# Patient Record
Sex: MALE | Race: WHITE | Employment: FULL TIME | URBAN - METROPOLITAN AREA
[De-identification: names, ages, dates, MRNs, and addresses within clinical notes are randomized per-mention and may not be internally consistent; named-entity substitution may affect disease eponyms.]

---

## 2020-09-10 ENCOUNTER — APPOINTMENT (OUTPATIENT)
Dept: ULTRASOUND IMAGING | Age: 50
DRG: 291 | End: 2020-09-10
Payer: COMMERCIAL

## 2020-09-10 ENCOUNTER — HOSPITAL ENCOUNTER (INPATIENT)
Age: 50
LOS: 3 days | Discharge: HOME | DRG: 291 | End: 2020-09-13
Attending: EMERGENCY MEDICINE | Admitting: INTERNAL MEDICINE
Payer: COMMERCIAL

## 2020-09-10 PROBLEM — N17.9 AKI (ACUTE KIDNEY INJURY) (HCC): Status: ACTIVE | Noted: 2020-09-10

## 2020-09-10 LAB
ADENOVIRUS BY PCR: NOT DETECTED
ALBUMIN SERPL-MCNC: 3.5 G/DL (ref 3.5–5.2)
ALP BLD-CCNC: 48 U/L (ref 40–129)
ALT SERPL-CCNC: 7 U/L (ref 0–40)
AMPHETAMINE SCREEN, URINE: NOT DETECTED
ANION GAP SERPL CALCULATED.3IONS-SCNC: 9 MMOL/L (ref 7–16)
AST SERPL-CCNC: 9 U/L (ref 0–39)
BACTERIA: ABNORMAL /HPF
BARBITURATE SCREEN URINE: NOT DETECTED
BASOPHILS ABSOLUTE: 0.06 E9/L (ref 0–0.2)
BASOPHILS RELATIVE PERCENT: 0.8 % (ref 0–2)
BENZODIAZEPINE SCREEN, URINE: NOT DETECTED
BILIRUB SERPL-MCNC: <0.2 MG/DL (ref 0–1.2)
BILIRUBIN DIRECT: <0.2 MG/DL (ref 0–0.3)
BILIRUBIN URINE: NEGATIVE
BILIRUBIN, INDIRECT: NORMAL MG/DL (ref 0–1)
BLOOD, URINE: ABNORMAL
BORDETELLA PARAPERTUSSIS BY PCR: NOT DETECTED
BORDETELLA PERTUSSIS BY PCR: NOT DETECTED
BUN BLDV-MCNC: 37 MG/DL (ref 6–20)
CALCIUM SERPL-MCNC: 8.7 MG/DL (ref 8.6–10.2)
CANNABINOID SCREEN URINE: NOT DETECTED
CHLAMYDOPHILIA PNEUMONIAE BY PCR: NOT DETECTED
CHLORIDE BLD-SCNC: 111 MMOL/L (ref 98–107)
CHLORIDE URINE RANDOM: 64 MMOL/L
CLARITY: CLEAR
CO2: 18 MMOL/L (ref 22–29)
COCAINE METABOLITE SCREEN URINE: POSITIVE
COLOR: YELLOW
CORONAVIRUS 229E BY PCR: NOT DETECTED
CORONAVIRUS HKU1 BY PCR: NOT DETECTED
CORONAVIRUS NL63 BY PCR: NOT DETECTED
CORONAVIRUS OC43 BY PCR: NOT DETECTED
CREAT SERPL-MCNC: 5.9 MG/DL (ref 0.7–1.2)
EOSINOPHILS ABSOLUTE: 0.41 E9/L (ref 0.05–0.5)
EOSINOPHILS RELATIVE PERCENT: 5.8 % (ref 0–6)
FENTANYL SCREEN, URINE: NOT DETECTED
GFR AFRICAN AMERICAN: 12
GFR NON-AFRICAN AMERICAN: 10 ML/MIN/1.73
GLUCOSE BLD-MCNC: 100 MG/DL (ref 74–99)
GLUCOSE URINE: NEGATIVE MG/DL
HCT VFR BLD CALC: 32.1 % (ref 37–54)
HEMOGLOBIN: 10.3 G/DL (ref 12.5–16.5)
HUMAN METAPNEUMOVIRUS BY PCR: NOT DETECTED
HUMAN RHINOVIRUS/ENTEROVIRUS BY PCR: NOT DETECTED
IMMATURE GRANULOCYTES #: 0.02 E9/L
IMMATURE GRANULOCYTES %: 0.3 % (ref 0–5)
INFLUENZA A BY PCR: NOT DETECTED
INFLUENZA B BY PCR: NOT DETECTED
KETONES, URINE: NEGATIVE MG/DL
LEUKOCYTE ESTERASE, URINE: NEGATIVE
LYMPHOCYTES ABSOLUTE: 1.2 E9/L (ref 1.5–4)
LYMPHOCYTES RELATIVE PERCENT: 16.9 % (ref 20–42)
Lab: ABNORMAL
MCH RBC QN AUTO: 32.6 PG (ref 26–35)
MCHC RBC AUTO-ENTMCNC: 32.1 % (ref 32–34.5)
MCV RBC AUTO: 101.6 FL (ref 80–99.9)
METHADONE SCREEN, URINE: NOT DETECTED
MONOCYTES ABSOLUTE: 0.57 E9/L (ref 0.1–0.95)
MONOCYTES RELATIVE PERCENT: 8.1 % (ref 2–12)
MYCOPLASMA PNEUMONIAE BY PCR: NOT DETECTED
NEUTROPHILS ABSOLUTE: 4.82 E9/L (ref 1.8–7.3)
NEUTROPHILS RELATIVE PERCENT: 68.1 % (ref 43–80)
NITRITE, URINE: NEGATIVE
OPIATE SCREEN URINE: NOT DETECTED
OSMOLALITY URINE: 355 MOSM/KG (ref 300–900)
OSMOLALITY: 305 MOSM/KG (ref 285–310)
OXYCODONE URINE: NOT DETECTED
PARAINFLUENZA VIRUS 1 BY PCR: NOT DETECTED
PARAINFLUENZA VIRUS 2 BY PCR: NOT DETECTED
PARAINFLUENZA VIRUS 3 BY PCR: NOT DETECTED
PARAINFLUENZA VIRUS 4 BY PCR: NOT DETECTED
PDW BLD-RTO: 13.7 FL (ref 11.5–15)
PH UA: 6 (ref 5–9)
PHENCYCLIDINE SCREEN URINE: NOT DETECTED
PLATELET # BLD: 312 E9/L (ref 130–450)
PMV BLD AUTO: 10.3 FL (ref 7–12)
POTASSIUM SERPL-SCNC: 5.7 MMOL/L (ref 3.5–5)
POTASSIUM, UR: 42.8 MMOL/L
PROTEIN UA: >=300 MG/DL
RBC # BLD: 3.16 E12/L (ref 3.8–5.8)
RBC UA: ABNORMAL /HPF (ref 0–2)
RESPIRATORY SYNCYTIAL VIRUS BY PCR: NOT DETECTED
SODIUM BLD-SCNC: 138 MMOL/L (ref 132–146)
SODIUM URINE: 60 MMOL/L
SPECIFIC GRAVITY UA: 1.02 (ref 1–1.03)
TOTAL CK: 100 U/L (ref 20–200)
TOTAL PROTEIN: 6.6 G/DL (ref 6.4–8.3)
UROBILINOGEN, URINE: 0.2 E.U./DL
WBC # BLD: 7.1 E9/L (ref 4.5–11.5)
WBC UA: ABNORMAL /HPF (ref 0–5)

## 2020-09-10 PROCEDURE — 87088 URINE BACTERIA CULTURE: CPT

## 2020-09-10 PROCEDURE — 6360000002 HC RX W HCPCS: Performed by: EMERGENCY MEDICINE

## 2020-09-10 PROCEDURE — 0100U HC RESPIRPTHGN MULT REV TRANS & AMP PRB TECH 21 TRGT: CPT

## 2020-09-10 PROCEDURE — 84133 ASSAY OF URINE POTASSIUM: CPT

## 2020-09-10 PROCEDURE — 83935 ASSAY OF URINE OSMOLALITY: CPT

## 2020-09-10 PROCEDURE — 1200000000 HC SEMI PRIVATE

## 2020-09-10 PROCEDURE — 6370000000 HC RX 637 (ALT 250 FOR IP): Performed by: EMERGENCY MEDICINE

## 2020-09-10 PROCEDURE — 80307 DRUG TEST PRSMV CHEM ANLYZR: CPT

## 2020-09-10 PROCEDURE — 83930 ASSAY OF BLOOD OSMOLALITY: CPT

## 2020-09-10 PROCEDURE — 84300 ASSAY OF URINE SODIUM: CPT

## 2020-09-10 PROCEDURE — 82550 ASSAY OF CK (CPK): CPT

## 2020-09-10 PROCEDURE — 87040 BLOOD CULTURE FOR BACTERIA: CPT

## 2020-09-10 PROCEDURE — 83835 ASSAY OF METANEPHRINES: CPT

## 2020-09-10 PROCEDURE — 80048 BASIC METABOLIC PNL TOTAL CA: CPT

## 2020-09-10 PROCEDURE — 6360000002 HC RX W HCPCS: Performed by: INTERNAL MEDICINE

## 2020-09-10 PROCEDURE — 99284 EMERGENCY DEPT VISIT MOD MDM: CPT

## 2020-09-10 PROCEDURE — 6370000000 HC RX 637 (ALT 250 FOR IP): Performed by: INTERNAL MEDICINE

## 2020-09-10 PROCEDURE — 85025 COMPLETE CBC W/AUTO DIFF WBC: CPT

## 2020-09-10 PROCEDURE — 82436 ASSAY OF URINE CHLORIDE: CPT

## 2020-09-10 PROCEDURE — 76770 US EXAM ABDO BACK WALL COMP: CPT

## 2020-09-10 PROCEDURE — 88112 CYTOPATH CELL ENHANCE TECH: CPT

## 2020-09-10 PROCEDURE — 2580000003 HC RX 258: Performed by: EMERGENCY MEDICINE

## 2020-09-10 PROCEDURE — 93005 ELECTROCARDIOGRAM TRACING: CPT | Performed by: EMERGENCY MEDICINE

## 2020-09-10 PROCEDURE — 81001 URINALYSIS AUTO W/SCOPE: CPT

## 2020-09-10 PROCEDURE — 96365 THER/PROPH/DIAG IV INF INIT: CPT

## 2020-09-10 PROCEDURE — 80076 HEPATIC FUNCTION PANEL: CPT

## 2020-09-10 PROCEDURE — 2580000003 HC RX 258: Performed by: INTERNAL MEDICINE

## 2020-09-10 RX ORDER — SODIUM BICARBONATE 650 MG/1
1300 TABLET ORAL 2 TIMES DAILY
Status: DISCONTINUED | OUTPATIENT
Start: 2020-09-10 | End: 2020-09-13 | Stop reason: HOSPADM

## 2020-09-10 RX ORDER — HEPARIN SODIUM 10000 [USP'U]/ML
5000 INJECTION, SOLUTION INTRAVENOUS; SUBCUTANEOUS EVERY 8 HOURS
Status: DISCONTINUED | OUTPATIENT
Start: 2020-09-10 | End: 2020-09-12 | Stop reason: SDUPTHER

## 2020-09-10 RX ORDER — SODIUM CHLORIDE 0.9 % (FLUSH) 0.9 %
10 SYRINGE (ML) INJECTION PRN
Status: DISCONTINUED | OUTPATIENT
Start: 2020-09-10 | End: 2020-09-13 | Stop reason: HOSPADM

## 2020-09-10 RX ORDER — CARVEDILOL 6.25 MG/1
12.5 TABLET ORAL 2 TIMES DAILY WITH MEALS
Status: DISCONTINUED | OUTPATIENT
Start: 2020-09-10 | End: 2020-09-12

## 2020-09-10 RX ORDER — HYDRALAZINE HYDROCHLORIDE 25 MG/1
25 TABLET, FILM COATED ORAL EVERY 8 HOURS SCHEDULED
Status: DISCONTINUED | OUTPATIENT
Start: 2020-09-10 | End: 2020-09-12

## 2020-09-10 RX ORDER — HYDROCHLOROTHIAZIDE 12.5 MG/1
12.5 CAPSULE, GELATIN COATED ORAL DAILY
Status: ON HOLD | COMMUNITY
End: 2020-09-13 | Stop reason: HOSPADM

## 2020-09-10 RX ORDER — ACETAMINOPHEN 325 MG/1
650 TABLET ORAL EVERY 4 HOURS PRN
Status: DISCONTINUED | OUTPATIENT
Start: 2020-09-10 | End: 2020-09-13 | Stop reason: HOSPADM

## 2020-09-10 RX ORDER — POTASSIUM CHLORIDE 750 MG/1
10 TABLET, EXTENDED RELEASE ORAL 2 TIMES DAILY
Status: ON HOLD | COMMUNITY
End: 2020-09-13 | Stop reason: HOSPADM

## 2020-09-10 RX ORDER — SODIUM CHLORIDE 9 MG/ML
INJECTION, SOLUTION INTRAVENOUS CONTINUOUS
Status: DISCONTINUED | OUTPATIENT
Start: 2020-09-10 | End: 2020-09-11

## 2020-09-10 RX ORDER — CLONIDINE HYDROCHLORIDE 0.1 MG/1
0.1 TABLET ORAL ONCE
Status: COMPLETED | OUTPATIENT
Start: 2020-09-10 | End: 2020-09-10

## 2020-09-10 RX ORDER — LABETALOL HYDROCHLORIDE 5 MG/ML
10 INJECTION, SOLUTION INTRAVENOUS EVERY 6 HOURS PRN
Status: DISCONTINUED | OUTPATIENT
Start: 2020-09-10 | End: 2020-09-13 | Stop reason: HOSPADM

## 2020-09-10 RX ORDER — PANTOPRAZOLE SODIUM 40 MG/1
40 TABLET, DELAYED RELEASE ORAL
Status: DISCONTINUED | OUTPATIENT
Start: 2020-09-11 | End: 2020-09-13 | Stop reason: HOSPADM

## 2020-09-10 RX ADMIN — CALCIUM GLUCONATE 2 G: 98 INJECTION, SOLUTION INTRAVENOUS at 14:47

## 2020-09-10 RX ADMIN — HEPARIN SODIUM 5000 UNITS: 10000 INJECTION INTRAVENOUS; SUBCUTANEOUS at 17:34

## 2020-09-10 RX ADMIN — CARVEDILOL 12.5 MG: 6.25 TABLET, FILM COATED ORAL at 17:57

## 2020-09-10 RX ADMIN — SODIUM BICARBONATE 1300 MG: 650 TABLET ORAL at 21:10

## 2020-09-10 RX ADMIN — HYDRALAZINE HYDROCHLORIDE 25 MG: 25 TABLET, FILM COATED ORAL at 21:10

## 2020-09-10 RX ADMIN — CLONIDINE HYDROCHLORIDE 0.1 MG: 0.1 TABLET ORAL at 14:17

## 2020-09-10 RX ADMIN — Medication 10 ML: at 16:37

## 2020-09-10 RX ADMIN — SODIUM CHLORIDE: 9 INJECTION, SOLUTION INTRAVENOUS at 16:37

## 2020-09-10 ASSESSMENT — ENCOUNTER SYMPTOMS
SORE THROAT: 0
SINUS PRESSURE: 0
DIARRHEA: 0
BACK PAIN: 0
EYE PAIN: 0
WHEEZING: 0
VOMITING: 0
NAUSEA: 0
EYE REDNESS: 0
ABDOMINAL PAIN: 0
SHORTNESS OF BREATH: 0
COUGH: 0
EYE DISCHARGE: 0

## 2020-09-10 ASSESSMENT — PAIN SCALES - GENERAL: PAINLEVEL_OUTOF10: 0

## 2020-09-10 NOTE — PROGRESS NOTES
Called Lab to get 24 hour urine container they said we are out of them. I asked how we are to collect the 24 hour urine then and she said we wont be able to until we get more in stock. Brynn Willard

## 2020-09-10 NOTE — PROGRESS NOTES
Department of Internal Medicine  Nephrology Attending Progress Note        SUBJECTIVE:  Mr Kole Sneed is a 56-year-old gentleman who follows with Dr. Pura Morris who last year at his office visit was noted to have some elevation in blood pressure and attempted by going on a vegetarian diet and increasing his exercise regiment to improve his blood pressure management. He did not do any follow-up blood pressure readings as an outpatient and was seen yesterday by Dr. Pura Morris for his routine physical, and blood work revealed abnormal renal function. He was called by Dr. Pura Morris to come to the hospital for further evaluation. Patient does admit to frequency and nocturia for the last 4 months. Does complain of some occasional swelling in his lower extremities at the end of the day but he blames this on the tightness of his boots. He has been having some hand cramps. Which has been taking some as needed Flexeril Vicodin ibuprofen and Motrin which he gets from his friends. . He denies any history of blood in his urine there is no history of kidney stone disease. He does state he was told 5 years ago that he had some enlargement of his prostate but on  his rectal exam by Dr. Pura Morris he did not feel this had worsened. PMH  Elevated cholesterol  Elevated blood pressure  BPH    Review of systems  Last tattoo 1 year ago  Does admit to tobacco history now smoking only cigars usually 2-3 a month  Following a vegetarian diet unclear if is getting adequate protein intake  Denies any history of thyroid problems, there is no history of asthma chronic obstructive airway disease, denies any history of cardiac arrhythmias or coronary disease. He denies any history of peptic ulcer disease gastroesophageal reflux disease is been no history of hepatitis, there is no history of blood clots.         Physical Exam:    Vitals:    09/10/20 1615   BP: (!) 180/102   Pulse: 74   Resp: 18   Temp: 98.3 °F (36.8 °C)   SpO2: 98%       I/O last 24 hours:  Intake/Output just admitted    Weight: 205    General Appearance:  awake, alert, oriented, in no acute distress  Skin: Multiple tattoos no rashes  Neck:  neck- supple, no mass, non-tender and no bruits  Lungs:  Normal expansion. Clear to auscultation. No rales, rhonchi, or wheezing. Heart: Regular rhythm no murmur no rub     Abdominal: Abdomen soft, non-tender. BS normal. No masses,  No organomegaly  Extremities: Extremities warm to touch, pink, with no edema. Peripheral Pulses:  +2    DATA:    CBC with Differential:    Lab Results   Component Value Date    WBC 7.1 09/10/2020    RBC 3.16 09/10/2020    HGB 10.3 09/10/2020    HCT 32.1 09/10/2020     09/10/2020    .6 09/10/2020    MCH 32.6 09/10/2020    MCHC 32.1 09/10/2020    RDW 13.7 09/10/2020    LYMPHOPCT 16.9 09/10/2020    MONOPCT 8.1 09/10/2020    BASOPCT 0.8 09/10/2020    MONOSABS 0.57 09/10/2020    LYMPHSABS 1.20 09/10/2020    EOSABS 0.41 09/10/2020    BASOSABS 0.06 09/10/2020     CMP:    Lab Results   Component Value Date     09/10/2020    K 5.7 09/10/2020     09/10/2020    CO2 18 09/10/2020    BUN 37 09/10/2020    CREATININE 5.9 09/10/2020    GFRAA 12 09/10/2020    LABGLOM 10 09/10/2020    GLUCOSE 100 09/10/2020    PROT 6.6 09/10/2020    LABALBU 3.5 09/10/2020    CALCIUM 8.7 09/10/2020    BILITOT <0.2 09/10/2020    ALKPHOS 48 09/10/2020    AST 9 09/10/2020    ALT 7 09/10/2020     U/A:    Lab Results   Component Value Date    COLORU Yellow 09/10/2020    PROTEINU >=300 09/10/2020    PHUR 6.0 09/10/2020    WBCUA 1-3 09/10/2020    RBCUA 1-3 09/10/2020    BACTERIA FEW 09/10/2020    CLARITYU Clear 09/10/2020    SPECGRAV 1.025 09/10/2020    LEUKOCYTESUR Negative 09/10/2020    UROBILINOGEN 0.2 09/10/2020    BILIRUBINUR Negative 09/10/2020    BLOODU SMALL 09/10/2020    GLUCOSEU Negative 09/10/2020     Renal ultrasound 11.9 right kidney left kidney 9.3 cortical thinning noted.       heparin (porcine)  5,000 Units Subcutaneous Q8H  [START ON 9/11/2020] pantoprazole  40 mg Oral QAM AC      sodium chloride 100 mL/hr at 09/10/20 1637     sodium chloride flush, acetaminophen, labetalol    IMPRESSION/RECOMMENDATIONS:      CKD 5 UA suggesting proteinuria, patient denies any history compatible with primary nephrotic syndrome. This may be secondary to poorly controlled hypertension, suspicious for renal artery stenosis of left kidney will need duplex studies to screen for renal artery stenosis. Patient not uremic although some of his weight loss may be reflecting decreased appetite  Hypertension we will initiate medications in the form of hydralazine and  Hyperkalemia mild  Hypocalcemia suspect this is causing his cramping await PTH  Carvedilol  Anemia of chronic kidney disease?   Indices slightly macrocytic labs of already been ordered  Metabolic acidosis will initiate some bicarbonate therapy        Jami Crowe MD  9/10/2020 5:27 PM

## 2020-09-11 ENCOUNTER — APPOINTMENT (OUTPATIENT)
Dept: ULTRASOUND IMAGING | Age: 50
DRG: 291 | End: 2020-09-11
Payer: COMMERCIAL

## 2020-09-11 PROBLEM — I50.31 ACUTE HEART FAILURE WITH PRESERVED EJECTION FRACTION (HCC): Status: ACTIVE | Noted: 2020-09-11

## 2020-09-11 PROBLEM — R80.9 PROTEINURIA: Status: ACTIVE | Noted: 2020-09-11

## 2020-09-11 PROBLEM — F11.21 OPIOID DEPENDENCE IN REMISSION (HCC): Status: ACTIVE | Noted: 2020-09-11

## 2020-09-11 PROBLEM — N52.9 ERECTILE DYSFUNCTION: Status: ACTIVE | Noted: 2020-09-11

## 2020-09-11 PROBLEM — I71.20 THORACIC AORTIC ANEURYSM WITHOUT RUPTURE: Status: ACTIVE | Noted: 2020-09-11

## 2020-09-11 PROBLEM — N40.0 BPH (BENIGN PROSTATIC HYPERPLASIA): Status: ACTIVE | Noted: 2020-09-11

## 2020-09-11 PROBLEM — J30.2 SEASONAL ALLERGIC RHINITIS: Status: ACTIVE | Noted: 2020-09-11

## 2020-09-11 PROBLEM — M10.9 GOUT: Status: ACTIVE | Noted: 2020-09-11

## 2020-09-11 PROBLEM — E87.5 HYPERKALEMIA: Status: ACTIVE | Noted: 2020-09-11

## 2020-09-11 LAB
ALBUMIN SERPL-MCNC: 3.5 G/DL (ref 3.5–5.2)
ALP BLD-CCNC: 48 U/L (ref 40–129)
ALT SERPL-CCNC: 8 U/L (ref 0–40)
ANION GAP SERPL CALCULATED.3IONS-SCNC: 9 MMOL/L (ref 7–16)
AST SERPL-CCNC: 9 U/L (ref 0–39)
BASOPHILS ABSOLUTE: 0.09 E9/L (ref 0–0.2)
BASOPHILS RELATIVE PERCENT: 1.4 % (ref 0–2)
BILIRUB SERPL-MCNC: 0.3 MG/DL (ref 0–1.2)
BILIRUBIN DIRECT: <0.2 MG/DL (ref 0–0.3)
BILIRUBIN, INDIRECT: NORMAL MG/DL (ref 0–1)
BUN BLDV-MCNC: 34 MG/DL (ref 6–20)
C-REACTIVE PROTEIN: 0.1 MG/DL (ref 0–0.4)
CALCIUM IONIZED: 1.28 MMOL/L (ref 1.15–1.33)
CALCIUM SERPL-MCNC: 8.7 MG/DL (ref 8.6–10.2)
CHLORIDE BLD-SCNC: 111 MMOL/L (ref 98–107)
CHOLESTEROL, TOTAL: 198 MG/DL (ref 0–199)
CO2: 18 MMOL/L (ref 22–29)
CREAT SERPL-MCNC: 5.8 MG/DL (ref 0.7–1.2)
CREATININE URINE: 68 MG/DL (ref 40–278)
EKG ATRIAL RATE: 88 BPM
EKG P AXIS: 48 DEGREES
EKG P-R INTERVAL: 154 MS
EKG Q-T INTERVAL: 378 MS
EKG QRS DURATION: 86 MS
EKG QTC CALCULATION (BAZETT): 457 MS
EKG R AXIS: 11 DEGREES
EKG T AXIS: 34 DEGREES
EKG VENTRICULAR RATE: 88 BPM
EOSINOPHILS ABSOLUTE: 0.41 E9/L (ref 0.05–0.5)
EOSINOPHILS RELATIVE PERCENT: 6.2 % (ref 0–6)
FERRITIN: 167 NG/ML
FOLATE: 13.6 NG/ML (ref 4.8–24.2)
GFR AFRICAN AMERICAN: 13
GFR NON-AFRICAN AMERICAN: 10 ML/MIN/1.73
GLUCOSE BLD-MCNC: 104 MG/DL (ref 74–99)
HBA1C MFR BLD: 4.9 % (ref 4–5.6)
HCT VFR BLD CALC: 31.8 % (ref 37–54)
HDLC SERPL-MCNC: 46 MG/DL
HEMOGLOBIN: 10.4 G/DL (ref 12.5–16.5)
IMMATURE GRANULOCYTES #: 0.02 E9/L
IMMATURE GRANULOCYTES %: 0.3 % (ref 0–5)
IRON SATURATION: 40 % (ref 20–55)
IRON: 92 MCG/DL (ref 59–158)
LACTATE DEHYDROGENASE: 153 U/L (ref 135–225)
LACTIC ACID, SEPSIS: 0.6 MMOL/L (ref 0.5–1.9)
LDL CHOLESTEROL CALCULATED: 108 MG/DL (ref 0–99)
LV EF: 60 %
LVEF MODALITY: NORMAL
LYMPHOCYTES ABSOLUTE: 1.17 E9/L (ref 1.5–4)
LYMPHOCYTES RELATIVE PERCENT: 17.8 % (ref 20–42)
MAGNESIUM: 2.1 MG/DL (ref 1.6–2.6)
MCH RBC QN AUTO: 32.8 PG (ref 26–35)
MCHC RBC AUTO-ENTMCNC: 32.7 % (ref 32–34.5)
MCV RBC AUTO: 100.3 FL (ref 80–99.9)
MONOCYTES ABSOLUTE: 0.38 E9/L (ref 0.1–0.95)
MONOCYTES RELATIVE PERCENT: 5.8 % (ref 2–12)
NEUTROPHILS ABSOLUTE: 4.5 E9/L (ref 1.8–7.3)
NEUTROPHILS RELATIVE PERCENT: 68.5 % (ref 43–80)
OSMOLALITY: 302 MOSM/KG (ref 285–310)
PARATHYROID HORMONE INTACT: 167 PG/ML (ref 15–65)
PDW BLD-RTO: 13.6 FL (ref 11.5–15)
PHOSPHORUS: 4.8 MG/DL (ref 2.5–4.5)
PLATELET # BLD: 309 E9/L (ref 130–450)
PMV BLD AUTO: 10.5 FL (ref 7–12)
POTASSIUM SERPL-SCNC: 6 MMOL/L (ref 3.5–5)
PRO-BNP: 1683 PG/ML (ref 0–125)
PROCALCITONIN: 0.11 NG/ML (ref 0–0.08)
PROSTATE SPECIFIC ANTIGEN: 0.49 NG/ML (ref 0–4)
PROTEIN PROTEIN: 275 MG/DL (ref 0–12)
PROTEIN/CREAT RATIO: 4
PROTEIN/CREAT RATIO: 4 (ref 0–0.2)
RBC # BLD: 3.17 E12/L (ref 3.8–5.8)
RHEUMATOID FACTOR: <10 IU/ML (ref 0–13)
SEDIMENTATION RATE, ERYTHROCYTE: 26 MM/HR (ref 0–15)
SODIUM BLD-SCNC: 138 MMOL/L (ref 132–146)
T4 FREE: 1.01 NG/DL (ref 0.93–1.7)
TOTAL IRON BINDING CAPACITY: 231 MCG/DL (ref 250–450)
TOTAL PROTEIN: 6.6 G/DL (ref 6.4–8.3)
TRIGL SERPL-MCNC: 218 MG/DL (ref 0–149)
TSH SERPL DL<=0.05 MIU/L-ACNC: 2.4 UIU/ML (ref 0.27–4.2)
URIC ACID, SERUM: 7.1 MG/DL (ref 3.4–7)
VITAMIN B-12: 355 PG/ML (ref 211–946)
VITAMIN D 25-HYDROXY: 15 NG/ML (ref 30–100)
VLDLC SERPL CALC-MCNC: 44 MG/DL
WBC # BLD: 6.6 E9/L (ref 4.5–11.5)

## 2020-09-11 PROCEDURE — 83880 ASSAY OF NATRIURETIC PEPTIDE: CPT

## 2020-09-11 PROCEDURE — 82746 ASSAY OF FOLIC ACID SERUM: CPT

## 2020-09-11 PROCEDURE — 82306 VITAMIN D 25 HYDROXY: CPT

## 2020-09-11 PROCEDURE — 84443 ASSAY THYROID STIM HORMONE: CPT

## 2020-09-11 PROCEDURE — 83550 IRON BINDING TEST: CPT

## 2020-09-11 PROCEDURE — 85025 COMPLETE CBC W/AUTO DIFF WBC: CPT

## 2020-09-11 PROCEDURE — 83540 ASSAY OF IRON: CPT

## 2020-09-11 PROCEDURE — 82570 ASSAY OF URINE CREATININE: CPT

## 2020-09-11 PROCEDURE — 84550 ASSAY OF BLOOD/URIC ACID: CPT

## 2020-09-11 PROCEDURE — 6370000000 HC RX 637 (ALT 250 FOR IP): Performed by: INTERNAL MEDICINE

## 2020-09-11 PROCEDURE — 83036 HEMOGLOBIN GLYCOSYLATED A1C: CPT

## 2020-09-11 PROCEDURE — 84156 ASSAY OF PROTEIN URINE: CPT

## 2020-09-11 PROCEDURE — 82330 ASSAY OF CALCIUM: CPT

## 2020-09-11 PROCEDURE — 86200 CCP ANTIBODY: CPT

## 2020-09-11 PROCEDURE — 85651 RBC SED RATE NONAUTOMATED: CPT

## 2020-09-11 PROCEDURE — 80076 HEPATIC FUNCTION PANEL: CPT

## 2020-09-11 PROCEDURE — 82607 VITAMIN B-12: CPT

## 2020-09-11 PROCEDURE — 83615 LACTATE (LD) (LDH) ENZYME: CPT

## 2020-09-11 PROCEDURE — 83970 ASSAY OF PARATHORMONE: CPT

## 2020-09-11 PROCEDURE — 84153 ASSAY OF PSA TOTAL: CPT

## 2020-09-11 PROCEDURE — 2580000003 HC RX 258: Performed by: INTERNAL MEDICINE

## 2020-09-11 PROCEDURE — 93010 ELECTROCARDIOGRAM REPORT: CPT | Performed by: INTERNAL MEDICINE

## 2020-09-11 PROCEDURE — 83930 ASSAY OF BLOOD OSMOLALITY: CPT

## 2020-09-11 PROCEDURE — 84145 PROCALCITONIN (PCT): CPT

## 2020-09-11 PROCEDURE — 6360000002 HC RX W HCPCS: Performed by: INTERNAL MEDICINE

## 2020-09-11 PROCEDURE — 83735 ASSAY OF MAGNESIUM: CPT

## 2020-09-11 PROCEDURE — 82784 ASSAY IGA/IGD/IGG/IGM EACH: CPT

## 2020-09-11 PROCEDURE — 76770 US EXAM ABDO BACK WALL COMP: CPT

## 2020-09-11 PROCEDURE — 1200000000 HC SEMI PRIVATE

## 2020-09-11 PROCEDURE — 82085 ASSAY OF ALDOLASE: CPT

## 2020-09-11 PROCEDURE — 86140 C-REACTIVE PROTEIN: CPT

## 2020-09-11 PROCEDURE — 86038 ANTINUCLEAR ANTIBODIES: CPT

## 2020-09-11 PROCEDURE — 93306 TTE W/DOPPLER COMPLETE: CPT

## 2020-09-11 PROCEDURE — 83605 ASSAY OF LACTIC ACID: CPT

## 2020-09-11 PROCEDURE — 80048 BASIC METABOLIC PNL TOTAL CA: CPT

## 2020-09-11 PROCEDURE — 84100 ASSAY OF PHOSPHORUS: CPT

## 2020-09-11 PROCEDURE — 86431 RHEUMATOID FACTOR QUANT: CPT

## 2020-09-11 PROCEDURE — 86235 NUCLEAR ANTIGEN ANTIBODY: CPT

## 2020-09-11 PROCEDURE — 6370000000 HC RX 637 (ALT 250 FOR IP): Performed by: STUDENT IN AN ORGANIZED HEALTH CARE EDUCATION/TRAINING PROGRAM

## 2020-09-11 PROCEDURE — 80061 LIPID PANEL: CPT

## 2020-09-11 PROCEDURE — 84439 ASSAY OF FREE THYROXINE: CPT

## 2020-09-11 PROCEDURE — 82728 ASSAY OF FERRITIN: CPT

## 2020-09-11 PROCEDURE — 84165 PROTEIN E-PHORESIS SERUM: CPT

## 2020-09-11 PROCEDURE — 36415 COLL VENOUS BLD VENIPUNCTURE: CPT

## 2020-09-11 PROCEDURE — 80074 ACUTE HEPATITIS PANEL: CPT

## 2020-09-11 PROCEDURE — 86255 FLUORESCENT ANTIBODY SCREEN: CPT

## 2020-09-11 PROCEDURE — 51798 US URINE CAPACITY MEASURE: CPT

## 2020-09-11 RX ORDER — SODIUM POLYSTYRENE SULFONATE 15 G/60ML
15 SUSPENSION ORAL; RECTAL ONCE
Status: COMPLETED | OUTPATIENT
Start: 2020-09-11 | End: 2020-09-11

## 2020-09-11 RX ADMIN — SODIUM BICARBONATE 1300 MG: 650 TABLET ORAL at 09:49

## 2020-09-11 RX ADMIN — SODIUM POLYSTYRENE SULFONATE 15 G: 15 SUSPENSION ORAL; RECTAL at 15:15

## 2020-09-11 RX ADMIN — HEPARIN SODIUM 5000 UNITS: 10000 INJECTION INTRAVENOUS; SUBCUTANEOUS at 00:32

## 2020-09-11 RX ADMIN — HEPARIN SODIUM 5000 UNITS: 10000 INJECTION INTRAVENOUS; SUBCUTANEOUS at 16:49

## 2020-09-11 RX ADMIN — SODIUM BICARBONATE 1300 MG: 650 TABLET ORAL at 20:56

## 2020-09-11 RX ADMIN — HEPARIN SODIUM 5000 UNITS: 10000 INJECTION INTRAVENOUS; SUBCUTANEOUS at 09:49

## 2020-09-11 RX ADMIN — CARVEDILOL 12.5 MG: 6.25 TABLET, FILM COATED ORAL at 09:49

## 2020-09-11 RX ADMIN — CARVEDILOL 12.5 MG: 6.25 TABLET, FILM COATED ORAL at 16:49

## 2020-09-11 RX ADMIN — Medication 10 ML: at 06:06

## 2020-09-11 RX ADMIN — PANTOPRAZOLE SODIUM 40 MG: 40 TABLET, DELAYED RELEASE ORAL at 06:06

## 2020-09-11 RX ADMIN — HYDRALAZINE HYDROCHLORIDE 25 MG: 25 TABLET, FILM COATED ORAL at 06:05

## 2020-09-11 RX ADMIN — HYDRALAZINE HYDROCHLORIDE 25 MG: 25 TABLET, FILM COATED ORAL at 15:15

## 2020-09-11 RX ADMIN — HYDRALAZINE HYDROCHLORIDE 25 MG: 25 TABLET, FILM COATED ORAL at 21:00

## 2020-09-11 ASSESSMENT — PAIN SCALES - GENERAL
PAINLEVEL_OUTOF10: 0
PAINLEVEL_OUTOF10: 0

## 2020-09-11 NOTE — PROGRESS NOTES
Department of Internal Medicine  Nephrology Attending Progress Note        SUBJECTIVE:  Mr Deloras Dakins resting in bed today. Blood pressures have improved does admit to using some synthetic drugs at a recent party. Also admits to using marijuana products. He does give a history of using some creatinine back in 2013 but none recently no use of anabolic steroids.        PMH  Elevated cholesterol  Elevated blood pressure  BPH      Physical Exam:    Vitals:    09/11/20 0948   BP: (!) 152/92   Pulse: 68   Resp:    Temp:    SpO2:        I/O last 24 hours:  Intake/Output inaccurate    Weight: Not done    General Appearance:  awake, alert, oriented, in no acute distress  Skin: Multiple tattoos no rashes  Neck:  neck- supple, no mass, non-tender and no bruits  Lungs:  Normal expansion. Clear to auscultation. No rales, rhonchi, or wheezing. Heart: Regular rhythm no murmur no rub     Abdominal: Abdomen soft, non-tender. BS normal. No masses,  No organomegaly  Extremities: Extremities warm to touch, pink, with no edema.   Peripheral Pulses:  +2    DATA:    CBC with Differential:    Lab Results   Component Value Date    WBC 6.6 09/11/2020    RBC 3.17 09/11/2020    HGB 10.4 09/11/2020    HCT 31.8 09/11/2020     09/11/2020    .3 09/11/2020    MCH 32.8 09/11/2020    MCHC 32.7 09/11/2020    RDW 13.6 09/11/2020    LYMPHOPCT 17.8 09/11/2020    MONOPCT 5.8 09/11/2020    BASOPCT 1.4 09/11/2020    MONOSABS 0.38 09/11/2020    LYMPHSABS 1.17 09/11/2020    EOSABS 0.41 09/11/2020    BASOSABS 0.09 09/11/2020     BMP:    Lab Results   Component Value Date     09/11/2020    K 6.0 09/11/2020     09/11/2020    CO2 18 09/11/2020    BUN 34 09/11/2020    LABALBU 3.5 09/11/2020    CREATININE 5.8 09/11/2020    CALCIUM 8.7 09/11/2020    GFRAA 13 09/11/2020    LABGLOM 10 09/11/2020    GLUCOSE 104 09/11/2020     Magnesium:    Lab Results   Component Value Date    MG 2.1 09/11/2020     Phosphorus:    Lab Results   Component Value Date    PHOS 4.8 09/11/2020            heparin (porcine)  5,000 Units Subcutaneous Q8H    pantoprazole  40 mg Oral QAM AC    carvedilol  12.5 mg Oral BID WC    hydrALAZINE  25 mg Oral 3 times per day    sodium bicarbonate  1,300 mg Oral BID       sodium chloride flush, acetaminophen, labetalol    IMPRESSION/RECOMMENDATIONS:      CKD 5 not much improvement despite hydration , lab did not have 24 urine containers till just this afternoon . UA suggesting proteinuria,  patient had been instructed about 24-hour urine at [place] he will try to obtain copies   Patient not uremic although some of his weight loss may be reflecting decreased appetite. Discussed the need to view TOPS video, and discussed transplantation  Hypertension improving on carvedilol and hydralazine   Hyperkalemia worsening will give some Kayexalate  Hypocalcemia suspect this is causing his cramping await PTH  Carvedilol  Anemia of chronic kidney disease?   Indices slightly macrocytic labs of already been ordered  Metabolic acidosis will initiate some bicarbonate therapy      Paola Jain MD  9/11/2020 3:05 PM

## 2020-09-11 NOTE — CARE COORDINATION
Social Work / Discharge Planning : SW and PREETHI attempted to see patient. Patient off unit for testing. Patient admitted with abnormal labs. From PCP office. Patient PCP is DR Cheri Jimenez and patient has insurance. Patient independent from home. Plan to return. Patient positive drug screen . AWait Nephrology plan. For any potential needs. SW to follow. Electronically signed by RAE Viramontes on 9/11/20 at 10:09 AM EDT    Addendum : JACKY and PREETHI attempted to meet with patient. In patient room but patient snoring ,sleeping soundly and would not participate. Will re-attempt. Anticipating no needs. JACKY to follow. Electronically signed by RAE Viramontes on 9/11/20 at 12:59 PM EDT      Addendum : JACKY and PREETHI met with patient and explained role as discharge planner/ transition of care. Patient verified plan at discharge is HOME. Patient independent and employed full time and active. Patient PCP is DR Cheri Jimenez and he uses [facility] [address]. AWait Nephro. plan. Admitted with abnormal labs. BUN and Creatine is elevated. SW to follow.  Electronically signed by RAE Viramontes on 9/11/20 at 2:13 PM EDT

## 2020-09-11 NOTE — H&P
History and Physical      CHIEF COMPLAINT: Abnormal labs    History of Present Illness: Unfortunate 57-year-old male patient of Dr. Jermaine Veronica I am asked to admit and follow. Patient was seen for the first time by Dr. Arcelia Bobby of the last few days had laboratory tests performed. He was unsure what lab values were. In the ED sodium 138 potassium 5.7 CO2 18 BUN 37 creatinine 5.9 glucose 100 serum osmolality 305, urine osmolality 355. Hemoglobin 10.3 WBC 7.1. Urinalysis negative except for protein greater than 300. Today potassium 6.0 BUN 34 creatinine 5.8 uric acid 7.1. proBNP 1683  triglycerides 218. --He also complained of mild dysuria which is chronic but worse recently. He thinks he was told he had BPH in the past.  In 2015 he underwent a \"physical\" was advised that his prostate was a size of a grapefruit. Did not follow-up with urology. --History of opioid abuse during his younger years; has been off for at least last 10 years. --He has an ex-smoker  --Patient recalls in 2015 he had a \"corporate physical\" and was sent for a 24-hour urine but he is unsure for what  --Within the last year [facility] was advised his blood pressure was elevated. Rather than taking medications, he decided to become vegetarian. He is noticed that since his vegetarian diet his gout has improved significantly. --Patient has had ED off and on for last few years  --Equivocal hypertension in the 2015 physical  --Mother  age 55 acute MI; father still alive age 79 underwent CABG in his 62s. --History of allergic rhinitis; underwent immunotherapy in his younger years. --Past medical history negative rheumatic fever scarlet fever polio diphtheria cancer diabetes  --Denies any previous recurring pulmonary difficulties pneumonia asthma dyspnea  --Denies any chest pain palpitations; cannot recall any cardiac evaluation  --BPH symptoms as listed above; they seem to come and go.   He has had EXAM:    VS: BP (!) 160/94 Comment: manual  Pulse 65   Temp 98.4 °F (36.9 °C) (Oral)   Resp 18   Ht 5' 11\" (1.803 m)   Wt 205 lb (93 kg)   SpO2 94%   BMI 28.59 kg/m²     General appearance: Alert, Awake, Oriented times 3, no distress  Skin: Warm and dry ; no rashes  Head: Normocephalic. No masses, lesions or tenderness noted  Eyes: Conjunctivae pink, sclera white. PERRL,EOM-I  Ears: External ears normal  Nose/Sinuses: Nares normal. Septum midline. Mucosa normal. No drainage  Oropharynx: Oropharynx clear with no exudate seen  Neck: Supple. No jugular venous distension, lymphadenopathy or thyromegaly Trachea midline  Lungs: Clear to auscultation bilaterally. No rhonchi, crackles or wheezes  Heart: S1 S2  Regular rate and rhythm. No rub or gallop; grade 1/6 to 2/6 systolic murmur second right intercostal space  Abdomen: Soft, non-tender. BS normal. No masses, organomegaly; no rebound or guarding  Extremities: No edema, Peripheral pulses palpable; patient states he does get minimal occasional edema of his ankles in the last 1 to 2 years  Musculoskeletal: Muscular strength appears intact. Neuro:  No focal motor defects ; II-XII grossly intact .  SCOTT equally  Breast: deferred  Rectal: deferred  Genitalia:  deferred    LABS:  CBC:   Lab Results   Component Value Date    WBC 6.6 09/11/2020    RBC 3.17 09/11/2020    HGB 10.4 09/11/2020    HCT 31.8 09/11/2020    .3 09/11/2020    MCH 32.8 09/11/2020    MCHC 32.7 09/11/2020    RDW 13.6 09/11/2020     09/11/2020    MPV 10.5 09/11/2020     CBC with Differential:    Lab Results   Component Value Date    WBC 6.6 09/11/2020    RBC 3.17 09/11/2020    HGB 10.4 09/11/2020    HCT 31.8 09/11/2020     09/11/2020    .3 09/11/2020    MCH 32.8 09/11/2020    MCHC 32.7 09/11/2020    RDW 13.6 09/11/2020    LYMPHOPCT 17.8 09/11/2020    MONOPCT 5.8 09/11/2020    BASOPCT 1.4 09/11/2020    MONOSABS 0.38 09/11/2020    LYMPHSABS 1.17 09/11/2020    EOSABS 0.41 09/11/2020    BASOSABS 0.09 09/11/2020     Hemoglobin/Hematocrit:    Lab Results   Component Value Date    HGB 10.4 09/11/2020    HCT 31.8 09/11/2020     CMP:    Lab Results   Component Value Date     09/11/2020    K 6.0 09/11/2020     09/11/2020    CO2 18 09/11/2020    BUN 34 09/11/2020    CREATININE 5.8 09/11/2020    GFRAA 13 09/11/2020    LABGLOM 10 09/11/2020    GLUCOSE 104 09/11/2020    PROT 6.6 09/11/2020    LABALBU 3.5 09/11/2020    CALCIUM 8.7 09/11/2020    BILITOT 0.3 09/11/2020    ALKPHOS 48 09/11/2020    AST 9 09/11/2020    ALT 8 09/11/2020     BMP:    Lab Results   Component Value Date     09/11/2020    K 6.0 09/11/2020     09/11/2020    CO2 18 09/11/2020    BUN 34 09/11/2020    LABALBU 3.5 09/11/2020    CREATININE 5.8 09/11/2020    CALCIUM 8.7 09/11/2020    GFRAA 13 09/11/2020    LABGLOM 10 09/11/2020    GLUCOSE 104 09/11/2020     Hepatic Function Panel:    Lab Results   Component Value Date    ALKPHOS 48 09/11/2020    ALT 8 09/11/2020    AST 9 09/11/2020    PROT 6.6 09/11/2020    BILITOT 0.3 09/11/2020    BILIDIR <0.2 09/11/2020    IBILI see below 09/11/2020    LABALBU 3.5 09/11/2020     Ionized Calcium:  No results found for: IONCA  Magnesium:    Lab Results   Component Value Date    MG 2.1 09/11/2020     Phosphorus:    Lab Results   Component Value Date    PHOS 4.8 09/11/2020     Uric Acid:    Lab Results   Component Value Date    LABURIC 7.1 09/11/2020     PT/INR:  No results found for: PROTIME, INR  Warfarin PT/INR:  No components found for: PTPATWAR, PTINRWAR  U/A:    Lab Results   Component Value Date    COLORU Yellow 09/10/2020    PROTEINU >=300 09/10/2020    PHUR 6.0 09/10/2020    WBCUA 1-3 09/10/2020    RBCUA 1-3 09/10/2020    BACTERIA FEW 09/10/2020    CLARITYU Clear 09/10/2020    SPECGRAV 1.025 09/10/2020    LEUKOCYTESUR Negative 09/10/2020    UROBILINOGEN 0.2 09/10/2020    BILIRUBINUR Negative 09/10/2020    BLOODU SMALL 09/10/2020    GLUCOSEU Negative 09/10/2020 HgBA1c:    Lab Results   Component Value Date    LABA1C 4.9 09/11/2020     FLP:    Lab Results   Component Value Date    TRIG 218 09/11/2020    HDL 46 09/11/2020    LDLCALC 108 09/11/2020    LABVLDL 44 09/11/2020     TSH:    Lab Results   Component Value Date    TSH 2.400 09/11/2020     VITAMIN B12: No components found for: B12  FOLATE:  No results found for: FOLATE  PSA:   Lab Results   Component Value Date    PSA 0.49 09/11/2020       RADIOLOGY:  US RETROPERITONEAL COMPLETE   Final Result      Increased echogenicity of the renal cortices compatible with renal   medical disease.       US RETROPERITONEAL SONIDO    (Results Pending)       ASSESSMENT:      Active Hospital Problems    Diagnosis    Mild aortic sclerosis ([address] [I70.0]    Erectile dysfunction [N52.9]    Thoracic aortic aneurysm without rupture ([address] [I71.2]    Hyperkalemia [E87.5]    BPH (benign prostatic hyperplasia) [N40.0]    Gout [M10.9]    Opioid dependence in remission ([address] [F11.21]    Seasonal allergic rhinitis [J30.2]    Proteinuria [R80.9]    Acute heart failure with preserved ejection fraction (HCC) [I50.31]    Asymmetric septal hypertrophy (HCC) [I42.2]    Hyperlipidemia [E78.5]    Hypertension [I10]    CONY (acute kidney injury) ([address] [N17.9]       PLAN:  Medications discussed with patient  GI prophylaxis  DVT prophylaxis  Consultants notes reviewed  Nephrology consult appreciated; had a long discussion with nephrology regarding same  Renal ultrasound for renal artery stenosis  Acute hepatitis panel  JOSE M rheumatoid factor  Anti-scleroderma PSA  24-hour urine collection for protein  2D echo has been completed  Post void residual bladder scan  Sjogren's anti-Smith, Sjogren's antibodies  IgG IgA IgM  Carvedilol 12.5 mg twice daily  Hydralazine 25 mg 3 times a day  ANCA  Monitor labs  Labetalol 10 mg IV every 6 hours systolic greater than 450  Renal diet no added salt  Extensive discussion with patient regarding peritoneal dialysis hemodialysis further work-up etc.  Patient will try to obtain addresses of laboratories/facilities that he had blood test done and urine test done in the last few years.           Please note that over 50 minutes was spent in evaluating the patient, review of records and results, discussion with staff/family, etc.    [address]  3:35 PM  9/11/2020    Voice recognition software use for dictation

## 2020-09-11 NOTE — ED PROVIDER NOTES
57-year-old male presents the emergency department after his doctor called and told him to come in for evaluation for renal failure. Patient does not know what his lab values were but he states he has been having increased urinary frequency. He states no other complaints at this time. In nursing is reporting patient's blood pressure is elevated. The history is provided by the patient. Dysuria    This is a new problem. The problem occurs every urination. The problem has not changed since onset. The pain is at a severity of 0/10. The patient is experiencing no pain. There has been no fever. Associated symptoms include frequency. Pertinent negatives include no chills, no sweats, no nausea, no vomiting, no discharge, no hematuria, no hesitancy, no urgency and no flank pain. His past medical history does not include kidney stones. Review of Systems   Constitutional: Negative for chills and fever. HENT: Negative for ear pain, sinus pressure and sore throat. Eyes: Negative for pain, discharge and redness. Respiratory: Negative for cough, shortness of breath and wheezing. Cardiovascular: Negative for chest pain. Gastrointestinal: Negative for abdominal pain, diarrhea, nausea and vomiting. Genitourinary: Positive for dysuria and frequency. Negative for flank pain, hematuria, hesitancy and urgency. Musculoskeletal: Negative for arthralgias and back pain. Skin: Negative for rash and wound. Neurological: Negative for weakness and headaches. Hematological: Negative for adenopathy. All other systems reviewed and are negative. Physical Exam  Constitutional:       Appearance: Normal appearance. HENT:      Right Ear: Tympanic membrane normal.      Left Ear: Tympanic membrane normal.      Mouth/Throat:      Mouth: Mucous membranes are moist.   Eyes:      Extraocular Movements: Extraocular movements intact. Pupils: Pupils are equal, round, and reactive to light.    Cardiovascular: family history includes Heart Attack (age of onset: 61) in his father; Heart Disease in his mother; No Known Problems in his sister. The patients home medications have been reviewed.     Allergies: Anesthetics, amide    -------------------------------------------------- RESULTS -------------------------------------------------    LABS:  Results for orders placed or performed during the hospital encounter of 09/10/20   CBC Auto Differential   Result Value Ref Range    WBC 7.1 4.5 - 11.5 E9/L    RBC 3.16 (L) 3.80 - 5.80 E12/L    Hemoglobin 10.3 (L) 12.5 - 16.5 g/dL    Hematocrit 32.1 (L) 37.0 - 54.0 %    .6 (H) 80.0 - 99.9 fL    MCH 32.6 26.0 - 35.0 pg    MCHC 32.1 32.0 - 34.5 %    RDW 13.7 11.5 - 15.0 fL    Platelets 671 812 - 714 E9/L    MPV 10.3 7.0 - 12.0 fL    Neutrophils % 68.1 43.0 - 80.0 %    Immature Granulocytes % 0.3 0.0 - 5.0 %    Lymphocytes % 16.9 (L) 20.0 - 42.0 %    Monocytes % 8.1 2.0 - 12.0 %    Eosinophils % 5.8 0.0 - 6.0 %    Basophils % 0.8 0.0 - 2.0 %    Neutrophils Absolute 4.82 1.80 - 7.30 E9/L    Immature Granulocytes # 0.02 E9/L    Lymphocytes Absolute 1.20 (L) 1.50 - 4.00 E9/L    Monocytes Absolute 0.57 0.10 - 0.95 E9/L    Eosinophils Absolute 0.41 0.05 - 0.50 E9/L    Basophils Absolute 0.06 0.00 - 0.20 H9/G   Basic Metabolic Panel   Result Value Ref Range    Sodium 138 132 - 146 mmol/L    Potassium 5.7 (H) 3.5 - 5.0 mmol/L    Chloride 111 (H) 98 - 107 mmol/L    CO2 18 (L) 22 - 29 mmol/L    Anion Gap 9 7 - 16 mmol/L    Glucose 100 (H) 74 - 99 mg/dL    BUN 37 (H) 6 - 20 mg/dL    CREATININE 5.9 (H) 0.7 - 1.2 mg/dL    GFR Non-African American 10 >=60 mL/min/1.73    GFR African American 12     Calcium 8.7 8.6 - 10.2 mg/dL   Hepatic Function Panel   Result Value Ref Range    Total Protein 6.6 6.4 - 8.3 g/dL    Alb 3.5 3.5 - 5.2 g/dL    Alkaline Phosphatase 48 40 - 129 U/L    ALT 7 0 - 40 U/L    AST 9 0 - 39 U/L    Total Bilirubin <0.2 0.0 - 1.2 mg/dL    Bilirubin, Direct <0.2 0.0 - 0.3 mg/dL    Bilirubin, Indirect see below 0.0 - 1.0 mg/dL   Urinalysis   Result Value Ref Range    Color, UA Yellow Straw/Yellow    Clarity, UA Clear Clear    Glucose, Ur Negative Negative mg/dL    Bilirubin Urine Negative Negative    Ketones, Urine Negative Negative mg/dL    Specific Gravity, UA 1.025 1.005 - 1.030    Blood, Urine SMALL (A) Negative    pH, UA 6.0 5.0 - 9.0    Protein, UA >=300 (A) Negative mg/dL    Urobilinogen, Urine 0.2 <2.0 E.U./dL    Nitrite, Urine Negative Negative    Leukocyte Esterase, Urine Negative Negative   Osmolality, Serum   Result Value Ref Range    Osmolality 305 285 - 310 mOsm/Kg   URINE ELECTROLYTES   Result Value Ref Range    Sodium, Ur 60 Not Established mmol/L    Potassium, Ur 42.8 Not Established mmol/L    Chloride 64 Not Established mmol/L   CK   Result Value Ref Range    Total  20 - 200 U/L   Microscopic Urinalysis   Result Value Ref Range    WBC, UA 1-3 0 - 5 /HPF    RBC, UA 1-3 0 - 2 /HPF    Bacteria, UA FEW (A) None Seen /HPF   EKG 12 Lead   Result Value Ref Range    Ventricular Rate 88 BPM    Atrial Rate 88 BPM    P-R Interval 154 ms    QRS Duration 86 ms    Q-T Interval 378 ms    QTc Calculation (Bazett) 457 ms    P Axis 48 degrees    R Axis 11 degrees    T Axis 34 degrees       RADIOLOGY:  Us Retroperitoneal Complete    Result Date: 9/10/2020  Clinical indications: Renal failure. Real-time and Doppler sonography of the kidneys and urinary bladder. FINDINGS: Right kidney measures 11.9 x 5.8 x 6.1 cm. Left kidney measures 9.3 x 5.3 x 5.6 cm. Kidneys are negative for evidence of solid or cystic mass, hydronephrosis or calculus disease. There is mild diffuse increased echogenicity of the renal cortices. The urinary bladder is unremarkable. Increased echogenicity of the renal cortices compatible with renal medical disease. EKG: This EKG is signed and interpreted by me.     Rate: 88  Rhythm: Sinus  Interpretation: NSR  Comparison: stable as compared to patient's most recent EKG      ------------------------- NURSING NOTES AND VITALS REVIEWED ---------------------------  Date / Time Roomed:  9/10/2020 12:46 PM  ED Bed Assignment:  [id number]    The nursing notes within the ED encounter and vital signs as below have been reviewed. Patient Vitals for the past 24 hrs:   BP Temp Temp src Pulse Resp SpO2 Height Weight   09/10/20 2000 (!) 157/93 97.9 °F (36.6 °C) Oral 72 18 98 % -- --   09/10/20 1615 (!) 180/102 98.3 °F (36.8 °C) Oral 74 18 98 % -- --   09/10/20 1530 (!) 190/94 98.2 °F (36.8 °C) -- 65 20 98 % -- --   09/10/20 1500 -- -- -- 69 18 100 % -- --   09/10/20 1415 (!) 211/124 98 °F (36.7 °C) -- 78 18 100 % -- --   09/10/20 1330 (!) 211/124 98 °F (36.7 °C) -- 80 18 98 % -- --   09/10/20 1244 (!) 212/116 97.5 °F (36.4 °C) Oral 90 16 98 % 5' 11\" (1.803 m) 205 lb (93 kg)       Oxygen Saturation Interpretation: Normal    ------------------------------------------ PROGRESS NOTES ------------------------------------------  Counseling:  I have spoken with the patient and discussed todays results, in addition to providing specific details for the plan of care and counseling regarding the diagnosis and prognosis. Their questions are answered at this time and they are agreeable with the plan of admission.    --------------------------------- ADDITIONAL PROVIDER NOTES ---------------------------------  This patient's ED course included: a personal history and physicial examination, re-evaluation prior to disposition, multiple bedside re-evaluations, IV medications, cardiac monitoring and continuous pulse oximetry    This patient has remained hemodynamically stable during their ED course. Diagnosis:  1. Acute renal failure, unspecified acute renal failure type ([facility] [address]    2. Hypertensive emergency      Please note that the withdrawal or failure to initiate urgent interventions for this patient would likely result in a life threatening deterioration or permanent disability. Accordingly this patient received 30 minutes of critical care time, excluding separately billable procedures. Disposition:  Patient's disposition: Admit to telemetry  Patient's condition is fair.            Martinez Jaramillo DO  09/10/20 2018

## 2020-09-11 NOTE — CARE COORDINATION
CM/SW met with pt; states recently moved here from [address] [place] [address]; PCP is .discussed current medical RX plan. Awaiting outcome of workup, CONY is a new occurrence for him.plan is for pt to return home independently , no needs. Melvin Daniel.

## 2020-09-11 NOTE — PROGRESS NOTES
Patient Urinated 150ml out and bladder scanned for a PVR of 38ml. Urine sample taken and sent to lab, 24 hour urine initiated.  Patient educated to call staff after urinating in urinal

## 2020-09-12 LAB
24HR URINE VOLUME (ML): 2800 ML
ALBUMIN SERPL-MCNC: 3.5 G/DL (ref 3.5–5.2)
ALP BLD-CCNC: 47 U/L (ref 40–129)
ALT SERPL-CCNC: 7 U/L (ref 0–40)
ANION GAP SERPL CALCULATED.3IONS-SCNC: 11 MMOL/L (ref 7–16)
AST SERPL-CCNC: 11 U/L (ref 0–39)
BASOPHILS ABSOLUTE: 0.06 E9/L (ref 0–0.2)
BASOPHILS RELATIVE PERCENT: 0.9 % (ref 0–2)
BILIRUB SERPL-MCNC: <0.2 MG/DL (ref 0–1.2)
BILIRUBIN DIRECT: <0.2 MG/DL (ref 0–0.3)
BILIRUBIN, INDIRECT: ABNORMAL MG/DL (ref 0–1)
BUN BLDV-MCNC: 36 MG/DL (ref 6–20)
C-REACTIVE PROTEIN: 0.1 MG/DL (ref 0–0.4)
CALCIUM SERPL-MCNC: 8.5 MG/DL (ref 8.6–10.2)
CHLORIDE BLD-SCNC: 108 MMOL/L (ref 98–107)
CO2: 18 MMOL/L (ref 22–29)
CREAT SERPL-MCNC: 5.8 MG/DL (ref 0.7–1.2)
CREATININE 24 HOUR URINE: 1652 MG/24H (ref 980–2200)
EOSINOPHILS ABSOLUTE: 0.44 E9/L (ref 0.05–0.5)
EOSINOPHILS RELATIVE PERCENT: 6.5 % (ref 0–6)
GFR AFRICAN AMERICAN: 13
GFR NON-AFRICAN AMERICAN: 10 ML/MIN/1.73
GLUCOSE BLD-MCNC: 93 MG/DL (ref 74–99)
HCT VFR BLD CALC: 30.3 % (ref 37–54)
HEMOGLOBIN: 9.9 G/DL (ref 12.5–16.5)
IMMATURE GRANULOCYTES #: 0.02 E9/L
IMMATURE GRANULOCYTES %: 0.3 % (ref 0–5)
LYMPHOCYTES ABSOLUTE: 1.94 E9/L (ref 1.5–4)
LYMPHOCYTES RELATIVE PERCENT: 28.7 % (ref 20–42)
Lab: 24 HOURS
MAGNESIUM: 2 MG/DL (ref 1.6–2.6)
MCH RBC QN AUTO: 32.7 PG (ref 26–35)
MCHC RBC AUTO-ENTMCNC: 32.7 % (ref 32–34.5)
MCV RBC AUTO: 100 FL (ref 80–99.9)
MONOCYTES ABSOLUTE: 0.44 E9/L (ref 0.1–0.95)
MONOCYTES RELATIVE PERCENT: 6.5 % (ref 2–12)
NEUTROPHILS ABSOLUTE: 3.85 E9/L (ref 1.8–7.3)
NEUTROPHILS RELATIVE PERCENT: 57.1 % (ref 43–80)
PDW BLD-RTO: 13.7 FL (ref 11.5–15)
PHOSPHORUS: 5.6 MG/DL (ref 2.5–4.5)
PLATELET # BLD: 299 E9/L (ref 130–450)
PMV BLD AUTO: 10.2 FL (ref 7–12)
POTASSIUM SERPL-SCNC: 5.1 MMOL/L (ref 3.5–5)
PROTEIN 24 HOUR URINE: 7.36 G/24HR (ref 0–0.14)
RBC # BLD: 3.03 E12/L (ref 3.8–5.8)
SEDIMENTATION RATE, ERYTHROCYTE: 26 MM/HR (ref 0–15)
SODIUM BLD-SCNC: 137 MMOL/L (ref 132–146)
TOTAL PROTEIN: 6.2 G/DL (ref 6.4–8.3)
WBC # BLD: 6.8 E9/L (ref 4.5–11.5)

## 2020-09-12 PROCEDURE — 2580000003 HC RX 258: Performed by: INTERNAL MEDICINE

## 2020-09-12 PROCEDURE — 86140 C-REACTIVE PROTEIN: CPT

## 2020-09-12 PROCEDURE — 88112 CYTOPATH CELL ENHANCE TECH: CPT

## 2020-09-12 PROCEDURE — 85025 COMPLETE CBC W/AUTO DIFF WBC: CPT

## 2020-09-12 PROCEDURE — 84100 ASSAY OF PHOSPHORUS: CPT

## 2020-09-12 PROCEDURE — 36415 COLL VENOUS BLD VENIPUNCTURE: CPT

## 2020-09-12 PROCEDURE — 6370000000 HC RX 637 (ALT 250 FOR IP): Performed by: INTERNAL MEDICINE

## 2020-09-12 PROCEDURE — 6360000002 HC RX W HCPCS: Performed by: INTERNAL MEDICINE

## 2020-09-12 PROCEDURE — 1200000000 HC SEMI PRIVATE

## 2020-09-12 PROCEDURE — 85651 RBC SED RATE NONAUTOMATED: CPT

## 2020-09-12 PROCEDURE — 80048 BASIC METABOLIC PNL TOTAL CA: CPT

## 2020-09-12 PROCEDURE — 84156 ASSAY OF PROTEIN URINE: CPT

## 2020-09-12 PROCEDURE — 2500000003 HC RX 250 WO HCPCS: Performed by: INTERNAL MEDICINE

## 2020-09-12 PROCEDURE — 84166 PROTEIN E-PHORESIS/URINE/CSF: CPT

## 2020-09-12 PROCEDURE — 83735 ASSAY OF MAGNESIUM: CPT

## 2020-09-12 PROCEDURE — 80076 HEPATIC FUNCTION PANEL: CPT

## 2020-09-12 RX ORDER — ROSUVASTATIN CALCIUM 20 MG/1
20 TABLET, COATED ORAL NIGHTLY
Status: DISCONTINUED | OUTPATIENT
Start: 2020-09-12 | End: 2020-09-13

## 2020-09-12 RX ORDER — HEPARIN SODIUM 10000 [USP'U]/ML
5000 INJECTION, SOLUTION INTRAVENOUS; SUBCUTANEOUS EVERY 8 HOURS SCHEDULED
Status: DISCONTINUED | OUTPATIENT
Start: 2020-09-12 | End: 2020-09-13 | Stop reason: HOSPADM

## 2020-09-12 RX ORDER — HYDRALAZINE HYDROCHLORIDE 50 MG/1
50 TABLET, FILM COATED ORAL EVERY 8 HOURS SCHEDULED
Status: DISCONTINUED | OUTPATIENT
Start: 2020-09-12 | End: 2020-09-13

## 2020-09-12 RX ORDER — CARVEDILOL 25 MG/1
25 TABLET ORAL 2 TIMES DAILY WITH MEALS
Status: DISCONTINUED | OUTPATIENT
Start: 2020-09-12 | End: 2020-09-13

## 2020-09-12 RX ADMIN — HEPARIN SODIUM 5000 UNITS: 10000 INJECTION INTRAVENOUS; SUBCUTANEOUS at 01:38

## 2020-09-12 RX ADMIN — CARVEDILOL 25 MG: 25 TABLET, FILM COATED ORAL at 17:29

## 2020-09-12 RX ADMIN — SODIUM BICARBONATE 1300 MG: 650 TABLET ORAL at 08:04

## 2020-09-12 RX ADMIN — ROSUVASTATIN CALCIUM 20 MG: 20 TABLET, FILM COATED ORAL at 21:49

## 2020-09-12 RX ADMIN — Medication 10 ML: at 20:00

## 2020-09-12 RX ADMIN — HYDRALAZINE HYDROCHLORIDE 50 MG: 50 TABLET, FILM COATED ORAL at 21:50

## 2020-09-12 RX ADMIN — HYDRALAZINE HYDROCHLORIDE 25 MG: 25 TABLET, FILM COATED ORAL at 14:12

## 2020-09-12 RX ADMIN — CARVEDILOL 12.5 MG: 6.25 TABLET, FILM COATED ORAL at 08:04

## 2020-09-12 RX ADMIN — HEPARIN SODIUM 5000 UNITS: 10000 INJECTION INTRAVENOUS; SUBCUTANEOUS at 06:36

## 2020-09-12 RX ADMIN — HEPARIN SODIUM 5000 UNITS: 10000 INJECTION INTRAVENOUS; SUBCUTANEOUS at 14:10

## 2020-09-12 RX ADMIN — LABETALOL HYDROCHLORIDE 10 MG: 5 INJECTION INTRAVENOUS at 06:31

## 2020-09-12 RX ADMIN — LABETALOL HYDROCHLORIDE 10 MG: 5 INJECTION INTRAVENOUS at 14:17

## 2020-09-12 RX ADMIN — HYDRALAZINE HYDROCHLORIDE 25 MG: 25 TABLET, FILM COATED ORAL at 05:22

## 2020-09-12 RX ADMIN — HEPARIN SODIUM 5000 UNITS: 10000 INJECTION INTRAVENOUS; SUBCUTANEOUS at 21:50

## 2020-09-12 RX ADMIN — SODIUM BICARBONATE 1300 MG: 650 TABLET ORAL at 21:50

## 2020-09-12 RX ADMIN — PANTOPRAZOLE SODIUM 40 MG: 40 TABLET, DELAYED RELEASE ORAL at 05:22

## 2020-09-12 ASSESSMENT — PAIN SCALES - GENERAL
PAINLEVEL_OUTOF10: 0
PAINLEVEL_OUTOF10: 0

## 2020-09-12 NOTE — PROGRESS NOTES
Dr Cassandra Bhakta,  Please consider decreasing the Crestor to 10 mg po daily for this patient with a CrCl = 18 ml/min. Thank Falguni Watkins Pharm. D 9/12/2020 12:41 PM

## 2020-09-12 NOTE — PROGRESS NOTES
PROGRESS  NOTE --                                                          INTERNAL  MEDICINE                                                                              I  PERSONALLY SAW , EXAMINED, AND CARED [address]. TODAY, 9/12/2020     LABS, XRAY ,CHART, AND MEDICATIONS  REVIEWED BY ME       Chief complaint: Abnormal labs        SUBJECTIVE: Viann . is alert awake and cooperative; oriented ×3. Denies any chest pain dyspnea nausea emesis. Tolerating diet. No abdominal pain. Afebrile last 24 hours. Blood pressure 170/104.  90% saturation on room air. Intake and output -1743 cc. Sodium 137 potassium 5.1 chloride 108 CO2 18 BUN 36 creatinine 5.8 magnesium 2.0 calcium 8.5 phosphorus 5.6 protein 6.2 albumin 3.5 CRP 0.1. Procalcitonin 0.11.  proBNP 1683.  triglycerides 218; HDL 46. I discussed the above with cardiology, Dr. Shane Robles who suggested Crestor 20 mg daily not 10 mg daily. A1c 4.9 TSH 2.400. Hemoglobin 9.9 WBC 6.8. Vitamin D low at 15. Sed rate 26. Urinary protein to 75. Protein creatinine ratio 4.0. Rheumatoid factor negative PSA 0.49. Yesterday patient was able to urinate 150 cc; bladder was scanned with PVR of 38 cc. Cardiology consult from today appreciated; continue carvedilol, quit smoking, avoid nonsteroidal agents. Once stabilized nuclear stress test should be performed. He is just about to finish his 24-hour urine collection. He would like discharge ASAP.           Objective:     PHYSICAL EXAM:    VS: BP (!) 170/104   Pulse 74   Temp 97.8 °F (36.6 °C) (Oral)   Resp 18   Ht 5' 11\" (1.803 m)   Wt 205 lb (93 kg)   SpO2 98%   BMI 28.59 kg/m²     Labs:   CBC:   Lab Results   Component Value Date    WBC 6.8 09/12/2020    RBC 3.03 09/12/2020    HGB 9.9 09/12/2020    HCT 30.3 09/12/2020    .0 09/12/2020    MCH 32.7 09/12/2020    MCHC 32.7 09/12/2020    RDW 13.7 09/12/2020  09/12/2020    MPV 10.2 09/12/2020     CBC with Differential:    Lab Results   Component Value Date    WBC 6.8 09/12/2020    RBC 3.03 09/12/2020    HGB 9.9 09/12/2020    HCT 30.3 09/12/2020     09/12/2020    .0 09/12/2020    MCH 32.7 09/12/2020    MCHC 32.7 09/12/2020    RDW 13.7 09/12/2020    LYMPHOPCT 28.7 09/12/2020    MONOPCT 6.5 09/12/2020    BASOPCT 0.9 09/12/2020    MONOSABS 0.44 09/12/2020    LYMPHSABS 1.94 09/12/2020    EOSABS 0.44 09/12/2020    BASOSABS 0.06 09/12/2020     Hemoglobin/Hematocrit:    Lab Results   Component Value Date    HGB 9.9 09/12/2020    HCT 30.3 09/12/2020     CMP:    Lab Results   Component Value Date     09/12/2020    K 5.1 09/12/2020     09/12/2020    CO2 18 09/12/2020    BUN 36 09/12/2020    CREATININE 5.8 09/12/2020    GFRAA 13 09/12/2020    LABGLOM 10 09/12/2020    GLUCOSE 93 09/12/2020    PROT 6.2 09/12/2020    LABALBU 3.5 09/12/2020    CALCIUM 8.5 09/12/2020    BILITOT <0.2 09/12/2020    ALKPHOS 47 09/12/2020    AST 11 09/12/2020    ALT 7 09/12/2020     BMP:    Lab Results   Component Value Date     09/12/2020    K 5.1 09/12/2020     09/12/2020    CO2 18 09/12/2020    BUN 36 09/12/2020    LABALBU 3.5 09/12/2020    CREATININE 5.8 09/12/2020    CALCIUM 8.5 09/12/2020    GFRAA 13 09/12/2020    LABGLOM 10 09/12/2020    GLUCOSE 93 09/12/2020     Hepatic Function Panel:    Lab Results   Component Value Date    ALKPHOS 47 09/12/2020    ALT 7 09/12/2020    AST 11 09/12/2020    PROT 6.2 09/12/2020    BILITOT <0.2 09/12/2020    BILIDIR <0.2 09/12/2020    IBILI see below 09/12/2020    LABALBU 3.5 09/12/2020     Ionized Calcium:  No results found for: IONCA  Magnesium:    Lab Results   Component Value Date    MG 2.0 09/12/2020     Phosphorus:    Lab Results   Component Value Date    PHOS 5.6 09/12/2020     LDH:    Lab Results   Component Value Date     09/11/2020     Uric Acid:    Lab Results   Component Value Date    LABURIC 7.1 09/11/2020     PT/INR:  No results found for: PROTIME, INR  Warfarin PT/INR:  No components found for: PTPATWAR, PTINRWAR  PTT:  No results found for: APTT, PTT[APTT}  Troponin:  No results found for: TROPONINI  Last 3 Troponin:  No results found for: TROPONINI  U/A:    Lab Results   Component Value Date    COLORU Yellow 09/10/2020    PROTEINU >=300 09/10/2020    PHUR 6.0 09/10/2020    WBCUA 1-3 09/10/2020    RBCUA 1-3 09/10/2020    BACTERIA FEW 09/10/2020    CLARITYU Clear 09/10/2020    SPECGRAV 1.025 09/10/2020    LEUKOCYTESUR Negative 09/10/2020    UROBILINOGEN 0.2 09/10/2020    BILIRUBINUR Negative 09/10/2020    BLOODU SMALL 09/10/2020    GLUCOSEU Negative 09/10/2020     HgBA1c:    Lab Results   Component Value Date    LABA1C 4.9 09/11/2020     FLP:    Lab Results   Component Value Date    TRIG 218 09/11/2020    HDL 46 09/11/2020    LDLCALC 108 09/11/2020    LABVLDL 44 09/11/2020     TSH:    Lab Results   Component Value Date    TSH 2.400 09/11/2020     VITAMIN B12: No components found for: B12  FOLATE:    Lab Results   Component Value Date    FOLATE 13.6 09/11/2020     IRON:    Lab Results   Component Value Date    IRON 92 09/11/2020     Iron Saturation:  No components found for: PERCENTFE  TIBC:    Lab Results   Component Value Date    TIBC 231 09/11/2020     FERRITIN:    Lab Results   Component Value Date    FERRITIN 167 09/11/2020     RPR:  No results found for: RPR  HIV:  No results found for: HIV  JOSE M:  No results found for: ANATITER, JOSE M  RF:    Lab Results   Component Value Date    RF <10 09/11/2020     LIPASE:  No results found for: LIPASE  24 Hour Urine for Protein:  No components found for: RAWUPRO, UHRS3, TASQ25EK, UTV3  24 Hour Urine for Creatinine Clearance:  No components found for: CREAT4, UHRS10, UTV10  PSA:   Lab Results   Component Value Date    PSA 0.49 09/11/2020        General appearance: Alert, Awake, Oriented times 3, no distress  Skin: Warm and dry ; no rashes  Head: Normocephalic.  No masses, lesions or tenderness noted  Eyes: Conjunctivae pink, sclera white. PERRL,EOM-I  Ears: External ears normal  Nose/Sinuses: Nares normal. Septum midline. Mucosa normal. No drainage  Oropharynx: Oropharynx clear with no exudate seen  Neck: Supple. No jugular venous distension, lymphadenopathy or thyromegaly Trachea midline  Lungs: Clear to auscultation bilaterally. No rhonchi, crackles or wheezes  Heart: S1 S2  Regular rate and rhythm. No rub or gallop; grade 1/6 systolic murmur second right intercostal space  Abdomen: Soft, non-tender. BS normal. No masses, organomegaly; no rebound or guarding  Extremities: No edema, Peripheral pulses palpable  Musculoskeletal: Muscular strength appears intact. Neuro:  No focal motor defects ; II-XII grossly intact . SCOTT equally    TELEMETRY: REVIEWED--Telemetry: Sinus    ASSESSMENT:   Principal Problem:    CONY (acute kidney injury) ([address]  Active Problems:    Asymmetric septal hypertrophy (HCC)    Hyperlipidemia    Hypertension    Mild aortic sclerosis (HCC)    Erectile dysfunction    Thoracic aortic aneurysm without rupture (HCC)    Hyperkalemia    BPH (benign prostatic hyperplasia)    Gout    Opioid dependence in remission (HCC)    Seasonal allergic rhinitis    Proteinuria    Acute heart failure with preserved ejection fraction ([address]  Resolved Problems:    * No resolved hospital problems. *      PLAN:  SEE ORDERS      RE  CHANGES AND FINDINGS   Medications reviewed with patient  GI prophylaxis  DVT prophylaxis  Consultants notes reviewed  Carvedilol increased to 25 mg twice daily due to hypertension  Heparin 5000 units subcu every 8 hours  Hydralazine 25 mg 3 times daily by mouth  Monitor labs      Discussed with patient and domestic partner and nursing. Evonne Villagran DO     2:52 PM     9/12/2020    TIME > 35 MINUTES    >  50 %  OF  TIME  DISCUSSION               ------------  INFORMATION  -----------      DIET:DIET NO SALT ADDED (3-4 GM);         Allergies Allergen Reactions    Anesthetics, Amide Anaphylaxis         MEDICATION SIDE EFFECTS:none       SCHEDULED MEDS:                                 Scheduled Meds:   heparin (porcine)  5,000 Units Subcutaneous 3 times per day    carvedilol  25 mg Oral BID WC    rosuvastatin  20 mg Oral Nightly    pantoprazole  40 mg Oral QAM AC    hydrALAZINE  25 mg Oral 3 times per day    sodium bicarbonate  1,300 mg Oral BID       Continuous Infusions:      Data:       Intake/Output Summary (Last 24 hours) at 9/12/2020 1452  Last data filed at 9/12/2020 0807  Gross per 24 hour   Intake --   Output 1743 ml   Net -1743 ml       Wt Readings from Last 3 Encounters:   09/10/20 205 lb (93 kg)       Labs: Additional    GLUCOSE:No results for input(s): POCGLU in the last 72 hours. BNP:No results found for: BNP    CRP:   Recent Labs     09/11/20  1154 09/12/20  0158   CRP 0.1 0.1       ESR:  Recent Labs     09/11/20  1154 09/12/20  0158   SEDRATE 26* 26*       RADIOLOGY: REVIEWED AVAILABLE REPORT  US RETROPERITONEAL SONIDO   Final Result   1. No evidence for hemodynamically significant renal artery stenosis. 2.  The kidneys appear to be hyperechoic suspicious for medical renal   disease      US RETROPERITONEAL COMPLETE   Final Result      Increased echogenicity of the renal cortices compatible with renal   medical disease.                 [address]  DO   2:52 PM     9/12/2020      Voice recognition software used for dictation

## 2020-09-12 NOTE — PROGRESS NOTES
Progress Note  2020 6:27 PM  Subjective:   Admit Date: 9/10/2020  PCP: Yael Lainez MD  Interval History: Patient examined     Diet: DIET NO SALT ADDED (3-4 GM); Data:   Scheduled Meds:   heparin (porcine)  5,000 Units Subcutaneous 3 times per day    carvedilol  25 mg Oral BID WC    rosuvastatin  20 mg Oral Nightly    pantoprazole  40 mg Oral QAM AC    hydrALAZINE  25 mg Oral 3 times per day    sodium bicarbonate  1,300 mg Oral BID     Continuous Infusions:  PRN Meds:sodium chloride flush, acetaminophen, labetalol  I/O last 3 completed shifts:  In: -   Out: 1743 [PWOHL:1945]  I/O this shift:  In: -   Out: 90 [Urine:90]    Intake/Output Summary (Last 24 hours) at 2020 182  Last data filed at 2020 1540  Gross per 24 hour   Intake --   Output 1545 ml   Net -1545 ml     CBC:   Recent Labs     09/10/20  1331 20  11520  0158   WBC 7.1 6.6 6.8   HGB 10.3* 10.4* 9.9*    309 299     BMP:    Recent Labs     09/10/20  1331 20  1154 20  0158    138 137   K 5.7* 6.0* 5.1*   * 111* 108*   CO2 18* 18* 18*   BUN 37* 34* 36*   CREATININE 5.9* 5.8* 5.8*   GLUCOSE 100* 104* 93     Hepatic:   Recent Labs     09/10/20  1331 20  1154 20  0158   AST 9 9 11   ALT 7 8 7   BILITOT <0.2 0.3 <0.2   ALKPHOS 48 48 47     Troponin: No results for input(s): TROPONINI in the last 72 hours. BNP: No results for input(s): BNP in the last 72 hours. Lipids:   Recent Labs     20  1154   CHOL 198   HDL 46     ABGs: No results found for: PHART, PO2ART, [id number]  INR: No results for input(s): INR in the last 72 hours.     -----------------------------------------------------------------  RAD: Us Retroperitoneal Nathan    Result Date: 2020  Patient MRN:  [id number] : 1970 Age: 52 years Gender: Male Order Date:  2020 10:21 AM EXAM: US RETROPERITONEAL NATHAN NUMBER OF IMAGES:  48 INDICATION:  renal artery stenosis renal artery stenosis COMPARISON: 9/10/2020 The right kidney is 10.5 x 6.4 x 5.7 cm The left kidney  is 9.4 x 3.9 x 5 cm There is no mass identified. There is no hydronephrosis identified. There is no calculus identified. The bladder is unremarkable. Both the right and left kidney appear hyperechoic Velocity within the aorta measure 73 cm/sec. Velocities in the right renal artery measure 110 cm/sec,  renal aortic ratio is 1.5. Velocities within the left renal artery measure 104 cm/sec, renal aortic ratio on the left is 1.4.     1.  No evidence for hemodynamically significant renal artery stenosis. 2.  The kidneys appear to be hyperechoic suspicious for medical renal disease    Us Retroperitoneal Complete    Result Date: 9/10/2020  Clinical indications: Renal failure. Real-time and Doppler sonography of the kidneys and urinary bladder. FINDINGS: Right kidney measures 11.9 x 5.8 x 6.1 cm. Left kidney measures 9.3 x 5.3 x 5.6 cm. Kidneys are negative for evidence of solid or cystic mass, hydronephrosis or calculus disease. There is mild diffuse increased echogenicity of the renal cortices. The urinary bladder is unremarkable. Increased echogenicity of the renal cortices compatible with renal medical disease. Objective:   Vitals: BP (!) 168/96 Comment: manual  Pulse 68   Temp 97.8 °F (36.6 °C) (Oral)   Resp 18   Ht 5' 11\" (1.803 m)   Wt 205 lb (93 kg)   SpO2 98%   BMI 28.59 kg/m²   General appearance: appears stated age   Skin:  No rashes or lesions  HEENT: Head: Normocephalic, no lesions, without obvious abnormality.   Neck: no adenopathy, no carotid bruit, no JVD, supple, symmetrical, trachea midline and thyroid not enlarged, symmetric, no tenderness/mass/nodules  Lungs: clear to auscultation bilaterally  Heart: regular rate and rhythm, S1, S2 normal, no murmur, click, rub or gallop  Abdomen: soft, non-tender; bowel sounds normal; no masses,  no organomegaly  Extremities: extremities normal, atraumatic, no cyanosis or edema  Neurologic: Mental status: Alert, oriented, thought content appropriate    Assessment:   Patient Active Problem List:     CONY (acute kidney injury) ([facility] [address]     Asymmetric septal hypertrophy (HCC)     Hyperlipidemia     Hypertension     Mild aortic sclerosis (HCC)     Erectile dysfunction     Thoracic aortic aneurysm without rupture (HCC)     Hyperkalemia     BPH (benign prostatic hyperplasia)     Gout     Opioid dependence in remission (HCC)     Seasonal allergic rhinitis     Proteinuria     Acute heart failure with preserved ejection fraction ([address]    Plan:   1) CKD 5 not much improvement despite hydration . UA suggesting proteinuria,  patient had been instructed about 24-hour urine at [place] he will try to obtain copies   Patient not uremic although some of his weight loss may be reflecting decreased appetite.   Discussed the need to view TOPS video, and discussed transplantation    2) Hypertension improving on carvedilol and hydralazine     3) Hyperkalemia better     4) Hypocalcemia suspect this is causing his cramping await PTH      5) Anemia of chronic kidney disease?  Indices slightly macrocytic labs of already been ordered    6) Metabolic acidosis on  bicarbonate therapy    If BP better may be discharged tomorrow / will follow up as out pt   Dialysis education -- to be arranged at [facility] unit on discharge        Edwin Pearson

## 2020-09-12 NOTE — CONSULTS
CARDIOLOGY CONSULTATION    Patient Name:  Eleuterio Montemayor. :  1970    Reason for Consultation:   Ascending aortic aneurysm    History of Present Illness:   Eleuterio Montemayor. presents to [facility], following the sudden discovery of advanced renal failure. His history dates back approximately 5 years, when he injured his back he was found to have both hypertension and prediabetes. Instead of taking medications as recommended by his physician he elected to go on a vegetarian diet. He subsequently was moved back to the [facility] and followed up with his primary physician, Dr. Sherwin Reyes will obtain blood work and noted that he was in severe renal failure. He also has a history of chronic prostatism. From a cardiac standpoint he denies any exertional chest jaw or arm nor intrascapular discomfort. He was working out in [employer] on a regular basis. Following admission a two-dimensional echocardiogram was obtained which demonstrated is a sending aorta to be dilated at 4.3 cm. Importantly he also has a history of hyperuricemia and markedly elevated cholesterol. Most importantly is that he has a strong family history of cardiovascular disease with his mother  secondary to coronary disease and heart failure in her 45s and father  secondary to myocardial infarction in his 62s. To his knowledge there is no family history of aneurysm. Past Medical History:   has a past medical history of Acute heart failure with preserved ejection fraction ([address] CONY (acute kidney injury) ([address] Asymmetric septal hypertrophy ([address] BPH (benign prostatic hyperplasia), Erectile dysfunction, Gout, Hyperkalemia, Hyperlipidemia, Hypertension, Mild aortic sclerosis ([address] Opioid dependence in remission ([address] Proteinuria, Seasonal allergic rhinitis, and Thoracic aortic aneurysm without rupture ([address]     Surgical History:   has a past surgical history that includes Tonsillectomy and deformities, edema, or skin discoloration. No cyanosis or clubbing noted to the nails. Peripheral pulses present 2+ upper extremities and present 2+  lower extremities. Musculoskeletal: Spine ROM normal. Muscular strength intact. Neuro: Cranial nerves intact. Motor: Strength 5/5 in all extremities. Reflexes 2+ in all extremities. No focal weakness. Sensory: grossly normal to touch. Coordination intact. Pertinent Labs:  CBC:   Recent Labs     09/10/20  1331 09/11/20  1154 09/12/20  0158   WBC 7.1 6.6 6.8   HGB 10.3* 10.4* 9.9*    309 299     BMP:  Recent Labs     09/10/20  1331 09/11/20  1154 09/12/20  0158    138 137   K 5.7* 6.0* 5.1*   * 111* 108*   CO2 18* 18* 18*   BUN 37* 34* 36*   CREATININE 5.9* 5.8* 5.8*   GLUCOSE 100* 104* 93   LABGLOM 10 10 10     ABGs: No results found for: PH, PO2, PCO2  INR: No results for input(s): INR in the last 72 hours. PRO-BNP:   Lab Results   Component Value Date    PROBNP 1,683 (H) 09/11/2020      Cardiac Injury Profile:   Recent Labs     09/10/20  1331   CKTOTAL 100      Lipid Profile:   Lab Results   Component Value Date    TRIG 218 09/11/2020    HDL 46 09/11/2020    LDLCALC 108 09/11/2020    CHOL 198 09/11/2020      Thyroid:   Lab Results   Component Value Date    TSH 2.400 09/11/2020      Hemoglobin A1C: No components found for: HGBA1C   ECG:  See report  ECHO: 9/11/2020  Summary   Left ventricle grossly normal in size. Mild asymmetric left ventricular septal hypertrophy. Estimated left ventricular ejection fraction is 60±5%. Lateral wall hypokinesis, minimally borderline. There is doppler evidence of stage I diastolic dysfunction. The LAESV Index is >34 ml/m2. Normal right ventricular size and function. TAPSE is normal.   Dilated ascending aorta. 4.3cm. Mild mitral regurgitation is present. Trace-mild aortic regurgitation is noted. Physiologic and/or trace pulmonic regurgitation present.    Physiologic and/or trace tricuspid regurgitation. RVSP is 21 mmHg. No evidence to suggest pulmonary hypertension. Technically good quality study. Radiology:  Us Retroperitoneal Nathan    Result Date: 2020  Patient MRN:  [id number] : 1970 Age: 52 years Gender: Male Order Date:  2020 10:21 AM EXAM: US RETROPERITONEAL NATHAN NUMBER OF IMAGES:  48 INDICATION:  renal artery stenosis renal artery stenosis COMPARISON: 9/10/2020 The right kidney is 10.5 x 6.4 x 5.7 cm The left kidney  is 9.4 x 3.9 x 5 cm There is no mass identified. There is no hydronephrosis identified. There is no calculus identified. The bladder is unremarkable. Both the right and left kidney appear hyperechoic Velocity within the aorta measure 73 cm/sec. Velocities in the right renal artery measure 110 cm/sec,  renal aortic ratio is 1.5. Velocities within the left renal artery measure 104 cm/sec, renal aortic ratio on the left is 1.4.     1.  No evidence for hemodynamically significant renal artery stenosis. 2.  The kidneys appear to be hyperechoic suspicious for medical renal disease    Us Retroperitoneal Complete    Result Date: 9/10/2020  Clinical indications: Renal failure. Real-time and Doppler sonography of the kidneys and urinary bladder. FINDINGS: Right kidney measures 11.9 x 5.8 x 6.1 cm. Left kidney measures 9.3 x 5.3 x 5.6 cm. Kidneys are negative for evidence of solid or cystic mass, hydronephrosis or calculus disease. There is mild diffuse increased echogenicity of the renal cortices. The urinary bladder is unremarkable. Increased echogenicity of the renal cortices compatible with renal medical disease.     Assessment:    Principal Problem:    CONY (acute kidney injury) ([address]  Active Problems:    Asymmetric septal hypertrophy (HCC)    Hyperlipidemia    Hypertension    Mild aortic sclerosis (HCC)    Erectile dysfunction    Thoracic aortic aneurysm without rupture (HCC)    Hyperkalemia    BPH (benign prostatic hyperplasia)    Gout    Opioid dependence in remission (HCC)    Seasonal allergic rhinitis    Proteinuria    Acute heart failure with preserved ejection fraction ([address]  Resolved Problems:    * No resolved hospital problems. *      Plan:  Following a prolonged discussion with Mr. Bree Esteban he appears to be willing to take his medications as ordered and would thus recommend a beta-blocker secondary to the findings of ascending aortic aneurysm and the likelihood of underlying coronary artery disease is great considering all of his risk factors especially chromosomal ones. He was counseled on smoking cessation and as importantly to avoid nonsteroidal agents. Once stabilized would strongly recommend a nuclear stress test due to his multiple risk factors. I have spent more than 45 minutes face to face with Mary Haji Jr.,and reviewing notes and laboratory data with greater than 50% of this time instructing and counseling the patient regarding my findings and recommendations and I have answered all questions as posed to me by Mr. Bree Esteban. Thank you, Lupe Reynoso MD for allowing me to consult in the care of this patient. Sergey Diez DO, FACP, FACC, [facility]    NOTE:  This report was transcribed using voice recognition software. Every effort was made to ensure accuracy; however, inadvertent computerized transcription errors may be present.

## 2020-09-13 VITALS
HEART RATE: 76 BPM | BODY MASS INDEX: 28.7 KG/M2 | DIASTOLIC BLOOD PRESSURE: 102 MMHG | WEIGHT: 205 LBS | OXYGEN SATURATION: 98 % | TEMPERATURE: 97.8 F | SYSTOLIC BLOOD PRESSURE: 152 MMHG | RESPIRATION RATE: 18 BRPM | HEIGHT: 71 IN

## 2020-09-13 LAB
ALBUMIN SERPL-MCNC: 3.2 G/DL (ref 3.5–5.2)
ALP BLD-CCNC: 40 U/L (ref 40–129)
ALT SERPL-CCNC: 6 U/L (ref 0–40)
ANION GAP SERPL CALCULATED.3IONS-SCNC: 8 MMOL/L (ref 7–16)
AST SERPL-CCNC: 7 U/L (ref 0–39)
BASOPHILS ABSOLUTE: 0.08 E9/L (ref 0–0.2)
BASOPHILS RELATIVE PERCENT: 1.5 % (ref 0–2)
BILIRUB SERPL-MCNC: <0.2 MG/DL (ref 0–1.2)
BILIRUBIN DIRECT: <0.2 MG/DL (ref 0–0.3)
BILIRUBIN, INDIRECT: ABNORMAL MG/DL (ref 0–1)
BUN BLDV-MCNC: 34 MG/DL (ref 6–20)
C-REACTIVE PROTEIN: <0.1 MG/DL (ref 0–0.4)
CALCIUM SERPL-MCNC: 8.6 MG/DL (ref 8.6–10.2)
CHLORIDE BLD-SCNC: 110 MMOL/L (ref 98–107)
CO2: 20 MMOL/L (ref 22–29)
CREAT SERPL-MCNC: 6 MG/DL (ref 0.7–1.2)
EOSINOPHILS ABSOLUTE: 0.32 E9/L (ref 0.05–0.5)
EOSINOPHILS RELATIVE PERCENT: 5.8 % (ref 0–6)
GFR AFRICAN AMERICAN: 12
GFR NON-AFRICAN AMERICAN: 10 ML/MIN/1.73
GLUCOSE BLD-MCNC: 91 MG/DL (ref 74–99)
HCT VFR BLD CALC: 29.5 % (ref 37–54)
HEMOGLOBIN: 9.6 G/DL (ref 12.5–16.5)
IMMATURE GRANULOCYTES #: 0.01 E9/L
IMMATURE GRANULOCYTES %: 0.2 % (ref 0–5)
LYMPHOCYTES ABSOLUTE: 1.47 E9/L (ref 1.5–4)
LYMPHOCYTES RELATIVE PERCENT: 26.7 % (ref 20–42)
MAGNESIUM: 2.1 MG/DL (ref 1.6–2.6)
MCH RBC QN AUTO: 32.8 PG (ref 26–35)
MCHC RBC AUTO-ENTMCNC: 32.5 % (ref 32–34.5)
MCV RBC AUTO: 100.7 FL (ref 80–99.9)
MONOCYTES ABSOLUTE: 0.43 E9/L (ref 0.1–0.95)
MONOCYTES RELATIVE PERCENT: 7.8 % (ref 2–12)
NEUTROPHILS ABSOLUTE: 3.2 E9/L (ref 1.8–7.3)
NEUTROPHILS RELATIVE PERCENT: 58 % (ref 43–80)
PDW BLD-RTO: 13.6 FL (ref 11.5–15)
PHOSPHORUS: 6.2 MG/DL (ref 2.5–4.5)
PLATELET # BLD: 260 E9/L (ref 130–450)
PMV BLD AUTO: 10.4 FL (ref 7–12)
POTASSIUM SERPL-SCNC: 5 MMOL/L (ref 3.5–5)
RBC # BLD: 2.93 E12/L (ref 3.8–5.8)
SEDIMENTATION RATE, ERYTHROCYTE: 16 MM/HR (ref 0–15)
SODIUM BLD-SCNC: 138 MMOL/L (ref 132–146)
TOTAL PROTEIN: 6 G/DL (ref 6.4–8.3)
URINE CULTURE, ROUTINE: NORMAL
WBC # BLD: 5.5 E9/L (ref 4.5–11.5)

## 2020-09-13 PROCEDURE — 6370000000 HC RX 637 (ALT 250 FOR IP): Performed by: INTERNAL MEDICINE

## 2020-09-13 PROCEDURE — 6360000002 HC RX W HCPCS: Performed by: INTERNAL MEDICINE

## 2020-09-13 PROCEDURE — 84100 ASSAY OF PHOSPHORUS: CPT

## 2020-09-13 PROCEDURE — 86140 C-REACTIVE PROTEIN: CPT

## 2020-09-13 PROCEDURE — 83735 ASSAY OF MAGNESIUM: CPT

## 2020-09-13 PROCEDURE — 80076 HEPATIC FUNCTION PANEL: CPT

## 2020-09-13 PROCEDURE — 85651 RBC SED RATE NONAUTOMATED: CPT

## 2020-09-13 PROCEDURE — 2500000003 HC RX 250 WO HCPCS: Performed by: INTERNAL MEDICINE

## 2020-09-13 PROCEDURE — 36415 COLL VENOUS BLD VENIPUNCTURE: CPT

## 2020-09-13 PROCEDURE — 80048 BASIC METABOLIC PNL TOTAL CA: CPT

## 2020-09-13 PROCEDURE — 85025 COMPLETE CBC W/AUTO DIFF WBC: CPT

## 2020-09-13 RX ORDER — ROSUVASTATIN CALCIUM 10 MG/1
10 TABLET, COATED ORAL NIGHTLY
Status: DISCONTINUED | OUTPATIENT
Start: 2020-09-13 | End: 2020-09-13 | Stop reason: HOSPADM

## 2020-09-13 RX ORDER — CARVEDILOL 6.25 MG/1
12.5 TABLET ORAL 2 TIMES DAILY WITH MEALS
Status: DISCONTINUED | OUTPATIENT
Start: 2020-09-13 | End: 2020-09-13 | Stop reason: HOSPADM

## 2020-09-13 RX ORDER — HYDRALAZINE HYDROCHLORIDE 25 MG/1
25 TABLET, FILM COATED ORAL EVERY 8 HOURS SCHEDULED
Qty: 90 TABLET | Refills: 3 | Status: ON HOLD | OUTPATIENT
Start: 2020-09-13 | End: 2020-12-06 | Stop reason: HOSPADM

## 2020-09-13 RX ORDER — ROSUVASTATIN CALCIUM 10 MG/1
10 TABLET, COATED ORAL NIGHTLY
Qty: 30 TABLET | Refills: 3 | Status: SHIPPED | OUTPATIENT
Start: 2020-09-13

## 2020-09-13 RX ORDER — CARVEDILOL 12.5 MG/1
12.5 TABLET ORAL 2 TIMES DAILY WITH MEALS
Qty: 60 TABLET | Refills: 3 | Status: SHIPPED | OUTPATIENT
Start: 2020-09-13

## 2020-09-13 RX ORDER — SODIUM BICARBONATE 650 MG/1
1300 TABLET ORAL 2 TIMES DAILY
Qty: 120 TABLET | Refills: 1 | Status: SHIPPED | OUTPATIENT
Start: 2020-09-13

## 2020-09-13 RX ORDER — HYDRALAZINE HYDROCHLORIDE 25 MG/1
25 TABLET, FILM COATED ORAL EVERY 8 HOURS SCHEDULED
Status: DISCONTINUED | OUTPATIENT
Start: 2020-09-13 | End: 2020-09-13 | Stop reason: HOSPADM

## 2020-09-13 RX ADMIN — HEPARIN SODIUM 5000 UNITS: 10000 INJECTION INTRAVENOUS; SUBCUTANEOUS at 05:48

## 2020-09-13 RX ADMIN — SODIUM BICARBONATE 1300 MG: 650 TABLET ORAL at 08:51

## 2020-09-13 RX ADMIN — PANTOPRAZOLE SODIUM 40 MG: 40 TABLET, DELAYED RELEASE ORAL at 05:48

## 2020-09-13 RX ADMIN — HYDRALAZINE HYDROCHLORIDE 50 MG: 50 TABLET, FILM COATED ORAL at 05:48

## 2020-09-13 RX ADMIN — LABETALOL HYDROCHLORIDE 10 MG: 5 INJECTION INTRAVENOUS at 05:54

## 2020-09-13 RX ADMIN — CARVEDILOL 25 MG: 25 TABLET, FILM COATED ORAL at 08:51

## 2020-09-13 ASSESSMENT — PAIN SCALES - GENERAL: PAINLEVEL_OUTOF10: 0

## 2020-09-13 NOTE — PROGRESS NOTES
left for Dr. Tejada Senate via ans service to notify that pt is refusing all medication until seen by him. Will await response.

## 2020-09-13 NOTE — PROGRESS NOTES
Manual blood pressure 162/100 pulse 62 patient to the restroom stated \"I feel foggy\", no complaints of pain or distress noted. Medication increase within 12 hours patient stated in 2007 was prescribed blood pressure medication and felt the same \"foggy\" in the head and the patient then quit taking the medication and diet change.

## 2020-09-13 NOTE — PROGRESS NOTES
PROGRESS NOTE       PATIENT PROBLEM LIST:  Principal Problem:    CONY (acute kidney injury) ([address]  Active Problems:    Asymmetric septal hypertrophy (HCC)    Hyperlipidemia    Hypertension    Mild aortic sclerosis (HCC)    Erectile dysfunction    Thoracic aortic aneurysm without rupture (HCC)    Hyperkalemia    BPH (benign prostatic hyperplasia)    Gout    Opioid dependence in remission (HCC)    Seasonal allergic rhinitis    Proteinuria    Acute heart failure with preserved ejection fraction ([address]  Resolved Problems:    * No resolved hospital problems. *      SUBJECTIVE:  Stoney Blank. states he feels somewhat better yet somewhat weaker since admission. He denies any chest discomfort but does feel somewhat lightheaded. He notes that his blood pressure remains significantly elevated. REVIEW OF SYSTEMS:  General ROS: positive for - fatigue. Psychological ROS: positive for -reactive anxiety and depression  Ophthalmic ROS: negative for - decreased vision or visual distortion. ENT ROS: negative  Allergy and Immunology ROS: negative  Hematological and Lymphatic ROS: negative  Endocrine: no heat or cold intolerance and no polyphagia, polydipsia, or polyuria  Respiratory ROS: negative for - cough, pleuritic pain and wheezing  Cardiovascular ROS: positive for - murmur. Gastrointestinal ROS: no abdominal pain, change in bowel habits, or black or bloody stools  Genito-Urinary ROS: no nocturia, dysuria, trouble voiding, frequency or hematuria  Musculoskeletal ROS: negative for- myalgias, arthralgias, or claudication  Neurological ROS: no TIA or stroke symptoms otherwise no significant change in symptoms or problems since yesterday as documented in previous progress notes.     SCHEDULED MEDICATIONS:   carvedilol  12.5 mg Oral BID WC    hydrALAZINE  25 mg Oral 3 times per day    rosuvastatin  10 mg Oral Nightly    heparin (porcine)  5,000 Units Subcutaneous 3 times per day    pantoprazole  40 mg Oral QAM AC    sodium bicarbonate  1,300 mg Oral BID       VITAL SIGNS:                                                                                                                          BP (!) 152/102 Comment: manual  Pulse 76   Temp 97.8 °F (36.6 °C) (Oral)   Resp 18   Ht 5' 11\" (1.803 m)   Wt 205 lb (93 kg)   SpO2 98%   BMI 28.59 kg/m²   Patient Vitals for the past 96 hrs (Last 3 readings):   Weight   09/10/20 1244 205 lb (93 kg)     OBJECTIVE:    HEENT: PERRL, EOM  Intact; sclera non-icteric, conjunctiva pink. Carotids are brisk in upstroke with normal contour. No carotid bruits. Normal jugular venous pulsation at 45°. No palpable cervical nor supraclavicular nodes. Thyroid not palpable. Trachea midline. Chest: Even excursion  Lungs: CTA B, no expiratory wheezes or rhonchi, no decreased tactile fremitus without inspiratory rales. Heart: Regular  rhythm; S1 > S2, no gallop; grade 2/6 early systolic murmur second right intercostal space no clicks, rub, palpable thrills   or heaves. PMI nondisplaced, 5th intercostal space MCL. Abdomen: Soft, nontender, nondistended,  mildly protuberant, no masses or organomegaly. Bowel sounds active. Extremities: Without clubbing, cyanosis or edema. Pulses present 3+ upper extermities bilaterally; present 1+ DP and present 1+ PT bilaterally.      Data:   Scheduled Meds: Reviewed  Continuous Infusions:     Intake/Output Summary (Last 24 hours) at 9/13/2020 1529  Last data filed at 9/12/2020 1540  Gross per 24 hour   Intake --   Output 90 ml   Net -90 ml     CBC:   Recent Labs     09/11/20  1154 09/12/20  0158 09/13/20  0310   WBC 6.6 6.8 5.5   HGB 10.4* 9.9* 9.6*   HCT 31.8* 30.3* 29.5*    299 260     BMP:  Recent Labs     09/11/20  1154 09/12/20  0158 09/13/20  0310    137 138   K 6.0* 5.1* 5.0   * 108* 110*   CO2 18* 18* 20*   BUN 34* 36* 34*   CREATININE 5.8* 5.8* 6.0*   LABGLOM 10 10 10     ABGs: No results found for: PH, PO2, PCO2  INR: No results for input(s): INR in the last 72 hours. PRO-BNP:   Lab Results   Component Value Date    PROBNP 1,683 (H) 2020      TSH:   Lab Results   Component Value Date    TSH 2.400 2020      Cardiac Injury Profile: No results for input(s): CKTOTAL, CKMB, TROPONINI in the last 72 hours. Lipid Profile:   Lab Results   Component Value Date    TRIG 218 2020    HDL 46 2020    LDLCALC 108 2020    CHOL 198 2020      Hemoglobin A1C: No components found for: HGBA1C     RAD:   Us Retroperitoneal Nathan    Result Date: 2020  Patient MRN:  [id number] : 1970 Age: 52 years Gender: Male Order Date:  2020 10:21 AM EXAM: US RETROPERITONEAL NATHAN NUMBER OF IMAGES:  48 INDICATION:  renal artery stenosis renal artery stenosis COMPARISON: 9/10/2020 The right kidney is 10.5 x 6.4 x 5.7 cm The left kidney  is 9.4 x 3.9 x 5 cm There is no mass identified. There is no hydronephrosis identified. There is no calculus identified. The bladder is unremarkable. Both the right and left kidney appear hyperechoic Velocity within the aorta measure 73 cm/sec. Velocities in the right renal artery measure 110 cm/sec,  renal aortic ratio is 1.5. Velocities within the left renal artery measure 104 cm/sec, renal aortic ratio on the left is 1.4.     1.  No evidence for hemodynamically significant renal artery stenosis. 2.  The kidneys appear to be hyperechoic suspicious for medical renal disease    Us Retroperitoneal Complete    Result Date: 9/10/2020  Clinical indications: Renal failure. Real-time and Doppler sonography of the kidneys and urinary bladder. FINDINGS: Right kidney measures 11.9 x 5.8 x 6.1 cm. Left kidney measures 9.3 x 5.3 x 5.6 cm. Kidneys are negative for evidence of solid or cystic mass, hydronephrosis or calculus disease. There is mild diffuse increased echogenicity of the renal cortices. The urinary bladder is unremarkable.      Increased echogenicity of the renal cortices compatible with renal medical disease. EKG: See Report  Echo: See Report      IMPRESSIONS:  Principal Problem:    CONY (acute kidney injury) ([address]  Active Problems:    Asymmetric septal hypertrophy (HCC)    Hyperlipidemia    Hypertension    Mild aortic sclerosis (HCC)    Erectile dysfunction    Thoracic aortic aneurysm without rupture (HCC)    Hyperkalemia    BPH (benign prostatic hyperplasia)    Gout    Opioid dependence in remission (HCC)    Seasonal allergic rhinitis    Proteinuria    Acute heart failure with preserved ejection fraction ([address]  Resolved Problems:    * No resolved hospital problems. *      RECOMMENDATIONS:  At this time I would continue to titrate medications as tolerated without further affecting his renal function and in 7 days consider increasing his dose of carvedilol to 25 mg twice daily and if this is not sufficient would then consider labetalol in a titratable dose as well. He is seeking a second opinion at the [facility]. Likewise he will seek acupuncture for chronic pain in his lower back and not ingest any further nonsteroidals considering his present renal status. I have spent more than 26 minutes face to face with Catherine Suarez. and reviewing notes and laboratory data, with greater than 50% of this time instructing and counseling the patient face to face regarding my findings and recommendations and I have answered all questions as posed to me by Mr. Renetta Burrellluci. Nissa Basurto, DO FACP,FACC,FSCAI      NOTE:  This report was transcribed using voice recognition software.   Every effort was made to ensure accuracy; however, inadvertent computerized transcription errors may be present

## 2020-09-13 NOTE — PROGRESS NOTES
PROGRESS  NOTE --                                                          INTERNAL  MEDICINE                                                                              I  PERSONALLY SAW , EXAMINED, AND CARED [address]. TODAY, 9/13/2020     LABS, XRAY ,CHART, AND MEDICATIONS  REVIEWED BY ME       Chief complaint: Abnormal labs      9/12/2020-SUBJECTIVE: Александр Walters. is alert awake and cooperative; oriented ×3. Denies any chest pain dyspnea nausea emesis. Tolerating diet. No abdominal pain. Afebrile last 24 hours. Blood pressure 170/104.  90% saturation on room air. Intake and output -1743 cc. Sodium 137 potassium 5.1 chloride 108 CO2 18 BUN 36 creatinine 5.8 magnesium 2.0 calcium 8.5 phosphorus 5.6 protein 6.2 albumin 3.5 CRP 0.1. Procalcitonin 0.11.  proBNP 1683.  triglycerides 218; HDL 46. I discussed the above with cardiology, Dr. Sonja Tam who suggested Crestor 20 mg daily not 10 mg daily. A1c 4.9 TSH 2.400. Hemoglobin 9.9 WBC 6.8. Vitamin D low at 15. Sed rate 26. Urinary protein to 75. Protein creatinine ratio 4.0. Rheumatoid factor negative PSA 0.49. Yesterday patient was able to urinate 150 cc; bladder was scanned with PVR of 38 cc. Cardiology consult from today appreciated; continue carvedilol, quit smoking, avoid nonsteroidal agents. Once stabilized nuclear stress test should be performed. He is just about to finish his 24-hour urine collection. He would like discharge ASAP.      9/13/2020-patient feeling poorly this morning; he blames on his medications. Lightheaded dizzy felt off balance; felt as if he is having a panic attack. He also noticed he is having difficulty concentrating difficulty reading; reading the same paragraph over and over again. Afebrile last 24 hours. Blood pressure 152/102. Intake and output -1833 cc.   Potassium 5.0 chloride 110 CO2 20 BUN 34 creatinine 6.0 phosphorus 6.2 protein 6.2 albumin 3.2 liver functions normal.  Hemoglobin 9.6 WBC 5.5 platelets 259. 24-hour urine for protein, 7.36 g. Viral respiratory panel negative; blood cultures negative to date. Nephrology note from yesterday appreciated; not much improvement renal function despite hydration. They also rediscussed transplantation; await results of PTH.;  PTH now available 167 (15-65).       Objective:     PHYSICAL EXAM:    VS: BP (!) 152/102 Comment: manual  Pulse 76   Temp 97.8 °F (36.6 °C) (Oral)   Resp 18   Ht 5' 11\" (1.803 m)   Wt 205 lb (93 kg)   SpO2 98%   BMI 28.59 kg/m²     Labs:   CBC:   Lab Results   Component Value Date    WBC 5.5 09/13/2020    RBC 2.93 09/13/2020    HGB 9.6 09/13/2020    HCT 29.5 09/13/2020    .7 09/13/2020    MCH 32.8 09/13/2020    MCHC 32.5 09/13/2020    RDW 13.6 09/13/2020     09/13/2020    MPV 10.4 09/13/2020     CBC with Differential:    Lab Results   Component Value Date    WBC 5.5 09/13/2020    RBC 2.93 09/13/2020    HGB 9.6 09/13/2020    HCT 29.5 09/13/2020     09/13/2020    .7 09/13/2020    MCH 32.8 09/13/2020    MCHC 32.5 09/13/2020    RDW 13.6 09/13/2020    LYMPHOPCT 26.7 09/13/2020    MONOPCT 7.8 09/13/2020    BASOPCT 1.5 09/13/2020    MONOSABS 0.43 09/13/2020    LYMPHSABS 1.47 09/13/2020    EOSABS 0.32 09/13/2020    BASOSABS 0.08 09/13/2020     Hemoglobin/Hematocrit:    Lab Results   Component Value Date    HGB 9.6 09/13/2020    HCT 29.5 09/13/2020     CMP:    Lab Results   Component Value Date     09/13/2020    K 5.0 09/13/2020     09/13/2020    CO2 20 09/13/2020    BUN 34 09/13/2020    CREATININE 6.0 09/13/2020    GFRAA 12 09/13/2020    LABGLOM 10 09/13/2020    GLUCOSE 91 09/13/2020    PROT 6.0 09/13/2020    LABALBU 3.2 09/13/2020    CALCIUM 8.6 09/13/2020    BILITOT <0.2 09/13/2020    ALKPHOS 40 09/13/2020    AST 7 09/13/2020    ALT 6 09/13/2020     BMP:    Lab Results   Component Value Date     09/13/2020 K 5.0 09/13/2020     09/13/2020    CO2 20 09/13/2020    BUN 34 09/13/2020    LABALBU 3.2 09/13/2020    CREATININE 6.0 09/13/2020    CALCIUM 8.6 09/13/2020    GFRAA 12 09/13/2020    LABGLOM 10 09/13/2020    GLUCOSE 91 09/13/2020     Hepatic Function Panel:    Lab Results   Component Value Date    ALKPHOS 40 09/13/2020    ALT 6 09/13/2020    AST 7 09/13/2020    PROT 6.0 09/13/2020    BILITOT <0.2 09/13/2020    BILIDIR <0.2 09/13/2020    IBILI see below 09/13/2020    LABALBU 3.2 09/13/2020     Ionized Calcium:  No results found for: IONCA  Magnesium:    Lab Results   Component Value Date    MG 2.1 09/13/2020     Phosphorus:    Lab Results   Component Value Date    PHOS 6.2 09/13/2020     LDH:    Lab Results   Component Value Date     09/11/2020     Uric Acid:    Lab Results   Component Value Date    LABURIC 7.1 09/11/2020     PT/INR:  No results found for: PROTIME, INR  Warfarin PT/INR:  No components found for: PTPATWAR, PTINRWAR  PTT:  No results found for: APTT, PTT[APTT}  Troponin:  No results found for: TROPONINI  Last 3 Troponin:  No results found for: TROPONINI  U/A:    Lab Results   Component Value Date    COLORU Yellow 09/10/2020    PROTEINU >=300 09/10/2020    PHUR 6.0 09/10/2020    WBCUA 1-3 09/10/2020    RBCUA 1-3 09/10/2020    BACTERIA FEW 09/10/2020    CLARITYU Clear 09/10/2020    SPECGRAV 1.025 09/10/2020    LEUKOCYTESUR Negative 09/10/2020    UROBILINOGEN 0.2 09/10/2020    BILIRUBINUR Negative 09/10/2020    BLOODU SMALL 09/10/2020    GLUCOSEU Negative 09/10/2020     HgBA1c:    Lab Results   Component Value Date    LABA1C 4.9 09/11/2020     FLP:    Lab Results   Component Value Date    TRIG 218 09/11/2020    HDL 46 09/11/2020    LDLCALC 108 09/11/2020    LABVLDL 44 09/11/2020     TSH:    Lab Results   Component Value Date    TSH 2.400 09/11/2020     VITAMIN B12: No components found for: B12  FOLATE:    Lab Results   Component Value Date    FOLATE 13.6 09/11/2020     IRON:    Lab Results (benign prostatic hyperplasia)    Gout    Opioid dependence in remission (HCC)    Seasonal allergic rhinitis    Proteinuria    Acute heart failure with preserved ejection fraction (HCC)  Resolved Problems:    * No resolved hospital problems. *      PLAN:  SEE ORDERS      RE  CHANGES AND FINDINGS   Medications reviewed with patient  GI prophylaxis  DVT prophylaxis  Consultants notes reviewed  Carvedilol increased to 25 mg twice daily due to hypertension  Heparin 5000 units subcu every 8 hours  Hydralazine 25 mg 3 times daily by mouth  Monitor labs  Extended discussion with patient regarding medications, need for medications but perhaps slower institution of medications. Reduce carvedilol 12.5 mg twice daily until his body adjusts to medications  Reduce Crestor 10 mg daily rather than 20 mg  Reduce Apresoline 25 mg 3 times daily  Sodium bicarb 1300 mg twice daily  Med reconciliation completed  Discharge today  Prescriptions written  Follow-up Dr. Isaiah Moore 1 week  Follow-up nephrology 2 to 3 weeks  He will obtain blood pressure cuff for outpatient measurements  He will visit [facility] for transplantation consideration after he has been seen in nephrology office      Discussed with patient and domestic partner and nursing. Danilo Villagran DO     11:43 AM     9/13/2020    TIME > 35 MINUTES    >  50 %  OF  TIME  DISCUSSION               ------------  INFORMATION  -----------      DIET:DIET NO SALT ADDED (3-4 GM);         Allergies   Allergen Reactions    Anesthetics, Amide Anaphylaxis         MEDICATION SIDE EFFECTS:none       SCHEDULED MEDS:                                 Scheduled Meds:   heparin (porcine)  5,000 Units Subcutaneous 3 times per day    carvedilol  25 mg Oral BID WC    rosuvastatin  20 mg Oral Nightly    hydrALAZINE  50 mg Oral 3 times per day    pantoprazole  40 mg Oral QAM AC    sodium bicarbonate  1,300 mg Oral BID       Continuous Infusions:      Data:       Intake/Output Summary (Last 24 hours) at 9/13/2020 1143  Last data filed at 9/12/2020 1540  Gross per 24 hour   Intake --   Output 90 ml   Net -90 ml       Wt Readings from Last 3 Encounters:   09/10/20 205 lb (93 kg)       Labs: Additional    GLUCOSE:No results for input(s): POCGLU in the last 72 hours. BNP:No results found for: BNP    CRP:   Recent Labs     09/11/20  1154 09/12/20  0158 09/13/20  0310   CRP 0.1 0.1 <0.1       ESR:  Recent Labs     09/11/20  1154 09/12/20  0158 09/13/20  0310   SEDRATE 26* 26* 16*       RADIOLOGY: REVIEWED AVAILABLE REPORT  US RETROPERITONEAL SONIDO   Final Result   1. No evidence for hemodynamically significant renal artery stenosis. 2.  The kidneys appear to be hyperechoic suspicious for medical renal   disease      US RETROPERITONEAL COMPLETE   Final Result      Increased echogenicity of the renal cortices compatible with renal   medical disease.                 [address]   11:43 AM     9/13/2020      Voice recognition software used for dictation

## 2020-09-14 LAB
ADDENDUM ELECTROPHORESIS URINE RANDOM: NORMAL
ALBUMIN SERPL-MCNC: 2.8 G/DL (ref 3.5–4.7)
ALPHA-1-GLOBULIN: 0.2 G/DL (ref 0.2–0.4)
ALPHA-2-GLOBULIN: 0.9 G/FL (ref 0.5–1)
ANTI-NUCLEAR ANTIBODY (ANA): NEGATIVE
BETA GLOBULIN: 0.9 G/DL (ref 0.8–1.3)
CREATININE 24 HOUR URINE: NORMAL MG/D (ref 1000–2500)
CREATININE URINE: 66 MG/DL
ELECTROPHORESIS: ABNORMAL
ENA TO RNP ANTIBODY: NEGATIVE
ENA TO SMITH (SM) ANTIBODY: NEGATIVE
ENA TO SSA (RO) ANTIBODY: NEGATIVE
ENA TO SSB (LA) ANTIBODY: NEGATIVE
GAMMA GLOBULIN: 0.8 G/DL (ref 0.7–1.6)
HOURS COLLECTED: NORMAL
IGA: 178 MG/DL (ref 70–400)
IGG: 779 MG/DL (ref 700–1600)
IGM: 114 MG/DL (ref 40–230)
METANEPHRINE INTREP URINE: NORMAL
METANEPHRINE UG/G CRE: 118 UG/G CRT (ref 0–300)
METANEPHRINE, UR-PER VOL: 78 UG/L
METANEPHRINES URINE: NORMAL UG/D (ref 55–320)
NORMETANEPHRINE 24 HOUR URINE: NORMAL UG/D (ref 114–865)
NORMETANEPHRINE, (G/CRT): 326 UG/G CRT (ref 0–400)
NORMETANEPHRINES, NMOL/L: 215 UG/L
SCLERODERMA (SCL-70) AB: NEGATIVE
TOTAL PROTEIN: 5.7 G/DL (ref 6.4–8.3)
URINE TOTAL VOLUME: NORMAL

## 2020-09-14 NOTE — DISCHARGE SUMMARY
DISCHARGE SUMMARY  Patient ID:  Radha Brown.  [id number]  52 y.o.  1970    Admit date: 9/10/2020      Discharge date : 9/30/2020      Admission Diagnoses: CONY (acute kidney injury) ([facility] [N17.9]  CONY (acute kidney injury) ([facility] [N17.9]    Discharge Diagnosis  Principal Problem:    CONY (acute kidney injury) ([facility]  Active Problems:    Asymmetric septal hypertrophy (HCC)    Hyperlipidemia    Hypertension    Mild aortic sclerosis (HCC)    Erectile dysfunction    Thoracic aortic aneurysm without rupture (HCC)    Hyperkalemia    BPH (benign prostatic hyperplasia)    Gout    Opioid dependence in remission (HCC)    Seasonal allergic rhinitis    Proteinuria    Acute heart failure with preserved ejection fraction ([facility]  Resolved Problems:    * No resolved hospital problems. [facility] Course:CHIEF COMPLAINT: Abnormal labs     History of Present Illness: Unfortunate 61-year-old male patient of Dr. Russella Snellen I am asked to admit and follow. Patient was seen for the first time by Dr. Devon Ordaz of the last few days had laboratory tests performed. He was unsure what lab values were. In the ED sodium 138 potassium 5.7 CO2 18 BUN 37 creatinine 5.9 glucose 100 serum osmolality 305, urine osmolality 355. Hemoglobin 10.3 WBC 7.1. Urinalysis negative except for protein greater than 300. Today potassium 6.0 BUN 34 creatinine 5.8 uric acid 7.1. proBNP 1683  triglycerides 218. --He also complained of mild dysuria which is chronic but worse recently. He thinks he was told he had BPH in the past.  In 2015 he underwent a \"physical\" was advised that his prostate was a size of a grapefruit. Did not follow-up with urology. --History of opioid abuse during his younger years; has been off for at least last 10 years.   --He has an ex-smoker  --Patient recalls in 2015 he had a \"corporate physical\" and was sent for a 24-hour urine but he is unsure for what  --Within the last year he had a physical at the [facility] clinic was advised his blood pressure was elevated. Rather than taking medications, he decided to become vegetarian. He is noticed that since his vegetarian diet his gout has improved significantly. --Patient has had ED off and on for last few years  --Equivocal hypertension in the 2015 physical  --Mother  age 55 acute MI; father still alive age 79 underwent CABG in his 62s. --History of allergic rhinitis; underwent immunotherapy in his younger years. --Past medical history negative rheumatic fever scarlet fever polio diphtheria cancer diabetes  --Denies any previous recurring pulmonary difficulties pneumonia asthma dyspnea  --Denies any chest pain palpitations; cannot recall any cardiac evaluation  --BPH symptoms as listed above; they seem to come and go. He has had no kidney stones kidney infection he is aware of.  --Denies any previous recurring indigestion heartburn gas gallbladder difficulties blood in stool. --He previously worked in a [employer] office in [place]; he retired from that job and moved back to [facility] for the last year or 2; he still works but is much more physical       2020-SUBJECTIVE: Slim Rome. is alert awake and cooperative; oriented ×3. Denies any chest pain dyspnea nausea emesis. Tolerating diet. No abdominal pain. Afebrile last 24 hours. Blood pressure 170/104.  90% saturation on room air. Intake and output -1743 cc. Sodium 137 potassium 5.1 chloride 108 CO2 18 BUN 36 creatinine 5.8 magnesium 2.0 calcium 8.5 phosphorus 5.6 protein 6.2 albumin 3.5 CRP 0.1. Procalcitonin 0.11.  proBNP 1683.  triglycerides 218; HDL 46. I discussed the above with cardiology, Dr. Nakia Mcnair who suggested Crestor 20 mg daily not 10 mg daily. A1c 4.9 TSH 2.400. Hemoglobin 9.9 WBC 6.8. Vitamin D low at 15. Sed rate 26. Urinary protein to 75. Protein creatinine ratio 4.0. Rheumatoid factor negative PSA 0.49.   Yesterday patient was able to urinate 150 cc; bladder was scanned with PVR of 38 cc. Cardiology consult from today appreciated; continue carvedilol, quit smoking, avoid nonsteroidal agents. Once stabilized nuclear stress test should be performed. He is just about to finish his 24-hour urine collection. He would like discharge ASA.       9/13/2020-patient feeling poorly this morning; he blames on his medications. Lightheaded dizzy felt off balance; felt as if he is having a panic attack. He also noticed he is having difficulty concentrating difficulty reading; reading the same paragraph over and over again. Afebrile last 24 hours. Blood pressure 152/102. Intake and output -1833 cc. Potassium 5.0 chloride 110 CO2 20 BUN 34 creatinine 6.0 phosphorus 6.2 protein 6.2 albumin 3.2 liver functions normal.  Hemoglobin 9.6 WBC 5.5 platelets 717. 24-hour urine for protein, 7.36 g. Viral respiratory panel negative; blood cultures negative to date. Nephrology note from yesterday appreciated; not much improvement renal function despite hydration. They also rediscussed transplantation; await results of PTH.;  PTH now available 167 (15-65). Hospital orders:    RE  CHANGES AND FINDINGS   Medications reviewed with patient  GI prophylaxis  DVT prophylaxis  Consultants notes reviewed  Carvedilol increased to 25 mg twice daily due to hypertension  Heparin 5000 units subcu every 8 hours  Hydralazine 25 mg 3 times daily by mouth  Monitor labs    Instructions at discharge:  Extended discussion with patient regarding medications, need for medications but perhaps slower institution of medications.   Reduce carvedilol 12.5 mg twice daily until his body adjusts to medications  Reduce Crestor 10 mg daily rather than 20 mg  Reduce Apresoline 25 mg 3 times daily  Sodium bicarb 1300 mg twice daily  Med reconciliation completed  Discharge today  Prescriptions written  Follow-up Dr. Dany Webber 1 week  Follow-up nephrology 2 to 3 weeks  Follow-up cardiology per their instruction  He will obtain blood pressure cuff for outpatient measurements  He will visit [facility] for transplantation consideration after he has been seen in nephrology office      Condition at DISCHARGE : Zach Cain     Discharged to: Home    Discharge Instructions: Medications reviewed with patient    Consults:cardiology and nephrology     Past Medical Hx :   Past Medical History:   Diagnosis Date    Acute heart failure with preserved ejection fraction ([facility] 9/11/2020    CONY (acute kidney injury) ([facility] 9/10/2020    Asymmetric septal hypertrophy ([facility] 2012    BPH (benign prostatic hyperplasia) 9/11/2020    Erectile dysfunction 9/11/2020    Gout 9/11/2020    Hyperkalemia 9/11/2020    Hyperlipidemia     Hypertension     Mild aortic sclerosis ([facility] 9/11/2020    Opioid dependence in remission ([facility] 9/11/2020    Younger years    Proteinuria 9/11/2020    Seasonal allergic rhinitis 9/11/2020    Thoracic aortic aneurysm without rupture ([facility] 09/11/2020    Ascending; 4.3 cm       Past Surgical Hx :  Past Surgical History:   Procedure Laterality Date    TONSILLECTOMY      TYMPANOPLASTY      Ventilation tubes, child       Labs:   CBC:   Lab Results   Component Value Date    WBC 5.5 09/13/2020    RBC 2.93 09/13/2020    HGB 9.6 09/13/2020    HCT 29.5 09/13/2020    .7 09/13/2020    MCH 32.8 09/13/2020    MCHC 32.5 09/13/2020    RDW 13.6 09/13/2020     09/13/2020    MPV 10.4 09/13/2020     CBC with Differential:    Lab Results   Component Value Date    WBC 5.5 09/13/2020    RBC 2.93 09/13/2020    HGB 9.6 09/13/2020    HCT 29.5 09/13/2020     09/13/2020    .7 09/13/2020    MCH 32.8 09/13/2020    MCHC 32.5 09/13/2020    RDW 13.6 09/13/2020    LYMPHOPCT 26.7 09/13/2020    MONOPCT 7.8 09/13/2020    BASOPCT 1.5 09/13/2020    MONOSABS 0.43 09/13/2020    LYMPHSABS 1.47 09/13/2020    EOSABS 0.32 09/13/2020    BASOSABS 0.08 09/13/2020     Hemoglobin/Hematocrit:    Lab Results   Component Value Date HGB 9.6 09/13/2020    HCT 29.5 09/13/2020     CMP:    Lab Results   Component Value Date     09/13/2020    K 5.0 09/13/2020     09/13/2020    CO2 20 09/13/2020    BUN 34 09/13/2020    CREATININE 6.0 09/13/2020    GFRAA 12 09/13/2020    LABGLOM 10 09/13/2020    GLUCOSE 91 09/13/2020    PROT 6.0 09/13/2020    LABALBU 3.2 09/13/2020    CALCIUM 8.6 09/13/2020    BILITOT <0.2 09/13/2020    ALKPHOS 40 09/13/2020    AST 7 09/13/2020    ALT 6 09/13/2020     BMP:    Lab Results   Component Value Date     09/13/2020    K 5.0 09/13/2020     09/13/2020    CO2 20 09/13/2020    BUN 34 09/13/2020    LABALBU 3.2 09/13/2020    CREATININE 6.0 09/13/2020    CALCIUM 8.6 09/13/2020    GFRAA 12 09/13/2020    LABGLOM 10 09/13/2020    GLUCOSE 91 09/13/2020     Hepatic Function Panel:    Lab Results   Component Value Date    ALKPHOS 40 09/13/2020    ALT 6 09/13/2020    AST 7 09/13/2020    PROT 6.0 09/13/2020    BILITOT <0.2 09/13/2020    BILIDIR <0.2 09/13/2020    IBILI see below 09/13/2020    LABALBU 3.2 09/13/2020     Ionized Calcium:  No results found for: IONCA  Magnesium:    Lab Results   Component Value Date    MG 2.1 09/13/2020     Phosphorus:    Lab Results   Component Value Date    PHOS 6.2 09/13/2020     LDH:    Lab Results   Component Value Date     09/11/2020     Uric Acid:    Lab Results   Component Value Date    LABURIC 7.1 09/11/2020     PT/INR:  No results found for: PROTIME, INR  Warfarin PT/INR:  No components found for: PTPATWAR, PTINRWAR  PTT:  No results found for: APTT, PTT[APTT}  Troponin:  No results found for: TROPONINI  Last 3 Troponin:  No results found for: TROPONINI  U/A:    Lab Results   Component Value Date    COLORU Yellow 09/10/2020    PROTEINU >=300 09/10/2020    PHUR 6.0 09/10/2020    WBCUA 1-3 09/10/2020    RBCUA 1-3 09/10/2020    BACTERIA FEW 09/10/2020    CLARITYU Clear 09/10/2020    SPECGRAV 1.025 09/10/2020    LEUKOCYTESUR Negative 09/10/2020    UROBILINOGEN 0.2 09/10/2020    BILIRUBINUR Negative 09/10/2020    BLOODU SMALL 09/10/2020    GLUCOSEU Negative 09/10/2020     HgBA1c:    Lab Results   Component Value Date    LABA1C 4.9 09/11/2020     FLP:    Lab Results   Component Value Date    TRIG 218 09/11/2020    HDL 46 09/11/2020    LDLCALC 108 09/11/2020    LABVLDL 44 09/11/2020     TSH:    Lab Results   Component Value Date    TSH 2.400 09/11/2020     VITAMIN B12: No components found for: B12  FOLATE:    Lab Results   Component Value Date    FOLATE 13.6 09/11/2020     IRON:    Lab Results   Component Value Date    IRON 92 09/11/2020     Iron Saturation:  No components found for: PERCENTFE  TIBC:    Lab Results   Component Value Date    TIBC 231 09/11/2020     FERRITIN:    Lab Results   Component Value Date    FERRITIN 167 09/11/2020     LIPASE:  No results found for: LIPASE  24 Hour Urine for Protein:  No components found for: Pam Master, UTV3  PSA:   Lab Results   Component Value Date    PSA 0.49 09/11/2020          CBC:  Recent Labs     09/12/20 0158 09/13/20 0310   WBC 6.8 5.5   RBC 3.03* 2.93*   HGB 9.9* 9.6*   HCT 30.3* 29.5*    260   .0* 100.7*   MCH 32.7 32.8   MCHC 32.7 32.5   RDW 13.7 13.6   LYMPHOPCT 28.7 26.7   MONOPCT 6.5 7.8   BASOPCT 0.9 1.5   MONOSABS 0.44 0.43   LYMPHSABS 1.94 1.47*   EOSABS 0.44 0.32   BASOSABS 0.06 0.08          H & H :  Recent Labs     09/12/20  0158 09/13/20  0310   HGB 9.9* 9.6*       TSH:No results for input(s): TSH in the last 72 hours. GLUCOSE:No results for input(s): POCGLU in the last 72 hours.     CMP:  Recent Labs     09/11/20  1821 09/12/20  0158 09/13/20  0310   NA  --  137 138   K  --  5.1* 5.0   CL  --  108* 110*   CO2  --  18* 20*   BUN  --  36* 34*   CREATININE  --  5.8* 6.0*   GFRAA  --  13 12   LABGLOM  --  10 10   GLUCOSE  --  93 91   PROT 5.7* 6.2* 6.0*   LABALBU 2.8* 3.5 3.2*   CALCIUM  --  8.5* 8.6   BILITOT  --  <0.2 <0.2   ALKPHOS  --  47 40   AST  --  11 7   ALT  --  7 6 BNP:No results found for: BNP    PROTIME/INR:No results for input(s): PROTIME, INR in the last 72 hours. CRP:   Recent Labs     20  0158 20   CRP 0.1 <0.1       ESR:  Recent Labs     20  0158 20   SEDRATE 26* 16*       LIPASE , AMYLASE:  No results found for: LIPASE   No results found for: AMYLASE    ABGs: No results found for: PHART, PO2ART, TNH3SDS    CARDIAC: No results for input(s): CKTOTAL, CKMB, CKMBINDEX, TROPONINI in the last 72 hours. Lipid Profile:   Lab Results   Component Value Date    TRIG 218 2020    HDL 46 2020    LDLCALC 108 2020    CHOL 198 2020        Us Retroperitoneal Nathan    Result Date: 2020  Patient MRN:  [id number] : 1970 Age: 52 years Gender: Male Order Date:  2020 10:21 AM EXAM: US RETROPERITONEAL NATHAN NUMBER OF IMAGES:  48 INDICATION:  renal artery stenosis renal artery stenosis COMPARISON: 9/10/2020 The right kidney is 10.5 x 6.4 x 5.7 cm The left kidney  is 9.4 x 3.9 x 5 cm There is no mass identified. There is no hydronephrosis identified. There is no calculus identified. The bladder is unremarkable. Both the right and left kidney appear hyperechoic Velocity within the aorta measure 73 cm/sec. Velocities in the right renal artery measure 110 cm/sec,  renal aortic ratio is 1.5. Velocities within the left renal artery measure 104 cm/sec, renal aortic ratio on the left is 1.4.     1.  No evidence for hemodynamically significant renal artery stenosis. 2.  The kidneys appear to be hyperechoic suspicious for medical renal disease    Us Retroperitoneal Complete    Result Date: 9/10/2020  Clinical indications: Renal failure. Real-time and Doppler sonography of the kidneys and urinary bladder. FINDINGS: Right kidney measures 11.9 x 5.8 x 6.1 cm. Left kidney measures 9.3 x 5.3 x 5.6 cm. Kidneys are negative for evidence of solid or cystic mass, hydronephrosis or calculus disease.  There is mild diffuse increased echogenicity of the renal cortices. The urinary bladder is unremarkable. Increased echogenicity of the renal cortices compatible with renal medical disease. Discharge Exam:  See progress note from today    Discharge Medication List as of 9/13/2020  1:15 PM      START taking these medications    Details   carvedilol (COREG) 12.5 MG tablet Take 1 tablet by mouth 2 times daily (with meals), Disp-60 tablet,R-3Normal      hydrALAZINE (APRESOLINE) 25 MG tablet Take 1 tablet by mouth every 8 hours, Disp-90 tablet,R-3Normal      rosuvastatin (CRESTOR) 10 MG tablet Take 1 tablet by mouth nightly, Disp-30 tablet,R-3Normal      sodium bicarbonate 650 MG tablet Take 2 tablets by mouth 2 times daily, Disp-120 tablet,R-1Normal         STOP taking these medications       hydroCHLOROthiazide (MICROZIDE) 12.5 MG capsule Comments:   Reason for Stopping:         potassium chloride (KLOR-CON M) 10 MEQ extended release tablet Comments:   Reason for Stopping:               Time Spent on discharge is more than 45 minutes --      TIME  INCLUDES TIME THAT WAS  SPENT WITH DISCHARGE PAPERS, MEDICATION REVIEW, MEDICATION RECONCILIATION,   PRESCRIPTIONS, CHART REVIEW, PATIENT EXAM , FINAL PROGRESS NOTE, DISCUSSION OF FINDINGS WITH PATIENT AND AVAILABLE FAMILY , AND DICTATION  WHERE NEEDED ; Home Health [facility]) FORMS COMPLETED ; N-17  COMPLETION ;  H&P UPDATED ; DURABLE EQUIPMENT FORMS.      Active Hospital Problems    Diagnosis    [facility] [I70.0]    Erectile dysfunction [N52.9]    Thoracic aortic aneurysm without rupture ([facility] [I71.2]    Hyperkalemia [E87.5]    BPH (benign prostatic hyperplasia) [N40.0]    Gout [M10.9]    Opioid dependence in remission ([address] [F11.21]    Seasonal allergic rhinitis [J30.2]    Proteinuria [R80.9]    Acute heart failure with preserved ejection fraction (HCC) [I50.31]    Asymmetric septal hypertrophy (HCC) [I42.2]    Hyperlipidemia [E78.5]    Hypertension [I10]    CONY (acute kidney injury) [facility]) [N17.9]       Signed:    Hiro Villagran DO    9/14/2020    3:08 PM    Voice recognition software use for dictation

## 2020-09-15 LAB
ALDOLASE: 3.8 U/L (ref 1.5–8.1)
CCP IGG ANTIBODIES: 5 UNITS (ref 0–19)
HAV IGM SER IA-ACNC: NORMAL
HEPATITIS B CORE IGM ANTIBODY: NORMAL
HEPATITIS B SURFACE ANTIGEN INTERPRETATION: NORMAL
HEPATITIS C ANTIBODY INTERPRETATION: NORMAL

## 2020-09-16 LAB
ANCA IFA: NORMAL
BLOOD CULTURE, ROUTINE: NORMAL
CULTURE, BLOOD 2: NORMAL

## 2020-12-05 ENCOUNTER — HOSPITAL ENCOUNTER (INPATIENT)
Age: 50
LOS: 1 days | Discharge: HOME | DRG: 640 | End: 2020-12-06
Attending: EMERGENCY MEDICINE | Admitting: INTERNAL MEDICINE
Payer: COMMERCIAL

## 2020-12-05 DIAGNOSIS — D63.1 ANEMIA DUE TO STAGE 5 CHRONIC KIDNEY DISEASE, NOT ON CHRONIC DIALYSIS (HCC): ICD-10-CM

## 2020-12-05 DIAGNOSIS — E87.5 HYPERKALEMIA: Primary | ICD-10-CM

## 2020-12-05 DIAGNOSIS — N18.5 ANEMIA DUE TO STAGE 5 CHRONIC KIDNEY DISEASE, NOT ON CHRONIC DIALYSIS (HCC): ICD-10-CM

## 2020-12-05 PROBLEM — I35.8 MILD AORTIC VALVE SCLEROSIS: Status: ACTIVE | Noted: 2020-09-11

## 2020-12-05 LAB
ABO/RH: NORMAL
ALBUMIN SERPL-MCNC: 4.2 G/DL (ref 3.5–5.2)
ALP BLD-CCNC: 53 U/L (ref 40–129)
ALT SERPL-CCNC: 11 U/L (ref 0–40)
ANION GAP SERPL CALCULATED.3IONS-SCNC: 15 MMOL/L (ref 7–16)
ANTIBODY SCREEN: NORMAL
AST SERPL-CCNC: 7 U/L (ref 0–39)
BASOPHILS ABSOLUTE: 0.05 E9/L (ref 0–0.2)
BASOPHILS ABSOLUTE: 0.06 E9/L (ref 0–0.2)
BASOPHILS RELATIVE PERCENT: 0.7 % (ref 0–2)
BASOPHILS RELATIVE PERCENT: 0.9 % (ref 0–2)
BILIRUB SERPL-MCNC: <0.2 MG/DL (ref 0–1.2)
BLOOD BANK DISPENSE STATUS: NORMAL
BLOOD BANK PRODUCT CODE: NORMAL
BPU ID: NORMAL
BUN BLDV-MCNC: 89 MG/DL (ref 6–20)
CALCIUM SERPL-MCNC: 8.4 MG/DL (ref 8.6–10.2)
CHLORIDE BLD-SCNC: 105 MMOL/L (ref 98–107)
CHP ED QC CHECK: NORMAL
CO2: 19 MMOL/L (ref 22–29)
CREAT SERPL-MCNC: 8.2 MG/DL (ref 0.7–1.2)
DESCRIPTION BLOOD BANK: NORMAL
EOSINOPHILS ABSOLUTE: 0.21 E9/L (ref 0.05–0.5)
EOSINOPHILS ABSOLUTE: 0.47 E9/L (ref 0.05–0.5)
EOSINOPHILS RELATIVE PERCENT: 3 % (ref 0–6)
EOSINOPHILS RELATIVE PERCENT: 6.9 % (ref 0–6)
GFR AFRICAN AMERICAN: 8
GFR NON-AFRICAN AMERICAN: 7 ML/MIN/1.73
GLUCOSE BLD-MCNC: 107 MG/DL (ref 74–99)
GLUCOSE BLD-MCNC: 43 MG/DL
HCT VFR BLD CALC: 22.2 % (ref 37–54)
HCT VFR BLD CALC: 24.9 % (ref 37–54)
HCT VFR BLD CALC: 25.7 % (ref 37–54)
HEMOGLOBIN: 7.1 G/DL (ref 12.5–16.5)
HEMOGLOBIN: 7.9 G/DL (ref 12.5–16.5)
IMMATURE GRANULOCYTES #: 0.02 E9/L
IMMATURE GRANULOCYTES #: 0.02 E9/L
IMMATURE GRANULOCYTES %: 0.3 % (ref 0–5)
IMMATURE GRANULOCYTES %: 0.3 % (ref 0–5)
IMMATURE RETIC FRACT: 6.2 % (ref 2.3–13.4)
LYMPHOCYTES ABSOLUTE: 0.82 E9/L (ref 1.5–4)
LYMPHOCYTES ABSOLUTE: 0.97 E9/L (ref 1.5–4)
LYMPHOCYTES RELATIVE PERCENT: 11.8 % (ref 20–42)
LYMPHOCYTES RELATIVE PERCENT: 14.3 % (ref 20–42)
MAGNESIUM: 2.1 MG/DL (ref 1.6–2.6)
MCH RBC QN AUTO: 31.3 PG (ref 26–35)
MCH RBC QN AUTO: 32 PG (ref 26–35)
MCHC RBC AUTO-ENTMCNC: 31.7 % (ref 32–34.5)
MCHC RBC AUTO-ENTMCNC: 32 % (ref 32–34.5)
MCV RBC AUTO: 100 FL (ref 80–99.9)
MCV RBC AUTO: 98.8 FL (ref 80–99.9)
METER GLUCOSE: 112 MG/DL (ref 74–99)
METER GLUCOSE: 113 MG/DL (ref 74–99)
METER GLUCOSE: 43 MG/DL (ref 74–99)
MONOCYTES ABSOLUTE: 0.39 E9/L (ref 0.1–0.95)
MONOCYTES ABSOLUTE: 0.55 E9/L (ref 0.1–0.95)
MONOCYTES RELATIVE PERCENT: 5.6 % (ref 2–12)
MONOCYTES RELATIVE PERCENT: 8.1 % (ref 2–12)
NEUTROPHILS ABSOLUTE: 4.7 E9/L (ref 1.8–7.3)
NEUTROPHILS ABSOLUTE: 5.46 E9/L (ref 1.8–7.3)
NEUTROPHILS RELATIVE PERCENT: 69.5 % (ref 43–80)
NEUTROPHILS RELATIVE PERCENT: 78.6 % (ref 43–80)
PDW BLD-RTO: 11.9 FL (ref 11.5–15)
PDW BLD-RTO: 12.4 FL (ref 11.5–15)
PLATELET # BLD: 272 E9/L (ref 130–450)
PLATELET # BLD: 272 E9/L (ref 130–450)
PMV BLD AUTO: 10 FL (ref 7–12)
PMV BLD AUTO: 10 FL (ref 7–12)
POTASSIUM SERPL-SCNC: 5.7 MMOL/L (ref 3.5–5)
POTASSIUM SERPL-SCNC: 6.7 MMOL/L (ref 3.5–5)
RBC # BLD: 2.22 E12/L (ref 3.8–5.8)
RBC # BLD: 2.52 E12/L (ref 3.8–5.8)
RETIC HGB EQUIVALENT: 35.3 PG (ref 28.2–36.6)
RETICULOCYTE ABSOLUTE COUNT: 0.02 E12/L
RETICULOCYTE COUNT PCT: 0.8 % (ref 0.4–1.9)
SODIUM BLD-SCNC: 139 MMOL/L (ref 132–146)
TOTAL PROTEIN: 6.8 G/DL (ref 6.4–8.3)
TROPONIN: <0.01 NG/ML (ref 0–0.03)
WBC # BLD: 6.8 E9/L (ref 4.5–11.5)
WBC # BLD: 7 E9/L (ref 4.5–11.5)

## 2020-12-05 PROCEDURE — 85025 COMPLETE CBC W/AUTO DIFF WBC: CPT

## 2020-12-05 PROCEDURE — 84132 ASSAY OF SERUM POTASSIUM: CPT

## 2020-12-05 PROCEDURE — 2060000000 HC ICU INTERMEDIATE R&B

## 2020-12-05 PROCEDURE — 86901 BLOOD TYPING SEROLOGIC RH(D): CPT

## 2020-12-05 PROCEDURE — 86850 RBC ANTIBODY SCREEN: CPT

## 2020-12-05 PROCEDURE — 6360000002 HC RX W HCPCS: Performed by: EMERGENCY MEDICINE

## 2020-12-05 PROCEDURE — 2580000003 HC RX 258: Performed by: INTERNAL MEDICINE

## 2020-12-05 PROCEDURE — 0202U NFCT DS 22 TRGT SARS-COV-2: CPT

## 2020-12-05 PROCEDURE — 83540 ASSAY OF IRON: CPT

## 2020-12-05 PROCEDURE — 83735 ASSAY OF MAGNESIUM: CPT

## 2020-12-05 PROCEDURE — 85045 AUTOMATED RETICULOCYTE COUNT: CPT

## 2020-12-05 PROCEDURE — 6370000000 HC RX 637 (ALT 250 FOR IP): Performed by: EMERGENCY MEDICINE

## 2020-12-05 PROCEDURE — P9016 RBC LEUKOCYTES REDUCED: HCPCS

## 2020-12-05 PROCEDURE — G0378 HOSPITAL OBSERVATION PER HR: HCPCS

## 2020-12-05 PROCEDURE — 96374 THER/PROPH/DIAG INJ IV PUSH: CPT

## 2020-12-05 PROCEDURE — 6370000000 HC RX 637 (ALT 250 FOR IP): Performed by: INTERNAL MEDICINE

## 2020-12-05 PROCEDURE — 96361 HYDRATE IV INFUSION ADD-ON: CPT

## 2020-12-05 PROCEDURE — 99284 EMERGENCY DEPT VISIT MOD MDM: CPT

## 2020-12-05 PROCEDURE — 36430 TRANSFUSION BLD/BLD COMPNT: CPT

## 2020-12-05 PROCEDURE — 82962 GLUCOSE BLOOD TEST: CPT

## 2020-12-05 PROCEDURE — 94640 AIRWAY INHALATION TREATMENT: CPT

## 2020-12-05 PROCEDURE — 94664 DEMO&/EVAL PT USE INHALER: CPT

## 2020-12-05 PROCEDURE — 2500000003 HC RX 250 WO HCPCS: Performed by: EMERGENCY MEDICINE

## 2020-12-05 PROCEDURE — 93005 ELECTROCARDIOGRAM TRACING: CPT | Performed by: PHYSICIAN ASSISTANT

## 2020-12-05 PROCEDURE — 80053 COMPREHEN METABOLIC PANEL: CPT

## 2020-12-05 PROCEDURE — 96376 TX/PRO/DX INJ SAME DRUG ADON: CPT

## 2020-12-05 PROCEDURE — 83550 IRON BINDING TEST: CPT

## 2020-12-05 PROCEDURE — 36415 COLL VENOUS BLD VENIPUNCTURE: CPT

## 2020-12-05 PROCEDURE — 96372 THER/PROPH/DIAG INJ SC/IM: CPT

## 2020-12-05 PROCEDURE — 96375 TX/PRO/DX INJ NEW DRUG ADDON: CPT

## 2020-12-05 PROCEDURE — 82728 ASSAY OF FERRITIN: CPT

## 2020-12-05 PROCEDURE — 86923 COMPATIBILITY TEST ELECTRIC: CPT

## 2020-12-05 PROCEDURE — 82306 VITAMIN D 25 HYDROXY: CPT

## 2020-12-05 PROCEDURE — 2580000003 HC RX 258: Performed by: EMERGENCY MEDICINE

## 2020-12-05 PROCEDURE — 86900 BLOOD TYPING SEROLOGIC ABO: CPT

## 2020-12-05 PROCEDURE — 84484 ASSAY OF TROPONIN QUANT: CPT

## 2020-12-05 PROCEDURE — 6360000002 HC RX W HCPCS: Performed by: INTERNAL MEDICINE

## 2020-12-05 PROCEDURE — 96360 HYDRATION IV INFUSION INIT: CPT

## 2020-12-05 RX ORDER — HEPARIN SODIUM 10000 [USP'U]/ML
5000 INJECTION, SOLUTION INTRAVENOUS; SUBCUTANEOUS EVERY 8 HOURS
Status: DISCONTINUED | OUTPATIENT
Start: 2020-12-05 | End: 2020-12-06 | Stop reason: HOSPADM

## 2020-12-05 RX ORDER — CALCIUM GLUCONATE 94 MG/ML
1 INJECTION, SOLUTION INTRAVENOUS ONCE
Status: COMPLETED | OUTPATIENT
Start: 2020-12-05 | End: 2020-12-05

## 2020-12-05 RX ORDER — HYDRALAZINE HYDROCHLORIDE 25 MG/1
25 TABLET, FILM COATED ORAL EVERY 8 HOURS SCHEDULED
Status: DISCONTINUED | OUTPATIENT
Start: 2020-12-05 | End: 2020-12-05

## 2020-12-05 RX ORDER — 0.9 % SODIUM CHLORIDE 0.9 %
1000 INTRAVENOUS SOLUTION INTRAVENOUS ONCE
Status: COMPLETED | OUTPATIENT
Start: 2020-12-05 | End: 2020-12-05

## 2020-12-05 RX ORDER — SODIUM POLYSTYRENE SULFONATE 4.1 MEQ/G
30 POWDER, FOR SUSPENSION ORAL; RECTAL ONCE
Status: COMPLETED | OUTPATIENT
Start: 2020-12-05 | End: 2020-12-05

## 2020-12-05 RX ORDER — PANTOPRAZOLE SODIUM 40 MG/1
40 TABLET, DELAYED RELEASE ORAL
Status: DISCONTINUED | OUTPATIENT
Start: 2020-12-06 | End: 2020-12-06 | Stop reason: HOSPADM

## 2020-12-05 RX ORDER — ACETAMINOPHEN 325 MG/1
650 TABLET ORAL EVERY 4 HOURS PRN
Status: DISCONTINUED | OUTPATIENT
Start: 2020-12-05 | End: 2020-12-06 | Stop reason: HOSPADM

## 2020-12-05 RX ORDER — 0.9 % SODIUM CHLORIDE 0.9 %
250 INTRAVENOUS SOLUTION INTRAVENOUS ONCE
Status: COMPLETED | OUTPATIENT
Start: 2020-12-05 | End: 2020-12-06

## 2020-12-05 RX ORDER — FUROSEMIDE 10 MG/ML
40 INJECTION INTRAMUSCULAR; INTRAVENOUS ONCE
Status: COMPLETED | OUTPATIENT
Start: 2020-12-05 | End: 2020-12-05

## 2020-12-05 RX ORDER — CALCIUM GLUCONATE 94 MG/ML
1 INJECTION, SOLUTION INTRAVENOUS ONCE
Status: DISCONTINUED | OUTPATIENT
Start: 2020-12-05 | End: 2020-12-05 | Stop reason: SDUPTHER

## 2020-12-05 RX ORDER — CARVEDILOL 6.25 MG/1
12.5 TABLET ORAL 2 TIMES DAILY WITH MEALS
Status: DISCONTINUED | OUTPATIENT
Start: 2020-12-05 | End: 2020-12-06 | Stop reason: HOSPADM

## 2020-12-05 RX ORDER — SODIUM BICARBONATE 650 MG/1
1300 TABLET ORAL 2 TIMES DAILY
Status: DISCONTINUED | OUTPATIENT
Start: 2020-12-05 | End: 2020-12-06 | Stop reason: HOSPADM

## 2020-12-05 RX ORDER — HYDRALAZINE HYDROCHLORIDE 50 MG/1
50 TABLET, FILM COATED ORAL EVERY 8 HOURS SCHEDULED
Status: DISCONTINUED | OUTPATIENT
Start: 2020-12-06 | End: 2020-12-06 | Stop reason: HOSPADM

## 2020-12-05 RX ORDER — ROSUVASTATIN CALCIUM 10 MG/1
10 TABLET, COATED ORAL NIGHTLY
Status: DISCONTINUED | OUTPATIENT
Start: 2020-12-05 | End: 2020-12-06 | Stop reason: HOSPADM

## 2020-12-05 RX ORDER — DEXTROSE MONOHYDRATE 25 G/50ML
50 INJECTION, SOLUTION INTRAVENOUS PRN
Status: DISCONTINUED | OUTPATIENT
Start: 2020-12-05 | End: 2020-12-06 | Stop reason: HOSPADM

## 2020-12-05 RX ADMIN — DEXTROSE MONOHYDRATE 50 ML: 25 INJECTION, SOLUTION INTRAVENOUS at 14:48

## 2020-12-05 RX ADMIN — CARVEDILOL 12.5 MG: 6.25 TABLET, FILM COATED ORAL at 20:57

## 2020-12-05 RX ADMIN — ALBUTEROL SULFATE 2.5 MG: 2.5 SOLUTION RESPIRATORY (INHALATION) at 15:30

## 2020-12-05 RX ADMIN — SODIUM POLYSTYRENE SULFONATE 30 G: 1 POWDER ORAL; RECTAL at 20:57

## 2020-12-05 RX ADMIN — INSULIN HUMAN 10 UNITS: 100 INJECTION, SOLUTION PARENTERAL at 14:50

## 2020-12-05 RX ADMIN — HYDRALAZINE HYDROCHLORIDE 25 MG: 25 TABLET, FILM COATED ORAL at 20:58

## 2020-12-05 RX ADMIN — ALBUTEROL SULFATE 2.5 MG: 2.5 SOLUTION RESPIRATORY (INHALATION) at 15:31

## 2020-12-05 RX ADMIN — SODIUM CHLORIDE 250 ML: 9 INJECTION, SOLUTION INTRAVENOUS at 20:55

## 2020-12-05 RX ADMIN — SODIUM BICARBONATE 1300 MG: 650 TABLET ORAL at 20:58

## 2020-12-05 RX ADMIN — CALCIUM GLUCONATE 1 G: 98 INJECTION, SOLUTION INTRAVENOUS at 14:12

## 2020-12-05 RX ADMIN — SODIUM BICARBONATE 50 MEQ: 84 INJECTION, SOLUTION INTRAVENOUS at 14:46

## 2020-12-05 RX ADMIN — FUROSEMIDE 40 MG: 10 INJECTION, SOLUTION INTRAMUSCULAR; INTRAVENOUS at 14:46

## 2020-12-05 RX ADMIN — SODIUM CHLORIDE 1000 ML: 9 INJECTION, SOLUTION INTRAVENOUS at 14:15

## 2020-12-05 RX ADMIN — ALBUTEROL SULFATE 2.5 MG: 2.5 SOLUTION RESPIRATORY (INHALATION) at 15:32

## 2020-12-05 RX ADMIN — HEPARIN SODIUM 5000 UNITS: 10000 INJECTION INTRAVENOUS; SUBCUTANEOUS at 21:30

## 2020-12-05 RX ADMIN — DEXTROSE MONOHYDRATE 50 ML: 25 INJECTION, SOLUTION INTRAVENOUS at 15:59

## 2020-12-05 RX ADMIN — ROSUVASTATIN CALCIUM 10 MG: 10 TABLET, FILM COATED ORAL at 20:57

## 2020-12-05 NOTE — ED NOTES
Bed: 28  Expected date:   Expected time:   Means of arrival:   Comments:  Bassam in triage Lizzie 64.  GISELE Alanis  12/05/20 [id number]

## 2020-12-05 NOTE — ED PROVIDER NOTES
HPI:  12/5/20, Time: 3:40 PM SHELLEY Castillo. is a 52 y.o. male presenting to the ED for Weakness Nausea Reported Elevated K 7.5 mmol/L.  , beginning several hours ago. The complaint has been persistent, severe in severity, and worsened by nothing. Patient has history of chronic kidney disease stage V not on dialysis was sent in by his nephrologist because outpatient lab reported a potassium of 7.5 mmol/L. Patient has the vomiting or fevers. Eyes cough or abdominal pain. He does complain of dizziness when he stands. Also found to be anemic with hemoglobin of 7.1 mg/dl. Suspect this is from chronic kidney disease. Patient denies melena, hematochezia, diarrhea. He has no metaphysis hemoptysis. He denies shortness of breath or chest pain. No Covid exposures reported. ROS:   Pertinent positives and negatives are stated within HPI, all other systems reviewed and are negative.  --------------------------------------------- PAST HISTORY ---------------------------------------------  Past Medical History:  has a past medical history of Acute heart failure with preserved ejection fraction ([address] CONY (acute kidney injury) ([address] Anemia in stage 5 chronic kidney disease, not on chronic dialysis ([address] Asymmetric septal hypertrophy ([address] BPH (benign prostatic hyperplasia), CKD (chronic kidney disease) stage 5, GFR less than 15 ml/min ([address] Erectile dysfunction, Gout, Hyperkalemia, Hyperlipidemia, Hypertension, Mild aortic sclerosis, Opioid dependence in remission ([address] Proteinuria, Seasonal allergic rhinitis, and Thoracic aortic aneurysm without rupture ([address] Past Surgical History:  has a past surgical history that includes Tonsillectomy and Tympanoplasty. Social History:  reports that he quit smoking about 14 months ago. His smoking use included cigarettes. He started smoking about 33 years ago. He has a 31.00 pack-year smoking history.  He has never used smokeless tobacco. He reports current alcohol use. He reports current drug use. Drug: Marijuana. Family History: family history includes Heart Attack (age of onset: 61) in his father; Heart Disease in his mother; No Known Problems in his sister. The patients home medications have been reviewed. Allergies: Anesthetics, amide    ---------------------------------------------------PHYSICAL EXAM--------------------------------------    Constitutional/General: Alert and oriented x3, well appearing, non toxic in NAD  Head: Normocephalic and atraumatic  Eyes: PERRL, EOMI, Conjunctiva pale. Mouth: Oropharynx clear, handling secretions, no trismus  Neck: Supple, full ROM, non tender to palpation in the midline, no stridor, no crepitus, no meningeal signs  Pulmonary: Lungs clear to auscultation bilaterally, no wheezes, rales, or rhonchi. Not in respiratory distress  Cardiovascular:  Regular rate. Regular rhythm. No murmurs, gallops, or rubs. 2+ distal pulses  Chest: no chest wall tenderness  Abdomen: Soft. Non tender. Non distended. +BS. No rebound, guarding, or rigidity. No pulsatile masses appreciated. Musculoskeletal: Moves all extremities x 4. Warm and well perfused, no clubbing, cyanosis, or edema. Capillary refill <3 seconds  Skin: warm and moist. . No rashes. Neurologic: GCS 15, CN 2-12 grossly intact, no focal deficits, symmetric strength 5/5 in the upper and lower extremities bilaterally  Psych: Normal Affect    -------------------------------------------------- RESULTS -------------------------------------------------  I have personally reviewed all laboratory and imaging results for this patient. Results are listed below.      LABS:  Results for orders placed or performed during the hospital encounter of 12/05/20   CBC Auto Differential   Result Value Ref Range    WBC 6.8 4.5 - 11.5 E9/L    RBC 2.22 (L) 3.80 - 5.80 E12/L    Hemoglobin 7.1 (L) 12.5 - 16.5 g/dL    Hematocrit 22.2 (L) 37.0 - 54.0 %    .0 (H) 80.0 - 99.9 fL    MCH 32.0 26.0 - 35.0 pg    MCHC 32.0 32.0 - 34.5 %    RDW 11.9 11.5 - 15.0 fL    Platelets 396 237 - 368 E9/L    MPV 10.0 7.0 - 12.0 fL    Neutrophils % 69.5 43.0 - 80.0 %    Immature Granulocytes % 0.3 0.0 - 5.0 %    Lymphocytes % 14.3 (L) 20.0 - 42.0 %    Monocytes % 8.1 2.0 - 12.0 %    Eosinophils % 6.9 (H) 0.0 - 6.0 %    Basophils % 0.9 0.0 - 2.0 %    Neutrophils Absolute 4.70 1.80 - 7.30 E9/L    Immature Granulocytes # 0.02 E9/L    Lymphocytes Absolute 0.97 (L) 1.50 - 4.00 E9/L    Monocytes Absolute 0.55 0.10 - 0.95 E9/L    Eosinophils Absolute 0.47 0.05 - 0.50 E9/L    Basophils Absolute 0.06 0.00 - 0.20 E9/L   Comprehensive Metabolic Panel   Result Value Ref Range    Sodium 139 132 - 146 mmol/L    Potassium 6.7 (HH) 3.5 - 5.0 mmol/L    Chloride 105 98 - 107 mmol/L    CO2 19 (L) 22 - 29 mmol/L    Anion Gap 15 7 - 16 mmol/L    Glucose 107 (H) 74 - 99 mg/dL    BUN 89 (H) 6 - 20 mg/dL    CREATININE 8.2 (HH) 0.7 - 1.2 mg/dL    GFR Non-African American 7 >=60 mL/min/1.73    GFR African American 8     Calcium 8.4 (L) 8.6 - 10.2 mg/dL    Total Protein 6.8 6.4 - 8.3 g/dL    Alb 4.2 3.5 - 5.2 g/dL    Total Bilirubin <0.2 0.0 - 1.2 mg/dL    Alkaline Phosphatase 53 40 - 129 U/L    ALT 11 0 - 40 U/L    AST 7 0 - 39 U/L   Troponin   Result Value Ref Range    Troponin <0.01 0.00 - 0.03 ng/mL   Magnesium   Result Value Ref Range    Magnesium 2.1 1.6 - 2.6 mg/dL   Reticulocytes   Result Value Ref Range    Retic Ct Pct 0.8 0.4 - 1.9 %    Retic Ct Abs 0.019 E12/L    Immature Retic Fract 6.2 2.3 - 13.4 %    Hematocrit 25.7 (L) 37.0 - 54.0 %    Retic HGB Equivalent 35.3 28.2 - 36.6 pg   Comprehensive Metabolic Panel   Result Value Ref Range    Sodium 139 132 - 146 mmol/L    Potassium 5.4 (H) 3.5 - 5.0 mmol/L    Chloride 107 98 - 107 mmol/L    CO2 17 (L) 22 - 29 mmol/L    Anion Gap 15 7 - 16 mmol/L    Glucose 93 74 - 99 mg/dL    BUN 79 (H) 6 - 20 mg/dL    CREATININE 7.7 (H) 0.7 - 1.2 mg/dL    GFR Non-African American 8 >=60 mL/min/1.73 GFR African American 9     Calcium 8.4 (L) 8.6 - 10.2 mg/dL    Total Protein 6.5 6.4 - 8.3 g/dL    Alb 3.4 (L) 3.5 - 5.2 g/dL    Total Bilirubin 0.3 0.0 - 1.2 mg/dL    Alkaline Phosphatase 47 40 - 129 U/L    ALT 9 0 - 40 U/L    AST 7 0 - 39 U/L   Magnesium   Result Value Ref Range    Magnesium 2.0 1.6 - 2.6 mg/dL   CBC   Result Value Ref Range    WBC 6.7 4.5 - 11.5 E9/L    RBC 2.42 (L) 3.80 - 5.80 E12/L    Hemoglobin 7.6 (L) 12.5 - 16.5 g/dL    Hematocrit 23.6 (L) 37.0 - 54.0 %    MCV 97.5 80.0 - 99.9 fL    MCH 31.4 26.0 - 35.0 pg    MCHC 32.2 32.0 - 34.5 %    RDW 12.6 11.5 - 15.0 fL    Platelets 871 785 - 392 E9/L    MPV 10.1 7.0 - 12.0 fL   Phosphorus   Result Value Ref Range    Phosphorus 7.7 (H) 2.5 - 4.5 mg/dL   Calcium, Ionized   Result Value Ref Range    Calcium, Ion 1.17 1.15 - 1.33 mmol/L   Brain Natriuretic Peptide   Result Value Ref Range    Pro-BNP 1,086 (H) 0 - 125 pg/mL   CBC Auto Differential   Result Value Ref Range    WBC 7.0 4.5 - 11.5 E9/L    RBC 2.52 (L) 3.80 - 5.80 E12/L    Hemoglobin 7.9 (L) 12.5 - 16.5 g/dL    Hematocrit 24.9 (L) 37.0 - 54.0 %    MCV 98.8 80.0 - 99.9 fL    MCH 31.3 26.0 - 35.0 pg    MCHC 31.7 (L) 32.0 - 34.5 %    RDW 12.4 11.5 - 15.0 fL    Platelets 258 632 - 127 E9/L    MPV 10.0 7.0 - 12.0 fL    Neutrophils % 78.6 43.0 - 80.0 %    Immature Granulocytes % 0.3 0.0 - 5.0 %    Lymphocytes % 11.8 (L) 20.0 - 42.0 %    Monocytes % 5.6 2.0 - 12.0 %    Eosinophils % 3.0 0.0 - 6.0 %    Basophils % 0.7 0.0 - 2.0 %    Neutrophils Absolute 5.46 1.80 - 7.30 E9/L    Immature Granulocytes # 0.02 E9/L    Lymphocytes Absolute 0.82 (L) 1.50 - 4.00 E9/L    Monocytes Absolute 0.39 0.10 - 0.95 E9/L    Eosinophils Absolute 0.21 0.05 - 0.50 E9/L    Basophils Absolute 0.05 0.00 - 0.20 E9/L   Lipid Panel   Result Value Ref Range    Cholesterol, Total 115 0 - 199 mg/dL    Triglycerides 161 (H) 0 - 149 mg/dL    HDL 32 >40 mg/dL    LDL Calculated 51 0 - 99 mg/dL    VLDL Cholesterol Calculated 32 mg/dL Hepatic Function Panel   Result Value Ref Range    Total Protein 6.4 6.4 - 8.3 g/dL    Alb 3.4 (L) 3.5 - 5.2 g/dL    Alkaline Phosphatase 47 40 - 129 U/L    ALT 10 0 - 40 U/L    AST 7 0 - 39 U/L    Total Bilirubin 0.2 0.0 - 1.2 mg/dL    Bilirubin, Direct <0.2 0.0 - 0.3 mg/dL    Bilirubin, Indirect see below 0.0 - 1.0 mg/dL   Reticulocytes   Result Value Ref Range    Retic Ct Pct 0.8 0.4 - 1.9 %    Retic Ct Abs 0.020 E12/L    Immature Retic Fract 4.6 2.3 - 13.4 %    Hematocrit 23.5 (L) 37.0 - 54.0 %    Retic HGB Equivalent 35.6 28.2 - 36.6 pg   Uric Acid   Result Value Ref Range    Uric Acid, Serum 8.6 (H) 3.4 - 7.0 mg/dL   TSH without Reflex   Result Value Ref Range    TSH 1.470 0.270 - 4.200 uIU/mL   Potassium   Result Value Ref Range    Potassium 5.7 (H) 3.5 - 5.0 mmol/L   POCT Glucose   Result Value Ref Range    Meter Glucose 43 (L) 74 - 99 mg/dL   POCT glucose   Result Value Ref Range    Glucose 43 mg/dL    QC OK? y    POCT Glucose   Result Value Ref Range    Meter Glucose 113 (H) 74 - 99 mg/dL   POCT Glucose   Result Value Ref Range    Meter Glucose 112 (H) 74 - 99 mg/dL   EKG 12 Lead   Result Value Ref Range    Ventricular Rate 67 BPM    Atrial Rate 67 BPM    P-R Interval 160 ms    QRS Duration 96 ms    Q-T Interval 416 ms    QTc Calculation (Bazett) 439 ms    P Axis 59 degrees    R Axis 52 degrees    T Axis 54 degrees   TYPE AND SCREEN   Result Value Ref Range    ABO/Rh A POS     Antibody Screen NEG    PREPARE RBC (CROSSMATCH)   Result Value Ref Range    Product Code Blood Bank [id number]     Description Blood Bank Red Blood Cells, Leuko-reduced     Unit Number [id number]     Dispense Status Blood Bank transfused        RADIOLOGY:  Interpreted by Radiologist.  No orders to display         EKG Interpretation  Interpreted by emergency department physician    Rhythm: normal sinus   Rate: 67 BPM  Axis: normal  Conduction: normal  ST Segments: nonspecific changes  T Waves: hyperacute in  multiple    Clinical Impression: hyperkalemia  Comparison to prior EKG: None      ------------------------- NURSING NOTES AND VITALS REVIEWED ---------------------------   The nursing notes within the ED encounter and vital signs as below have been reviewed by myself. BP (!) 180/90   Pulse 72   Temp 97.4 °F (36.3 °C) (Oral)   Resp 18   Ht 6' (1.829 m)   Wt 205 lb (93 kg)   SpO2 98%   BMI 27.80 kg/m²   Oxygen Saturation Interpretation: Normal    The patients available past medical records and past encounters were reviewed.         ------------------------------ ED COURSE/MEDICAL DECISION MAKING----------------------  Medications   dextrose 50 % IV solution (50 mLs Intravenous Given 12/5/20 1559)   0.9 % sodium chloride bolus (250 mLs Intravenous New Bag 12/5/20 2055)   carvedilol (COREG) tablet 12.5 mg (12.5 mg Oral Given 12/5/20 2057)   rosuvastatin (CRESTOR) tablet 10 mg (10 mg Oral Given 12/5/20 2057)   sodium bicarbonate tablet 1,300 mg (1,300 mg Oral Given 12/5/20 2058)   heparin (porcine) injection 5,000 Units (5,000 Units Subcutaneous Given 12/5/20 2130)   pantoprazole (PROTONIX) tablet 40 mg (40 mg Oral Given 12/6/20 0525)   acetaminophen (TYLENOL) tablet 650 mg (has no administration in time range)   sodium zirconium cyclosilicate (LOKELMA) oral suspension 10 g (has no administration in time range)   hydrALAZINE (APRESOLINE) tablet 50 mg (50 mg Oral Given 12/6/20 0525)   0.9 % sodium chloride bolus (0 mLs Intravenous Stopped 12/5/20 1527)   sodium bicarbonate 8.4 % injection 50 mEq (50 mEq Intravenous Given 12/5/20 1446)   furosemide (LASIX) injection 40 mg (40 mg Intravenous Given 12/5/20 1446)   albuterol (PROVENTIL) nebulizer solution 2.5 mg (2.5 mg Nebulization Given 12/5/20 1532)   insulin regular (HUMULIN R;NOVOLIN R) injection 10 Units (10 Units Intravenous Given 12/5/20 1450)   calcium gluconate 10 % injection 1 g (1 g Intravenous Given 12/5/20 1412)   sodium polystyrene (KAYEXALATE) powder 30 g (30 g Oral Given 12/5/20 2057)             Medical Decision Making:    Patient's chronic kidney disease stage V not on hemodialysis sent in for reported elevated potassium. Repeat was concerning with potassium of 6.7. Patient is also found to be anemic 7.1 without any history of melena hematochezia. He was given 2 Amps of calcium gluconate. Followed by dextrose sodium bicarb Lasix he got Proventil and regular insulin 10 units IVP. Patient was typed and screened. Patient became diaphoretic. His repeat blood glucose was 43 mg/dL. He was given a second amp of D50 and given a frozen  Dinner. Recommend patient be admitted to the hospital for chronic kidney disease hyperkalemia and anemia. Re-Evaluations:             Re-evaluation. Patients symptoms are worsening      Consultations:             Dr. Erlinda Joe will admit. Consultation was made with Dr. Zohaib Juan from nephrology    Critical Care:   CRITICAL CARE:   [facility]. Please note that the withdrawal or failure to initiate urgent interventions for this patient would likely result in a life threatening deterioration or permanent disability. Accordingly this patient received the above mentioned time, excluding separately billable procedures. This patient's ED course included: a personal history and physicial examination, re-evaluation prior to disposition, multiple bedside re-evaluations, IV medications, cardiac monitoring, continuous pulse oximetry, complex medical decision making and emergency management and a personal history and physicial eaxmination    This patient has remained hemodynamically stable, been closely monitored and initially deteriorated, but then stabilized during their ED course. Counseling: The emergency provider has spoken with the patient and discussed todays results, in addition to providing specific details for the plan of care and counseling regarding the diagnosis and prognosis.   Questions are answered at this time and they are agreeable with the plan.       --------------------------------- IMPRESSION AND DISPOSITION ---------------------------------    IMPRESSION  1. Hyperkalemia    2. Anemia due to stage 5 chronic kidney disease, not on chronic dialysis ([address]        DISPOSITION  Disposition: Admit to telemetry  Patient condition is serious        NOTE: This report was transcribed using voice recognition software.  Every effort was made to ensure accuracy; however, inadvertent computerized transcription errors may be present        Martin Knox DO  12/06/20 0600

## 2020-12-05 NOTE — ED PROVIDER NOTES
FIRST PROVIDER CONTACT ASSESSMENT NOTE      Department of Emergency Medicine   ED  First Provider Note   12/5/20  1:00 PM EST    Chief Complaint: Other (potassium at 7.5. states he recently had covid test done that is pending, states stage 5 kidney failure)      History of Present Illness:    Noemy Carballo is a 52 y.o. male who presents to the ED by private car for elevated potassium. Stage V renal failure. States that he gets labs every 2 weeks. He was called this morning because his potassium was reported as 7.5 yesterday. He is not in dialysis. He has had recent upper respiratory symptoms but had a negative Covid 2 days ago. Yesterday he had another Covid swab but this was a send out. Focused Screening Exam:  Constitutional:  Alert, appears stated age and is in no distress.       *ALLERGIES*     Anesthetics, amide     ED Triage Vitals [12/05/20 1258]   BP Temp Temp Source Pulse Resp SpO2 Height Weight   -- -- Temporal -- -- -- -- --        Initial Plan of Care:  Initiate Treatment-Testing, Proceed toTreatment Area When Bed Available for ED Attending/MLP to Continue Care    -----------------END OF FIRST PROVIDER CONTACT ASSESSMENT NOTE--------------  Electronically signed by Jeanine Chew PA-C   DD: 12/5/20       Jeanine Chew PA-C  12/05/20 1300

## 2020-12-06 VITALS
DIASTOLIC BLOOD PRESSURE: 110 MMHG | OXYGEN SATURATION: 100 % | HEIGHT: 72 IN | TEMPERATURE: 98 F | RESPIRATION RATE: 18 BRPM | HEART RATE: 70 BPM | BODY MASS INDEX: 27.77 KG/M2 | SYSTOLIC BLOOD PRESSURE: 150 MMHG | WEIGHT: 205 LBS

## 2020-12-06 LAB
ADENOVIRUS BY PCR: NOT DETECTED
ALBUMIN SERPL-MCNC: 3.4 G/DL (ref 3.5–5.2)
ALBUMIN SERPL-MCNC: 3.4 G/DL (ref 3.5–5.2)
ALP BLD-CCNC: 47 U/L (ref 40–129)
ALP BLD-CCNC: 47 U/L (ref 40–129)
ALT SERPL-CCNC: 10 U/L (ref 0–40)
ALT SERPL-CCNC: 9 U/L (ref 0–40)
ANION GAP SERPL CALCULATED.3IONS-SCNC: 15 MMOL/L (ref 7–16)
AST SERPL-CCNC: 7 U/L (ref 0–39)
AST SERPL-CCNC: 7 U/L (ref 0–39)
BILIRUB SERPL-MCNC: 0.2 MG/DL (ref 0–1.2)
BILIRUB SERPL-MCNC: 0.3 MG/DL (ref 0–1.2)
BILIRUBIN DIRECT: <0.2 MG/DL (ref 0–0.3)
BILIRUBIN, INDIRECT: ABNORMAL MG/DL (ref 0–1)
BORDETELLA PARAPERTUSSIS BY PCR: NOT DETECTED
BORDETELLA PERTUSSIS BY PCR: NOT DETECTED
BUN BLDV-MCNC: 79 MG/DL (ref 6–20)
CALCIUM IONIZED: 1.17 MMOL/L (ref 1.15–1.33)
CALCIUM SERPL-MCNC: 8.4 MG/DL (ref 8.6–10.2)
CHLAMYDOPHILIA PNEUMONIAE BY PCR: NOT DETECTED
CHLORIDE BLD-SCNC: 107 MMOL/L (ref 98–107)
CHOLESTEROL, TOTAL: 115 MG/DL (ref 0–199)
CO2: 17 MMOL/L (ref 22–29)
CORONAVIRUS 229E BY PCR: NOT DETECTED
CORONAVIRUS HKU1 BY PCR: NOT DETECTED
CORONAVIRUS NL63 BY PCR: NOT DETECTED
CORONAVIRUS OC43 BY PCR: NOT DETECTED
CREAT SERPL-MCNC: 7.7 MG/DL (ref 0.7–1.2)
EKG ATRIAL RATE: 67 BPM
EKG P AXIS: 59 DEGREES
EKG P-R INTERVAL: 160 MS
EKG Q-T INTERVAL: 416 MS
EKG QRS DURATION: 96 MS
EKG QTC CALCULATION (BAZETT): 439 MS
EKG R AXIS: 52 DEGREES
EKG T AXIS: 54 DEGREES
EKG VENTRICULAR RATE: 67 BPM
GFR AFRICAN AMERICAN: 9
GFR NON-AFRICAN AMERICAN: 8 ML/MIN/1.73
GLUCOSE BLD-MCNC: 93 MG/DL (ref 74–99)
HBA1C MFR BLD: 5.6 % (ref 4–5.6)
HCT VFR BLD CALC: 23.5 % (ref 37–54)
HCT VFR BLD CALC: 23.6 % (ref 37–54)
HDLC SERPL-MCNC: 32 MG/DL
HEMOGLOBIN: 7.6 G/DL (ref 12.5–16.5)
HUMAN METAPNEUMOVIRUS BY PCR: NOT DETECTED
HUMAN RHINOVIRUS/ENTEROVIRUS BY PCR: NOT DETECTED
IMMATURE RETIC FRACT: 4.6 % (ref 2.3–13.4)
INFLUENZA A BY PCR: NOT DETECTED
INFLUENZA B BY PCR: NOT DETECTED
LDL CHOLESTEROL CALCULATED: 51 MG/DL (ref 0–99)
MAGNESIUM: 2 MG/DL (ref 1.6–2.6)
MCH RBC QN AUTO: 31.4 PG (ref 26–35)
MCHC RBC AUTO-ENTMCNC: 32.2 % (ref 32–34.5)
MCV RBC AUTO: 97.5 FL (ref 80–99.9)
MYCOPLASMA PNEUMONIAE BY PCR: NOT DETECTED
PARAINFLUENZA VIRUS 1 BY PCR: NOT DETECTED
PARAINFLUENZA VIRUS 2 BY PCR: NOT DETECTED
PARAINFLUENZA VIRUS 3 BY PCR: NOT DETECTED
PARAINFLUENZA VIRUS 4 BY PCR: NOT DETECTED
PARATHYROID HORMONE INTACT: 271 PG/ML (ref 15–65)
PDW BLD-RTO: 12.6 FL (ref 11.5–15)
PHOSPHORUS: 7.7 MG/DL (ref 2.5–4.5)
PLATELET # BLD: 286 E9/L (ref 130–450)
PMV BLD AUTO: 10.1 FL (ref 7–12)
POTASSIUM SERPL-SCNC: 5.4 MMOL/L (ref 3.5–5)
PREALBUMIN: 39 MG/DL (ref 20–40)
PRO-BNP: 1086 PG/ML (ref 0–125)
RBC # BLD: 2.42 E12/L (ref 3.8–5.8)
RESPIRATORY SYNCYTIAL VIRUS BY PCR: NOT DETECTED
RETIC HGB EQUIVALENT: 35.6 PG (ref 28.2–36.6)
RETICULOCYTE ABSOLUTE COUNT: 0.02 E12/L
RETICULOCYTE COUNT PCT: 0.8 % (ref 0.4–1.9)
SARS-COV-2, PCR: NOT DETECTED
SEDIMENTATION RATE, ERYTHROCYTE: 63 MM/HR (ref 0–15)
SODIUM BLD-SCNC: 139 MMOL/L (ref 132–146)
TOTAL PROTEIN: 6.4 G/DL (ref 6.4–8.3)
TOTAL PROTEIN: 6.5 G/DL (ref 6.4–8.3)
TRIGL SERPL-MCNC: 161 MG/DL (ref 0–149)
TSH SERPL DL<=0.05 MIU/L-ACNC: 1.47 UIU/ML (ref 0.27–4.2)
URIC ACID, SERUM: 8.6 MG/DL (ref 3.4–7)
VITAMIN D 25-HYDROXY: 15 NG/ML (ref 30–100)
VITAMIN D 25-HYDROXY: 17 NG/ML (ref 30–100)
VLDLC SERPL CALC-MCNC: 32 MG/DL
WBC # BLD: 6.7 E9/L (ref 4.5–11.5)

## 2020-12-06 PROCEDURE — 93308 TTE F-UP OR LMTD: CPT

## 2020-12-06 PROCEDURE — 83970 ASSAY OF PARATHORMONE: CPT

## 2020-12-06 PROCEDURE — 84100 ASSAY OF PHOSPHORUS: CPT

## 2020-12-06 PROCEDURE — 85027 COMPLETE CBC AUTOMATED: CPT

## 2020-12-06 PROCEDURE — 6360000002 HC RX W HCPCS: Performed by: INTERNAL MEDICINE

## 2020-12-06 PROCEDURE — 83550 IRON BINDING TEST: CPT

## 2020-12-06 PROCEDURE — 82746 ASSAY OF FOLIC ACID SERUM: CPT

## 2020-12-06 PROCEDURE — 83036 HEMOGLOBIN GLYCOSYLATED A1C: CPT

## 2020-12-06 PROCEDURE — 86334 IMMUNOFIX E-PHORESIS SERUM: CPT

## 2020-12-06 PROCEDURE — 84134 ASSAY OF PREALBUMIN: CPT

## 2020-12-06 PROCEDURE — 93010 ELECTROCARDIOGRAM REPORT: CPT | Performed by: INTERNAL MEDICINE

## 2020-12-06 PROCEDURE — 96361 HYDRATE IV INFUSION ADD-ON: CPT

## 2020-12-06 PROCEDURE — 6370000000 HC RX 637 (ALT 250 FOR IP): Performed by: INTERNAL MEDICINE

## 2020-12-06 PROCEDURE — 85651 RBC SED RATE NONAUTOMATED: CPT

## 2020-12-06 PROCEDURE — 36415 COLL VENOUS BLD VENIPUNCTURE: CPT

## 2020-12-06 PROCEDURE — 82607 VITAMIN B-12: CPT

## 2020-12-06 PROCEDURE — G0378 HOSPITAL OBSERVATION PER HR: HCPCS

## 2020-12-06 PROCEDURE — 83735 ASSAY OF MAGNESIUM: CPT

## 2020-12-06 PROCEDURE — 83880 ASSAY OF NATRIURETIC PEPTIDE: CPT

## 2020-12-06 PROCEDURE — 83540 ASSAY OF IRON: CPT

## 2020-12-06 PROCEDURE — 2500000003 HC RX 250 WO HCPCS: Performed by: NURSE PRACTITIONER

## 2020-12-06 PROCEDURE — 80076 HEPATIC FUNCTION PANEL: CPT

## 2020-12-06 PROCEDURE — 82306 VITAMIN D 25 HYDROXY: CPT

## 2020-12-06 PROCEDURE — 80061 LIPID PANEL: CPT

## 2020-12-06 PROCEDURE — 82330 ASSAY OF CALCIUM: CPT

## 2020-12-06 PROCEDURE — 84550 ASSAY OF BLOOD/URIC ACID: CPT

## 2020-12-06 PROCEDURE — 84166 PROTEIN E-PHORESIS/URINE/CSF: CPT

## 2020-12-06 PROCEDURE — 84165 PROTEIN E-PHORESIS SERUM: CPT

## 2020-12-06 PROCEDURE — 82728 ASSAY OF FERRITIN: CPT

## 2020-12-06 PROCEDURE — 6370000000 HC RX 637 (ALT 250 FOR IP): Performed by: NURSE PRACTITIONER

## 2020-12-06 PROCEDURE — 84443 ASSAY THYROID STIM HORMONE: CPT

## 2020-12-06 PROCEDURE — 85045 AUTOMATED RETICULOCYTE COUNT: CPT

## 2020-12-06 PROCEDURE — 80053 COMPREHEN METABOLIC PANEL: CPT

## 2020-12-06 RX ORDER — ERGOCALCIFEROL 1.25 MG/1
50000 CAPSULE ORAL WEEKLY
Status: DISCONTINUED | OUTPATIENT
Start: 2020-12-06 | End: 2020-12-06 | Stop reason: HOSPADM

## 2020-12-06 RX ORDER — ALLOPURINOL 100 MG/1
100 TABLET ORAL DAILY
Status: DISCONTINUED | OUTPATIENT
Start: 2020-12-06 | End: 2020-12-06 | Stop reason: HOSPADM

## 2020-12-06 RX ORDER — HYDRALAZINE HYDROCHLORIDE 50 MG/1
50 TABLET, FILM COATED ORAL EVERY 8 HOURS SCHEDULED
Qty: 90 TABLET | Refills: 3 | Status: SHIPPED | OUTPATIENT
Start: 2020-12-06

## 2020-12-06 RX ORDER — CALCIUM ACETATE 667 MG/1
667 CAPSULE ORAL
Qty: 180 CAPSULE | Refills: 1 | Status: SHIPPED | OUTPATIENT
Start: 2020-12-06

## 2020-12-06 RX ORDER — CALCIUM ACETATE 667 MG/1
667 CAPSULE ORAL
Status: DISCONTINUED | OUTPATIENT
Start: 2020-12-06 | End: 2020-12-06 | Stop reason: HOSPADM

## 2020-12-06 RX ORDER — ALLOPURINOL 100 MG/1
100 TABLET ORAL DAILY
Qty: 30 TABLET | Refills: 3 | Status: SHIPPED | OUTPATIENT
Start: 2020-12-07

## 2020-12-06 RX ADMIN — SODIUM ZIRCONIUM CYCLOSILICATE 10 G: 10 POWDER, FOR SUSPENSION ORAL at 11:44

## 2020-12-06 RX ADMIN — ALLOPURINOL 100 MG: 100 TABLET ORAL at 10:05

## 2020-12-06 RX ADMIN — CARVEDILOL 12.5 MG: 6.25 TABLET, FILM COATED ORAL at 10:06

## 2020-12-06 RX ADMIN — EPOETIN ALFA-EPBX 10000 UNITS: 10000 INJECTION, SOLUTION INTRAVENOUS; SUBCUTANEOUS at 16:04

## 2020-12-06 RX ADMIN — HYDRALAZINE HYDROCHLORIDE 50 MG: 50 TABLET, FILM COATED ORAL at 15:33

## 2020-12-06 RX ADMIN — HYDRALAZINE HYDROCHLORIDE 50 MG: 50 TABLET, FILM COATED ORAL at 05:25

## 2020-12-06 RX ADMIN — PANTOPRAZOLE SODIUM 40 MG: 40 TABLET, DELAYED RELEASE ORAL at 05:25

## 2020-12-06 RX ADMIN — CARVEDILOL 12.5 MG: 6.25 TABLET, FILM COATED ORAL at 16:04

## 2020-12-06 RX ADMIN — ERGOCALCIFEROL 50000 UNITS: 1.25 CAPSULE ORAL at 11:44

## 2020-12-06 RX ADMIN — CALCIUM ACETATE 667 MG: 667 CAPSULE ORAL at 16:04

## 2020-12-06 RX ADMIN — HEPARIN SODIUM 5000 UNITS: 10000 INJECTION INTRAVENOUS; SUBCUTANEOUS at 05:30

## 2020-12-06 RX ADMIN — CALCIUM ACETATE 667 MG: 667 CAPSULE ORAL at 11:44

## 2020-12-06 RX ADMIN — SODIUM BICARBONATE 1300 MG: 650 TABLET ORAL at 10:05

## 2020-12-06 ASSESSMENT — PAIN SCALES - GENERAL: PAINLEVEL_OUTOF10: 0

## 2020-12-06 NOTE — CONSULTS
Nephrology Consult Note  Patient's Name: Sarah Angelo.  8:41 PM  2020    Nephrologist: Ashely Art MD    Reason for Consult:  Hyperkalemia  Requesting Physician:  Tessie Concepcion MD    Chief Complaint:  Abnormal Labs    History Obtained From:  patient and past medical records    History of Present Ilness:    Sarah Sosa is a 52 y.o. male with prior history of CKD G5 with a creatinine 6.72mg/dl on 20 with an associated K+ 6.7. He was treated with Kayexalate. He had repeat lab work 20 and I was called re K+ 7.1. Pt sent to the ED. He states other than feeling fatigued he feels well. No N/V/D/C, appetite good, no pruritis, no insomnia. He has no urinary tract symptoms. Labs in the ED showed a K+=6.7, cr=8.2, and HgB 7.1.  Pt receiving 1 unit PRBC's    Past Medical History:   Diagnosis Date    Acute heart failure with preserved ejection fraction ([address] 2020    CONY (acute kidney injury) ([address] 9/10/2020    Anemia in stage 5 chronic kidney disease, not on chronic dialysis ([address] 2020    Asymmetric septal hypertrophy ([address]     BPH (benign prostatic hyperplasia) 2020    CKD (chronic kidney disease) stage 5, GFR less than 15 ml/min ([address] 2020    Erectile dysfunction 2020    Gout 2020    Hyperkalemia 2020    Hyperlipidemia     Hypertension     Mild aortic sclerosis 2020    Opioid dependence in remission ([address] 2020    Younger years    Proteinuria 2020    Seasonal allergic rhinitis 2020    Thoracic aortic aneurysm without rupture (HCC) 2020    Ascending; 4.3 cm       Past Surgical History:   Procedure Laterality Date    TONSILLECTOMY      TYMPANOPLASTY      Ventilation tubes, child       Family History   Problem Relation Age of Onset    Heart Disease Mother          age 55    Heart Attack Father 61        26, alive age 79; previous CABG    No Known Problems Sister         reports that he quit smoking about 14 months ago. His smoking use included cigarettes. He started smoking about 33 years ago. He has a 31.00 pack-year smoking history. He has never used smokeless tobacco. He reports current alcohol use. He reports current drug use. Drug: Marijuana. Allergies:  Anesthetics, amide    Current Medications:    dextrose 50 % IV solution, PRN  0.9 % sodium chloride bolus, Once  carvedilol (COREG) tablet 12.5 mg, BID WC  hydrALAZINE (APRESOLINE) tablet 25 mg, 3 times per day  rosuvastatin (CRESTOR) tablet 10 mg, Nightly  sodium bicarbonate tablet 1,300 mg, BID  heparin (porcine) injection 5,000 Units, Q8H  [START ON 12/6/2020] pantoprazole (PROTONIX) tablet 40 mg, QAM AC  acetaminophen (TYLENOL) tablet 650 mg, Q4H PRN  sodium polystyrene (KAYEXALATE) powder 30 g, Once  [START ON 12/6/2020] sodium zirconium cyclosilicate (LOKELMA) oral suspension 10 g, Daily        Review of Systems:   Pertinent items are noted in HPI. Remainder of a complete review of systems is (-) other than stated in the HPI    Physical exam:   Constitutional:  Middle aged male in NAD  Vitals:   VITALS:  BP (!) 220/114   Pulse 60   Temp 98.9 °F (37.2 °C) (Oral)   Resp 16   Ht 6' (1.829 m)   Wt 205 lb (93 kg)   SpO2 98%   BMI 27.80 kg/m²   24HR INTAKE/OUTPUT:    Intake/Output Summary (Last 24 hours) at 12/5/2020 2042  Last data filed at 12/5/2020 1747  Gross per 24 hour   Intake 350 ml   Output --   Net 350 ml     URINARY CATHETER OUTPUT (Louis):     DRAIN/TUBE OUTPUT:     VENT SETTINGS:  Vent Information  SpO2: 98 %  Additional Respiratory  Assessments  Pulse: 60  Resp: 16  SpO2: 98 %    Skin: no rash, turgor wnl  Heent:  eomi, mmm  Neck: no bruits or jvd noted  Cardiovascular: PMI not lat displaced  S1, S2 without an S3 no rub appreciated, 2/6 systolic murmur  Respiratory: CTA B without w/r/r  Abdomen:  +bs, soft, nt, nd, (-) HSM  Ext: (-) Bilat  lower extremity edema  Psychiatric: mood and affect appropriate, no asterixsis, cr nr 2-12 grossly intact    Data:   Labs:  CBC:   Lab Results   Component Value Date    WBC 6.8 12/05/2020    RBC 2.22 12/05/2020    HGB 7.1 12/05/2020    HCT 22.2 12/05/2020    .0 12/05/2020    MCH 32.0 12/05/2020    MCHC 32.0 12/05/2020    RDW 11.9 12/05/2020     12/05/2020    MPV 10.0 12/05/2020     CBC with Differential:    Lab Results   Component Value Date    WBC 6.8 12/05/2020    RBC 2.22 12/05/2020    HGB 7.1 12/05/2020    HCT 22.2 12/05/2020     12/05/2020    .0 12/05/2020    MCH 32.0 12/05/2020    MCHC 32.0 12/05/2020    RDW 11.9 12/05/2020    LYMPHOPCT 14.3 12/05/2020    MONOPCT 8.1 12/05/2020    BASOPCT 0.9 12/05/2020    MONOSABS 0.55 12/05/2020    LYMPHSABS 0.97 12/05/2020    EOSABS 0.47 12/05/2020    BASOSABS 0.06 12/05/2020     Hemoglobin/Hematocrit:    Lab Results   Component Value Date    HGB 7.1 12/05/2020    HCT 22.2 12/05/2020     CMP:    Lab Results   Component Value Date     12/05/2020    K 6.7 12/05/2020     12/05/2020    CO2 19 12/05/2020    BUN 89 12/05/2020    CREATININE 8.2 12/05/2020    GFRAA 8 12/05/2020    LABGLOM 7 12/05/2020    GLUCOSE 43 12/05/2020    PROT 6.8 12/05/2020    LABALBU 4.2 12/05/2020    CALCIUM 8.4 12/05/2020    BILITOT <0.2 12/05/2020    ALKPHOS 53 12/05/2020    AST 7 12/05/2020    ALT 11 12/05/2020     BMP:    Lab Results   Component Value Date     12/05/2020    K 6.7 12/05/2020     12/05/2020    CO2 19 12/05/2020    BUN 89 12/05/2020    LABALBU 4.2 12/05/2020    CREATININE 8.2 12/05/2020    CALCIUM 8.4 12/05/2020    GFRAA 8 12/05/2020    LABGLOM 7 12/05/2020    GLUCOSE 43 12/05/2020     Potassium:    Lab Results   Component Value Date    K 6.7 12/05/2020     Hepatic Function Panel:    Lab Results   Component Value Date    ALKPHOS 53 12/05/2020    ALT 11 12/05/2020    AST 7 12/05/2020    PROT 6.8 12/05/2020    BILITOT <0.2 12/05/2020    BILIDIR <0.2 09/13/2020    IBILI see below 09/13/2020    LABALBU 4.2 12/05/2020     Albumin:    Lab Results   Component Value Date    LABALBU 4.2 12/05/2020     Calcium:    Lab Results   Component Value Date    CALCIUM 8.4 12/05/2020     Ionized Calcium:  No results found for: IONCA  Magnesium:    Lab Results   Component Value Date    MG 2.1 12/05/2020     Phosphorus:    Lab Results   Component Value Date    PHOS 6.2 09/13/2020     LDH:    Lab Results   Component Value Date     09/11/2020     Uric Acid:    Lab Results   Component Value Date    LABURIC 7.1 09/11/2020     PT/INR:  No results found for: PROTIME, INR  PTT:  No results found for: APTT, PTT[APTT}  Troponin:    Lab Results   Component Value Date    TROPONINI <0.01 12/05/2020     U/A:    Lab Results   Component Value Date    COLORU Yellow 09/10/2020    PROTEINU >=300 09/10/2020    PHUR 6.0 09/10/2020    WBCUA 1-3 09/10/2020    RBCUA 1-3 09/10/2020    BACTERIA FEW 09/10/2020    CLARITYU Clear 09/10/2020    SPECGRAV 1.025 09/10/2020    LEUKOCYTESUR Negative 09/10/2020    UROBILINOGEN 0.2 09/10/2020    BILIRUBINUR Negative 09/10/2020    BLOODU SMALL 09/10/2020    GLUCOSEU Negative 09/10/2020     ABG:  No results found for: PH, PCO2, PO2, HCO3, BE, THGB, TCO2, O2SAT  HgBA1c:    Lab Results   Component Value Date    LABA1C 4.9 09/11/2020     Microalbumen/Creatinine ratio:  No components found for: RUCREAT  FLP:    Lab Results   Component Value Date    TRIG 218 09/11/2020    HDL 46 09/11/2020    LDLCALC 108 09/11/2020    LABVLDL 44 09/11/2020     TSH:    Lab Results   Component Value Date    TSH 2.400 09/11/2020     VITAMIN B12: No components found for: B12  FOLATE:    Lab Results   Component Value Date    FOLATE 13.6 09/11/2020     Iron Saturation:  No components found for: PERCENTFE  FERRITIN:    Lab Results   Component Value Date    FERRITIN 167 09/11/2020     AMYLASE:  No results found for: AMYLASE  LIPASE:  No results found for: LIPASE  24 Hour Urine for Protein:  No components found for: Jonelle August, URHB48VN, UTV3  24 Hour Urine for Creatinine Clearance:  No components found for: CREAT4, Vanalfonsoe Roles, UTV10  PSA:   Lab Results   Component Value Date    PSA 0.49 2020        Imaging:  Patient MRN:  [id number]    : 1970    Age: 52 years    Gender: Male    Order Date:  2020 10:21 AM    EXAM: US RETROPERITONEAL SONIDO    NUMBER OF IMAGES:  53    INDICATION:  renal artery stenosis    renal artery stenosis    COMPARISON: 9/10/2020         The right kidney is 10.5 x 6.4 x 5.7 cm    The left kidney  is 9.4 x 3.9 x 5 cm    There is no mass identified.         There is no hydronephrosis identified.         There is no calculus identified.         The bladder is unremarkable. Both the right and left kidney appear hyperechoic              Velocity within the aorta measure 73 cm/sec. Velocities in the right renal artery measure 110 cm/sec,  renal aortic    ratio is 1.5. Velocities within the left renal artery measure 104 cm/sec, renal    aortic ratio on the left is 1.4.              Impression    1.  No evidence for hemodynamically significant renal artery stenosis. 2.  The kidneys appear to be hyperechoic suspicious for medical renal    disease          Assessment  1-CKD G5 with a baseline serum cr 6.7-8.2mg/dl with an e-GFR=8-9ml/min-with minimal uremic symptoms  Pt wishes to set up PD catheter placement as an outpt  PLAN:1. Monitor Labs    2-Hyperkalemia sec to the advanced CKD  PLAN:1. Treat with Kayexalate this PM and then start on patiromer daily on   2. Follow K+    3-Non Anion gap Met Acidosis-HCO3 below goal of HCO3>/=22  PLAN:1. Start oral HCO3    4-Sec HPTH of Renal Origin  PLAN:1. Check PO4, PTH, Vit D    5- Anemia in CKD-HgB below goal 10  S/P 1 unit PRBC  Rule out GI loss, Paraproteinemia, B12, Folate  PLAN:1. Check Ferritin, Iron Sat if adequate start JUANITA  2. Check SPEP and UPEP  3. Check FOB    6- HTN with CKD 5/ESRD  PLAN:1. Titrate the hydralazine    Thank you  for allowing us to participate in care of Bell. Belkys Sanchez  8:41 PM  12/5/2020

## 2020-12-06 NOTE — CARE COORDINATION
COVID negative 12/5. Phone conversation w/ patient. Explained role of  and plan of care. Lives alone in a 2 story house. Independent PTA. Drives. PCP is Dr. Vigil Friend and pharmacy is [facility]. As per renal note, Pt wishes to set up PD catheter placement as an outpt. Per pt, plan is to return home on discharge and will continue to follow w/ renal pending their recommendation.  Will follow Andrey Phillips

## 2020-12-06 NOTE — PROGRESS NOTES
No Known Problems Sister         reports that he quit smoking about 14 months ago. His smoking use included cigarettes. He started smoking about 33 years ago. He has a 31.00 pack-year smoking history. He has never used smokeless tobacco. He reports current alcohol use. He reports current drug use. Drug: Marijuana. Allergies:  Anesthetics, amide    Current Medications:    dextrose 50 % IV solution, PRN  0.9 % sodium chloride bolus, Once  carvedilol (COREG) tablet 12.5 mg, BID WC  rosuvastatin (CRESTOR) tablet 10 mg, Nightly  sodium bicarbonate tablet 1,300 mg, BID  heparin (porcine) injection 5,000 Units, Q8H  pantoprazole (PROTONIX) tablet 40 mg, QAM AC  acetaminophen (TYLENOL) tablet 650 mg, Q4H PRN  sodium zirconium cyclosilicate (LOKELMA) oral suspension 10 g, Daily  hydrALAZINE (APRESOLINE) tablet 50 mg, 3 times per day        Review of Systems:   Pertinent items are noted in HPI. Remainder of a complete review of systems is (-) other than stated in the HPI    Physical exam:   Constitutional:  Middle aged male in NAD  Vitals:   VITALS:  BP (!) 160/100   Pulse 75   Temp 98.4 °F (36.9 °C) (Oral)   Resp 18   Ht 6' (1.829 m)   Wt 205 lb (93 kg)   SpO2 100%   BMI 27.80 kg/m²   24HR INTAKE/OUTPUT:      Intake/Output Summary (Last 24 hours) at 12/6/2020 0844  Last data filed at 12/6/2020 0057  Gross per 24 hour   Intake 670 ml   Output 400 ml   Net 270 ml     URINARY CATHETER OUTPUT (Louis):     DRAIN/TUBE OUTPUT:     VENT SETTINGS:  Vent Information  SpO2: 100 %  Additional Respiratory  Assessments  Pulse: 75  Resp: 18  SpO2: 100 %    Skin: no rash, turgor wnl  Heent:  eomi, mmm  Neck: no bruits or jvd noted  Cardiovascular: PMI not lat displaced  S1, S2 without an S3 no rub appreciated, 2/6 systolic murmur  Respiratory: Clear and equal bilaterally  Abdomen:  +bs, soft, nt, nd, (-) HSM  Ext: (-) Bilat  lower extremity edema  Psychiatric: mood and affect appropriate, no asterixsis, cr nr 2-12 grossly intact    Data:   Labs:  CBC:   Lab Results   Component Value Date    WBC 6.7 12/06/2020    RBC 2.42 12/06/2020    HGB 7.6 12/06/2020    HCT 23.6 12/06/2020    HCT 23.5 12/06/2020    MCV 97.5 12/06/2020    MCH 31.4 12/06/2020    MCHC 32.2 12/06/2020    RDW 12.6 12/06/2020     12/06/2020    MPV 10.1 12/06/2020     CBC with Differential:    Lab Results   Component Value Date    WBC 6.7 12/06/2020    RBC 2.42 12/06/2020    HGB 7.6 12/06/2020    HCT 23.6 12/06/2020    HCT 23.5 12/06/2020     12/06/2020    MCV 97.5 12/06/2020    MCH 31.4 12/06/2020    MCHC 32.2 12/06/2020    RDW 12.6 12/06/2020    LYMPHOPCT 11.8 12/05/2020    MONOPCT 5.6 12/05/2020    BASOPCT 0.7 12/05/2020    MONOSABS 0.39 12/05/2020    LYMPHSABS 0.82 12/05/2020    EOSABS 0.21 12/05/2020    BASOSABS 0.05 12/05/2020     Hemoglobin/Hematocrit:    Lab Results   Component Value Date    HGB 7.6 12/06/2020    HCT 23.6 12/06/2020    HCT 23.5 12/06/2020     CMP:    Lab Results   Component Value Date     12/06/2020    K 5.4 12/06/2020     12/06/2020    CO2 17 12/06/2020    BUN 79 12/06/2020    CREATININE 7.7 12/06/2020    GFRAA 9 12/06/2020    LABGLOM 8 12/06/2020    GLUCOSE 93 12/06/2020    PROT 6.5 12/06/2020    PROT 6.4 12/06/2020    LABALBU 3.4 12/06/2020    LABALBU 3.4 12/06/2020    CALCIUM 8.4 12/06/2020    BILITOT 0.3 12/06/2020    BILITOT 0.2 12/06/2020    ALKPHOS 47 12/06/2020    ALKPHOS 47 12/06/2020    AST 7 12/06/2020    AST 7 12/06/2020    ALT 9 12/06/2020    ALT 10 12/06/2020     BMP:    Lab Results   Component Value Date     12/06/2020    K 5.4 12/06/2020     12/06/2020    CO2 17 12/06/2020    BUN 79 12/06/2020    LABALBU 3.4 12/06/2020    LABALBU 3.4 12/06/2020    CREATININE 7.7 12/06/2020    CALCIUM 8.4 12/06/2020    GFRAA 9 12/06/2020    LABGLOM 8 12/06/2020    GLUCOSE 93 12/06/2020     Potassium:    Lab Results   Component Value Date    K 5.4 12/06/2020     Hepatic Function Panel:    Lab Results Component Value Date    ALKPHOS 47 12/06/2020    ALKPHOS 47 12/06/2020    ALT 9 12/06/2020    ALT 10 12/06/2020    AST 7 12/06/2020    AST 7 12/06/2020    PROT 6.5 12/06/2020    PROT 6.4 12/06/2020    BILITOT 0.3 12/06/2020    BILITOT 0.2 12/06/2020    BILIDIR <0.2 12/06/2020    IBILI see below 12/06/2020    LABALBU 3.4 12/06/2020    LABALBU 3.4 12/06/2020     Albumin:    Lab Results   Component Value Date    LABALBU 3.4 12/06/2020    LABALBU 3.4 12/06/2020     Calcium:    Lab Results   Component Value Date    CALCIUM 8.4 12/06/2020     Ionized Calcium:  No results found for: IONCA  Magnesium:    Lab Results   Component Value Date    MG 2.0 12/06/2020     Phosphorus:    Lab Results   Component Value Date    PHOS 7.7 12/06/2020     LDH:    Lab Results   Component Value Date     09/11/2020     Uric Acid:    Lab Results   Component Value Date    LABURIC 8.6 12/06/2020     PT/INR:  No results found for: PROTIME, INR  PTT:  No results found for: APTT, PTT[APTT}  Troponin:    Lab Results   Component Value Date    TROPONINI <0.01 12/05/2020     U/A:    Lab Results   Component Value Date    COLORU Yellow 09/10/2020    PROTEINU >=300 09/10/2020    PHUR 6.0 09/10/2020    WBCUA 1-3 09/10/2020    RBCUA 1-3 09/10/2020    BACTERIA FEW 09/10/2020    CLARITYU Clear 09/10/2020    SPECGRAV 1.025 09/10/2020    LEUKOCYTESUR Negative 09/10/2020    UROBILINOGEN 0.2 09/10/2020    BILIRUBINUR Negative 09/10/2020    BLOODU SMALL 09/10/2020    GLUCOSEU Negative 09/10/2020     ABG:  No results found for: PH, PCO2, PO2, HCO3, BE, THGB, TCO2, O2SAT  HgBA1c:    Lab Results   Component Value Date    LABA1C 4.9 09/11/2020     Microalbumen/Creatinine ratio:  No components found for: RUCREAT  FLP:    Lab Results   Component Value Date    TRIG 161 12/06/2020    HDL 32 12/06/2020    LDLCALC 51 12/06/2020    LABVLDL 32 12/06/2020     TSH:    Lab Results   Component Value Date    TSH 1.470 12/06/2020     VITAMIN B12: No components found for: B12  FOLATE:    Lab Results   Component Value Date    FOLATE 13.6 2020     Iron Saturation:  No components found for: PERCENTFE  FERRITIN:    Lab Results   Component Value Date    FERRITIN 167 2020     AMYLASE:  No results found for: AMYLASE  LIPASE:  No results found for: LIPASE  24 Hour Urine for Protein:  No components found for: Danay Prost, YOPX80TG, UTV3  24 Hour Urine for Creatinine Clearance:  No components found for: CREAT4, UHRS10, UTV10  PSA:   Lab Results   Component Value Date    PSA 0.49 2020        Imaging:  Patient MRN:  [id number]    : 1970    Age: 52 years    Gender: Male    Order Date:  2020 10:21 AM    EXAM: US RETROPERITONEAL SONIDO    NUMBER OF IMAGES:  53    INDICATION:  renal artery stenosis    renal artery stenosis    COMPARISON: 9/10/2020         The right kidney is 10.5 x 6.4 x 5.7 cm    The left kidney  is 9.4 x 3.9 x 5 cm    There is no mass identified.         There is no hydronephrosis identified.         There is no calculus identified.         The bladder is unremarkable. Both the right and left kidney appear hyperechoic              Velocity within the aorta measure 73 cm/sec. Velocities in the right renal artery measure 110 cm/sec,  renal aortic    ratio is 1.5. Velocities within the left renal artery measure 104 cm/sec, renal    aortic ratio on the left is 1.4.              Impression    1.  No evidence for hemodynamically significant renal artery stenosis. 2.  The kidneys appear to be hyperechoic suspicious for medical renal    disease          Assessment  1-CKD G5 with a baseline serum cr 6.7-8.2mg/dl with an e-GFR=8-9ml/min-with minimal uremic symptoms  Pt wishes to set up PD catheter placement as an outpt  PLAN:1. Monitor Labs    2-Hyperkalemia sec to the advanced CKD  PLAN:1. Treat with Kayexalate 12/5 pm- started on Lokelma 10 g daily on  K 6.7--5.7--5.4  2.  Follow K+    3-Non Anion gap Met Acidosis-HCO3 below goal of

## 2020-12-07 LAB
ADDENDUM ELECTROPHORESIS URINE RANDOM: NORMAL
IMMUNOFIXATION URINE: NORMAL

## 2020-12-07 NOTE — DISCHARGE SUMMARY
DISCHARGE SUMMARY  Patient ID:  Zaira Lange  [id number]  52 y.o.  1970    Admit date: 12/5/2020      Discharge date : 12/6/2020      Admission Diagnoses: Hyperkalemia [E87.5]  Hyperkalemia [E87.5]    Discharge Diagnosis  Principal Problem:    Hyperkalemia  Active Problems:    Asymmetric septal hypertrophy (HCC)    Hyperlipidemia    Hypertension    Mild aortic valve sclerosis    Thoracic aortic aneurysm without rupture (HCC)    Gout    Opioid dependence in remission (HCC)    Proteinuria    Acute heart failure with preserved ejection fraction (HCC)    CKD (chronic kidney disease) stage 5, GFR less than 15 ml/min (HCC)    Anemia in stage 5 chronic kidney disease, not on chronic dialysis ([facility] [address]  Resolved Problems:    * No resolved hospital problems. *      Hospital Course:  CHIEF COMPLAINT: Abnormal labs, weakness     History of Present Illness: 62-year-old male patient of Dr. Yudith Muhammad and nephrology group. He is known to have stage V chronic kidney disease not on dialysis. He has been having lab test done repeatedly as an outpatient. His most recent outpatient potassium reported 7.5. In addition hemoglobin was 7.1. He said no emesis diarrhea; he continues to work full-time. He did notice more fatigue and \"leg heaviness\" recently. He has not changed his diet. He sticks to a renal diet and limits carbohydrates and salt intake. He was supposed to have outpatient visit with surgery regarding insertion of peritoneal dialysis catheter. However, because of recent surgeon pandemic COVID-19 cases he was afraid to present to the hospital for peritoneal catheter insertion. He has had no blood loss nor chest pains. In the ED sodium 6.7 BUN 89 creatinine 8.2. Hemoglobin 7.1. He was transfused 1 unit of packed red cells, feeling much better today. He was seen yesterday by nephrology service given Kayexalate and today started on Veltassa. Today potassium 5.4 BUN 79 creatinine 7.7 calcium 8.8 phosphorus 7.7. proBNP 1086. SARS-CoV-2 nondetected. --Patient was admitted to this hospital 9/11/2020; he had just had labs drawn at Dr. Jeyson Reese office with a BUN of 37 creatinine to 5.9 potassium of 5.7. He was directed to the hospital for urgent admission. Extensive work-up done at that time.         Instructions at discharge:  Consultants notes reviewed  Patient to be scheduled for outpatient peritoneal dialysis catheter insertion  Possible discharge later today if okay with nephrology  Med reconciliation completed  Prescriptions written  Follow-up Dr. Jeyson Reese 1 week  Follow-up nephrology per their instructions  Veltassa or equivalent as per nephrology      Condition at DISCHARGE : STABLE AND IMPROVED     Discharged to: Home    Discharge Instructions: Medications reviewed with patient    Consults:nephrology     Past Medical Hx :   Past Medical History:   Diagnosis Date    Acute heart failure with preserved ejection fraction ([address] 9/11/2020    CONY (acute kidney injury) ([address] 9/10/2020    Anemia in stage 5 chronic kidney disease, not on chronic dialysis ([address] 12/5/2020    Asymmetric septal hypertrophy ([address] 2012    BPH (benign prostatic hyperplasia) 9/11/2020    CKD (chronic kidney disease) stage 5, GFR less than 15 ml/min ([address] 12/5/2020    Erectile dysfunction 9/11/2020    Gout 9/11/2020    Hyperkalemia 9/11/2020    Hyperlipidemia     Hypertension     Mild aortic sclerosis 9/11/2020    Opioid dependence in remission ([address] 9/11/2020    Younger years    Proteinuria 9/11/2020    Seasonal allergic rhinitis 9/11/2020    Thoracic aortic aneurysm without rupture ([address] 09/11/2020    Ascending; 4.3 cm       Past Surgical Hx :  Past Surgical History:   Procedure Laterality Date    TONSILLECTOMY      TYMPANOPLASTY      Ventilation tubes, child       Labs:   CBC:   Lab Results   Component Value Date    WBC 6.7 12/06/2020    RBC 2.42 12/06/2020    HGB 7.6 12/06/2020    HCT 23.6 12/06/2020    HCT 23.5 12/06/2020    MCV 97.5 12/06/2020    MCH 31.4 12/06/2020    MCHC 32.2 12/06/2020    RDW 12.6 12/06/2020     12/06/2020    MPV 10.1 12/06/2020     CBC with Differential:    Lab Results   Component Value Date    WBC 6.7 12/06/2020    RBC 2.42 12/06/2020    HGB 7.6 12/06/2020    HCT 23.6 12/06/2020    HCT 23.5 12/06/2020     12/06/2020    MCV 97.5 12/06/2020    MCH 31.4 12/06/2020    MCHC 32.2 12/06/2020    RDW 12.6 12/06/2020    LYMPHOPCT 11.8 12/05/2020    MONOPCT 5.6 12/05/2020    BASOPCT 0.7 12/05/2020    MONOSABS 0.39 12/05/2020    LYMPHSABS 0.82 12/05/2020    EOSABS 0.21 12/05/2020    BASOSABS 0.05 12/05/2020     Hemoglobin/Hematocrit:    Lab Results   Component Value Date    HGB 7.6 12/06/2020    HCT 23.6 12/06/2020    HCT 23.5 12/06/2020     CMP:    Lab Results   Component Value Date     12/06/2020    K 5.4 12/06/2020     12/06/2020    CO2 17 12/06/2020    BUN 79 12/06/2020    CREATININE 7.7 12/06/2020    GFRAA 9 12/06/2020    LABGLOM 8 12/06/2020    GLUCOSE 93 12/06/2020    PROT 6.5 12/06/2020    PROT 6.4 12/06/2020    LABALBU 3.4 12/06/2020    LABALBU 3.4 12/06/2020    CALCIUM 8.4 12/06/2020    BILITOT 0.3 12/06/2020    BILITOT 0.2 12/06/2020    ALKPHOS 47 12/06/2020    ALKPHOS 47 12/06/2020    AST 7 12/06/2020    AST 7 12/06/2020    ALT 9 12/06/2020    ALT 10 12/06/2020     BMP:    Lab Results   Component Value Date     12/06/2020    K 5.4 12/06/2020     12/06/2020    CO2 17 12/06/2020    BUN 79 12/06/2020    LABALBU 3.4 12/06/2020    LABALBU 3.4 12/06/2020    CREATININE 7.7 12/06/2020    CALCIUM 8.4 12/06/2020    GFRAA 9 12/06/2020    LABGLOM 8 12/06/2020    GLUCOSE 93 12/06/2020     Hepatic Function Panel:    Lab Results   Component Value Date    ALKPHOS 47 12/06/2020    ALKPHOS 47 12/06/2020    ALT 9 12/06/2020    ALT 10 12/06/2020    AST 7 12/06/2020    AST 7 12/06/2020    PROT 6.5 12/06/2020    PROT 6.4 12/06/2020    BILITOT 0.3 12/06/2020    BILITOT 0.2 12/06/2020    BILIDIR <0.2 medications which have NOT CHANGED    Details   carvedilol (COREG) 12.5 MG tablet Take 1 tablet by mouth 2 times daily (with meals), Disp-60 tablet,R-3Normal      rosuvastatin (CRESTOR) 10 MG tablet Take 1 tablet by mouth nightly, Disp-30 tablet,R-3Normal      sodium bicarbonate 650 MG tablet Take 2 tablets by mouth 2 times daily, Disp-120 tablet,R-1Normal             Time Spent on discharge is more than 30 minutes --      TIME  INCLUDES TIME THAT WAS  SPENT WITH DISCHARGE PAPERS, MEDICATION REVIEW, MEDICATION RECONCILIATION,   PRESCRIPTIONS, CHART REVIEW, PATIENT EXAM , FINAL PROGRESS NOTE, DISCUSSION OF FINDINGS WITH PATIENT AND AVAILABLE FAMILY , AND DICTATION  WHERE NEEDED ; Home Health Care [facility]) FORMS COMPLETED ; N-17  COMPLETION ;  H&P UPDATED ; DURABLE EQUIPMENT FORMS.      Active Hospital Problems    Diagnosis    CKD (chronic kidney disease) stage 5, GFR less than 15 ml/min (HCC) [N18.5]    Anemia in stage 5 chronic kidney disease, not on chronic dialysis ([address] [N18.5, D63.1]    Hyperkalemia [E87.5]    Thoracic aortic aneurysm without rupture ([address] [I71.2]    Opioid dependence in remission ([address] [F11.21]    Acute heart failure with preserved ejection fraction (HCC) [I50.31]    Proteinuria [R80.9]    Mild aortic valve sclerosis [I35.8]    Gout [M10.9]    Asymmetric septal hypertrophy ([address] [I42.2]    Hypertension [I10]    Hyperlipidemia [E78.5]       Signed:    Nolan Villagran DO    12/7/2020    11:54 AM    Voice recognition software use for dictation

## 2020-12-08 ENCOUNTER — OFFICE VISIT (OUTPATIENT)
Dept: SURGERY | Age: 50
End: 2020-12-08
Payer: COMMERCIAL

## 2020-12-08 VITALS
HEART RATE: 79 BPM | TEMPERATURE: 98.8 F | HEIGHT: 72 IN | OXYGEN SATURATION: 100 % | WEIGHT: 219.4 LBS | SYSTOLIC BLOOD PRESSURE: 162 MMHG | BODY MASS INDEX: 29.72 KG/M2 | RESPIRATION RATE: 16 BRPM | DIASTOLIC BLOOD PRESSURE: 91 MMHG

## 2020-12-08 LAB
FERRITIN: 253 NG/ML
IRON SATURATION: 31 % (ref 20–55)
IRON SATURATION: 36 % (ref 20–55)
IRON: 72 MCG/DL (ref 59–158)
IRON: 77 MCG/DL (ref 59–158)
TOTAL IRON BINDING CAPACITY: 215 MCG/DL (ref 250–450)
TOTAL IRON BINDING CAPACITY: 234 MCG/DL (ref 250–450)

## 2020-12-08 PROCEDURE — 99203 OFFICE O/P NEW LOW 30 MIN: CPT | Performed by: SURGERY

## 2020-12-08 NOTE — PROGRESS NOTES
Prior to Admission medications    Medication Sig Start Date End Date Taking?  Authorizing Provider   hydrALAZINE (APRESOLINE) 50 MG tablet Take 1 tablet by mouth every 8 hours 20  Yes Osiel Villagran, DO   Calcium Acetate, Phos Binder, 667 MG CAPS Take 1 capsule by mouth 3 times daily (with meals) 20  Yes Dale Villagran DO   allopurinol (ZYLOPRIM) 100 MG tablet Take 1 tablet by mouth daily 20  Yes Nelli Villagran DO   carvedilol (COREG) 12.5 MG tablet Take 1 tablet by mouth 2 times daily (with meals) 20  Yes Dale Villagran DO   rosuvastatin (CRESTOR) 10 MG tablet Take 1 tablet by mouth nightly 20  Yes Shalom Fletcher DO   sodium bicarbonate 650 MG tablet Take 2 tablets by mouth 2 times daily 20  Yes Shalom Fletcher DO   Cholecalciferol (VITAMIN D3) 1.25 MG (27934 UT) CAPS Take by mouth once a week    Historical Provider, MD       Allergies   Allergen Reactions    Anesthetics, Amide Anaphylaxis       Social History     Socioeconomic History    Marital status: Single     Spouse name: None    Number of children: None    Years of education: None    Highest education level: None   Occupational History    None   Social Needs    Financial resource strain: None    Food insecurity     Worry: None     Inability: None    Transportation needs     Medical: None     Non-medical: None   Tobacco Use    Smoking status: Former Smoker     Packs/day: 1.00     Years: 31.00     Pack years: 31.00     Types: Cigarettes     Start date: 9/10/1987     Quit date: 9/10/2019     Years since quittin.2    Smokeless tobacco: Never Used   Substance and Sexual Activity    Alcohol use: Not Currently    Drug use: Yes     Types: Marijuana     Comment: ROX 20    Sexual activity: None   Lifestyle    Physical activity     Days per week: None     Minutes per session: None    Stress: None   Relationships    Social connections     Talks on phone: None     Gets together: None Attends Taoist service: None     Active member of club or organization: None     Attends meetings of clubs or organizations: None     Relationship status: None    Intimate partner violence     Fear of current or ex partner: None     Emotionally abused: None     Physically abused: None     Forced sexual activity: None   Other Topics Concern    None   Social History Narrative    None       Family History   Problem Relation Age of Onset    Heart Disease Mother          age 55    Heart Attack Father 61        26, alive age 79; previous CABG    No Known Problems Sister        Review of Systems   All other systems reviewed and are negative. Objective:  Vitals:    20 1501   BP: (!) 162/91   Site: Left Upper Arm   Position: Sitting   Cuff Size: Large Adult   Pulse: 79   Resp: 16   Temp: 98.8 °F (37.1 °C)   TempSrc: Temporal   SpO2: 100%   Weight: 219 lb 6.4 oz (99.5 kg)   Height: 6' (1.829 m)          Physical Exam  Constitutional:       General: He is not in acute distress. Appearance: He is not diaphoretic. HENT:      Head: Normocephalic and atraumatic. Eyes:      General:         Right eye: No discharge. Left eye: No discharge. Neck:      Trachea: No tracheal deviation. Cardiovascular:      Rate and Rhythm: Normal rate. Pulmonary:      Effort: Pulmonary effort is normal. No respiratory distress. Abdominal:      General: There is no distension. Palpations: Abdomen is soft. Tenderness: There is no abdominal tenderness. There is no guarding or rebound. Skin:     General: Skin is warm and dry. Neurological:      Mental Status: He is alert and oriented to person, place, and time.                  Rolando Hall MD  2020 NOTE: This report, in part or full,may have been transcribed using voice recognition software. Every effort was made to ensure accuracy; however, inadvertent computerized transcription errors may be present. Please excuse any transcriptional grammatical or spelling errors that may have escaped my editorial review.     CC: Suzi Shahid MD , Dr. Rony Parks

## 2020-12-09 ENCOUNTER — TELEPHONE (OUTPATIENT)
Dept: SURGERY | Age: 50
End: 2020-12-09

## 2020-12-09 ENCOUNTER — HOSPITAL ENCOUNTER (OUTPATIENT)
Age: 50
Discharge: HOME | End: 2020-12-11
Payer: COMMERCIAL

## 2020-12-09 LAB
ALBUMIN SERPL-MCNC: 2.8 G/DL (ref 3.5–4.7)
ALPHA-1-GLOBULIN: 0.3 G/DL (ref 0.2–0.4)
ALPHA-2-GLOBULIN: 1 G/FL (ref 0.5–1)
BETA GLOBULIN: 0.9 G/DL (ref 0.8–1.3)
ELECTROPHORESIS: ABNORMAL
FERRITIN: 239 NG/ML
GAMMA GLOBULIN: 1 G/DL (ref 0.7–1.6)

## 2020-12-09 PROCEDURE — U0003 INFECTIOUS AGENT DETECTION BY NUCLEIC ACID (DNA OR RNA); SEVERE ACUTE RESPIRATORY SYNDROME CORONAVIRUS 2 (SARS-COV-2) (CORONAVIRUS DISEASE [COVID-19]), AMPLIFIED PROBE TECHNIQUE, MAKING USE OF HIGH THROUGHPUT TECHNOLOGIES AS DESCRIBED BY CMS-2020-01-R: HCPCS

## 2020-12-09 NOTE — TELEPHONE ENCOUNTER
Alexei Ochoa. is scheduled for laparoscopic peritoneal dialysis insertion with Dr Jami Patton on 12-14-20 at [facility] at 12:30 pm. Patient was told to arrive at 10:30 am. Patient needs to be NPO after midnight the night before procedure. All surgery instructions were explained to the patient and a surgery letter was also mailed out. MA informed patient that PAT will also be calling to review pre-op instructions and medications. Patient verbalized understanding. MA informed Lucila at [employer] [facility].   Electronically signed by Magali Maldonado MA on 12/9/2020 at 9:55 AM

## 2020-12-09 NOTE — TELEPHONE ENCOUNTER
Prior Authorization Form:      DEMOGRAPHICS:                     Patient Name:  Mercedes Boyd. Patient :  1970            Insurance:  Payor: [facility] / Plan: [facility] - [place] PPO / Product Type: *No Product type* /   Insurance ID Number:    Payor/Plan Subscr  Sex Relation Sub. Ins. ID Effective Group Num   1.  2422  Sw. 1970 Male  [id number]                                   [address]         DIAGNOSIS & PROCEDURE:                       Procedure/Operation: Laparoscopic peritoneal dialysis catheter insertion           CPT Code: 41936    Diagnosis:  Chronic Kidney Disease    ICD10 Code: N18.5    Location:  [facility]    Surgeon:  Dr Sarah Sy INFORMATION:                          Date: 20    Time: 12:30 pm              Anesthesia:  General                                                       Status:  Outpatient        Special Comments:         Electronically signed by Mindy Jang MA on 2020 at 10:01 AM

## 2020-12-10 LAB
SARS-COV-2: NOT DETECTED
SOURCE: NORMAL

## 2020-12-10 RX ORDER — CHOLECALCIFEROL (VITAMIN D3) 1250 MCG
CAPSULE ORAL WEEKLY
COMMUNITY

## 2020-12-10 NOTE — PROGRESS NOTES
Updated Dr. Montserrat Altamirano on history, recent hospitalization, and elevated potassium. Orders given to check the potassium level the day of surgery.

## 2020-12-12 LAB
FOLATE: 10 NG/ML (ref 4.8–24.2)
FOLATE: 10.2 NG/ML (ref 4.8–24.2)
VITAMIN B-12: 339 PG/ML (ref 211–946)
VITAMIN B-12: 359 PG/ML (ref 211–946)

## 2020-12-14 ENCOUNTER — ANESTHESIA EVENT (OUTPATIENT)
Dept: OPERATING ROOM | Age: 50
End: 2020-12-14
Payer: COMMERCIAL

## 2020-12-14 ENCOUNTER — HOSPITAL ENCOUNTER (OUTPATIENT)
Age: 50
Setting detail: OUTPATIENT SURGERY
Discharge: HOME | End: 2020-12-14
Attending: SURGERY | Admitting: SURGERY
Payer: COMMERCIAL

## 2020-12-14 ENCOUNTER — ANESTHESIA (OUTPATIENT)
Dept: OPERATING ROOM | Age: 50
End: 2020-12-14
Payer: COMMERCIAL

## 2020-12-14 VITALS
DIASTOLIC BLOOD PRESSURE: 51 MMHG | TEMPERATURE: 90.3 F | SYSTOLIC BLOOD PRESSURE: 108 MMHG | RESPIRATION RATE: 16 BRPM | OXYGEN SATURATION: 100 %

## 2020-12-14 VITALS
DIASTOLIC BLOOD PRESSURE: 78 MMHG | SYSTOLIC BLOOD PRESSURE: 138 MMHG | HEART RATE: 65 BPM | WEIGHT: 205 LBS | BODY MASS INDEX: 27.77 KG/M2 | RESPIRATION RATE: 16 BRPM | HEIGHT: 72 IN | OXYGEN SATURATION: 97 % | TEMPERATURE: 97 F

## 2020-12-14 LAB — POTASSIUM SERPL-SCNC: 5.7 MMOL/L (ref 3.5–5)

## 2020-12-14 PROCEDURE — 6360000002 HC RX W HCPCS: Performed by: SURGERY

## 2020-12-14 PROCEDURE — 7100000000 HC PACU RECOVERY - FIRST 15 MIN: Performed by: SURGERY

## 2020-12-14 PROCEDURE — 3700000001 HC ADD 15 MINUTES (ANESTHESIA): Performed by: SURGERY

## 2020-12-14 PROCEDURE — 2500000003 HC RX 250 WO HCPCS: Performed by: NURSE ANESTHETIST, CERTIFIED REGISTERED

## 2020-12-14 PROCEDURE — 49324 LAP INSERT TUNNEL IP CATH: CPT | Performed by: SURGERY

## 2020-12-14 PROCEDURE — 2580000003 HC RX 258: Performed by: SURGERY

## 2020-12-14 PROCEDURE — 7100000001 HC PACU RECOVERY - ADDTL 15 MIN: Performed by: SURGERY

## 2020-12-14 PROCEDURE — 3600000013 HC SURGERY LEVEL 3 ADDTL 15MIN: Performed by: SURGERY

## 2020-12-14 PROCEDURE — 36415 COLL VENOUS BLD VENIPUNCTURE: CPT

## 2020-12-14 PROCEDURE — 3700000000 HC ANESTHESIA ATTENDED CARE: Performed by: SURGERY

## 2020-12-14 PROCEDURE — 7100000011 HC PHASE II RECOVERY - ADDTL 15 MIN: Performed by: SURGERY

## 2020-12-14 PROCEDURE — 7100000010 HC PHASE II RECOVERY - FIRST 15 MIN: Performed by: SURGERY

## 2020-12-14 PROCEDURE — 2709999900 HC NON-CHARGEABLE SUPPLY: Performed by: SURGERY

## 2020-12-14 PROCEDURE — 84132 ASSAY OF SERUM POTASSIUM: CPT

## 2020-12-14 PROCEDURE — 2580000003 HC RX 258: Performed by: NURSE ANESTHETIST, CERTIFIED REGISTERED

## 2020-12-14 PROCEDURE — C1750 CATH, HEMODIALYSIS,LONG-TERM: HCPCS | Performed by: SURGERY

## 2020-12-14 PROCEDURE — 6360000002 HC RX W HCPCS: Performed by: NURSE ANESTHETIST, CERTIFIED REGISTERED

## 2020-12-14 PROCEDURE — 3600000003 HC SURGERY LEVEL 3 BASE: Performed by: SURGERY

## 2020-12-14 RX ORDER — ROCURONIUM BROMIDE 10 MG/ML
INJECTION, SOLUTION INTRAVENOUS PRN
Status: DISCONTINUED | OUTPATIENT
Start: 2020-12-14 | End: 2020-12-14 | Stop reason: SDUPTHER

## 2020-12-14 RX ORDER — PROMETHAZINE HYDROCHLORIDE 25 MG/ML
6.25 INJECTION, SOLUTION INTRAMUSCULAR; INTRAVENOUS
Status: DISCONTINUED | OUTPATIENT
Start: 2020-12-14 | End: 2020-12-14 | Stop reason: HOSPADM

## 2020-12-14 RX ORDER — EPHEDRINE SULFATE/0.9% NACL/PF 50 MG/5 ML
SYRINGE (ML) INTRAVENOUS PRN
Status: DISCONTINUED | OUTPATIENT
Start: 2020-12-14 | End: 2020-12-14 | Stop reason: SDUPTHER

## 2020-12-14 RX ORDER — FENTANYL CITRATE 50 UG/ML
INJECTION, SOLUTION INTRAMUSCULAR; INTRAVENOUS PRN
Status: DISCONTINUED | OUTPATIENT
Start: 2020-12-14 | End: 2020-12-14

## 2020-12-14 RX ORDER — MIDAZOLAM HYDROCHLORIDE 1 MG/ML
INJECTION INTRAMUSCULAR; INTRAVENOUS PRN
Status: DISCONTINUED | OUTPATIENT
Start: 2020-12-14 | End: 2020-12-14 | Stop reason: SDUPTHER

## 2020-12-14 RX ORDER — ROCURONIUM BROMIDE 10 MG/ML
INJECTION, SOLUTION INTRAVENOUS PRN
Status: DISCONTINUED | OUTPATIENT
Start: 2020-12-14 | End: 2020-12-14

## 2020-12-14 RX ORDER — SODIUM CHLORIDE 9 MG/ML
INJECTION, SOLUTION INTRAVENOUS CONTINUOUS PRN
Status: DISCONTINUED | OUTPATIENT
Start: 2020-12-14 | End: 2020-12-14 | Stop reason: SDUPTHER

## 2020-12-14 RX ORDER — PROPOFOL 10 MG/ML
INJECTION, EMULSION INTRAVENOUS PRN
Status: DISCONTINUED | OUTPATIENT
Start: 2020-12-14 | End: 2020-12-14 | Stop reason: SDUPTHER

## 2020-12-14 RX ORDER — BUPIVACAINE HYDROCHLORIDE AND EPINEPHRINE 5; 5 MG/ML; UG/ML
30 INJECTION, SOLUTION EPIDURAL; INTRACAUDAL; PERINEURAL ONCE
Status: CANCELLED | OUTPATIENT
Start: 2020-12-14 | End: 2020-12-14

## 2020-12-14 RX ORDER — LIDOCAINE HYDROCHLORIDE 10 MG/ML
INJECTION, SOLUTION EPIDURAL; INFILTRATION; INTRACAUDAL; PERINEURAL PRN
Status: DISCONTINUED | OUTPATIENT
Start: 2020-12-14 | End: 2020-12-14 | Stop reason: SDUPTHER

## 2020-12-14 RX ORDER — FENTANYL CITRATE 50 UG/ML
INJECTION, SOLUTION INTRAMUSCULAR; INTRAVENOUS PRN
Status: DISCONTINUED | OUTPATIENT
Start: 2020-12-14 | End: 2020-12-14 | Stop reason: SDUPTHER

## 2020-12-14 RX ORDER — ONDANSETRON 2 MG/ML
INJECTION INTRAMUSCULAR; INTRAVENOUS PRN
Status: DISCONTINUED | OUTPATIENT
Start: 2020-12-14 | End: 2020-12-14 | Stop reason: SDUPTHER

## 2020-12-14 RX ORDER — SODIUM CHLORIDE 0.9 % (FLUSH) 0.9 %
10 SYRINGE (ML) INJECTION PRN
Status: DISCONTINUED | OUTPATIENT
Start: 2020-12-14 | End: 2020-12-14 | Stop reason: HOSPADM

## 2020-12-14 RX ORDER — LIDOCAINE HYDROCHLORIDE 10 MG/ML
INJECTION, SOLUTION EPIDURAL; INFILTRATION; INTRACAUDAL; PERINEURAL PRN
Status: DISCONTINUED | OUTPATIENT
Start: 2020-12-14 | End: 2020-12-14

## 2020-12-14 RX ORDER — SODIUM CHLORIDE 0.9 % (FLUSH) 0.9 %
10 SYRINGE (ML) INJECTION EVERY 12 HOURS SCHEDULED
Status: DISCONTINUED | OUTPATIENT
Start: 2020-12-14 | End: 2020-12-14 | Stop reason: HOSPADM

## 2020-12-14 RX ORDER — OXYCODONE HYDROCHLORIDE AND ACETAMINOPHEN 5; 325 MG/1; MG/1
1 TABLET ORAL EVERY 6 HOURS PRN
Qty: 12 TABLET | Refills: 0 | Status: SHIPPED | OUTPATIENT
Start: 2020-12-14 | End: 2020-12-17

## 2020-12-14 RX ORDER — HEPARIN SODIUM (PORCINE) LOCK FLUSH IV SOLN 100 UNIT/ML 100 UNIT/ML
SOLUTION INTRAVENOUS PRN
Status: DISCONTINUED | OUTPATIENT
Start: 2020-12-14 | End: 2020-12-14 | Stop reason: ALTCHOICE

## 2020-12-14 RX ADMIN — Medication 2 G: at 13:37

## 2020-12-14 RX ADMIN — FENTANYL CITRATE 50 MCG: 50 INJECTION, SOLUTION INTRAMUSCULAR; INTRAVENOUS at 14:20

## 2020-12-14 RX ADMIN — ROCURONIUM BROMIDE 50 MG: 10 INJECTION, SOLUTION INTRAVENOUS at 13:46

## 2020-12-14 RX ADMIN — ONDANSETRON 4 MG: 2 INJECTION INTRAMUSCULAR; INTRAVENOUS at 14:27

## 2020-12-14 RX ADMIN — SODIUM CHLORIDE: 9 INJECTION, SOLUTION INTRAVENOUS at 13:37

## 2020-12-14 RX ADMIN — Medication 10 MG: at 14:35

## 2020-12-14 RX ADMIN — MIDAZOLAM 2 MG: 1 INJECTION INTRAMUSCULAR; INTRAVENOUS at 13:37

## 2020-12-14 RX ADMIN — LIDOCAINE HYDROCHLORIDE 4 ML: 10 INJECTION, SOLUTION EPIDURAL; INFILTRATION; INTRACAUDAL; PERINEURAL at 13:46

## 2020-12-14 RX ADMIN — FENTANYL CITRATE 100 MCG: 50 INJECTION, SOLUTION INTRAMUSCULAR; INTRAVENOUS at 13:46

## 2020-12-14 RX ADMIN — PROPOFOL 200 MG: 10 INJECTION, EMULSION INTRAVENOUS at 13:46

## 2020-12-14 ASSESSMENT — PULMONARY FUNCTION TESTS
PIF_VALUE: 22
PIF_VALUE: 27
PIF_VALUE: 28
PIF_VALUE: 23
PIF_VALUE: 26
PIF_VALUE: 19
PIF_VALUE: 29
PIF_VALUE: 20
PIF_VALUE: 55
PIF_VALUE: 21
PIF_VALUE: 28
PIF_VALUE: 3
PIF_VALUE: 26
PIF_VALUE: 4
PIF_VALUE: 0
PIF_VALUE: 20
PIF_VALUE: 22
PIF_VALUE: 19
PIF_VALUE: 0
PIF_VALUE: 20
PIF_VALUE: 19
PIF_VALUE: 23
PIF_VALUE: 29
PIF_VALUE: 19
PIF_VALUE: 19
PIF_VALUE: 0
PIF_VALUE: 4
PIF_VALUE: 23
PIF_VALUE: 22
PIF_VALUE: 1
PIF_VALUE: 1
PIF_VALUE: 26
PIF_VALUE: 30
PIF_VALUE: 19
PIF_VALUE: 0
PIF_VALUE: 19
PIF_VALUE: 22
PIF_VALUE: 22
PIF_VALUE: 23
PIF_VALUE: 25
PIF_VALUE: 1
PIF_VALUE: 1
PIF_VALUE: 26
PIF_VALUE: 26
PIF_VALUE: 21
PIF_VALUE: 22
PIF_VALUE: 23
PIF_VALUE: 11
PIF_VALUE: 29
PIF_VALUE: 0
PIF_VALUE: 28
PIF_VALUE: 0
PIF_VALUE: 30
PIF_VALUE: 24
PIF_VALUE: 0
PIF_VALUE: 29
PIF_VALUE: 23
PIF_VALUE: 22
PIF_VALUE: 17
PIF_VALUE: 22
PIF_VALUE: 0
PIF_VALUE: 27
PIF_VALUE: 27
PIF_VALUE: 19
PIF_VALUE: 0
PIF_VALUE: 19
PIF_VALUE: 1
PIF_VALUE: 26
PIF_VALUE: 21
PIF_VALUE: 22
PIF_VALUE: 17
PIF_VALUE: 18
PIF_VALUE: 29
PIF_VALUE: 22

## 2020-12-14 ASSESSMENT — PAIN DESCRIPTION - DESCRIPTORS
DESCRIPTORS: DISCOMFORT
DESCRIPTORS: DISCOMFORT

## 2020-12-14 ASSESSMENT — PAIN SCALES - GENERAL
PAINLEVEL_OUTOF10: 0
PAINLEVEL_OUTOF10: 0

## 2020-12-14 ASSESSMENT — PAIN DESCRIPTION - PAIN TYPE
TYPE: SURGICAL PAIN
TYPE: SURGICAL PAIN

## 2020-12-14 ASSESSMENT — PAIN DESCRIPTION - LOCATION
LOCATION: ABDOMEN
LOCATION: ABDOMEN

## 2020-12-14 ASSESSMENT — PAIN SCALES - WONG BAKER
WONGBAKER_NUMERICALRESPONSE: 4
WONGBAKER_NUMERICALRESPONSE: 2

## 2020-12-14 NOTE — H&P
 TONSILLECTOMY        TYMPANOPLASTY         Ventilation tubes, child            Home Medications           Prior to Admission medications    Medication Sig Start Date End Date Taking? Authorizing Provider   hydrALAZINE (APRESOLINE) 50 MG tablet Take 1 tablet by mouth every 8 hours 20   Yes Benedicto Villagran DO   Calcium Acetate, Phos Binder, 667 MG CAPS Take 1 capsule by mouth 3 times daily (with meals) 20   Yes Benedicto Villagran DO   allopurinol (ZYLOPRIM) 100 MG tablet Take 1 tablet by mouth daily 20   Yes Phan Villagran, DO   carvedilol (COREG) 12.5 MG tablet Take 1 tablet by mouth 2 times daily (with meals) 20   Yes Benedicto Villagran DO   rosuvastatin (CRESTOR) 10 MG tablet Take 1 tablet by mouth nightly 20   Yes Phan Villagran,    sodium bicarbonate 650 MG tablet Take 2 tablets by mouth 2 times daily 20   Yes Osiel Villagran DO   Cholecalciferol (VITAMIN D3) 1.25 MG (21101 UT) CAPS Take by mouth once a week       Historical Provider, MD                 Allergies   Allergen Reactions    Anesthetics, Amide Anaphylaxis         Social History   Social History            Socioeconomic History    Marital status: Single       Spouse name: None    Number of children: None    Years of education: None    Highest education level: None   Occupational History    None   Social Needs    Financial resource strain: None    Food insecurity       Worry: None       Inability: None    Transportation needs       Medical: None       Non-medical: None   Tobacco Use    Smoking status: Former Smoker       Packs/day: 1.00       Years: 31.00       Pack years: 31.00       Types: Cigarettes       Start date: 9/10/1987       Quit date: 9/10/2019       Years since quittin.2    Smokeless tobacco: Never Used   Substance and Sexual Activity    Alcohol use: Not Currently    Drug use:  Yes       Types: Marijuana       Comment: ROX 20    Sexual activity: None   Lifestyle    Physical activity       Days per week: None       Minutes per session: None    Stress: None   Relationships    Social connections       Talks on phone: None       Gets together: None       Attends Shinto service: None       Active member of club or organization: None       Attends meetings of clubs or organizations: None       Relationship status: None    Intimate partner violence       Fear of current or ex partner: None       Emotionally abused: None       Physically abused: None       Forced sexual activity: None   Other Topics Concern    None   Social History Narrative    None            Family History         Family History   Problem Relation Age of Onset    Heart Disease Mother            age 55    Heart Attack Father 61         26, alive age 79; previous CABG    No Known Problems Sister              Review of Systems   All other systems reviewed and are negative.                  Objective:  Vitals       Vitals:     20 1501   BP: (!) 162/91   Site: Left Upper Arm   Position: Sitting   Cuff Size: Large Adult   Pulse: 79   Resp: 16   Temp: 98.8 °F (37.1 °C)   TempSrc: Temporal   SpO2: 100%   Weight: 219 lb 6.4 oz (99.5 kg)   Height: 6' (1.829 m)            Physical Exam  Constitutional:       General: He is not in acute distress. Appearance: He is not diaphoretic. HENT:      Head: Normocephalic and atraumatic. Eyes:      General:         Right eye: No discharge. Left eye: No discharge. Neck:      Trachea: No tracheal deviation. Cardiovascular:      Rate and Rhythm: Normal rate. Pulmonary:      Effort: Pulmonary effort is normal. No respiratory distress. Abdominal:      General: There is no distension. Palpations: Abdomen is soft. Tenderness: There is no abdominal tenderness. There is no guarding or rebound. Skin:     General: Skin is warm and dry.    Neurological:      Mental Status: He is alert and oriented to person, place, and time.                      Juan Oropeza MD       NOTE: This report, in part or full,may have been transcribed using voice recognition software. Every effort was made to ensure accuracy; however, inadvertent computerized transcription errors may be present.  Please excuse any transcriptional grammatical or spelling errors that may have escaped my editorial review.     CC: Dara Mehta MD , Dr. Laurence Shore

## 2020-12-14 NOTE — ANESTHESIA PRE PROCEDURE
Department of Anesthesiology  Preprocedure Note       Name:  Jose M Shearer. Age:  52 y.o.  :  1970                                          MRN:  [id number]         Date:  2020      Surgeon: Lis Landon):  Merna Cornell MD    Procedure: Procedure(s):  LAPAROSCOPIC PERITONEAL DIALYSIS CATHETER INSERTION   ++DR. MURRELL NOTIFY REP++    Medications prior to admission:   Prior to Admission medications    Medication Sig Start Date End Date Taking? Authorizing Provider   Cholecalciferol (VITAMIN D3) 1.25 MG (39067 UT) CAPS Take by mouth once a week   Yes Historical Provider, MD   hydrALAZINE (APRESOLINE) 50 MG tablet Take 1 tablet by mouth every 8 hours 20  Yes Breanna Villagran DO   allopurinol (ZYLOPRIM) 100 MG tablet Take 1 tablet by mouth daily 20  Yes Osiel Villagran DO   carvedilol (COREG) 12.5 MG tablet Take 1 tablet by mouth 2 times daily (with meals) 20  Yes Breanna Villagran DO   rosuvastatin (CRESTOR) 10 MG tablet Take 1 tablet by mouth nightly 20  Yes Osiel Villagran DO   sodium bicarbonate 650 MG tablet Take 2 tablets by mouth 2 times daily 20  Yes Malathi Villagran DO   Calcium Acetate, Phos Binder, 667 MG CAPS Take 1 capsule by mouth 3 times daily (with meals) 20   Breanna Villagran DO       Current medications:    Current Facility-Administered Medications   Medication Dose Route Frequency Provider Last Rate Last Admin    sodium chloride flush 0.9 % injection 10 mL  10 mL Intravenous 2 times per day Merna Cornell MD        sodium chloride flush 0.9 % injection 10 mL  10 mL Intravenous PRN Merna Cornell MD        ceFAZolin (ANCEF) 2 g in sterile water 20 mL IV syringe  2 g Intravenous On Call to [address], MD           Allergies:     Allergies   Allergen Reactions    Anesthetics, Amide Anaphylaxis       Problem List:    Patient Active Problem List   Diagnosis Code    CONY (acute kidney injury) ([facility] [address] N17.9    Asymmetric septal hypertrophy (HCC) I42.2 Counseling given: Not Answered      Vital Signs (Current):   Vitals:    12/10/20 1223 12/14/20 1119   BP:  (!) 149/82   Pulse:  68   Resp:  14   Temp:  96.8 °F (36 °C)   TempSrc:  Temporal   SpO2:  100%   Weight: 205 lb (93 kg)    Height: 6' (1.829 m)                                               BP Readings from Last 3 Encounters:   12/14/20 (!) 149/82   12/08/20 (!) 162/91   12/06/20 (!) 150/110       NPO Status: Time of last liquid consumption: 2000                        Time of last solid consumption: 2000                        Date of last liquid consumption: 12/13/20                        Date of last solid food consumption: 12/13/20    BMI:   Wt Readings from Last 3 Encounters:   12/10/20 205 lb (93 kg)   12/08/20 219 lb 6.4 oz (99.5 kg)   12/05/20 205 lb (93 kg)     Body mass index is 27.8 kg/m². CBC:   Lab Results   Component Value Date    WBC 6.7 12/06/2020    RBC 2.42 12/06/2020    HGB 7.6 12/06/2020    HCT 23.6 12/06/2020    HCT 23.5 12/06/2020    MCV 97.5 12/06/2020    RDW 12.6 12/06/2020     12/06/2020       CMP:   Lab Results   Component Value Date     12/06/2020    K 5.7 12/14/2020     12/06/2020    CO2 17 12/06/2020    BUN 79 12/06/2020    CREATININE 7.7 12/06/2020    GFRAA 9 12/06/2020    LABGLOM 8 12/06/2020    GLUCOSE 93 12/06/2020    PROT 6.5 12/06/2020    PROT 6.4 12/06/2020    CALCIUM 8.4 12/06/2020    BILITOT 0.3 12/06/2020    BILITOT 0.2 12/06/2020    ALKPHOS 47 12/06/2020    ALKPHOS 47 12/06/2020    AST 7 12/06/2020    AST 7 12/06/2020    ALT 9 12/06/2020    ALT 10 12/06/2020       POC Tests: No results for input(s): POCGLU, POCNA, POCK, POCCL, POCBUN, POCHEMO, POCHCT in the last 72 hours.     Coags: No results found for: PROTIME, INR, APTT    HCG (If Applicable): No results found for: PREGTESTUR, PREGSERUM, HCG, HCGQUANT     ABGs: No results found for: PHART, PO2ART, VGA3GOB, TRJ7ECZ, BEART, B6LIMHJB     Type & Screen (If Applicable): No results found for: LABABO, [address]    Drug/Infectious Status (If Applicable):  No results found for: HIV, HEPCAB    COVID-19 Screening (If Applicable):   Lab Results   Component Value Date    COVID19 Not Detected 12/09/2020    COVID19 Not Detected 12/05/2020         Anesthesia Evaluation  Patient summary reviewed and Nursing notes reviewed no history of anesthetic complications:   Airway: Mallampati: IV  TM distance: >3 FB   Neck ROM: full  Mouth opening: > = 3 FB Dental: normal exam         Pulmonary:Negative Pulmonary ROS breath sounds clear to auscultation                             Cardiovascular:    (+) hypertension:,       ECG reviewed  Rhythm: regular  Rate: normal  Echocardiogram reviewed               ROS comment: Echo Dec 2020     Summary   No evidence of tricuspid regurgitation. Left ventricle grossly normal in size. Mild asymmetric left ventricular septal hypertrophy. Normal LV segmental wall motion. Estimated left ventricular ejection fraction is 58±5%. <50% criteria for diastolic dysfunction. Mildly dilated right ventricle. Right ventricular segmental wall motion is normal.   Trace-mild aortic regurgitation is noted. Physiologic and/or trace pulmonic regurgitation present. Technically good quality study. Compared to prior echo, no significant changes noted. Suggest clinical correlation. Neuro/Psych:   Negative Neuro/Psych ROS              GI/Hepatic/Renal:   (+) renal disease: CRI,           Endo/Other: Negative Endo/Other ROS                    Abdominal:           Vascular:           ROS comment: Thoracic aortic aneurysm without rupture (HCC) Ascending; 4.3 cm- Dr. Una Manzano 3/2020 . Anesthesia Plan      general     ASA 3       Induction: intravenous. MIPS: Postoperative opioids intended and Prophylactic antiemetics administered. Anesthetic plan and risks discussed with patient and father.       Plan discussed with CRNA and surgical team.

## 2020-12-14 NOTE — ANESTHESIA POSTPROCEDURE EVALUATION
Department of Anesthesiology  Postprocedure Note    Patient: Manjeet Combs MRN: [id number]  YOB: 1970  Date of evaluation: 12/14/2020  Time:  4:08 PM     Procedure Summary     Date: 12/14/20 Room / Location: [facility] 07 / [facility]    Anesthesia Start: 5552 Anesthesia Stop: 6477    Procedure: LAPAROSCOPIC PERITONEAL DIALYSIS CATHETER INSERTION   ++DR. MURRELL NOTIFY REP++ (N/A Abdomen) Diagnosis: (RENAL FAILURE)    Surgeons: Werner Oropeza MD Responsible Provider: Ggaan Barbosa MD    Anesthesia Type: general ASA Status: 3          Anesthesia Type: general    Savanna Phase I: Savanna Score: 10    Savanna Phase II: Savanna Score: 10    Last vitals: Reviewed and per EMR flowsheets.        Anesthesia Post Evaluation    Patient location during evaluation: PACU  Patient participation: complete - patient participated  Level of consciousness: awake and alert  Pain score: 3  Airway patency: patent  Nausea & Vomiting: no vomiting and no nausea  Complications: no  Cardiovascular status: hemodynamically stable  Respiratory status: spontaneous ventilation  Hydration status: stable

## 2020-12-16 NOTE — PROGRESS NOTES
Physician Progress Note      PATIENT:               Sabrina Hallman  CSN #:                  [id number]  :                       1970  ADMIT DATE:       2020 1:28 PM  [address] DATE:        2020 4:59 PM  RESPONDING  PROVIDER #:        Meriel Schirmer MIHOK DO          QUERY TEXT:    Dear Attending Provider,    Patient admitted with hyperkalemia. Noted documentation of Acute heart failure   with preserved EF in DCS on . If possible, please document in progress   notes and discharge summary:    The medical record reflects the following:  Risk Factors: history of CHF  Clinical Indicators: Per IM in DCS, \" Acute heart failure with preserved   ejection fraction. Amita Heman Amita Heman \", Pro-BNP 1086  Treatment: IV Lasix x's 1 dose in ED, labs    Nicole Krueger RN, CCDS, Cards Off  Clinical Documentation Improvement  [phone]  Options provided:  -- Acute diastolic heart failure present as evidenced by, Please document   evidence. -- Acute on chronic diastolic heart failure present as evidenced by, Please   document evidence.   -- Acute diastolic heart failure was ruled out  -- Other - I will add my own diagnosis  -- Disagree - Not applicable / Not valid  -- Disagree - Clinically unable to determine / Unknown  -- Refer to Clinical Documentation Reviewer    PROVIDER RESPONSE TEXT:    acute diastolic heart failure is present as evidenced by Ejection fraction   58%; elevated proBNP; intravenous furosemide given in the ED    Query created by: Chris Yoo on 12/15/2020 12:43 PM      Electronically signed by:  Kristian Cervantes DO 2020 2:04 PM

## 2020-12-18 ENCOUNTER — TELEPHONE (OUTPATIENT)
Dept: SURGERY | Age: 50
End: 2020-12-18

## 2020-12-18 NOTE — TELEPHONE ENCOUNTER
MA called the patient to make a follow up appointment for his PD cath. Patient stated he did not feel he needed to come in because the nurses are taking care of it and hes already seeing a doctor to follow up. He also stated he is starting the classes for the PD cath. MA told the patient he would let Dr. Echo Worley know he did not want to follow up in 2 weeks.     Electronically signed by Shaquille Cortez on 12/18/20 at 2:19 PM EST

## 2020-12-19 NOTE — OP NOTE
Operative Note    Petra Mancia. YOB: 1970  [id number]    Pre-operative Diagnosis: Chronic kidney disease stage V    Post-operative Diagnosis: Same    Procedure(s):  LAPAROSCOPIC PERITONEAL DIALYSIS CATHETER INSERTION    * No implants in log *      Surgeon:   Primary: Ghada Rodriguez MD    Staff:  Rachel Scrub: Rocío Higuera  Scrub Person First: Joel Singh    Anesthesia:   General    Estimated Blood Loss: less than 50     Complications: None    Specimens: * No specimens in log *    Findings: Adequate flow rate and fluid return      Indications: Patient is a 52 y.o. male who was diagnosed with CKD. Risks/Benefits/Alternatives were discussed with the patient, including bleeding, infection, catheter malfunction, injury to surrounding structures, need for further surgery. The patient agreed to undergo the procedure and informed consent was obtained. Procedure: After informed consent, the patient was brought to the operating room and placed supine. General anesthesia was then induced which the patient tolerated well. Time out was performed to identify the correct patient and procedure. They received appropriate perioperative antibiotics. The abdomen was prepped and draped in the usual sterile fashion. A small nick incision was made in the left upper quadrant. There is needle was inserted. Pneumoperitoneum was established which patient tolerated well. In the right upper abdomen a 5 mm incision was made and port placed with Optiview technique. There is no injury noted on entry. The abdomen was surveilled. There were no significant adhesions noted. We then identified symphysis pubis as our landmark to begin catheter measurement. Markings in the location of deep cuff noted. Superior to this transverse incision was made lateral to the midline. We dissected down to the rectus sheath and small nick incision was made in the sheath.   Port was then inserted down to the posterior sheath under direct visualization with the laparoscope and this was tunneled inferiorly to our intra-abdominal insertion site. In Seldinger technique the catheter was advanced with identification of the deep cuff just superficial to the peritoneum. Catheter was directed down towards the pelvis. Omentum was identified and noted to be small and omentopexy deemed unnecessary. Exit site was then identified and a small incision made here. Tunneler was then advanced from the deep site to the exit site and the catheter was tunneled. After the catheter was tunneled, 1 L of heparinized saline was instilled in the abdomen. Flow was noted to be adequate. Once fully instilled, gravity drainage was then allowed to drain the abdomen of all but approximately 200 to 300 L of saline. Flow again proceeded briskly. 3-0 Vicryl was used to close the subcutaneous tissue of the initial incision. 4-0 Monocryl and skin glue was then applied to the remainder of the incisions. The adapter was then attached and sterile cap was applied. Sterile dressing was then applied. Counts were correct at the end of the case. Tolerated procedure well    I was present and scrubbed for the procedure.     Rodrigo Bacon MD  12/19/20  10:43 AM

## 2020-12-29 ENCOUNTER — HOSPITAL ENCOUNTER (OUTPATIENT)
Dept: INFUSION THERAPY | Age: 50
Setting detail: INFUSION SERIES
Discharge: HOME | End: 2020-12-29
Payer: COMMERCIAL

## 2020-12-29 NOTE — PROGRESS NOTES
Patient called in said he came in for labs and he was not paying $300 so he left .  He said he spoke with the doctor and he is going to [facility] and getting the labs for $9 there tomorrow so he scheduled appointment for Monday 1/4/21 because he cannot make appointment here Thursday he works until 230pm

## 2021-01-04 ENCOUNTER — HOSPITAL ENCOUNTER (OUTPATIENT)
Dept: INFUSION THERAPY | Age: 51
Setting detail: INFUSION SERIES
Discharge: HOME | End: 2021-01-04
Payer: COMMERCIAL

## 2021-01-04 VITALS
HEART RATE: 71 BPM | HEIGHT: 72 IN | BODY MASS INDEX: 27.77 KG/M2 | RESPIRATION RATE: 16 BRPM | OXYGEN SATURATION: 98 % | SYSTOLIC BLOOD PRESSURE: 174 MMHG | DIASTOLIC BLOOD PRESSURE: 101 MMHG | WEIGHT: 205 LBS | TEMPERATURE: 98.2 F

## 2021-01-04 DIAGNOSIS — D63.1 ANEMIA IN STAGE 5 CHRONIC KIDNEY DISEASE, NOT ON CHRONIC DIALYSIS (HCC): Primary | ICD-10-CM

## 2021-01-04 DIAGNOSIS — N18.5 ANEMIA IN STAGE 5 CHRONIC KIDNEY DISEASE, NOT ON CHRONIC DIALYSIS (HCC): Primary | ICD-10-CM

## 2021-01-04 PROCEDURE — 6360000002 HC RX W HCPCS: Performed by: INTERNAL MEDICINE

## 2021-01-04 PROCEDURE — 96372 THER/PROPH/DIAG INJ SC/IM: CPT

## 2021-01-04 RX ADMIN — EPOETIN ALFA-EPBX 20000 UNITS: 10000 INJECTION, SOLUTION INTRAVENOUS; SUBCUTANEOUS at 13:35

## 2021-01-04 NOTE — PROGRESS NOTES
Called spoke with pharmacist Gerardo Klein to verify dose interchange insurance approval for retacrit interchange for aranesp and 60 units aranesp= retacrit 20,000 units

## 2021-01-04 NOTE — PROGRESS NOTES
Patient tolerated  RETACRIT injection well. Therapy plan reviewed with patient. Verbalizes understanding. Reviewed AVS with patient, reviewed medication information, verbalizes good knowledge of current plan, and has no signs or symptoms to report at this time. Declines copy of AVS.  Next appointment made and patient instructed on lab draw and procedure for next injection.

## 2021-01-11 ENCOUNTER — HOSPITAL ENCOUNTER (OUTPATIENT)
Dept: INFUSION THERAPY | Age: 51
Setting detail: INFUSION SERIES
Discharge: HOME | End: 2021-01-11
Payer: COMMERCIAL

## 2021-01-11 VITALS
WEIGHT: 205 LBS | TEMPERATURE: 97.7 F | BODY MASS INDEX: 27.8 KG/M2 | SYSTOLIC BLOOD PRESSURE: 151 MMHG | OXYGEN SATURATION: 99 % | RESPIRATION RATE: 16 BRPM | DIASTOLIC BLOOD PRESSURE: 94 MMHG | HEART RATE: 72 BPM

## 2021-01-11 DIAGNOSIS — D63.1 ANEMIA IN STAGE 5 CHRONIC KIDNEY DISEASE, NOT ON CHRONIC DIALYSIS (HCC): Primary | ICD-10-CM

## 2021-01-11 DIAGNOSIS — N18.5 ANEMIA IN STAGE 5 CHRONIC KIDNEY DISEASE, NOT ON CHRONIC DIALYSIS (HCC): Primary | ICD-10-CM

## 2021-01-11 PROCEDURE — 6360000002 HC RX W HCPCS: Performed by: INTERNAL MEDICINE

## 2021-01-11 PROCEDURE — 96372 THER/PROPH/DIAG INJ SC/IM: CPT

## 2021-01-11 RX ADMIN — EPOETIN ALFA-EPBX 20000 UNITS: 10000 INJECTION, SOLUTION INTRAVENOUS; SUBCUTANEOUS at 10:56

## 2021-01-11 ASSESSMENT — PAIN DESCRIPTION - LOCATION: LOCATION: HIP;SHOULDER;KNEE

## 2021-01-11 ASSESSMENT — PAIN DESCRIPTION - DESCRIPTORS: DESCRIPTORS: ACHING

## 2021-01-11 ASSESSMENT — PAIN SCALES - WONG BAKER: WONGBAKER_NUMERICALRESPONSE: 2

## 2021-01-11 ASSESSMENT — PAIN DESCRIPTION - PROGRESSION: CLINICAL_PROGRESSION: NOT CHANGED

## 2021-01-11 ASSESSMENT — PAIN DESCRIPTION - FREQUENCY: FREQUENCY: INTERMITTENT

## 2021-01-11 ASSESSMENT — PAIN SCALES - GENERAL: PAINLEVEL_OUTOF10: 4

## 2021-01-11 ASSESSMENT — PAIN DESCRIPTION - ORIENTATION: ORIENTATION: RIGHT;LEFT

## 2021-01-11 ASSESSMENT — PAIN DESCRIPTION - PAIN TYPE: TYPE: CHRONIC PAIN

## 2021-01-18 ENCOUNTER — HOSPITAL ENCOUNTER (OUTPATIENT)
Dept: INFUSION THERAPY | Age: 51
Setting detail: INFUSION SERIES
Discharge: HOME | End: 2021-01-18
Payer: COMMERCIAL

## 2021-01-19 ENCOUNTER — HOSPITAL ENCOUNTER (OUTPATIENT)
Dept: INFUSION THERAPY | Age: 51
Setting detail: INFUSION SERIES
Discharge: HOME | End: 2021-01-19
Payer: COMMERCIAL

## 2021-01-19 NOTE — PROGRESS NOTES
Patient called in said started on dialysis and was cancelling his aranesp injection, called and left message on clinical line at Dr. Rhoda Barrios office to see if ok to discharge

## 2022-01-10 ENCOUNTER — HOSPITAL ENCOUNTER (OUTPATIENT)
Dept: PULMONOLOGY | Age: 52
Discharge: HOME | End: 2022-01-10
Payer: MEDICARE

## 2022-01-10 DIAGNOSIS — Z01.818 PREOP EXAMINATION: ICD-10-CM

## 2022-01-10 DIAGNOSIS — Z01.810 PRE-OPERATIVE CARDIOVASCULAR EXAMINATION: ICD-10-CM

## 2022-01-10 DIAGNOSIS — N18.6 END STAGE RENAL DISEASE (HCC): ICD-10-CM

## 2022-01-10 PROCEDURE — 94729 DIFFUSING CAPACITY: CPT

## 2022-01-10 PROCEDURE — 94726 PLETHYSMOGRAPHY LUNG VOLUMES: CPT

## 2022-01-10 PROCEDURE — 94060 EVALUATION OF WHEEZING: CPT

## 2022-01-15 NOTE — PROCEDURES
[address] [address]                               PULMONARY FUNCTION    PATIENT NAME: Eve Mena                  :        1970  MED REC NO:   [id number]                            ROOM:  ACCOUNT NO:   [de-identified]                           ADMIT DATE: 01/10/2022  PROVIDER:     Sandra Ramirez MD    DATE OF PROCEDURE:  01/10/2022    ORDERING PROVIDER:  Zeny Urbina MD.    REASON FOR TESTING:  The patient with preop examination, end-stage renal  disease. FINDINGS:  As follows:  FVC was 4.35 liters, which was 81% of predicted. FEV1 was 3.64 liters, which was 86% of predicted. FEV1/FVC was 84. Minute volume ventilation was 162 liters per minute, which was 100% of  predicted. Total lung capacity was 7.45 liters, which was 101% of  predicted and diffusion capacity was 25.5 mL per minute per mmHg, which  was 72% of predicted. IMPRESSION:  1. No obstructive or restrictive lung disease. 2.  Normal minute volume ventilation. 3.  Normal total lung capacity. 4.  Normal diffusion capacity.         Murray Pinedo MD    D: 2022 14:19:43       T: 2022 14:22:02     [id number]  Job#: [id number]     Doc#: [id number]    CC:

## 2022-03-03 ENCOUNTER — HOSPITAL ENCOUNTER (OUTPATIENT)
Age: 52
Discharge: HOME | End: 2022-03-03
Payer: MEDICARE

## 2022-03-03 LAB
ALCOHOL URINE: NOT DETECTED MG/DL
AMPHETAMINE SCREEN, URINE: NOT DETECTED
BARBITURATE SCREEN URINE: NOT DETECTED
BENZODIAZEPINE SCREEN, URINE: NOT DETECTED
CANNABINOID SCREEN URINE: NOT DETECTED
COCAINE METABOLITE SCREEN URINE: NOT DETECTED
FENTANYL SCREEN, URINE: NOT DETECTED
Lab: NORMAL
METHADONE SCREEN, URINE: NOT DETECTED
OPIATE SCREEN URINE: NOT DETECTED
OXYCODONE URINE: NOT DETECTED
PHENCYCLIDINE SCREEN URINE: NOT DETECTED

## 2022-03-03 PROCEDURE — G0480 DRUG TEST DEF 1-7 CLASSES: HCPCS

## 2022-03-03 PROCEDURE — 80307 DRUG TEST PRSMV CHEM ANLYZR: CPT

## 2022-03-08 LAB
Lab: NORMAL
REPORT: NORMAL
THIS TEST SENT TO: NORMAL

## 2022-03-10 LAB
3-OH-COTININE: 7 NG/ML
COTININE: 6 NG/ML
NICOTINE: <2 NG/ML

## 2022-11-24 ENCOUNTER — HOSPITAL ENCOUNTER (INPATIENT)
Age: 52
LOS: 1 days | Discharge: HOME | DRG: 371 | End: 2022-11-24
Attending: EMERGENCY MEDICINE | Admitting: INTERNAL MEDICINE
Payer: COMMERCIAL

## 2022-11-24 VITALS
TEMPERATURE: 98.4 F | WEIGHT: 210 LBS | DIASTOLIC BLOOD PRESSURE: 74 MMHG | HEART RATE: 97 BPM | HEIGHT: 72 IN | RESPIRATION RATE: 18 BRPM | OXYGEN SATURATION: 97 % | SYSTOLIC BLOOD PRESSURE: 118 MMHG | BODY MASS INDEX: 28.44 KG/M2

## 2022-11-24 DIAGNOSIS — K65.2 SBP (SPONTANEOUS BACTERIAL PERITONITIS) (HCC): Primary | ICD-10-CM

## 2022-11-24 DIAGNOSIS — N18.5 STAGE 5 CHRONIC KIDNEY DISEASE NOT ON CHRONIC DIALYSIS (HCC): ICD-10-CM

## 2022-11-24 DIAGNOSIS — E87.5 HYPERKALEMIA: ICD-10-CM

## 2022-11-24 PROBLEM — Z99.2 ANEMIA IN CHRONIC KIDNEY DISEASE, ON CHRONIC DIALYSIS (HCC): Status: ACTIVE | Noted: 2020-12-05

## 2022-11-24 PROBLEM — N18.6 ANEMIA IN CHRONIC KIDNEY DISEASE, ON CHRONIC DIALYSIS (HCC): Status: ACTIVE | Noted: 2020-12-05

## 2022-11-24 PROBLEM — N18.6 CKD (CHRONIC KIDNEY DISEASE) STAGE V REQUIRING CHRONIC DIALYSIS (HCC): Status: ACTIVE | Noted: 2022-11-24

## 2022-11-24 PROBLEM — Z99.2 CKD (CHRONIC KIDNEY DISEASE) STAGE V REQUIRING CHRONIC DIALYSIS (HCC): Status: ACTIVE | Noted: 2022-11-24

## 2022-11-24 LAB
ANION GAP SERPL CALCULATED.3IONS-SCNC: 13 MMOL/L (ref 7–16)
APPEARANCE FLUID: NORMAL
BACTERIA: NORMAL /HPF
BASOPHILS ABSOLUTE: 0.04 E9/L (ref 0–0.2)
BASOPHILS RELATIVE PERCENT: 0.4 % (ref 0–2)
BILIRUBIN URINE: NEGATIVE
BLOOD, URINE: NEGATIVE
BUN BLDV-MCNC: 79 MG/DL (ref 6–20)
CALCIUM SERPL-MCNC: 9.3 MG/DL (ref 8.6–10.2)
CELL COUNT FLUID TYPE: NORMAL
CHLORIDE BLD-SCNC: 94 MMOL/L (ref 98–107)
CLARITY: CLEAR
CO2: 27 MMOL/L (ref 22–29)
COLOR FLUID: YELLOW
COLOR: YELLOW
CREAT SERPL-MCNC: 15.1 MG/DL (ref 0.7–1.2)
EKG ATRIAL RATE: 95 BPM
EKG P AXIS: 53 DEGREES
EKG P-R INTERVAL: 170 MS
EKG Q-T INTERVAL: 388 MS
EKG QRS DURATION: 90 MS
EKG QTC CALCULATION (BAZETT): 487 MS
EKG R AXIS: 38 DEGREES
EKG T AXIS: 51 DEGREES
EKG VENTRICULAR RATE: 95 BPM
EOSINOPHILS ABSOLUTE: 0.23 E9/L (ref 0.05–0.5)
EOSINOPHILS RELATIVE PERCENT: 2 % (ref 0–6)
GFR SERPL CREATININE-BSD FRML MDRD: 4 ML/MIN/1.73
GLUCOSE BLD-MCNC: 107 MG/DL (ref 74–99)
GLUCOSE URINE: 100 MG/DL
HCT VFR BLD CALC: 31.1 % (ref 37–54)
HEMOGLOBIN: 10.2 G/DL (ref 12.5–16.5)
IMMATURE GRANULOCYTES #: 0.06 E9/L
IMMATURE GRANULOCYTES %: 0.5 % (ref 0–5)
KETONES, URINE: NEGATIVE MG/DL
LEUKOCYTE ESTERASE, URINE: NEGATIVE
LYMPHOCYTES ABSOLUTE: 0.82 E9/L (ref 1.5–4)
LYMPHOCYTES RELATIVE PERCENT: 7.3 % (ref 20–42)
MCH RBC QN AUTO: 33.8 PG (ref 26–35)
MCHC RBC AUTO-ENTMCNC: 32.8 % (ref 32–34.5)
MCV RBC AUTO: 103 FL (ref 80–99.9)
MONOCYTE, FLUID: 24 %
MONOCYTES ABSOLUTE: 0.85 E9/L (ref 0.1–0.95)
MONOCYTES RELATIVE PERCENT: 7.5 % (ref 2–12)
NEUTROPHIL, FLUID: 76 %
NEUTROPHILS ABSOLUTE: 9.29 E9/L (ref 1.8–7.3)
NEUTROPHILS RELATIVE PERCENT: 82.3 % (ref 43–80)
NITRITE, URINE: NEGATIVE
NUCLEATED CELLS FLUID: 7763 /UL
PDW BLD-RTO: 13.4 FL (ref 11.5–15)
PH UA: 8 (ref 5–9)
PLATELET # BLD: 250 E9/L (ref 130–450)
PMV BLD AUTO: 10.4 FL (ref 7–12)
POTASSIUM SERPL-SCNC: 6 MMOL/L (ref 3.5–5)
PROTEIN UA: 100 MG/DL
RBC # BLD: 3.02 E12/L (ref 3.8–5.8)
RBC FLUID: <2000 /UL
RBC UA: NORMAL /HPF (ref 0–2)
SODIUM BLD-SCNC: 134 MMOL/L (ref 132–146)
SPECIFIC GRAVITY UA: 1.02 (ref 1–1.03)
UROBILINOGEN, URINE: 0.2 E.U./DL
WBC # BLD: 11.3 E9/L (ref 4.5–11.5)
WBC UA: NORMAL /HPF (ref 0–5)

## 2022-11-24 PROCEDURE — 99285 EMERGENCY DEPT VISIT HI MDM: CPT

## 2022-11-24 PROCEDURE — 87088 URINE BACTERIA CULTURE: CPT

## 2022-11-24 PROCEDURE — 87186 SC STD MICRODIL/AGAR DIL: CPT

## 2022-11-24 PROCEDURE — 6370000000 HC RX 637 (ALT 250 FOR IP): Performed by: INTERNAL MEDICINE

## 2022-11-24 PROCEDURE — 87070 CULTURE OTHR SPECIMN AEROBIC: CPT

## 2022-11-24 PROCEDURE — 1200000000 HC SEMI PRIVATE

## 2022-11-24 PROCEDURE — 85025 COMPLETE CBC W/AUTO DIFF WBC: CPT

## 2022-11-24 PROCEDURE — 6360000002 HC RX W HCPCS: Performed by: EMERGENCY MEDICINE

## 2022-11-24 PROCEDURE — 2580000003 HC RX 258: Performed by: EMERGENCY MEDICINE

## 2022-11-24 PROCEDURE — 80048 BASIC METABOLIC PNL TOTAL CA: CPT

## 2022-11-24 PROCEDURE — 87040 BLOOD CULTURE FOR BACTERIA: CPT

## 2022-11-24 PROCEDURE — 89051 BODY FLUID CELL COUNT: CPT

## 2022-11-24 PROCEDURE — 87077 CULTURE AEROBIC IDENTIFY: CPT

## 2022-11-24 PROCEDURE — 81001 URINALYSIS AUTO W/SCOPE: CPT

## 2022-11-24 PROCEDURE — 93005 ELECTROCARDIOGRAM TRACING: CPT | Performed by: EMERGENCY MEDICINE

## 2022-11-24 PROCEDURE — 94640 AIRWAY INHALATION TREATMENT: CPT

## 2022-11-24 PROCEDURE — 87205 SMEAR GRAM STAIN: CPT

## 2022-11-24 PROCEDURE — 93010 ELECTROCARDIOGRAM REPORT: CPT | Performed by: INTERNAL MEDICINE

## 2022-11-24 PROCEDURE — 2580000003 HC RX 258: Performed by: INTERNAL MEDICINE

## 2022-11-24 PROCEDURE — 6360000002 HC RX W HCPCS: Performed by: INTERNAL MEDICINE

## 2022-11-24 RX ORDER — CALCIUM ACETATE 667 MG/1
667 CAPSULE ORAL
Status: CANCELLED | OUTPATIENT
Start: 2022-11-25

## 2022-11-24 RX ORDER — SODIUM BICARBONATE 650 MG/1
1300 TABLET ORAL 3 TIMES DAILY
Status: CANCELLED | OUTPATIENT
Start: 2022-11-24

## 2022-11-24 RX ORDER — ACETAMINOPHEN 325 MG/1
650 TABLET ORAL EVERY 4 HOURS PRN
Status: CANCELLED | OUTPATIENT
Start: 2022-11-24

## 2022-11-24 RX ORDER — CARVEDILOL 25 MG/1
25 TABLET ORAL 2 TIMES DAILY WITH MEALS
Status: CANCELLED | OUTPATIENT
Start: 2022-11-24

## 2022-11-24 RX ORDER — ROSUVASTATIN CALCIUM 10 MG/1
10 TABLET, COATED ORAL NIGHTLY
Status: CANCELLED | OUTPATIENT
Start: 2022-11-24

## 2022-11-24 RX ORDER — HEPARIN SODIUM 10000 [USP'U]/ML
5000 INJECTION, SOLUTION INTRAVENOUS; SUBCUTANEOUS EVERY 8 HOURS
Status: CANCELLED | OUTPATIENT
Start: 2022-11-24

## 2022-11-24 RX ORDER — HYDRALAZINE HYDROCHLORIDE 50 MG/1
100 TABLET, FILM COATED ORAL EVERY 8 HOURS SCHEDULED
Status: CANCELLED | OUTPATIENT
Start: 2022-11-24

## 2022-11-24 RX ORDER — ALLOPURINOL 100 MG/1
100 TABLET ORAL DAILY
Status: CANCELLED | OUTPATIENT
Start: 2022-11-24

## 2022-11-24 RX ORDER — PANTOPRAZOLE SODIUM 40 MG/1
40 TABLET, DELAYED RELEASE ORAL
Status: CANCELLED | OUTPATIENT
Start: 2022-11-25

## 2022-11-24 RX ADMIN — VANCOMYCIN HYDROCHLORIDE: 1 INJECTION, POWDER, LYOPHILIZED, FOR SOLUTION INTRAVENOUS at 20:33

## 2022-11-24 RX ADMIN — ALBUTEROL SULFATE 2.5 MG: 2.5 SOLUTION RESPIRATORY (INHALATION) at 16:29

## 2022-11-24 RX ADMIN — CALCIUM GLUCONATE 1000 MG: 98 INJECTION, SOLUTION INTRAVENOUS at 15:59

## 2022-11-24 RX ADMIN — SODIUM ZIRCONIUM CYCLOSILICATE 10 G: 10 POWDER, FOR SUSPENSION ORAL at 14:53

## 2022-11-24 NOTE — ED PROVIDER NOTES
HPI:  11/24/22,   Time: 11:59 AM SHELLEY Shane is a 50 y.o. male presenting to the ED for Abdiminal cramping and cloudy dyastatele  no nausea. , beginning 24 hours ago. The complaint has been persistent, mild in severity, and worsened by nothing. Makes urine. No diarrhea no fevers or chills. No back pain cough or SOB. Review of Systems:   Pertinent positives and negatives are stated within HPI, all other systems reviewed and are negative.    --------------------------------------------- PAST HISTORY ---------------------------------------------  Past Medical History:  has a past medical history of Acute heart failure with preserved ejection fraction ([address] CONY (acute kidney injury) ([address] Anemia in stage 5 chronic kidney disease, not on chronic dialysis ([address] Asymmetric septal hypertrophy ([address] BPH (benign prostatic hyperplasia), CKD (chronic kidney disease) stage 5, GFR less than 15 ml/min ([address] Erectile dysfunction, Gout, Hyperkalemia, Hyperlipidemia, Hypertension, Mild aortic sclerosis, Opioid dependence in remission ([address] Proteinuria, Seasonal allergic rhinitis, and Thoracic aortic aneurysm without rupture ([address] Past Surgical History:  has a past surgical history that includes Tonsillectomy; Tympanoplasty; and Dialysis Catheter Insertion (N/A, 12/14/2020). Social History:  reports that he quit smoking about 3 years ago. His smoking use included cigarettes. He started smoking about 35 years ago. He has a 31.00 pack-year smoking history. He has never used smokeless tobacco. He reports that he does not currently use alcohol. He reports current drug use. Drug: Marijuana Kenard Snooks). Family History: family history includes Heart Attack (age of onset: 61) in his father; Heart Disease in his mother; No Known Problems in his sister. The patients home medications have been reviewed.     Allergies: Anesthetics, amide    ---------------------------------------------------PHYSICAL EXAM--------------------------------------    Constitutional/General: Alert and oriented x3, well appearing, non toxic in NAD  Head: Normocephalic and atraumatic  Eyes: PERRL, EOMI, conjunctive normal, sclera non icteric  Mouth: Oropharynx clear, handling secretions, no trismus, no asymmetry of the posterior oropharynx or uvular edema  Neck: Supple, full ROM, non tender to palpation in the midline, no stridor, no crepitus, no meningeal signs  Respiratory: Lungs clear to auscultation bilaterally, no wheezes, rales, or rhonchi. Not in respiratory distress  Cardiovascular:  Regular rate. Regular rhythm. No murmurs, gallops, or rubs. 2+ distal pulses  Chest: No chest wall tenderness  GI:  Abdomen Soft, Non tender, Non distended. +BS. No organomegaly, no palpable masses,  No rebound, guarding, or rigidity. Musculoskeletal: Moves all extremities x 4. Warm and well perfused, no clubbing, cyanosis, or edema. Capillary refill <3 seconds  Integument: skin warm and dry. No rashes. Lymphatic: no lymphadenopathy noted  Neurologic: GCS 15, no focal deficits, symmetric strength 5/5 in the upper and lower extremities bilaterally  Psychiatric: Normal Affect    -------------------------------------------------- RESULTS -------------------------------------------------  I have personally reviewed all laboratory and imaging results for this patient. Results are listed below.      LABS:  Results for orders placed or performed during the hospital encounter of 11/24/22   CBC with Auto Differential   Result Value Ref Range    WBC 11.3 4.5 - 11.5 E9/L    RBC 3.02 (L) 3.80 - 5.80 E12/L    Hemoglobin 10.2 (L) 12.5 - 16.5 g/dL    Hematocrit 31.1 (L) 37.0 - 54.0 %    .0 (H) 80.0 - 99.9 fL    MCH 33.8 26.0 - 35.0 pg    MCHC 32.8 32.0 - 34.5 %    RDW 13.4 11.5 - 15.0 fL    Platelets 946 872 - 413 E9/L    MPV 10.4 7.0 - 12.0 fL    Neutrophils % 82.3 (H) 43.0 - 80.0 %    Immature Granulocytes % 0.5 0.0 - 5.0 %    Lymphocytes % 7.3 (L) 20.0 - 42.0 %    Monocytes % 7.5 2.0 - 12.0 %    Eosinophils % 2.0 0.0 - 6.0 %    Basophils % 0.4 0.0 - 2.0 %    Neutrophils Absolute 9.29 (H) 1.80 - 7.30 E9/L    Immature Granulocytes # 0.06 E9/L    Lymphocytes Absolute 0.82 (L) 1.50 - 4.00 E9/L    Monocytes Absolute 0.85 0.10 - 0.95 E9/L    Eosinophils Absolute 0.23 0.05 - 0.50 E9/L    Basophils Absolute 0.04 0.00 - 0.20 R6/L   Basic Metabolic Panel   Result Value Ref Range    Sodium 134 132 - 146 mmol/L    Potassium 6.0 (H) 3.5 - 5.0 mmol/L    Chloride 94 (L) 98 - 107 mmol/L    CO2 27 22 - 29 mmol/L    Anion Gap 13 7 - 16 mmol/L    Glucose 107 (H) 74 - 99 mg/dL    BUN 79 (H) 6 - 20 mg/dL    Creatinine 15.1 (HH) 0.7 - 1.2 mg/dL    Est, Glom Filt Rate 4 >=60 mL/min/1.73    Calcium 9.3 8.6 - 10.2 mg/dL   Urinalysis   Result Value Ref Range    Color, UA Yellow Straw/Yellow    Clarity, UA Clear Clear    Glucose, Ur 100 (A) Negative mg/dL    Bilirubin Urine Negative Negative    Ketones, Urine Negative Negative mg/dL    Specific Gravity, UA 1.020 1.005 - 1.030    Blood, Urine Negative Negative    pH, UA 8.0 5.0 - 9.0    Protein,  (A) Negative mg/dL    Urobilinogen, Urine 0.2 <2.0 E.U./dL    Nitrite, Urine Negative Negative    Leukocyte Esterase, Urine Negative Negative   Microscopic Urinalysis   Result Value Ref Range    WBC, UA 1-3 0 - 5 /HPF    RBC, UA NONE 0 - 2 /HPF    Bacteria, UA NONE SEEN None Seen /HPF       RADIOLOGY:  Interpreted by Radiologist.  No orders to display       EKG: This EKG is signed and interpreted by the EP. Time: 1527  Rate: 95  Rhythm: Sinus  Interpretation: non-specific EKG, QTc: 487 ms  Comparison: stable as compared to patient's most recent EKG      ------------------------- NURSING NOTES AND VITALS REVIEWED ---------------------------   The nursing notes within the ED encounter and vital signs as below have been reviewed by myself.   /80   Pulse 92   Temp 98.1 °F (36.7 °C) (Oral)   Resp 17   Ht 6' (1.829 m)   Wt 210 lb (95.3 kg)   SpO2 97%   BMI 28.48 kg/m²   Oxygen Saturation Interpretation: Normal    The patients available past medical records and past encounters were reviewed. ------------------------------ ED COURSE/MEDICAL DECISION MAKING----------------------  Medications   sodium zirconium cyclosilicate (LOKELMA) oral suspension 10 g (10 g Oral Given 11/24/22 1453)   calcium gluconate 1,000 mg in dextrose 5 % 50 mL IVPB (has no administration in time range)   albuterol (PROVENTIL) nebulizer solution 2.5 mg (has no administration in time range)         ED COURSE:  ED Course as of 11/24/22 1535   Thu Nov 24, 2022   1229 I spoke with Dr. Lakisha Oquendo covering for Dr. Leslie Velasco. We will contact the dialysis nurse for culture extraction. I did speak with the dialysis nurse she is asking to be 5 to 6 hours before she can make it through the department to draw the cultures. Patient and family aware. [JN]   4938 Discussed admission with Dr. Gloris Kayser okay to admit to medical floor. [JN]      ED Course User Index  [JN] Inez Harper, DO       Medical Decision Making:    Patient presents with abdominal cramping nausea. Patient has a PD catheter. In place for 2 years. States last night his distillate was cloudy. He is having a lower abdominal cramping. No fevers. Positive chills. Concern for spontaneous bacterial peritonitis. Patient does. PD on a nightly basis. He has not missed any doses. Consultation was made with Dr. Lakisha Oquendo and we will consult the dialysis team to remove some distillate for cultures. Labs were sent and reviewed. Patient does have a BUN of 79 creatinine 15. Potassium is 6.0. Patient was given lokelma calcium gluconate and albuterol nebulizers. Hold antibiotics until cultures are obtained.     Vitals:    11/24/22 1450   BP: 126/80   Pulse:    Resp: 17   Temp:    SpO2: 97%     HEART RATE 90 BPM    Oxygen Saturation Interpretation: Normal    The cardiac monitor revealed SR with a heart rate in the 90s as interpreted by me. The cardiac monitor was ordered secondary to the patient's heart rate and to monitor the patient for dysrhythmia. CPT 58815    The patients available past medical records and past encounters were reviewed. This patient's ED course included: a personal history and physicial examination, re-evaluation prior to disposition, multiple bedside re-evaluations, IV medications, cardiac monitoring, continuous pulse oximetry, and complex medical decision making and emergency management    This patient has remained hemodynamically stable, remained unchanged, and been closely monitored during their ED course. Re-Evaluations:             Re-evaluation. Patients symptoms show no change    Re-examination  11/24/22   11:59 AM EST    Consultations:             Dr. Kilo Leal and Dr. Grecia Pizarro:   CRITICAL CARE:   27 MINUTES. Please note that the withdrawal or failure to initiate urgent interventions for this patient would likely result in a life threatening deterioration or permanent disability. Accordingly this patient received the above mentioned time, excluding separately billable procedures. Counseling: The emergency provider has spoken with the patient and discussed todays results, in addition to providing specific details for the plan of care and counseling regarding the diagnosis and prognosis. Questions are answered at this time and they are agreeable with the plan.       --------------------------------- IMPRESSION AND DISPOSITION ---------------------------------    IMPRESSION  1. SBP (spontaneous bacterial peritonitis) [facility]    2. Hyperkalemia    3. Stage 5 chronic kidney disease not on chronic dialysis ([facility]        DISPOSITION  Disposition: Admit to telemetry  Patient condition is serious    NOTE: This report was transcribed using voice recognition software.  Every effort was made to ensure accuracy; however, inadvertent computerized transcription errors may be present       Micaela Porras, DO  11/24/22 Benjamin Gaytan, DO  11/24/22 0134

## 2022-11-24 NOTE — ED NOTES
Called into the patients room, by the patient, and he said he was told by Dr. Aldean Skiff that there is a dialysis nurse coming in from [facility] to drain his PD. Pt will then be discharged after the ATB is instilled.       Minnie Bennett RN  11/24/22 9934

## 2022-11-24 NOTE — CONSULTS
Nephrology Consult  [facility]  Marlene Verduzco MD    CC:   cloudy pd fluid    HPI:   pt is a 51 yo male with pmh of htn, hyperlipidemia, bph, eserd on pd, taa, gout who came in for cloudy pd fluid and abd pain. Denies fever and vomiting, diarrhea, sob, cough, edema. Labs show na 134 k 6, co2 27, bun 79, cr 15, ca 9.3, wbc 11.3, hgb 10.2, plt 250. Vitals show bp 133/94, temp 98.1, rr 14 hr 92. He spoke to pd nurse who  told him to go to er. He has not had peritonitis before and has been on pd  for about 2 yrs. He has antiobiotic kit at home.      PMH:    Past Medical History:   Diagnosis Date    Acute heart failure with preserved ejection fraction ([address] 9/11/2020    CONY (acute kidney injury) ([address] 9/10/2020    Anemia in stage 5 chronic kidney disease, not on chronic dialysis ([address] 12/5/2020    Asymmetric septal hypertrophy ([address] 2012    BPH (benign prostatic hyperplasia) 9/11/2020    CKD (chronic kidney disease) stage 5, GFR less than 15 ml/min (HCC) 12/5/2020    Erectile dysfunction 9/11/2020    Gout 9/11/2020    Hyperkalemia 9/11/2020    Hyperlipidemia     Hypertension     Mild aortic sclerosis 9/11/2020    Opioid dependence in remission ([address] 9/11/2020    Younger years    Proteinuria 9/11/2020    Seasonal allergic rhinitis 9/11/2020    Thoracic aortic aneurysm without rupture ([address] 09/11/2020    Ascending; 4.3 cm- Dr. Rosina Garland 3/2020       Patient Active Problem List   Diagnosis    CONY (acute kidney injury) ([address]    Asymmetric septal hypertrophy (HCC)    Hyperlipidemia    Hypertension    Mild aortic valve sclerosis    Erectile dysfunction    Thoracic aortic aneurysm without rupture    Hyperkalemia    BPH (benign prostatic hyperplasia)    Gout    Opioid dependence in remission (HCC)    Seasonal allergic rhinitis    Proteinuria    Acute heart failure with preserved ejection fraction (HCC)    CKD (chronic kidney disease) stage 5, GFR less than 15 ml/min (HCC)    Anemia in stage 5 chronic kidney disease, not on chronic dialysis ([facility]       Meds:            Meds prn:         Meds prior to admission:     No current facility-administered medications on file prior to encounter. Current Outpatient Medications on File Prior to Encounter   Medication Sig Dispense Refill    sodium zirconium cyclosilicate (LOKELMA) 10 g PACK oral suspension Take 10 g by mouth daily      Cholecalciferol (VITAMIN D3) 1.25 MG (90417 UT) CAPS Take by mouth once a week      hydrALAZINE (APRESOLINE) 50 MG tablet Take 1 tablet by mouth every 8 hours (Patient taking differently: Take 100 mg by mouth every 8 hours ) 90 tablet 3    Calcium Acetate, Phos Binder, 667 MG CAPS Take 1 capsule by mouth 3 times daily (with meals) 180 capsule 1    allopurinol (ZYLOPRIM) 100 MG tablet Take 1 tablet by mouth daily 30 tablet 3    carvedilol (COREG) 12.5 MG tablet Take 1 tablet by mouth 2 times daily (with meals) (Patient taking differently: Take 25 mg by mouth 2 times daily (with meals) ) 60 tablet 3    rosuvastatin (CRESTOR) 10 MG tablet Take 1 tablet by mouth nightly 30 tablet 3    sodium bicarbonate 650 MG tablet Take 2 tablets by mouth 2 times daily (Patient taking differently: Take 1,300 mg by mouth 3 times daily ) 120 tablet 1       Allergies:    Anesthetics, amide    Social History:     reports that he quit smoking about 3 years ago. His smoking use included cigarettes. He started smoking about 35 years ago. He has a 31.00 pack-year smoking history. He has never used smokeless tobacco. He reports that he does not currently use alcohol. He reports current drug use. Drug: Marijuana [place]).     Family History:         Problem Relation Age of Onset    Heart Disease Mother          age 55    Heart Attack Father 61        26, alive age 79; previous CABG    No Known Problems Sister        ROS:     General: no fever, chills   Heent: no nasal congestion, sore throat   Resp: no cough, sob , hemoptysis  Cardiac: no cp , le edema, palpitations  Gi: abd pain and cloudy pd fluid  Gu: no hematuria, dysuria   Neruo: no numbness, weakness, headache, blurry vision   Endocrine:  no h/o dm  Derm: no rash , petechia  Heme: no epistaxis, bruising  All other sx negative     Physical Exam:      Patient Vitals for the past 24 hrs:   BP Temp Temp src Pulse Resp SpO2 Height Weight   11/24/22 1116 (!) 133/94 98.1 °F (36.7 °C) Oral 92 16 100 % 6' (1.829 m) 210 lb (95.3 kg)       No intake or output data in the 24 hours ending 11/24/22 1241    Constitutional: Patient in no acute distress   Head: normocephalic, atraumatic   Neck: supple, no jvd  Cardiovascular: regular rate and rhythm, no murmurs, gallops, or rubs   Respiratory: Clear, no rales, rhochi, or wheezes,   Gastrointestinal: soft, tender throughout  Ext: no edema  Neuro:aaox3  Skin: dry, no rash   Back: nontender    Data:    Recent Labs     11/24/22  1215   WBC 11.3   HGB 10.2*   HCT 31.1*   .0*          No results for input(s): NA, K, CL, CO2, GLU, CREATININE, BUN, LABGLOM, GLUCOSE, CALCIUM, PHOS, MG in the last 72 hours. Vit D, 25-Hydroxy   Date Value Ref Range Status   12/06/2020 15 (L) 30 - 100 ng/mL Final     Comment:     <20 ng/mL. ........... Lucyann Push Deficient  20-30 ng/mL. ......... Lucyann Push Insufficient   ng/mL. ........ Lucyann Push Sufficient  >100 ng/mL. .......... Lucyann Push Toxic         PTH   Date Value Ref Range Status   12/06/2020 271 (H) 15 - 65 pg/mL Final       No results for input(s): ALT, AST, ALKPHOS, BILITOT, BILIDIR in the last 72 hours. No results for input(s): LABALBU in the last 72 hours.     Ferritin   Date Value Ref Range Status   12/06/2020 239 ng/mL Final     Comment:     FERRITIN Reference Ranges:  Adult Males   20 - 60 years:    27 - 400 ng/mL  Adult females 16 - 61 years:    15 - 150 ng/mL  Adults greater than 60 years:   no established reference range  Pediatrics:                     no established reference range       Iron   Date Value Ref Range Status   12/06/2020 77 59 - 158 mcg/dL Final     TIBC   Date Value Ref Range Status   12/06/2020 215 (L) 250 - 450 mcg/dL Final       Vitamin B-12   Date Value Ref Range Status   12/06/2020 339 211 - 946 pg/mL Final   12/06/2020 359 211 - 946 pg/mL Final       Folate   Date Value Ref Range Status   12/06/2020 10.0 4.8 - 24.2 ng/mL Final   12/06/2020 10.2 4.8 - 24.2 ng/mL Final         Lab Results   Component Value Date/Time    COLORU Yellow 09/10/2020 01:31 PM    NITRU Negative 09/10/2020 01:31 PM    GLUCOSEU Negative 09/10/2020 01:31 PM    KETUA Negative 09/10/2020 01:31 PM    UROBILINOGEN 0.2 09/10/2020 01:31 PM    BILIRUBINUR Negative 09/10/2020 01:31 PM       Lab Results   Component Value Date/Time    OSMOU 355 09/10/2020 07:43 PM       No components found for: URIC    No results found for: LIPIDPAN      Assessment and Plan:    Esrd  Continue pd cycler per outpt orders  Check p in am    2. Abd pain/cloudy fluid  Send pd cx and cell count  Then start empiric abx for peritonitis with IP vanco 1 g/L x1  and IP cefepine 1g/L q 24 hours  and let dwell 6 hours then give q 24 hrs IP  Daily cell count  He has abx at home so could go home after cell ct and cx done and abx instilled, he can drain at home 6 hrs later  Can do vanco again on monday    3. Hyperkalemia  Will give lokelma now  To start pd tonight    4. Htn  Follow on outpt coreg and apresoline    5. Hyperlipidemia  On crestor    6. Anemia of esrd  Dose pablo if hgb <10    [facility].  Inga Wall MD

## 2022-11-25 NOTE — FLOWSHEET NOTE
CAPD drain 700cc cloudy yellow effluent, no fibrin seen. Instilled with 2L 1.5% dex/cefepime. vanc added. Tolerated well, walked sample to lab.

## 2022-11-25 NOTE — ED NOTES
This RN introduced herself to the patient and his girlfriend. Patient has no complaints and is resting quietly with TV on. Awaiting Dialysis nurse to perform the PD and collect the cell count of the PD fluid; followed by antibiotic instillation/dwell. Call light within reach.      Abdirashid Moreira RN  11/24/22 1919

## 2022-11-26 LAB — URINE CULTURE, ROUTINE: NORMAL

## 2022-11-28 LAB
BODY FLUID CULTURE, STERILE: ABNORMAL
GRAM STAIN RESULT: ABNORMAL
ORGANISM: ABNORMAL
VANCOMYCIN TROUGH: 7.3 MCG/ML (ref 5–16)

## 2022-11-29 LAB
BLOOD CULTURE, ROUTINE: NORMAL
CULTURE, BLOOD 2: NORMAL

## 2022-12-04 NOTE — PROGRESS NOTES
12/04/22                            Ricarda Burger  [address]  [place] [address]    To Whom It May Concern:    This is to certify Ricarda Burger was evaluated with Reshma Alaniz PA-C on 12/04/22 and is unable to return to physical education the week of Dec 5th-9th. If wrist pain continues, patient must be evaluated by pediatrician for release back to gym.                        Reshma Alaniz PA-C  [facility]  [address]  [place] [address]  Dept Phone: [phone]       [facility] PRE-ADMISSION TESTING INSTRUCTIONS    The Preadmission Testing patient is instructed accordingly using the following criteria (check applicable):    ARRIVAL INSTRUCTIONS:  [x] Parking the day of Surgery is located in the Main Entrance lot. Upon entering the door, make an immediate right to the surgery reception desk    [] Bring photo ID and insurance card    [] Bring in a copy of Living will or Durable Power of  papers. [x] Please be sure to arrange transportation to and from the hospital    [x] Please arrange for someone to be with you the remainder of the day due to having anesthesia      GENERAL INSTRUCTIONS:    [x] Nothing by mouth after midnight, including gum, candy, mints or water    [x] You may brush your teeth, but do not swallow any water    [x] Take medications as instructed with 1-2 oz of water    [] Stop herbal supplements and vitamins 5 days prior to procedure    [] Follow preop dosing of blood thinners per physician instructions    [] Do not take insulin or oral diabetic medications    [] If diabetic and have low blood sugar or feel symptomatic, take 1-2oz apple juice or glucose tablets    [] Bring inhalers day of surgery    [] Bring C-PAP/ Bi-Pap day of surgery    [] Bring urine specimen day of surgery    [x] Antibacterial Soap shower or bath AM of Surgery, no lotion, powders or creams to surgical site    [] Follow bowel prep as instructed per surgeon    [] No tobacco products within 24 hours of surgery     [] No alcohol or illegal drug use within 24 hours of surgery.     [x] Jewelry, body piercing's, eyeglasses, contact lenses and dentures are not permitted into surgery (bring cases)      [] Please do not wear any nail polish or make up on the day of surgery    [x] If not already done, you can expect a call from registration    [x] If surgeon requests a time change you will be notified the day prior to surgery    [] If you receive a survey after

## 2022-12-28 ENCOUNTER — HOSPITAL ENCOUNTER (INPATIENT)
Age: 52
LOS: 2 days | Discharge: HOME | DRG: 291 | End: 2022-12-30
Attending: STUDENT IN AN ORGANIZED HEALTH CARE EDUCATION/TRAINING PROGRAM | Admitting: INTERNAL MEDICINE
Payer: COMMERCIAL

## 2022-12-28 ENCOUNTER — APPOINTMENT (OUTPATIENT)
Dept: CT IMAGING | Age: 52
DRG: 291 | End: 2022-12-28
Payer: COMMERCIAL

## 2022-12-28 ENCOUNTER — APPOINTMENT (OUTPATIENT)
Dept: GENERAL RADIOLOGY | Age: 52
DRG: 291 | End: 2022-12-28
Payer: COMMERCIAL

## 2022-12-28 DIAGNOSIS — I21.4 NSTEMI (NON-ST ELEVATED MYOCARDIAL INFARCTION) (HCC): Primary | ICD-10-CM

## 2022-12-28 LAB
ABO/RH: NORMAL
ALBUMIN SERPL-MCNC: 3.7 G/DL (ref 3.5–5.2)
ALP BLD-CCNC: 58 U/L (ref 40–129)
ALT SERPL-CCNC: 32 U/L (ref 0–40)
ANION GAP SERPL CALCULATED.3IONS-SCNC: 16 MMOL/L (ref 7–16)
ANISOCYTOSIS: ABNORMAL
ANTIBODY SCREEN: NORMAL
APTT: 30.9 SEC (ref 24.5–35.1)
APTT: 32.3 SEC (ref 24.5–35.1)
AST SERPL-CCNC: 13 U/L (ref 0–39)
BACTERIA: ABNORMAL /HPF
BASOPHILS ABSOLUTE: 0.16 E9/L (ref 0–0.2)
BASOPHILS RELATIVE PERCENT: 0.9 % (ref 0–2)
BILIRUB SERPL-MCNC: 0.3 MG/DL (ref 0–1.2)
BILIRUBIN URINE: NEGATIVE
BLOOD, URINE: ABNORMAL
BUN BLDV-MCNC: 63 MG/DL (ref 6–20)
CALCIUM SERPL-MCNC: 10.6 MG/DL (ref 8.6–10.2)
CHLORIDE BLD-SCNC: 96 MMOL/L (ref 98–107)
CHP ED QC CHECK: NORMAL
CLARITY: CLEAR
CO2: 23 MMOL/L (ref 22–29)
COLOR: YELLOW
CREAT SERPL-MCNC: 14.4 MG/DL (ref 0.7–1.2)
EOSINOPHILS ABSOLUTE: 0 E9/L (ref 0.05–0.5)
EOSINOPHILS RELATIVE PERCENT: 0.3 % (ref 0–6)
EPITHELIAL CELLS, UA: ABNORMAL /HPF
GFR SERPL CREATININE-BSD FRML MDRD: 4 ML/MIN/1.73
GLUCOSE BLD-MCNC: 131 MG/DL
GLUCOSE BLD-MCNC: 138 MG/DL (ref 74–99)
GLUCOSE URINE: 100 MG/DL
HCT VFR BLD CALC: 30.4 % (ref 37–54)
HEMOGLOBIN: 10.1 G/DL (ref 12.5–16.5)
INR BLD: 1
KETONES, URINE: NEGATIVE MG/DL
LEUKOCYTE ESTERASE, URINE: NEGATIVE
LYMPHOCYTES ABSOLUTE: 0.9 E9/L (ref 1.5–4)
LYMPHOCYTES RELATIVE PERCENT: 5.2 % (ref 20–42)
MCH RBC QN AUTO: 34.1 PG (ref 26–35)
MCHC RBC AUTO-ENTMCNC: 33.2 % (ref 32–34.5)
MCV RBC AUTO: 102.7 FL (ref 80–99.9)
METAMYELOCYTES RELATIVE PERCENT: 0.9 % (ref 0–1)
METER GLUCOSE: 131 MG/DL (ref 74–99)
METER GLUCOSE: 76 MG/DL (ref 74–99)
MONOCYTES ABSOLUTE: 0.9 E9/L (ref 0.1–0.95)
MONOCYTES RELATIVE PERCENT: 5.2 % (ref 2–12)
MYELOCYTE PERCENT: 0.9 % (ref 0–0)
NEUTROPHILS ABSOLUTE: 15.93 E9/L (ref 1.8–7.3)
NEUTROPHILS RELATIVE PERCENT: 86.9 % (ref 43–80)
NITRITE, URINE: NEGATIVE
OVALOCYTES: ABNORMAL
PDW BLD-RTO: 13.6 FL (ref 11.5–15)
PH UA: 7.5 (ref 5–9)
PLATELET # BLD: 260 E9/L (ref 130–450)
PMV BLD AUTO: 9.4 FL (ref 7–12)
POIKILOCYTES: ABNORMAL
POTASSIUM REFLEX MAGNESIUM: 5.5 MMOL/L (ref 3.5–5)
POTASSIUM SERPL-SCNC: 5.7 MMOL/L (ref 3.5–5)
PRO-BNP: 749 PG/ML (ref 0–125)
PROTEIN UA: 100 MG/DL
PROTHROMBIN TIME: 11.4 SEC (ref 9.3–12.4)
RBC # BLD: 2.96 E12/L (ref 3.8–5.8)
RBC UA: ABNORMAL /HPF (ref 0–2)
SODIUM BLD-SCNC: 135 MMOL/L (ref 132–146)
SPECIFIC GRAVITY UA: 1.01 (ref 1–1.03)
STOMATOCYTES: ABNORMAL
TOTAL PROTEIN: 6.6 G/DL (ref 6.4–8.3)
TROPONIN, HIGH SENSITIVITY: 103 NG/L (ref 0–11)
TROPONIN, HIGH SENSITIVITY: 105 NG/L (ref 0–11)
TROPONIN, HIGH SENSITIVITY: 95 NG/L (ref 0–11)
UROBILINOGEN, URINE: 0.2 E.U./DL
WBC # BLD: 17.9 E9/L (ref 4.5–11.5)
WBC UA: ABNORMAL /HPF (ref 0–5)

## 2022-12-28 PROCEDURE — 85610 PROTHROMBIN TIME: CPT

## 2022-12-28 PROCEDURE — 84132 ASSAY OF SERUM POTASSIUM: CPT

## 2022-12-28 PROCEDURE — 86901 BLOOD TYPING SEROLOGIC RH(D): CPT

## 2022-12-28 PROCEDURE — 36415 COLL VENOUS BLD VENIPUNCTURE: CPT

## 2022-12-28 PROCEDURE — 2140000000 HC CCU INTERMEDIATE R&B

## 2022-12-28 PROCEDURE — 86850 RBC ANTIBODY SCREEN: CPT

## 2022-12-28 PROCEDURE — 93005 ELECTROCARDIOGRAM TRACING: CPT | Performed by: INTERNAL MEDICINE

## 2022-12-28 PROCEDURE — 6360000002 HC RX W HCPCS: Performed by: STUDENT IN AN ORGANIZED HEALTH CARE EDUCATION/TRAINING PROGRAM

## 2022-12-28 PROCEDURE — 96374 THER/PROPH/DIAG INJ IV PUSH: CPT

## 2022-12-28 PROCEDURE — 6370000000 HC RX 637 (ALT 250 FOR IP): Performed by: STUDENT IN AN ORGANIZED HEALTH CARE EDUCATION/TRAINING PROGRAM

## 2022-12-28 PROCEDURE — 81001 URINALYSIS AUTO W/SCOPE: CPT

## 2022-12-28 PROCEDURE — 85025 COMPLETE CBC W/AUTO DIFF WBC: CPT

## 2022-12-28 PROCEDURE — 6370000000 HC RX 637 (ALT 250 FOR IP): Performed by: NURSE PRACTITIONER

## 2022-12-28 PROCEDURE — 2580000003 HC RX 258: Performed by: STUDENT IN AN ORGANIZED HEALTH CARE EDUCATION/TRAINING PROGRAM

## 2022-12-28 PROCEDURE — 74174 CTA ABD&PLVS W/CONTRAST: CPT

## 2022-12-28 PROCEDURE — 83880 ASSAY OF NATRIURETIC PEPTIDE: CPT

## 2022-12-28 PROCEDURE — 96375 TX/PRO/DX INJ NEW DRUG ADDON: CPT

## 2022-12-28 PROCEDURE — 96365 THER/PROPH/DIAG IV INF INIT: CPT

## 2022-12-28 PROCEDURE — 85730 THROMBOPLASTIN TIME PARTIAL: CPT

## 2022-12-28 PROCEDURE — 80053 COMPREHEN METABOLIC PANEL: CPT

## 2022-12-28 PROCEDURE — 86900 BLOOD TYPING SEROLOGIC ABO: CPT

## 2022-12-28 PROCEDURE — 82962 GLUCOSE BLOOD TEST: CPT

## 2022-12-28 PROCEDURE — 84484 ASSAY OF TROPONIN QUANT: CPT

## 2022-12-28 PROCEDURE — 99285 EMERGENCY DEPT VISIT HI MDM: CPT

## 2022-12-28 PROCEDURE — 87040 BLOOD CULTURE FOR BACTERIA: CPT

## 2022-12-28 PROCEDURE — 6360000004 HC RX CONTRAST MEDICATION: Performed by: RADIOLOGY

## 2022-12-28 PROCEDURE — 71275 CT ANGIOGRAPHY CHEST: CPT

## 2022-12-28 PROCEDURE — 2500000003 HC RX 250 WO HCPCS: Performed by: STUDENT IN AN ORGANIZED HEALTH CARE EDUCATION/TRAINING PROGRAM

## 2022-12-28 RX ORDER — ALLOPURINOL 100 MG/1
100 TABLET ORAL DAILY
Status: DISCONTINUED | OUTPATIENT
Start: 2022-12-29 | End: 2022-12-30 | Stop reason: HOSPADM

## 2022-12-28 RX ORDER — HEPARIN SODIUM 1000 [USP'U]/ML
2000 INJECTION, SOLUTION INTRAVENOUS; SUBCUTANEOUS PRN
Status: DISCONTINUED | OUTPATIENT
Start: 2022-12-28 | End: 2022-12-30 | Stop reason: HOSPADM

## 2022-12-28 RX ORDER — CARVEDILOL 25 MG/1
25 TABLET ORAL 2 TIMES DAILY WITH MEALS
Status: DISCONTINUED | OUTPATIENT
Start: 2022-12-29 | End: 2022-12-30 | Stop reason: HOSPADM

## 2022-12-28 RX ORDER — HEPARIN SODIUM 1000 [USP'U]/ML
4000 INJECTION, SOLUTION INTRAVENOUS; SUBCUTANEOUS ONCE
Status: COMPLETED | OUTPATIENT
Start: 2022-12-28 | End: 2022-12-28

## 2022-12-28 RX ORDER — ROSUVASTATIN CALCIUM 10 MG/1
10 TABLET, COATED ORAL NIGHTLY
Status: DISCONTINUED | OUTPATIENT
Start: 2022-12-28 | End: 2022-12-30 | Stop reason: HOSPADM

## 2022-12-28 RX ORDER — ACETAMINOPHEN 325 MG/1
650 TABLET ORAL EVERY 6 HOURS PRN
Status: DISCONTINUED | OUTPATIENT
Start: 2022-12-28 | End: 2022-12-30 | Stop reason: HOSPADM

## 2022-12-28 RX ORDER — HEPARIN SODIUM 1000 [USP'U]/ML
4000 INJECTION, SOLUTION INTRAVENOUS; SUBCUTANEOUS PRN
Status: DISCONTINUED | OUTPATIENT
Start: 2022-12-28 | End: 2022-12-30 | Stop reason: HOSPADM

## 2022-12-28 RX ORDER — ASPIRIN 81 MG/1
324 TABLET, CHEWABLE ORAL ONCE
Status: COMPLETED | OUTPATIENT
Start: 2022-12-28 | End: 2022-12-28

## 2022-12-28 RX ORDER — HEPARIN SODIUM 10000 [USP'U]/100ML
5-30 INJECTION, SOLUTION INTRAVENOUS CONTINUOUS
Status: DISCONTINUED | OUTPATIENT
Start: 2022-12-28 | End: 2022-12-29

## 2022-12-28 RX ORDER — NITROGLYCERIN 0.4 MG/1
0.4 TABLET SUBLINGUAL ONCE
Status: COMPLETED | OUTPATIENT
Start: 2022-12-28 | End: 2022-12-28

## 2022-12-28 RX ORDER — SODIUM CHLORIDE 0.9 % (FLUSH) 0.9 %
5-40 SYRINGE (ML) INJECTION EVERY 12 HOURS SCHEDULED
Status: DISCONTINUED | OUTPATIENT
Start: 2022-12-28 | End: 2022-12-30 | Stop reason: HOSPADM

## 2022-12-28 RX ORDER — CALCIUM ACETATE 667 MG/1
667 CAPSULE ORAL
Status: DISCONTINUED | OUTPATIENT
Start: 2022-12-29 | End: 2022-12-30 | Stop reason: HOSPADM

## 2022-12-28 RX ORDER — SODIUM CHLORIDE 0.9 % (FLUSH) 0.9 %
5-40 SYRINGE (ML) INJECTION PRN
Status: DISCONTINUED | OUTPATIENT
Start: 2022-12-28 | End: 2022-12-30 | Stop reason: HOSPADM

## 2022-12-28 RX ORDER — ACETAMINOPHEN 650 MG/1
650 SUPPOSITORY RECTAL EVERY 6 HOURS PRN
Status: DISCONTINUED | OUTPATIENT
Start: 2022-12-28 | End: 2022-12-30 | Stop reason: HOSPADM

## 2022-12-28 RX ORDER — SODIUM CHLORIDE 9 MG/ML
INJECTION, SOLUTION INTRAVENOUS PRN
Status: DISCONTINUED | OUTPATIENT
Start: 2022-12-28 | End: 2022-12-30 | Stop reason: HOSPADM

## 2022-12-28 RX ORDER — SODIUM BICARBONATE 650 MG/1
1300 TABLET ORAL 3 TIMES DAILY
Status: DISCONTINUED | OUTPATIENT
Start: 2022-12-28 | End: 2022-12-30 | Stop reason: HOSPADM

## 2022-12-28 RX ORDER — HYDRALAZINE HYDROCHLORIDE 50 MG/1
100 TABLET, FILM COATED ORAL EVERY 8 HOURS SCHEDULED
Status: DISCONTINUED | OUTPATIENT
Start: 2022-12-28 | End: 2022-12-30 | Stop reason: HOSPADM

## 2022-12-28 RX ORDER — DEXTROSE MONOHYDRATE 100 MG/ML
INJECTION, SOLUTION INTRAVENOUS CONTINUOUS PRN
Status: DISCONTINUED | OUTPATIENT
Start: 2022-12-28 | End: 2022-12-30 | Stop reason: HOSPADM

## 2022-12-28 RX ADMIN — Medication 1000 MG: at 18:55

## 2022-12-28 RX ADMIN — SODIUM BICARBONATE 1300 MG: 650 TABLET ORAL at 21:44

## 2022-12-28 RX ADMIN — HEPARIN SODIUM 4000 UNITS: 1000 INJECTION INTRAVENOUS; SUBCUTANEOUS at 20:13

## 2022-12-28 RX ADMIN — Medication 5 UNITS: at 19:02

## 2022-12-28 RX ADMIN — DEXTROSE MONOHYDRATE 250 ML: 100 INJECTION, SOLUTION INTRAVENOUS at 19:06

## 2022-12-28 RX ADMIN — NITROGLYCERIN 0.5 INCH: 20 OINTMENT TOPICAL at 18:11

## 2022-12-28 RX ADMIN — NITROGLYCERIN 0.4 MG: 0.4 TABLET, ORALLY DISINTEGRATING SUBLINGUAL at 17:39

## 2022-12-28 RX ADMIN — IOPAMIDOL 90 ML: 755 INJECTION, SOLUTION INTRAVENOUS at 18:08

## 2022-12-28 RX ADMIN — HYDRALAZINE HYDROCHLORIDE 100 MG: 50 TABLET, FILM COATED ORAL at 21:44

## 2022-12-28 RX ADMIN — ASPIRIN 324 MG: 81 TABLET, CHEWABLE ORAL at 17:37

## 2022-12-28 RX ADMIN — ROSUVASTATIN 10 MG: 10 TABLET, FILM COATED ORAL at 21:44

## 2022-12-28 RX ADMIN — HEPARIN SODIUM 10 UNITS/KG/HR: 10000 INJECTION, SOLUTION INTRAVENOUS at 20:15

## 2022-12-28 ASSESSMENT — ENCOUNTER SYMPTOMS
BACK PAIN: 1
NAUSEA: 0
SINUS PRESSURE: 0
EYE DISCHARGE: 0
SINUS PAIN: 0
ABDOMINAL DISTENTION: 0
SHORTNESS OF BREATH: 0
ANAL BLEEDING: 0
ABDOMINAL PAIN: 0
DIARRHEA: 0
RHINORRHEA: 0
VOMITING: 0
COUGH: 0
CONSTIPATION: 0
CHEST TIGHTNESS: 0

## 2022-12-28 ASSESSMENT — PAIN SCALES - GENERAL
PAINLEVEL_OUTOF10: 0
PAINLEVEL_OUTOF10: 4

## 2022-12-28 ASSESSMENT — PAIN - FUNCTIONAL ASSESSMENT
PAIN_FUNCTIONAL_ASSESSMENT: 0-10
PAIN_FUNCTIONAL_ASSESSMENT: 0-10

## 2022-12-28 ASSESSMENT — PAIN DESCRIPTION - LOCATION
LOCATION: CHEST
LOCATION: CHEST

## 2022-12-28 ASSESSMENT — PAIN DESCRIPTION - ORIENTATION: ORIENTATION: RIGHT

## 2022-12-28 ASSESSMENT — PAIN DESCRIPTION - DESCRIPTORS: DESCRIPTORS: CRAMPING

## 2022-12-28 ASSESSMENT — PAIN DESCRIPTION - FREQUENCY: FREQUENCY: INTERMITTENT

## 2022-12-28 ASSESSMENT — PAIN DESCRIPTION - PAIN TYPE: TYPE: ACUTE PAIN

## 2022-12-28 NOTE — ED NOTES
Department of Emergency Medicine  FIRST PROVIDER TRIAGE NOTE             Independent MLP           12/28/22  5:13 PM EST    Date of Encounter: 12/28/22   MRN: [id number]      HPI: Matthew Raines. is a 50 y.o. male who presents to the ED for Chest Pain (Patient arrives to ED w/ CP and SOB starting this morning after peritoneal dialysis. -fever/chills )   History of peritonitis in November 2022. ROS: Negative for fever. PE: Gen Appearance/Constitutional: alert  HEENT: NC/NT. PERRLA,  Airway patent. Initial Plan of Care: All treatment areas with department are currently occupied. Plan to order/Initiate the following while awaiting opening in ED: labs, EKG, and imaging studies.   Initiate Treatment-Testing, Proceed toTreatment Area When Bed Available for ED Attending/MLP to Continue Care    Electronically signed by VIC Gibbons CNP   DD: 12/28/22       VIC Gibbons CNP  12/28/22 3729

## 2022-12-28 NOTE — ED PROVIDER NOTES
HPI     Patient is a 50 y.o. male presents with a chief complaint of chest pain  This has been occurring for a few hours. Patient states that it gets better with nothing. Patient states that it gets worse with nothing. Patient states that it is moderate in severity. Patient states it was acute in onset. Chest pain. Patient has a history of dialysis. Acute onset of midsternal pressure. Radiates to the back. Patient has never had a heart attack in the past.  Patient takes no blood thinners. Patient denies any fevers, chills, nausea, vomiting, abdominal pain, changes in urinary or bowel habits. Patient is peritoneal dialysis and has not missed any treatments. Review of Systems   Constitutional:  Negative for activity change, appetite change, fatigue and fever. HENT:  Negative for congestion, rhinorrhea, sinus pressure and sinus pain. Eyes:  Negative for discharge. Respiratory:  Negative for cough, chest tightness and shortness of breath. Cardiovascular:  Positive for chest pain. Negative for palpitations. Gastrointestinal:  Negative for abdominal distention, abdominal pain, anal bleeding, constipation, diarrhea, nausea and vomiting. Endocrine: Negative for polydipsia and polyuria. Genitourinary:  Negative for decreased urine volume, difficulty urinating, enuresis, flank pain, frequency and urgency. Musculoskeletal:  Positive for back pain. Negative for arthralgias and neck stiffness. Skin:  Negative for rash and wound. Neurological:  Negative for dizziness, weakness, light-headedness and headaches. Psychiatric/Behavioral:  Negative for agitation, behavioral problems and confusion. Physical Exam  Vitals and nursing note reviewed. Constitutional:       Appearance: He is well-developed. HENT:      Head: Normocephalic and atraumatic. Eyes:      Conjunctiva/sclera: Conjunctivae normal.   Cardiovascular:      Rate and Rhythm: Normal rate and regular rhythm.       Heart sounds: Normal heart sounds. No murmur heard. Pulmonary:      Effort: Pulmonary effort is normal. No respiratory distress. Breath sounds: Normal breath sounds. No wheezing or rales. Abdominal:      General: Bowel sounds are normal.      Palpations: Abdomen is soft. Tenderness: There is no abdominal tenderness. There is no guarding or rebound. Musculoskeletal:         General: No tenderness or deformity. Cervical back: Normal range of motion and neck supple. Skin:     General: Skin is warm and dry. Coloration: Skin is pale. Neurological:      Mental Status: He is alert and oriented to person, place, and time. Cranial Nerves: No cranial nerve deficit. Coordination: Coordination normal.        Procedures     MDM     Amount and/or Complexity of Data Reviewed  Decide to obtain previous medical records or to obtain history from someone other than the patient: yes         ED Course as of 12/28/22 2335   Wed Dec 28, 2022   1743 Consulted with Dr. Sergio Knowles, cardiology, who states the patient is not a canidate for the cath lab at this time. Will obtain ct prior to initiation of heparin. [KG]   9210 The read interpreted by me. Rate 107 bpm.  Normal axis. Normal UT interval.  ST elevations in lead I to aVL. T wave inversion in aVR. Significant changes from prior EKG [JM]   1756 Echo:      No evidence of tricuspid regurgitation. Left ventricle grossly normal in size. Mild asymmetric left ventricular septal hypertrophy. Normal LV segmental wall motion. Estimated left ventricular ejection fraction is 58±5%. <50% criteria for diastolic dysfunction. Mildly dilated right ventricle. Right ventricular segmental wall motion is normal.   Trace-mild aortic regurgitation is noted. Physiologic and/or trace pulmonic regurgitation present. Technically good quality study. Compared to prior echo, no significant changes noted. Suggest clinical correlation.  [KG]   1757 Discussed case with Dr. Abran Smart. Requested that patient have Nitropaste placed. [JM]   2013 Discussed case with internal medicine who agreed admit patient. [JM]      ED Course User Index  [JM] Donato Ronquillo MD  [KG] Marcel Stoll DO      Patient is a 50 y.o. male presenting with Chest pain. Patient. EKG changes. Interventional cardiology was immediately consulted. Concern for possible NSTEMI. Looked at patient's EKG. Differential includes but is not limited to STEMI, NSTEMI, aortic dissection. Patient was given aspirin and nitro. Significant improvement of symptoms. Patient had a CTA that showed no acute findings. Patient was started on heparin. Patient's cardiologist was also consulted. Patient will be admitted for an NSTEMI. Discussed this with patient and patient's family who are agreeable to this plan          Patient was seen and evaluated by myself and my attending Marcel Stoll DO. Assessment and Plan discussed with attending provider, please see attestation for final plan of care. This note was done using dictation software and there may be some grammatical errors associated with this. Donato Ronquillo MD    ED Course as of 12/28/22 9663   Wed Dec 28, 2022   8181 Consulted with Dr. Heber Tate, cardiology, who states the patient is not a canidate for the cath lab at this time. Will obtain ct prior to initiation of heparin. [KG]   8702 The read interpreted by me. Rate 107 bpm.  Normal axis. Normal MA interval.  ST elevations in lead I to aVL. T wave inversion in aVR. Significant changes from prior EKG [JM]   6266 Echo:      No evidence of tricuspid regurgitation. Left ventricle grossly normal in size. Mild asymmetric left ventricular septal hypertrophy. Normal LV segmental wall motion. Estimated left ventricular ejection fraction is 58±5%. <50% criteria for diastolic dysfunction. Mildly dilated right ventricle.    Right ventricular segmental wall motion is normal. Trace-mild aortic regurgitation is noted. Physiologic and/or trace pulmonic regurgitation present. Technically good quality study. Compared to prior echo, no significant changes noted. Suggest clinical correlation. [KG]   8642 Discussed case with Dr. Israel Gordon. Requested that patient have Nitropaste placed. [JM]   2013 Discussed case with internal medicine who agreed admit patient. [JM]      ED Course User Index  [JM] Coral Haynes MD  [KG] Carola Camarena, DO       --------------------------------------------- PAST HISTORY ---------------------------------------------  Past Medical History:  has a past medical history of Acute heart failure with preserved ejection fraction ([address] CONY (acute kidney injury) ([address] Anemia in chronic kidney disease, on chronic dialysis ([address] Anemia in stage 5 chronic kidney disease, not on chronic dialysis ([address] Asymmetric septal hypertrophy ([address] BPH (benign prostatic hyperplasia), CKD (chronic kidney disease) stage 5, GFR less than 15 ml/min ([address] CKD (chronic kidney disease) stage V requiring chronic dialysis ([address] Erectile dysfunction, Gout, Hyperkalemia, Hyperlipidemia, Hypertension, Mild aortic sclerosis, Mild aortic valve sclerosis, Opioid dependence in remission ([address] Proteinuria, SBP (spontaneous bacterial peritonitis) ([address] Seasonal allergic rhinitis, and Thoracic aortic aneurysm without rupture. Past Surgical History:  has a past surgical history that includes Tonsillectomy; Tympanoplasty; and Dialysis Catheter Insertion (N/A, 12/14/2020). Social History:  reports that he quit smoking about 3 years ago. His smoking use included cigarettes. He started smoking about 35 years ago. He has a 31.00 pack-year smoking history. He has never used smokeless tobacco. He reports that he does not currently use alcohol. He reports current drug use. Drug: Marijuana [place]).     Family History: family history includes Heart Attack (age of onset: 61) in his father; Heart Disease in his mother; No Known Problems in his sister. The patients home medications have been reviewed.     Allergies: Anesthetics, amide    -------------------------------------------------- RESULTS -------------------------------------------------    Lab  Results for orders placed or performed during the hospital encounter of 12/28/22   Troponin   Result Value Ref Range    Troponin, High Sensitivity 103 (H) 0 - 11 ng/L   CBC with Auto Differential   Result Value Ref Range    WBC 17.9 (H) 4.5 - 11.5 E9/L    RBC 2.96 (L) 3.80 - 5.80 E12/L    Hemoglobin 10.1 (L) 12.5 - 16.5 g/dL    Hematocrit 30.4 (L) 37.0 - 54.0 %    .7 (H) 80.0 - 99.9 fL    MCH 34.1 26.0 - 35.0 pg    MCHC 33.2 32.0 - 34.5 %    RDW 13.6 11.5 - 15.0 fL    Platelets 793 913 - 958 E9/L    MPV 9.4 7.0 - 12.0 fL    Neutrophils % 86.9 (H) 43.0 - 80.0 %    Lymphocytes % 5.2 (L) 20.0 - 42.0 %    Monocytes % 5.2 2.0 - 12.0 %    Eosinophils % 0.3 0.0 - 6.0 %    Basophils % 0.9 0.0 - 2.0 %    Neutrophils Absolute 15.93 (H) 1.80 - 7.30 E9/L    Lymphocytes Absolute 0.90 (L) 1.50 - 4.00 E9/L    Monocytes Absolute 0.90 0.10 - 0.95 E9/L    Eosinophils Absolute 0.00 (L) 0.05 - 0.50 E9/L    Basophils Absolute 0.16 0.00 - 0.20 E9/L    Metamyelocytes Relative 0.9 0.0 - 1.0 %    Myelocyte Percent 0.9 0 - 0 %    Anisocytosis 1+     Poikilocytes 2+     Ovalocytes 1+     Stomatocytes 1+    Comprehensive Metabolic Panel w/ Reflex to MG   Result Value Ref Range    Sodium 135 132 - 146 mmol/L    Potassium reflex Magnesium 5.5 (H) 3.5 - 5.0 mmol/L    Chloride 96 (L) 98 - 107 mmol/L    CO2 23 22 - 29 mmol/L    Anion Gap 16 7 - 16 mmol/L    Glucose 138 (H) 74 - 99 mg/dL    BUN 63 (H) 6 - 20 mg/dL    Creatinine 14.4 (HH) 0.7 - 1.2 mg/dL    Est, Glom Filt Rate 4 >=60 mL/min/1.73    Calcium 10.6 (H) 8.6 - 10.2 mg/dL    Total Protein 6.6 6.4 - 8.3 g/dL    Albumin 3.7 3.5 - 5.2 g/dL    Total Bilirubin 0.3 0.0 - 1.2 mg/dL    Alkaline Phosphatase 58 40 - 129 U/L ALT 32 0 - 40 U/L    AST 13 0 - 39 U/L   Brain Natriuretic Peptide   Result Value Ref Range    Pro- (H) 0 - 125 pg/mL   Protime-INR   Result Value Ref Range    Protime 11.4 9.3 - 12.4 sec    INR 1.0    APTT   Result Value Ref Range    aPTT 32.3 24.5 - 35.1 sec   Troponin   Result Value Ref Range    Troponin, High Sensitivity 95 (H) 0 - 11 ng/L   APTT   Result Value Ref Range    aPTT 30.9 24.5 - 35.1 sec   Potassium   Result Value Ref Range    Potassium 5.7 (H) 3.5 - 5.0 mmol/L   Troponin   Result Value Ref Range    Troponin, High Sensitivity 105 (H) 0 - 11 ng/L   Urinalysis with Microscopic   Result Value Ref Range    Color, UA Yellow Straw/Yellow    Clarity, UA Clear Clear    Glucose, Ur 100 (A) Negative mg/dL    Bilirubin Urine Negative Negative    Ketones, Urine Negative Negative mg/dL    Specific Gravity, UA 1.010 1.005 - 1.030    Blood, Urine TRACE-INTACT Negative    pH, UA 7.5 5.0 - 9.0    Protein,  (A) Negative mg/dL    Urobilinogen, Urine 0.2 <2.0 E.U./dL    Nitrite, Urine Negative Negative    Leukocyte Esterase, Urine Negative Negative    WBC, UA 0-1 0 - 5 /HPF    RBC, UA 2-5 0 - 2 /HPF    Epithelial Cells, UA RARE /HPF    Bacteria, UA RARE (A) None Seen /HPF   POCT Glucose   Result Value Ref Range    Glucose 131 mg/dL    QC OK? ok    POCT Glucose   Result Value Ref Range    Meter Glucose 131 (H) 74 - 99 mg/dL   POCT Glucose   Result Value Ref Range    Meter Glucose 76 74 - 99 mg/dL   TYPE AND SCREEN   Result Value Ref Range    ABO/Rh A POS     Antibody Screen NEG        Radiology  CTA CHEST W CONTRAST   Final Result   Mild dilatation of the ascending aorta measuring up to 4.3 cm. No evidence   for intramural hematoma or dissection involving the thoracic aorta. Cardiomegaly with small pericardial effusion. CTA ABDOMEN PELVIS W CONTRAST   Final Result   Normal caliber abdominal aorta and iliac arteries with no evidence for   intramural hematoma or dissection.       Moderate volume 4 quadrant ascites. Cholelithiasis in a mildly distended gallbladder with mild gallbladder wall   thickening. Correlate with right upper quadrant pain. Bilateral renal atrophy.                 ------------------------- NURSING NOTES AND VITALS REVIEWED ---------------------------  Date / Time Roomed:  12/28/2022  5:37 PM  ED Bed Assignment:  22/22    The nursing notes within the ED encounter and vital signs as below have been reviewed. Patient Vitals for the past 24 hrs:   BP Temp Temp src Pulse Resp SpO2 Weight   12/28/22 2130 134/89 -- -- (!) 110 10 96 % --   12/28/22 2030 125/84 -- -- (!) 112 25 97 % --   12/28/22 2000 (!) 140/94 -- -- (!) 116 19 96 % --   12/28/22 1930 (!) 143/87 -- -- (!) 112 22 96 % --   12/28/22 1900 (!) 162/108 -- -- (!) 102 21 96 % --   12/28/22 1830 (!) 147/92 -- -- 100 23 98 % --   12/28/22 1811 (!) 138/102 -- -- (!) 108 22 100 % --   12/28/22 1744 -- -- -- -- -- 98 % --   12/28/22 1739 (!) 157/105 -- -- (!) 114 -- -- --   12/28/22 1718 (!) 146/97 -- -- -- -- -- --   12/28/22 1715 -- -- -- -- 18 -- 215 lb (97.5 kg)   12/28/22 1700 -- 97.7 °F (36.5 °C) Temporal (!) 118 -- 98 % --       Oxygen Saturation Interpretation: Normal      ------------------------------------------ PROGRESS NOTES ------------------------------------------  Re-evaluation(s):   Patients symptoms show no change  Repeat physical examination is not changed        I have spoken with the patient and discussed todays results, in addition to providing specific details for the plan of care and counseling regarding the diagnosis and prognosis. Their questions are answered at this time and they are agreeable with the plan.      --------------------------------- ADDITIONAL PROVIDER NOTES ---------------------------------  Consultations:  Spoke with Dr. Jason Johnston,  They will admit this patient.     This patient's ED course included: a personal history and physicial examination, re-evaluation prior to disposition, multiple bedside re-evaluations, IV medications, cardiac monitoring, continuous pulse oximetry, and complex medical decision making and emergency management    This patient has remained hemodynamically stable during their ED course. Please note that the withdrawal or failure to initiate urgent interventions for this patient would likely result in a life threatening deterioration or permanent disability. Accordingly this patient received 30 minutes of critical care time, excluding separately billable procedures. Clinical Impression  1. NSTEMI (non-ST elevated myocardial infarction) [facility])          Disposition  Patient's disposition: Admit to telemetry  Patient's condition is serious.        Massiel Pace MD  Resident  12/28/22 7750

## 2022-12-28 NOTE — ED NOTES
Radiology Procedure Waiver   Name: Nasima Love. : 1970  MRN: [id number]    Date:  22    Time: 5:47 PM EST    Benefits of immediately proceeding with radiology exam(s) without pre-testing outweigh the risks or are not indicated as specified below and therefore the following is/are being waived:    [x] Benefits of immediate radiology exam(s) outweigh any risk. OR    Pre-exam testing is not indicated for the following reason(s):  [] Pregnancy test   [] Patients LMP on-time and regular.   [] Patient had Tubal Ligation or has other Contraception Device. [] Patient  is Menopausal or Premenarcheal.    [] Patient had Full or Partial Hysterectomy. [] Protocol for CT contrast allegry   [] Patient has tolerated well previously   [] Patient does not have a true allergy    [] MRI Questionnaire     [] BUN/Creatinine   [] Patient age w/no hx of renal dysfunction. [] Patient on Dialysis. [] Recent Normal Labs.   Electronically signed by Anthony Soares DO on 22 at 5:47 PM EST               Anthony Soares DO  22 [id number]

## 2022-12-29 LAB
ANION GAP SERPL CALCULATED.3IONS-SCNC: 16 MMOL/L (ref 7–16)
ANION GAP SERPL CALCULATED.3IONS-SCNC: 19 MMOL/L (ref 7–16)
APTT: 29.4 SEC (ref 24.5–35.1)
APTT: 35.5 SEC (ref 24.5–35.1)
APTT: 37.1 SEC (ref 24.5–35.1)
BASOPHILS ABSOLUTE: 0.08 E9/L (ref 0–0.2)
BASOPHILS RELATIVE PERCENT: 0.7 % (ref 0–2)
BLOOD CULTURE, ROUTINE: NORMAL
BUN BLDV-MCNC: 71 MG/DL (ref 6–20)
BUN BLDV-MCNC: 72 MG/DL (ref 6–20)
CALCIUM SERPL-MCNC: 9.8 MG/DL (ref 8.6–10.2)
CALCIUM SERPL-MCNC: 9.9 MG/DL (ref 8.6–10.2)
CHLORIDE BLD-SCNC: 93 MMOL/L (ref 98–107)
CHLORIDE BLD-SCNC: 94 MMOL/L (ref 98–107)
CHOLESTEROL, FASTING: 173 MG/DL (ref 0–199)
CO2: 24 MMOL/L (ref 22–29)
CO2: 25 MMOL/L (ref 22–29)
CREAT SERPL-MCNC: 14.6 MG/DL (ref 0.7–1.2)
CREAT SERPL-MCNC: 15.7 MG/DL (ref 0.7–1.2)
CULTURE, BLOOD 2: NORMAL
EOSINOPHILS ABSOLUTE: 0.13 E9/L (ref 0.05–0.5)
EOSINOPHILS RELATIVE PERCENT: 1.1 % (ref 0–6)
GFR SERPL CREATININE-BSD FRML MDRD: 3 ML/MIN/1.73
GFR SERPL CREATININE-BSD FRML MDRD: 4 ML/MIN/1.73
GLUCOSE BLD-MCNC: 108 MG/DL (ref 74–99)
GLUCOSE BLD-MCNC: 162 MG/DL (ref 74–99)
HBA1C MFR BLD: 5.3 % (ref 4–5.6)
HCT VFR BLD CALC: 28.1 % (ref 37–54)
HDLC SERPL-MCNC: 42 MG/DL
HEMOGLOBIN: 9.2 G/DL (ref 12.5–16.5)
IMMATURE GRANULOCYTES #: 0.08 E9/L
IMMATURE GRANULOCYTES %: 0.7 % (ref 0–5)
LDL CHOLESTEROL CALCULATED: 112 MG/DL (ref 0–99)
LYMPHOCYTES ABSOLUTE: 0.94 E9/L (ref 1.5–4)
LYMPHOCYTES RELATIVE PERCENT: 7.8 % (ref 20–42)
MCH RBC QN AUTO: 33.6 PG (ref 26–35)
MCHC RBC AUTO-ENTMCNC: 32.7 % (ref 32–34.5)
MCV RBC AUTO: 102.6 FL (ref 80–99.9)
MONOCYTES ABSOLUTE: 1.39 E9/L (ref 0.1–0.95)
MONOCYTES RELATIVE PERCENT: 11.5 % (ref 2–12)
NEUTROPHILS ABSOLUTE: 9.49 E9/L (ref 1.8–7.3)
NEUTROPHILS RELATIVE PERCENT: 78.2 % (ref 43–80)
PDW BLD-RTO: 13.7 FL (ref 11.5–15)
PLATELET # BLD: 231 E9/L (ref 130–450)
PMV BLD AUTO: 9.6 FL (ref 7–12)
POTASSIUM REFLEX MAGNESIUM: 5.8 MMOL/L (ref 3.5–5)
POTASSIUM SERPL-SCNC: 5.2 MMOL/L (ref 3.5–5)
POTASSIUM SERPL-SCNC: 5.4 MMOL/L (ref 3.5–5)
POTASSIUM SERPL-SCNC: 5.5 MMOL/L (ref 3.5–5)
POTASSIUM SERPL-SCNC: 6 MMOL/L (ref 3.5–5)
RBC # BLD: 2.74 E12/L (ref 3.8–5.8)
SODIUM BLD-SCNC: 135 MMOL/L (ref 132–146)
SODIUM BLD-SCNC: 136 MMOL/L (ref 132–146)
TRIGLYCERIDE, FASTING: 94 MG/DL (ref 0–149)
VLDLC SERPL CALC-MCNC: 19 MG/DL
WBC # BLD: 12.1 E9/L (ref 4.5–11.5)

## 2022-12-29 PROCEDURE — 6370000000 HC RX 637 (ALT 250 FOR IP): Performed by: INTERNAL MEDICINE

## 2022-12-29 PROCEDURE — 87088 URINE BACTERIA CULTURE: CPT

## 2022-12-29 PROCEDURE — 6370000000 HC RX 637 (ALT 250 FOR IP): Performed by: NURSE PRACTITIONER

## 2022-12-29 PROCEDURE — 2580000003 HC RX 258: Performed by: NURSE PRACTITIONER

## 2022-12-29 PROCEDURE — 6360000002 HC RX W HCPCS: Performed by: INTERNAL MEDICINE

## 2022-12-29 PROCEDURE — 85025 COMPLETE CBC W/AUTO DIFF WBC: CPT

## 2022-12-29 PROCEDURE — 80061 LIPID PANEL: CPT

## 2022-12-29 PROCEDURE — 6360000002 HC RX W HCPCS: Performed by: STUDENT IN AN ORGANIZED HEALTH CARE EDUCATION/TRAINING PROGRAM

## 2022-12-29 PROCEDURE — 2500000003 HC RX 250 WO HCPCS: Performed by: INTERNAL MEDICINE

## 2022-12-29 PROCEDURE — 85730 THROMBOPLASTIN TIME PARTIAL: CPT

## 2022-12-29 PROCEDURE — 2500000003 HC RX 250 WO HCPCS: Performed by: NURSE PRACTITIONER

## 2022-12-29 PROCEDURE — 90945 DIALYSIS ONE EVALUATION: CPT

## 2022-12-29 PROCEDURE — 2140000000 HC CCU INTERMEDIATE R&B

## 2022-12-29 PROCEDURE — 93005 ELECTROCARDIOGRAM TRACING: CPT | Performed by: INTERNAL MEDICINE

## 2022-12-29 PROCEDURE — 84132 ASSAY OF SERUM POTASSIUM: CPT

## 2022-12-29 PROCEDURE — 83036 HEMOGLOBIN GLYCOSYLATED A1C: CPT

## 2022-12-29 PROCEDURE — 36415 COLL VENOUS BLD VENIPUNCTURE: CPT

## 2022-12-29 PROCEDURE — 80048 BASIC METABOLIC PNL TOTAL CA: CPT

## 2022-12-29 PROCEDURE — 2580000003 HC RX 258: Performed by: INTERNAL MEDICINE

## 2022-12-29 PROCEDURE — 93005 ELECTROCARDIOGRAM TRACING: CPT

## 2022-12-29 PROCEDURE — 3E1M39Z IRRIGATION OF PERITONEAL CAVITY USING DIALYSATE, PERCUTANEOUS APPROACH: ICD-10-PCS | Performed by: INTERNAL MEDICINE

## 2022-12-29 RX ORDER — SODIUM POLYSTYRENE SULFONATE 15 G/60ML
15 SUSPENSION ORAL; RECTAL ONCE
Status: COMPLETED | OUTPATIENT
Start: 2022-12-29 | End: 2022-12-29

## 2022-12-29 RX ORDER — DEXTROSE MONOHYDRATE 25 G/50ML
25 INJECTION, SOLUTION INTRAVENOUS ONCE
Status: COMPLETED | OUTPATIENT
Start: 2022-12-29 | End: 2022-12-29

## 2022-12-29 RX ORDER — SEVELAMER CARBONATE 800 MG/1
1 TABLET, FILM COATED ORAL
COMMUNITY

## 2022-12-29 RX ADMIN — CALCIUM GLUCONATE 1000 MG: 98 INJECTION, SOLUTION INTRAVENOUS at 13:55

## 2022-12-29 RX ADMIN — ROSUVASTATIN 10 MG: 10 TABLET, FILM COATED ORAL at 23:15

## 2022-12-29 RX ADMIN — CARVEDILOL 25 MG: 25 TABLET, FILM COATED ORAL at 10:06

## 2022-12-29 RX ADMIN — Medication 10 ML: at 23:16

## 2022-12-29 RX ADMIN — HYDRALAZINE HYDROCHLORIDE 100 MG: 50 TABLET, FILM COATED ORAL at 23:16

## 2022-12-29 RX ADMIN — SODIUM BICARBONATE 50 MEQ: 84 INJECTION INTRAVENOUS at 14:08

## 2022-12-29 RX ADMIN — SODIUM POLYSTYRENE SULFONATE 15 G: 15 SUSPENSION ORAL; RECTAL at 01:19

## 2022-12-29 RX ADMIN — SODIUM BICARBONATE 1300 MG: 650 TABLET ORAL at 23:16

## 2022-12-29 RX ADMIN — CARVEDILOL 25 MG: 25 TABLET, FILM COATED ORAL at 16:12

## 2022-12-29 RX ADMIN — ALLOPURINOL 100 MG: 100 TABLET ORAL at 10:06

## 2022-12-29 RX ADMIN — SODIUM BICARBONATE 1300 MG: 650 TABLET ORAL at 15:45

## 2022-12-29 RX ADMIN — HEPARIN SODIUM 2000 UNITS: 1000 INJECTION INTRAVENOUS; SUBCUTANEOUS at 02:45

## 2022-12-29 RX ADMIN — DEXTROSE MONOHYDRATE 25 G: 25 INJECTION, SOLUTION INTRAVENOUS at 14:09

## 2022-12-29 RX ADMIN — Medication 10 ML: at 10:06

## 2022-12-29 RX ADMIN — Medication 5 ML: at 01:32

## 2022-12-29 RX ADMIN — CALCIUM ACETATE 667 MG: 667 CAPSULE ORAL at 10:06

## 2022-12-29 RX ADMIN — SODIUM ZIRCONIUM CYCLOSILICATE 10 G: 10 POWDER, FOR SUSPENSION ORAL at 13:56

## 2022-12-29 RX ADMIN — CALCIUM ACETATE 667 MG: 667 CAPSULE ORAL at 16:12

## 2022-12-29 RX ADMIN — HYDRALAZINE HYDROCHLORIDE 100 MG: 50 TABLET, FILM COATED ORAL at 06:24

## 2022-12-29 RX ADMIN — INSULIN HUMAN 5 UNITS: 100 INJECTION, SOLUTION PARENTERAL at 14:09

## 2022-12-29 RX ADMIN — HYDRALAZINE HYDROCHLORIDE 100 MG: 50 TABLET, FILM COATED ORAL at 15:46

## 2022-12-29 ASSESSMENT — PAIN SCALES - GENERAL
PAINLEVEL_OUTOF10: 0
PAINLEVEL_OUTOF10: 0

## 2022-12-29 ASSESSMENT — PAIN - FUNCTIONAL ASSESSMENT
PAIN_FUNCTIONAL_ASSESSMENT: NONE - DENIES PAIN
PAIN_FUNCTIONAL_ASSESSMENT: NONE - DENIES PAIN

## 2022-12-29 NOTE — FLOWSHEET NOTE
12/29/22 1622   Vitals   /82   Temp 97.8 °F (36.6 °C)   Temp Source Oral   Heart Rate 97   Resp 20   SpO2 95 %   Weight 214 lb 15.2 oz (97.5 kg)   Peritoneal Dialysis (CAPD manual)   Effluent Appearance Clear;Yellow   Cycler   Verification of Prescription CCPD   Informed Consent  Yes   Total Volume Programmed 88781 mL   Therapy Time (Hours:Minutes) 8   Cycler Type [facility]   Fill Volume 2500 mL   Number of Cycles 5   Bag Volume 5000 mL   Number of Bags Used 3   Dianeal Solution Other (Comment)  (3 bags of 2.5% solution in 5000ml bags)   PD initiated without difficulty using aseptic technique. PD effluent clear. Abdomen non-tender. No complications noted. Dsg changed. Exit site clean and dry.

## 2022-12-29 NOTE — ED NOTES
Dialysis nurse called and notified of patient. She states she will notify staff in the morning. Dr. J Carlos Myles called for consult. Says to wait to see what morning labs say and that dialysis will be in the hospital after 0600.       Ant Casey RN  12/29/22 1524

## 2022-12-29 NOTE — H&P
[facility]  History and Physical      CHIEF COMPLAINT:    Chief Complaint   Patient presents with    Chest Pain     Patient arrives to ED w/ CP and SOB starting this morning after peritoneal dialysis. -fever/chills         Patient of Dioni Sánchez MD presents with:  NSTEMI (non-ST elevated myocardial infarction) [facility])    History of Present Illness:   Patient is a 51-year-old male with a past medical history of CONY, anemia, BPH, CKD, erectile dysfunction, hyperkalemia, hyperlipidemia, hypertension, thoracic aortic aneurysm without rupture. Patient presented to the ED with complaints of chest pain that has been ongoing for a few hours prior to arrival.  Patient is on dialysis. Patient states chest pain radiates to his back. Patient does peritoneal dialysis has not missed any treatments. Patient is alert and oriented on examination. Patient admits that approximately 1 to 2 months ago he did have peritonitis as he was treated with antibiotics and has not had any further complications. ER work-up revealed elevated potassium 5.5, BUN/creatinine 63/14.4, troponin 103, WBC 17.9. Patient is admitted to telemetry unit for further testing and treatment. On evaluation, he is completely asymptomatic. He denies having had any chest heaviness pain or discomfort when he presented to the ER. He has been evaluated by cardiology last night. He is a peritoneal dialysis patient and wishes to be discharged so he can continue PD at home. REVIEW OF SYSTEMS:  Pertinent negatives are above in HPI. 10 point ROS otherwise negative.       Past Medical History:   Diagnosis Date    Acute heart failure with preserved ejection fraction ([address] 09/11/2020    CONY (acute kidney injury) ([address] 09/10/2020    Anemia in chronic kidney disease, on chronic dialysis ([address] 12/05/2020    Anemia in stage 5 chronic kidney disease, not on chronic dialysis ([address] 12/05/2020    Asymmetric septal hypertrophy ([address] 2012    BPH (benign prostatic hyperplasia) 09/11/2020    CKD (chronic kidney disease) stage 5, GFR less than 15 ml/min ([facility]) 12/05/2020    CKD (chronic kidney disease) stage V requiring chronic dialysis ([facility]     Erectile dysfunction 09/11/2020    Gout 09/11/2020    Hyperkalemia 09/11/2020    Hyperlipidemia     Hypertension     Mild aortic sclerosis 09/11/2020    Mild aortic valve sclerosis 09/11/2020    Opioid dependence in remission ([facility] 09/11/2020    Younger years    Proteinuria 09/11/2020    SBP (spontaneous bacterial peritonitis) ([facility] 11/24/2022    Seasonal allergic rhinitis 09/11/2020    Thoracic aortic aneurysm without rupture 09/11/2020    Ascending; 4.3 cm- Dr. Machelle Rojo 3/2020         Past Surgical History:   Procedure Laterality Date    DIALYSIS CATHETER INSERTION N/A 12/14/2020    LAPAROSCOPIC PERITONEAL DIALYSIS CATHETER INSERTION performed by Miguel Rubio MD at [address]      Ventilation tubes, child       Medications Prior to Admission:    Not in a hospital admission. Note that the patient's home medications were reviewed and the above list is accurate to the best of my knowledge at the time of the exam.    Allergies:    Anesthetics, amide    Social History:    reports that he quit smoking about 3 years ago. His smoking use included cigarettes. He started smoking about 35 years ago. He has a 31.00 pack-year smoking history. He has never used smokeless tobacco. He reports that he does not currently use alcohol. He reports current drug use. Drug: Marijuana Vanessa Lux). Family History:   family history includes Heart Attack (age of onset: 61) in his father; Heart Disease in his mother; No Known Problems in his sister.       PHYSICAL EXAM:    Vitals:  /74   Pulse 98   Temp 98.2 °F (36.8 °C) (Oral)   Resp 18   Wt 215 lb (97.5 kg)   SpO2 98%   BMI 29.16 kg/m²       General appearance: NAD, conversant, pleasant  Eyes: Sclerae anicteric, PERRLA  HEENT: AT/NC, MMM  Neck: FROM, supple, no thyromegaly  Lymph: No cervical / supraclavicular lymphadenopathy  Lungs: Clear to auscultation, WOB normal  CV: RRR, no MRGs, no lower extremity edema  Abdomen: Soft, non-tender; no masses or HSM, +BS, CAPD catheter  Extremities: FROM without synovitis. No clubbing or cyanosis of the hands. Skin: no rash, induration, lesions, or ulcers  Psych: Calm and cooperative. Normal judgement and insight. Normal mood and affect. Neuro: Alert and interactive, face symmetric, speech fluent. LABS:  All labs reviewed.   Of note:  CBC with Differential:    Lab Results   Component Value Date/Time    WBC 12.1 12/29/2022 05:08 AM    RBC 2.74 12/29/2022 05:08 AM    HGB 9.2 12/29/2022 05:08 AM    HCT 28.1 12/29/2022 05:08 AM     12/29/2022 05:08 AM    .6 12/29/2022 05:08 AM    MCH 33.6 12/29/2022 05:08 AM    MCHC 32.7 12/29/2022 05:08 AM    RDW 13.7 12/29/2022 05:08 AM    METASPCT 0.9 12/28/2022 05:23 PM    LYMPHOPCT 7.8 12/29/2022 05:08 AM    MONOPCT 11.5 12/29/2022 05:08 AM    MYELOPCT 0.9 12/28/2022 05:23 PM    BASOPCT 0.7 12/29/2022 05:08 AM    MONOSABS 1.39 12/29/2022 05:08 AM    LYMPHSABS 0.94 12/29/2022 05:08 AM    EOSABS 0.13 12/29/2022 05:08 AM    BASOSABS 0.08 12/29/2022 05:08 AM     CMP:    Lab Results   Component Value Date/Time     12/29/2022 05:08 AM    K 5.8 12/29/2022 05:08 AM    K 5.5 12/29/2022 03:16 AM    CL 94 12/29/2022 05:08 AM    CO2 25 12/29/2022 05:08 AM    BUN 71 12/29/2022 05:08 AM    CREATININE 14.6 12/29/2022 05:08 AM    GFRAA 9 12/06/2020 04:58 AM    LABGLOM 4 12/29/2022 05:08 AM    GLUCOSE 108 12/29/2022 05:08 AM    PROT 6.6 12/28/2022 05:23 PM    LABALBU 3.7 12/28/2022 05:23 PM    CALCIUM 9.8 12/29/2022 05:08 AM    BILITOT 0.3 12/28/2022 05:23 PM    ALKPHOS 58 12/28/2022 05:23 PM    AST 13 12/28/2022 05:23 PM    ALT 32 12/28/2022 05:23 PM       Imaging:  CTA chest: Mild dilation of the ascending aorta measures up to 4.3 cm no evidence of intramural hematoma or dissection involving the thoracic aorta. Cardiomegaly with small pericardial effusion. CTA abdomen pelvis: Normal caliber abdominal aorta and iliac arteries with no evidence for intramural hematoma or dissection. Moderate volume four-quadrant ascites. Cholelithiasis is mildly distended gallbladder and mild gallbladder wall thickening. Bilateral renal atrophy. EKG:  I've personally reviewed the patient's EKG:  NSR    Telemetry:  I've personally reviewed the patient's telemetry:      ASSESSMENT/PLAN:  Principal Problem:    NSTEMI (non-ST elevated myocardial infarction) ([address]  Resolved Problems:    * No resolved hospital problems. *    70-year-old male with a history of ESRD, hypertension presents to the ED with complaints of chest pain and is admitted to telemetry unit with    Elevated troponins are secondary to his renal disease, no evidence of NSTEMI  Pericardial effusion is of volume overload  Monitor labs-troponins-105  Continue heparin drip-low-dose nomogram  Cardiology following-no plans for any further evaluation or work-up  Nitropaste-monitor BP  Supplement O2 to keep saturation greater than 93%. Patient stable for discharge from medicine standpoint if repeat potassium at 5 PM returns within normal limits    ESRD/hyperkalemia  Continue CAPD  Consult nephrology   Status post 1 dose of Lokelma at 12 noon      Medication for other comorbidities continue as appropriate dose adjustment as necessary. DVT prophylaxis  PT OT  Discharge planning  Case discussed with attending and agreed upon plan of care. Code status: Full  Requires inpatient level of care  VIC العلي CNP    8:56 AM  12/29/2022     Above note edited to reflect my thoughts     I personally saw, examined and provided care for the patient. Radiographs, labs and medication list were reviewed by me independently. The case was discussed in detail and plans for care were established.  Review of Radha BATEMAN CNP, documentation was conducted and revisions were made as appropriate directly by me. I agree with the above documented exam, problem list, and plan of care.      Farzana Sparks MD  4:54 PM  12/29/2022

## 2022-12-29 NOTE — CARE COORDINATION
Social Work Discharge Planning:  SW met with patient at bedside. Patient lives alone in a 2 story home. Patient independent prior to admission with no hx with HHC. Patient does PD supplies provided by [facility]. PCP is Duong and pharmacy is [facility] on [address].  SW will continue to follow and assist.  Electronically signed by RAE Licea on 12/29/2022 at 11:41 AM

## 2022-12-29 NOTE — CONSULTS
CARDIOLOGY CONSULTATION    Patient Name:  Moises Kearney. :  1970    Reason for Consultation:   Chest pain    History of Present Illness:   Moises Kearney presents to [facility], following history of onset of this retrosternal chest discomfort markedly increased with lying supine and somewhat improved with sitting up also associated with respiratory inspiration. He has been on chronic peritoneal dialysis for the past few years and is being evaluated for renal transplantation. Apparently the etiology of his renal failure secondary to longstanding hypertension for which she was not treated by his lack of seeking medical advice by his own admission. He denies any hemoptysis and denies any exertional chest discomfort and in fact recently underwent a nuclear stress test at [facility] as part of his preoperative evaluation for renal transplantation and according to the patient was normal.  In the emergency room an EKG was obtained which suggested acute pericarditis with [place] segment changes in aVR and diffuse concordant concave ST elevation. Past Medical History:   has a past medical history of Acute heart failure with preserved ejection fraction ([address] CONY (acute kidney injury) ([address] Anemia in chronic kidney disease, on chronic dialysis ([address] Anemia in stage 5 chronic kidney disease, not on chronic dialysis ([address] Asymmetric septal hypertrophy ([address] BPH (benign prostatic hyperplasia), CKD (chronic kidney disease) stage 5, GFR less than 15 ml/min ([address] CKD (chronic kidney disease) stage V requiring chronic dialysis ([address] Erectile dysfunction, Gout, Hyperkalemia, Hyperlipidemia, Hypertension, Mild aortic sclerosis, Mild aortic valve sclerosis, Opioid dependence in remission ([address] Proteinuria, SBP (spontaneous bacterial peritonitis) ([address] Seasonal allergic rhinitis, and Thoracic aortic aneurysm without rupture.     Surgical History:   has a past surgical history that includes Tonsillectomy; Tympanoplasty; and Dialysis Catheter Insertion (N/A, 12/14/2020). Social History:   reports that he quit smoking about 3 years ago. His smoking use included cigarettes. He started smoking about 35 years ago. He has a 31.00 pack-year smoking history. He has never used smokeless tobacco. He reports that he does not currently use alcohol. He reports current drug use. Drug: Marijuana Valdene Vela). Family History:  family history includes Heart Attack (age of onset: 61) in his father; Heart Disease in his mother; No Known Problems in his sister. Medications:  Prior to Admission medications    Medication Sig Start Date End Date Taking?  Authorizing Provider   sevelamer (RENVELA) 800 MG tablet Take 1 tablet by mouth 3 times daily (with meals)   Yes Historical Provider, MD   sodium zirconium cyclosilicate (LOKELMA) 10 g PACK oral suspension Take 10 g by mouth daily    Historical Provider, MD   Cholecalciferol (VITAMIN D3) 1.25 MG (25718 UT) CAPS Take by mouth once a week    Historical Provider, MD   hydrALAZINE (APRESOLINE) 50 MG tablet Take 1 tablet by mouth every 8 hours  Patient taking differently: Take 100 mg by mouth every 8 hours  12/6/20   Celso Villagran DO   Calcium Acetate, Phos Binder, 667 MG CAPS Take 1 capsule by mouth 3 times daily (with meals)  Patient not taking: Reported on 12/29/2022 12/6/20   Celso Villagran DO   allopurinol (ZYLOPRIM) 100 MG tablet Take 1 tablet by mouth daily 12/7/20   Celso Villagran DO   carvedilol (COREG) 12.5 MG tablet Take 1 tablet by mouth 2 times daily (with meals)  Patient taking differently: Take 25 mg by mouth 2 times daily (with meals) 9/13/20   Celso Villagran DO   rosuvastatin (CRESTOR) 10 MG tablet Take 1 tablet by mouth nightly 9/13/20   Celso Villagran DO   sodium bicarbonate 650 MG tablet Take 2 tablets by mouth 2 times daily  Patient taking differently: Take 1,300 mg by mouth 3 times daily 9/13/20   Celso Villagran DO       Allergies:  Anesthetics, amide     Review of Systems:   Constitutional: there has been no unanticipated weight loss. There's been no significant change in energy or activity level, nor sleep pattern . No fever chills or rigors. Eyes: No visual changes or diplopia. No scleral icterus. ENT: No Headaches, hearing loss or vertigo. No mouth sores or sore throat. No change in taste or smell. Cardiovascular: As chest discomfort, dyspnea on exertion, denies palpitations, loss of consciousness, no phlebitis, no claudication. Respiratory: No cough or wheezing, no sputum production. No hemoptysis, pleuritic pain. Gastrointestinal: No abdominal pain, appetite loss, blood in stools. No change in bowel habits. No hematemesis  Genitourinary: No dysuria, trouble voiding or hematuria. No nocturia or increased frequency. Musculoskeletal:  No gait disturbance, weakness or joint complaints. Integumentary: No rash or pruritis. Neurological: No headache, diplopia, change in muscle strength, numbness or tingling. No change in gait, balance, coordination, mood, affect, memory, mentation, behavior. Psychiatric: No anxiety or depression. Endocrine: No temperature intolerance. No excessive thirst, fluid intake, or urination. No tremor. Hematologic/Lymphatic: No abnormal bruising or bleeding, blood clots or swollen lymph nodes. Allergic/Immunologic: No nasal congestion or hives. Physical Examination:    Vital Signs: BP (!) 148/96   Pulse (!) 110   Temp 97.5 °F (36.4 °C) (Axillary)   Resp 19   Wt 215 lb (97.5 kg)   SpO2 97%   BMI 29.16 kg/m²   General appearance: Well preserved, mesomorphic body habitus, alert, moderate distress. Skin: Skin color, texture, turgor normal. No rashes or lesions. No induration or tightening palpated. Head: Normocephalic. No masses, lesions, tenderness or abnormalities  Eyes: Conjunctivae/corneas clear. PERRL, EOMs intact. Sclera non icteric. Ears: External ears normal. Canals clear. TM's clear bilaterally.  Hearing normal to finger rub. Nose/Sinuses: Nares normal. Septum midline. Mucosa normal. No drainage or sinus tenderness. Oropharynx: Lips, mucosa, and tongue normal. Oropharynx clear with no exudate seen. Neck: Neck supple and symmetric. No adenopathy. Thyroid symmetric, normal size, without nodules. Trachea is midline. Carotids brisk in upstroke without bruits, no abnormal JVP noted at 45°. Chest: Even excursion  Lungs: Lungs clear to auscultation bilaterally. No retractions or use of accessory muscles. No tactile vocal fremitus. No rhonchi, crackles or rales. Heart:  S1 > S2. Regular rhythm. No gallop or murmur. No rub, palpable thrill or heave noted. PMI 5th intercostal space midclavicular line. Abdomen: Abdomen soft, moderately protuberant, non-tender. BS normal. No masses, organomegaly. No hernia noted. Extremities: Extremities normal. No deformities, edema, or skin discoloration. No cyanosis or clubbing noted to the nails. Peripheral pulses present 2+ upper extremities and present 1+  lower extremities. Musculoskeletal: Spine ROM normal. Muscular strength intact. Neuro: Cranial nerves intact. Motor: Strength 5/5 in all extremities. Reflexes 2+ in all extremities. No focal weakness. Sensory: grossly normal to touch. Coordination intact.     Pertinent Labs:  CBC:   Recent Labs     12/28/22  1723   WBC 17.9*   HGB 10.1*        BMP:  Recent Labs     12/28/22  1723 12/28/22  1902 12/28/22  2133 12/29/22  0508     --   --  135   K 5.5*  --  5.7* 5.8*   CL 96*  --   --  94*   CO2 23  --   --  25   BUN 63*  --   --  71*   CREATININE 14.4*  --   --  14.6*   GLUCOSE 138* 131  --  108*   LABGLOM 4  --   --  4     ABGs: No results found for: PH, PO2, PCO2  INR:   Recent Labs     12/28/22  1737   INR 1.0     PRO-BNP:   Lab Results   Component Value Date    PROBNP 749 (H) 12/28/2022    PROBNP 1,086 (H) 12/06/2020      Cardiac Injury Profile:   Recent Labs     12/28/22  1723 12/28/22  1858 12/28/22  2138 TROPHS 103* 95* 105*      Lipid Profile:   Lab Results   Component Value Date/Time    TRIG 161 12/06/2020 04:58 AM    HDL 42 12/29/2022 05:08 AM    LDLCALC 112 12/29/2022 05:08 AM    CHOL 115 12/06/2020 04:58 AM      Thyroid:   Lab Results   Component Value Date    TSH 1.470 12/06/2020      Hemoglobin A1C: No components found for: HGBA1C   ECG:  See report    Radiology:  CTA CHEST W CONTRAST    Result Date: 12/28/2022  Mild dilatation of the ascending aorta measuring up to 4.3 cm. No evidence for intramural hematoma or dissection involving the thoracic aorta. Cardiomegaly with small pericardial effusion. CTA ABDOMEN PELVIS W CONTRAST    Result Date: 12/28/2022  Normal caliber abdominal aorta and iliac arteries with no evidence for intramural hematoma or dissection. Moderate volume 4 quadrant ascites. Cholelithiasis in a mildly distended gallbladder with mild gallbladder wall thickening. Correlate with right upper quadrant pain. Bilateral renal atrophy.      Assessment:    Patient Active Problem List   Diagnosis Code    CONY (acute kidney injury) ([facility] N17.9    Asymmetric septal hypertrophy ([facility]) I42.2    Hyperlipidemia E78.5    Hypertension I10    Mild aortic valve sclerosis I35.8    Erectile dysfunction N52.9    Thoracic aortic aneurysm without rupture I71.20    Hyperkalemia E87.5    BPH (benign prostatic hyperplasia) N40.0    Gout M10.9    Opioid dependence in remission ([facility]) F11.21    Seasonal allergic rhinitis J30.2    Proteinuria R80.9    Acute heart failure with preserved ejection fraction ([facility]) I50.31    CKD (chronic kidney disease) stage 5, GFR less than 15 ml/min ([facility]) N18.5    Anemia in chronic kidney disease, on chronic dialysis ([facility]) N18.6, D63.1, Z99.2    SBP (spontaneous bacterial peritonitis) ([facility] K65.2    CKD (chronic kidney disease) stage V requiring chronic dialysis ([facility]) N18.6, Z99.2    NSTEMI (non-ST elevated myocardial infarction) ([facility] I21.4       Plan:  Would recommend increasing dialysis treatments with the hope that this will help somewhat. Utilizing colchicine is somewhat controversial considering his renal dysfunction and perhaps utilizing a nonsteroidal once again but not without potential secondary side effects. I have spent more than 55 minutes face to face with Linda Gold Jr.,and reviewing notes and laboratory data with greater than 50% of this time instructing and counseling the patient regarding my findings and recommendations and I have answered all questions as posed to me by Mr. Killianjoão Kamron. Thank you, Bre Alonso MD for allowing me to consult in the care of this patient. Estee Dougherty DO , FACP, [facility], [facility]    NOTE:  This report was transcribed using voice recognition software. Every effort was made to ensure accuracy; however, inadvertent computerized transcription errors may be present.

## 2022-12-29 NOTE — ED NOTES
Spoke with snow from dialysis and she stated pt may go to floor and dr juarez aware pt is here and they will perform peritoneal dialysis.       Mary Caicedo RN  12/29/22 [id number]

## 2022-12-29 NOTE — CONSULTS
[facility]  Nephrology Consult Note    Patient's Name: Sheba Perry. Reason for Consult: ESRD on PD    Chief Complaint: Chest pain  History Obtained From:  patient, past medical records, and EMR    History of Present Illness:    Sheba Peraza is a 50 y.o. male with a past medical history of anemia, hypertension, hyperlipidemia, and BPH. He presented to the ED on 12/28 with complaints of chest pain. Vital signs on 12/28 includes temperature 97.7, respirations 18, pulse 118, /97, he was 98% SPO2. Lab data on 12/28 includes potassium 5.7, BUN 63, creatinine 14.4, calcium 10.6, WBC 17.9, and hemoglobin 10.1. He had a CT chest on 12/28 which showed mild dilation of ascending aorta. We were consulted to see the patient for ESRD on PD. Patient is known to our service. At present, patient was seen and examined. He reports that he came in due to chest pain. He denies any current chest pain or shortness of breath. He denies any abdominal pain, nausea, vomiting, or diarrhea. He denies any fevers or chills. He denies any headaches.   He reports that his appetite is normal.    PMH:    Past Medical History:   Diagnosis Date    Acute heart failure with preserved ejection fraction ([address] 09/11/2020    CONY (acute kidney injury) ([address] 09/10/2020    Anemia in chronic kidney disease, on chronic dialysis ([address] 12/05/2020    Anemia in stage 5 chronic kidney disease, not on chronic dialysis ([address] 12/05/2020    Asymmetric septal hypertrophy ([address] 2012    BPH (benign prostatic hyperplasia) 09/11/2020    CKD (chronic kidney disease) stage 5, GFR less than 15 ml/min ([address] 12/05/2020    CKD (chronic kidney disease) stage V requiring chronic dialysis ([address]     Erectile dysfunction 09/11/2020    Gout 09/11/2020    Hyperkalemia 09/11/2020    Hyperlipidemia     Hypertension     Mild aortic sclerosis 09/11/2020    Mild aortic valve sclerosis 09/11/2020    Opioid dependence in remission ([address] 09/11/2020 Younger years    Proteinuria 09/11/2020    SBP (spontaneous bacterial peritonitis) ([facility]) 11/24/2022    Seasonal allergic rhinitis 09/11/2020    Thoracic aortic aneurysm without rupture 09/11/2020    Ascending; 4.3 cm- Dr. Armenta 3/2020       Patient Active Problem List   Diagnosis    CONY (acute kidney injury) ([facility])    Asymmetric septal hypertrophy ([facility])    Hyperlipidemia    Hypertension    Mild aortic valve sclerosis    Erectile dysfunction    Thoracic aortic aneurysm without rupture    Hyperkalemia    BPH (benign prostatic hyperplasia)    Gout    Opioid dependence in remission ([facility])    Seasonal allergic rhinitis    Proteinuria    Acute heart failure with preserved ejection fraction ([facility])    CKD (chronic kidney disease) stage 5, GFR less than 15 ml/min ([facility])    Anemia in chronic kidney disease, on chronic dialysis ([facility])    SBP (spontaneous bacterial peritonitis) ([facility])    CKD (chronic kidney disease) stage V requiring chronic dialysis ([facility])    NSTEMI (non-ST elevated myocardial infarction) ([facility])       Diet:    ADULT DIET; Regular; Low Fat/Low Chol/High Fiber/DAISY; Low Potassium (Less than 3000 mg/day)    Meds:     sodium zirconium cyclosilicate  10 g Oral Daily    sodium chloride flush  5-40 mL IntraVENous 2 times per day    allopurinol  100 mg Oral Daily    calcium acetate  667 mg Oral TID WC    carvedilol  25 mg Oral BID WC    hydrALAZINE  100 mg Oral 3 times per day    rosuvastatin  10 mg Oral Nightly    sodium bicarbonate  1,300 mg Oral TID        dextrose      sodium chloride         Meds prn:     perflutren lipid microspheres, glucose, dextrose bolus **OR** dextrose bolus, glucagon (rDNA), dextrose, heparin (porcine), heparin (porcine), sodium chloride flush, sodium chloride, acetaminophen **OR** acetaminophen, bisacodyl    Meds prior to admission:     No current facility-administered medications on file prior to encounter.     Current Outpatient Medications on File Prior to Encounter   Medication Sig Dispense  Refill    sevelamer (RENVELA) 800 MG tablet Take 1 tablet by mouth 3 times daily (with meals)      sodium zirconium cyclosilicate (LOKELMA) 10 g PACK oral suspension Take 10 g by mouth daily      Cholecalciferol (VITAMIN D3) 1.25 MG (07290 UT) CAPS Take by mouth once a week      hydrALAZINE (APRESOLINE) 50 MG tablet Take 1 tablet by mouth every 8 hours (Patient taking differently: Take 100 mg by mouth every 8 hours ) 90 tablet 3    Calcium Acetate, Phos Binder, 667 MG CAPS Take 1 capsule by mouth 3 times daily (with meals) (Patient not taking: Reported on 2022) 180 capsule 1    allopurinol (ZYLOPRIM) 100 MG tablet Take 1 tablet by mouth daily 30 tablet 3    carvedilol (COREG) 12.5 MG tablet Take 1 tablet by mouth 2 times daily (with meals) (Patient taking differently: Take 25 mg by mouth 2 times daily (with meals)) 60 tablet 3    rosuvastatin (CRESTOR) 10 MG tablet Take 1 tablet by mouth nightly 30 tablet 3    sodium bicarbonate 650 MG tablet Take 2 tablets by mouth 2 times daily (Patient taking differently: Take 1,300 mg by mouth 3 times daily) 120 tablet 1       Allergies:    Anesthetics, amide    Social History:     reports that he quit smoking about 3 years ago. His smoking use included cigarettes. He started smoking about 35 years ago. He has a 31.00 pack-year smoking history. He has never used smokeless tobacco. He reports that he does not currently use alcohol. He reports current drug use. Drug: Marijuana Russell Blow). Family History:         Problem Relation Age of Onset    Heart Disease Mother          age 55    Heart Attack Father 61        26, alive age 79; previous CABG    No Known Problems Sister        Review of Systems:   Pertinent items are noted in HPI.     Physical Exam:      Patient Vitals for the past 24 hrs:   BP Temp Temp src Pulse Resp SpO2 Weight   22 1440 125/80 97.8 °F (36.6 °C) Oral 97 20 95 % --   22 1044 (!) 132/99 96.9 °F (36.1 °C) Temporal (!) 103 18 98 % -- 12/29/22 0910 (!) 148/96 97.5 °F (36.4 °C) Axillary (!) 110 19 97 % --   12/29/22 0822 122/74 98.2 °F (36.8 °C) Oral 98 18 98 % --   12/29/22 0754 (!) 128/90 98.2 °F (36.8 °C) Oral 100 18 98 % --   12/29/22 0700 (!) 125/90 -- -- 100 16 -- --   12/29/22 0630 (!) 134/91 -- -- 97 15 -- --   12/29/22 0600 (!) 146/100 -- -- 97 20 98 % --   12/29/22 0530 (!) 131/96 -- -- (!) 101 22 -- --   12/29/22 0515 (!) 138/99 -- -- (!) 103 21 -- --   12/29/22 0430 (!) 140/91 -- -- (!) 108 17 -- --   12/29/22 0300 (!) 137/97 -- -- 94 23 99 % --   12/29/22 0100 (!) 128/90 -- -- 94 27 -- --   12/29/22 0030 124/79 -- -- 98 -- 98 % --   12/29/22 0000 113/82 -- -- -- -- -- --   12/28/22 2330 (!) 135/101 -- -- -- -- -- --   12/28/22 2300 113/84 -- -- -- -- -- --   12/28/22 2230 (!) 122/96 -- -- (!) 115 14 99 % --   12/28/22 2200 (!) 150/98 -- -- (!) 118 -- -- --   12/28/22 2130 134/89 -- -- (!) 110 10 96 % --   12/28/22 2030 125/84 -- -- (!) 112 25 97 % --   12/28/22 2000 (!) 140/94 -- -- (!) 116 19 96 % --   12/28/22 1930 (!) 143/87 -- -- (!) 112 22 96 % --   12/28/22 1900 (!) 162/108 -- -- (!) 102 21 96 % --   12/28/22 1830 (!) 147/92 -- -- 100 23 98 % --   12/28/22 1811 (!) 138/102 -- -- (!) 108 22 100 % --   12/28/22 1744 -- -- -- -- -- 98 % --   12/28/22 1739 (!) 157/105 -- -- (!) 114 -- -- --   12/28/22 1718 (!) 146/97 -- -- -- -- -- --   12/28/22 1715 -- -- -- -- 18 -- 215 lb (97.5 kg)   12/28/22 1700 -- 97.7 °F (36.5 °C) Temporal (!) 118 -- 98 % --         Intake/Output Summary (Last 24 hours) at 12/29/2022 1508  Last data filed at 12/29/2022 1326  Gross per 24 hour   Intake 170.96 ml   Output --   Net 170.96 ml     General: Awake, alert, no acute distress  Neck: No JVD noted  Lungs: Clear bilaterally upper, diminished to the bases bilaterally. Unlabored  CV: Regular rate and rhythm. No rub  Abd: Soft, nontender, nondistended. Active bowel sounds; PD cath  Skin: Warm and dry.   No rash on exposed extremities  Ext: No edema Neuro: Awake, answers questions appropriately    Data:    Recent Labs     12/28/22  1723 12/29/22  0508   WBC 17.9* 12.1*   HGB 10.1* 9.2*   HCT 30.4* 28.1*   .7* 102.6*    231       Recent Labs     12/28/22  1723 12/28/22  1902 12/28/22  2133 12/29/22  0316 12/29/22  0508 12/29/22  1143     --   --   --  135  --    K 5.5*  --    < > 5.5* 5.8* 6.0*   CL 96*  --   --   --  94*  --    CO2 23  --   --   --  25  --    CREATININE 14.4*  --   --   --  14.6*  --    BUN 63*  --   --   --  71*  --    LABGLOM 4  --   --   --  4  --    GLUCOSE 138* 131  --   --  108*  --    CALCIUM 10.6*  --   --   --  9.8  --     < > = values in this interval not displayed. Vit D, 25-Hydroxy   Date Value Ref Range Status   12/06/2020 15 (L) 30 - 100 ng/mL Final     Comment:     <20 ng/mL. ........... Art Gatito Deficient  20-30 ng/mL. ......... Art Gatito Insufficient   ng/mL. ........ Art Gatito Sufficient  >100 ng/mL. .......... Art Gatito Toxic         PTH   Date Value Ref Range Status   12/06/2020 271 (H) 15 - 65 pg/mL Final       Recent Labs     12/28/22  1723   ALT 32   AST 13   ALKPHOS 58   BILITOT 0.3       Recent Labs     12/28/22  1723   LABALBU 3.7       Ferritin   Date Value Ref Range Status   12/06/2020 239 ng/mL Final     Comment:     FERRITIN Reference Ranges:  Adult Males   20 - 60 years:    27 - 400 ng/mL  Adult females 16 - 61 years:    15 - 150 ng/mL  Adults greater than 60 years:   no established reference range  Pediatrics:                     no established reference range       Iron   Date Value Ref Range Status   12/06/2020 77 59 - 158 mcg/dL Final     TIBC   Date Value Ref Range Status   12/06/2020 215 (L) 250 - 450 mcg/dL Final       Vitamin B-12   Date Value Ref Range Status   12/06/2020 339 211 - 946 pg/mL Final   12/06/2020 359 211 - 946 pg/mL Final       Folate   Date Value Ref Range Status   12/06/2020 10.0 4.8 - 24.2 ng/mL Final   12/06/2020 10.2 4.8 - 24.2 ng/mL Final       Lab Results   Component Value Date/Time    COLORU Yellow 12/28/2022 09:33 PM    NITRU Negative 12/28/2022 09:33 PM    GLUCOSEU 100 12/28/2022 09:33 PM    KETUA Negative 12/28/2022 09:33 PM    UROBILINOGEN 0.2 12/28/2022 09:33 PM    BILIRUBINUR Negative 12/28/2022 09:33 PM       Lab Results   Component Value Date/Time    JOSE Hackett 09/10/2020 07:43 PM       No components found for: URIC    No results found for: LIPIDPAN    Assessment and Plans:    ESRD on PD  Follows outpatient with Dr. Krystina Shearer PD while inpatient  Strict I&O, daily weights  Monitor labs    2. Chest pain  Troponin 105 on 12/28  S/p heparin drip  For echo  Cardiology following    3. Hyperkalemia  in the setting of ESRD  K+ 6 today  S/p Lokelma  On Lokelma 10 g oral daily  Repeat BMP this evening  Low potassium diet  Monitor labs    4. Secondary hyperparathyroidism of renal origin   and phosphorus 6.2 on 12/16 in the outpatient setting  Check Phos in the a.m. On PhosLo  Monitor labs    5. Hypertension in CKD 5/ESRD  BP goal<140/90  BP at goal  Monitor on current regimen    6. Anemia of chronic kidney disease  Hemoglobin target 10-12  Hemoglobin 9.2 on 12/29-below target  Transfuse for hemoglobin<7  Monitor H&H    VIC Abrams - CNP    Patient seen and examined all key components of the physical performed independently , case discussed with NP, all pertinent labs and radiologic tests personally reviewed agree with above.       Tomas Camilo MD

## 2022-12-30 VITALS
SYSTOLIC BLOOD PRESSURE: 121 MMHG | HEART RATE: 89 BPM | RESPIRATION RATE: 16 BRPM | BODY MASS INDEX: 29.15 KG/M2 | OXYGEN SATURATION: 97 % | DIASTOLIC BLOOD PRESSURE: 85 MMHG | TEMPERATURE: 97.9 F | WEIGHT: 214.95 LBS

## 2022-12-30 LAB
ANION GAP SERPL CALCULATED.3IONS-SCNC: 18 MMOL/L (ref 7–16)
ANION GAP SERPL CALCULATED.3IONS-SCNC: 19 MMOL/L (ref 7–16)
BUN BLDV-MCNC: 73 MG/DL (ref 6–20)
BUN BLDV-MCNC: 74 MG/DL (ref 6–20)
CALCIUM SERPL-MCNC: 9.3 MG/DL (ref 8.6–10.2)
CALCIUM SERPL-MCNC: 9.5 MG/DL (ref 8.6–10.2)
CHLORIDE BLD-SCNC: 91 MMOL/L (ref 98–107)
CHLORIDE BLD-SCNC: 94 MMOL/L (ref 98–107)
CO2: 25 MMOL/L (ref 22–29)
CO2: 25 MMOL/L (ref 22–29)
CREAT SERPL-MCNC: 15.3 MG/DL (ref 0.7–1.2)
CREAT SERPL-MCNC: 16.8 MG/DL (ref 0.7–1.2)
GFR SERPL CREATININE-BSD FRML MDRD: 3 ML/MIN/1.73
GFR SERPL CREATININE-BSD FRML MDRD: 3 ML/MIN/1.73
GLUCOSE BLD-MCNC: 100 MG/DL (ref 74–99)
GLUCOSE BLD-MCNC: 99 MG/DL (ref 74–99)
HCT VFR BLD CALC: 27.3 % (ref 37–54)
HEMOGLOBIN: 9.2 G/DL (ref 12.5–16.5)
MAGNESIUM: 2.7 MG/DL (ref 1.6–2.6)
MCH RBC QN AUTO: 33.6 PG (ref 26–35)
MCHC RBC AUTO-ENTMCNC: 33.7 % (ref 32–34.5)
MCV RBC AUTO: 99.6 FL (ref 80–99.9)
PDW BLD-RTO: 13.5 FL (ref 11.5–15)
PHOSPHORUS: 8.9 MG/DL (ref 2.5–4.5)
PLATELET # BLD: 234 E9/L (ref 130–450)
PMV BLD AUTO: 9.7 FL (ref 7–12)
POTASSIUM SERPL-SCNC: 5.3 MMOL/L (ref 3.5–5)
POTASSIUM SERPL-SCNC: 5.6 MMOL/L (ref 3.5–5)
RBC # BLD: 2.74 E12/L (ref 3.8–5.8)
SODIUM BLD-SCNC: 135 MMOL/L (ref 132–146)
SODIUM BLD-SCNC: 137 MMOL/L (ref 132–146)
WBC # BLD: 10.1 E9/L (ref 4.5–11.5)

## 2022-12-30 PROCEDURE — 93306 TTE W/DOPPLER COMPLETE: CPT

## 2022-12-30 PROCEDURE — 2500000003 HC RX 250 WO HCPCS: Performed by: NURSE PRACTITIONER

## 2022-12-30 PROCEDURE — 2580000003 HC RX 258: Performed by: NURSE PRACTITIONER

## 2022-12-30 PROCEDURE — 6370000000 HC RX 637 (ALT 250 FOR IP): Performed by: INTERNAL MEDICINE

## 2022-12-30 PROCEDURE — 36415 COLL VENOUS BLD VENIPUNCTURE: CPT

## 2022-12-30 PROCEDURE — 6370000000 HC RX 637 (ALT 250 FOR IP): Performed by: NURSE PRACTITIONER

## 2022-12-30 PROCEDURE — 6370000000 HC RX 637 (ALT 250 FOR IP)

## 2022-12-30 PROCEDURE — 80048 BASIC METABOLIC PNL TOTAL CA: CPT

## 2022-12-30 PROCEDURE — 84100 ASSAY OF PHOSPHORUS: CPT

## 2022-12-30 PROCEDURE — 83735 ASSAY OF MAGNESIUM: CPT

## 2022-12-30 PROCEDURE — 85027 COMPLETE CBC AUTOMATED: CPT

## 2022-12-30 RX ORDER — SODIUM POLYSTYRENE SULFONATE 15 G/60ML
15 SUSPENSION ORAL; RECTAL ONCE
Status: COMPLETED | OUTPATIENT
Start: 2022-12-30 | End: 2022-12-30

## 2022-12-30 RX ORDER — CARVEDILOL 25 MG/1
25 TABLET ORAL 2 TIMES DAILY WITH MEALS
Qty: 60 TABLET | Refills: 3 | Status: SHIPPED | OUTPATIENT
Start: 2022-12-30

## 2022-12-30 RX ORDER — SODIUM BICARBONATE 650 MG/1
1300 TABLET ORAL 3 TIMES DAILY
Qty: 120 TABLET | Refills: 2 | Status: SHIPPED | OUTPATIENT
Start: 2022-12-30

## 2022-12-30 RX ORDER — HYDRALAZINE HYDROCHLORIDE 100 MG/1
100 TABLET, FILM COATED ORAL EVERY 8 HOURS SCHEDULED
Qty: 90 TABLET | Refills: 3 | Status: SHIPPED | OUTPATIENT
Start: 2022-12-30

## 2022-12-30 RX ADMIN — CALCIUM ACETATE 667 MG: 667 CAPSULE ORAL at 08:00

## 2022-12-30 RX ADMIN — ALLOPURINOL 100 MG: 100 TABLET ORAL at 10:21

## 2022-12-30 RX ADMIN — SODIUM BICARBONATE 1300 MG: 650 TABLET ORAL at 15:38

## 2022-12-30 RX ADMIN — SODIUM POLYSTYRENE SULFONATE 15 G: 15 SUSPENSION ORAL; RECTAL at 12:23

## 2022-12-30 RX ADMIN — CALCIUM ACETATE 667 MG: 667 CAPSULE ORAL at 17:01

## 2022-12-30 RX ADMIN — CARVEDILOL 25 MG: 25 TABLET, FILM COATED ORAL at 10:00

## 2022-12-30 RX ADMIN — Medication 10 ML: at 10:25

## 2022-12-30 RX ADMIN — SODIUM ZIRCONIUM CYCLOSILICATE 10 G: 10 POWDER, FOR SUSPENSION ORAL at 10:20

## 2022-12-30 RX ADMIN — HYDRALAZINE HYDROCHLORIDE 100 MG: 50 TABLET, FILM COATED ORAL at 08:01

## 2022-12-30 RX ADMIN — HYDRALAZINE HYDROCHLORIDE 100 MG: 50 TABLET, FILM COATED ORAL at 15:38

## 2022-12-30 RX ADMIN — CARVEDILOL 25 MG: 25 TABLET, FILM COATED ORAL at 17:18

## 2022-12-30 RX ADMIN — SODIUM BICARBONATE 1300 MG: 650 TABLET ORAL at 10:21

## 2022-12-30 NOTE — PROGRESS NOTES
[facility]  Nephrology Progress Note    Patient's Name: Nasima Love. History of Present Illness from 12/29 Consult Note:  Gianna Mena. is a 50 y.o. male with a past medical history of anemia, hypertension, hyperlipidemia, and BPH. He presented to the ED on 12/28 with complaints of chest pain. Vital signs on 12/28 includes temperature 97.7, respirations 18, pulse 118, /97, he was 98% SPO2. Lab data on 12/28 includes potassium 5.7, BUN 63, creatinine 14.4, calcium 10.6, WBC 17.9, and hemoglobin 10.1. He had a CT chest on 12/28 which showed mild dilation of ascending aorta. We were consulted to see the patient for ESRD on PD. Patient is known to our service. At present, patient was seen and examined. He reports that he came in due to chest pain. He denies any current chest pain or shortness of breath. He denies any abdominal pain, nausea, vomiting, or diarrhea. He denies any fevers or chills. He denies any headaches. He reports that his appetite is normal.\"    Subjective:    12/30: Patient was seen and examined. He denies any chest pain or shortness of breath. He denies any abdominal pain or nausea.     PMH:    Past Medical History:   Diagnosis Date    Acute heart failure with preserved ejection fraction ([address] 09/11/2020    CONY (acute kidney injury) ([address] 09/10/2020    Anemia in chronic kidney disease, on chronic dialysis ([address] 12/05/2020    Anemia in stage 5 chronic kidney disease, not on chronic dialysis ([address] 12/05/2020    Asymmetric septal hypertrophy ([address] 2012    BPH (benign prostatic hyperplasia) 09/11/2020    CKD (chronic kidney disease) stage 5, GFR less than 15 ml/min ([address] 12/05/2020    CKD (chronic kidney disease) stage V requiring chronic dialysis ([address]     Erectile dysfunction 09/11/2020    Gout 09/11/2020    Hyperkalemia 09/11/2020    Hyperlipidemia     Hypertension     Mild aortic sclerosis 09/11/2020    Mild aortic valve sclerosis 09/11/2020    Opioid dependence in remission ([facility] 09/11/2020    Younger years    Proteinuria 09/11/2020    SBP (spontaneous bacterial peritonitis) ([facility] 11/24/2022    Seasonal allergic rhinitis 09/11/2020    Thoracic aortic aneurysm without rupture 09/11/2020    Ascending; 4.3 cm- Dr. Livingstno Sessions 3/2020       Patient Active Problem List   Diagnosis    CONY (acute kidney injury) ([facility]    Asymmetric septal hypertrophy (H[facility])    Hyperlipidemia    Hypertension    Mild aortic valve sclerosis    Erectile dysfunction    Thoracic aortic aneurysm without rupture    Hyperkalemia    BPH (benign prostatic hyperplasia)    Gout    Opioid dependence in remission ([facility])    Seasonal allergic rhinitis    Proteinuria    Acute heart failure with preserved ejection fraction ([facility])    CKD (chronic kidney disease) stage 5, GFR less than 15 ml/min ([facility])    Anemia in chronic kidney disease, on chronic dialysis ([facility])    SBP (spontaneous bacterial peritonitis) ([facility]    CKD (chronic kidney disease) stage V requiring chronic dialysis ([facility]    NSTEMI (non-ST elevated myocardial infarction) ([facility]       Diet:    ADULT DIET; Regular; Low Fat/Low Chol/High Fiber/DAISY; Low Potassium (Less than 3000 mg/day)    Meds:     sodium zirconium cyclosilicate  10 g Oral Daily    sodium chloride flush  5-40 mL IntraVENous 2 times per day    allopurinol  100 mg Oral Daily    calcium acetate  667 mg Oral TID WC    carvedilol  25 mg Oral BID WC    hydrALAZINE  100 mg Oral 3 times per day    rosuvastatin  10 mg Oral Nightly    sodium bicarbonate  1,300 mg Oral TID        dextrose      sodium chloride         Meds prn:     perflutren lipid microspheres, glucose, dextrose bolus **OR** dextrose bolus, glucagon (rDNA), dextrose, heparin (porcine), heparin (porcine), sodium chloride flush, sodium chloride, acetaminophen **OR** acetaminophen, bisacodyl    Meds prior to admission:     No current facility-administered medications on file prior to encounter.      Current Outpatient Medications on File Prior to Encounter   Medication Sig Dispense Refill    sevelamer (RENVELA) 800 MG tablet Take 1 tablet by mouth 3 times daily (with meals)      sodium zirconium cyclosilicate (LOKELMA) 10 g PACK oral suspension Take 10 g by mouth daily      Cholecalciferol (VITAMIN D3) 1.25 MG (25283 UT) CAPS Take by mouth once a week      hydrALAZINE (APRESOLINE) 50 MG tablet Take 1 tablet by mouth every 8 hours (Patient taking differently: Take 100 mg by mouth every 8 hours ) 90 tablet 3    Calcium Acetate, Phos Binder, 667 MG CAPS Take 1 capsule by mouth 3 times daily (with meals) (Patient not taking: Reported on 2022) 180 capsule 1    allopurinol (ZYLOPRIM) 100 MG tablet Take 1 tablet by mouth daily 30 tablet 3    carvedilol (COREG) 12.5 MG tablet Take 1 tablet by mouth 2 times daily (with meals) (Patient taking differently: Take 25 mg by mouth 2 times daily (with meals)) 60 tablet 3    rosuvastatin (CRESTOR) 10 MG tablet Take 1 tablet by mouth nightly 30 tablet 3    sodium bicarbonate 650 MG tablet Take 2 tablets by mouth 2 times daily (Patient taking differently: Take 1,300 mg by mouth 3 times daily) 120 tablet 1       Allergies:    Anesthetics, amide    Social History:     reports that he quit smoking about 3 years ago. His smoking use included cigarettes. He started smoking about 35 years ago. He has a 31.00 pack-year smoking history. He has never used smokeless tobacco. He reports that he does not currently use alcohol. He reports current drug use. Drug: Marijuana Palma Hakeem).     Family History:         Problem Relation Age of Onset    Heart Disease Mother          age 55    Heart Attack Father 61        26, alive age 79; previous CABG    No Known Problems Sister        Physical Exam:      Patient Vitals for the past 24 hrs:   BP Temp Temp src Pulse Resp SpO2 Weight   22 0708 121/71 98.4 °F (36.9 °C) Temporal 99 18 95 % --   22 0507 -- -- -- -- -- -- 215 lb (97.5 kg)   22 0331 121/84 98.8 °F (37.1 °C) Temporal 95 18 97 % --   12/29/22 1949 110/74 99 °F (37.2 °C) Temporal 97 14 97 % --   12/29/22 1624 -- -- -- 96 -- -- --   12/29/22 1622 123/82 97.8 °F (36.6 °C) Oral 97 20 95 % 214 lb 15.2 oz (97.5 kg)   12/29/22 1440 125/80 97.8 °F (36.6 °C) Oral 97 20 95 % --   12/29/22 1044 (!) 132/99 96.9 °F (36.1 °C) Temporal (!) 103 18 98 % --   12/29/22 0910 (!) 148/96 97.5 °F (36.4 °C) Axillary (!) 110 19 97 % --           Intake/Output Summary (Last 24 hours) at 12/30/2022 0831  Last data filed at 12/30/2022 0700  Gross per 24 hour   Intake 300 ml   Output 1825 ml   Net -1525 ml       General: Awake, alert, no acute distress  Neck: No JVD noted  Lungs: Clear bilaterally upper, diminished to the bases bilaterally. Unlabored  CV: Regular rate and rhythm. No rub  Abd: Soft, nontender, nondistended. Active bowel sounds; PD cath  Skin: Warm and dry. No rash on exposed extremities  Ext: No edema   Neuro: Awake, answers questions appropriately    Data:    Recent Labs     12/28/22  1723 12/29/22  0508 12/30/22  0515   WBC 17.9* 12.1* 10.1   HGB 10.1* 9.2* 9.2*   HCT 30.4* 28.1* 27.3*   .7* 102.6* 99.6    231 234         Recent Labs     12/29/22  0508 12/29/22  1143 12/29/22  1507 12/29/22  1716 12/30/22  0515     --   --  136 137   K 5.8*   < > 5.4* 5.2* 5.6*   CL 94*  --   --  93* 94*   CO2 25  --   --  24 25   CREATININE 14.6*  --   --  15.7* 15.3*   BUN 71*  --   --  72* 74*   LABGLOM 4  --   --  3 3   GLUCOSE 108*  --   --  162* 99   CALCIUM 9.8  --   --  9.9 9.3   PHOS  --   --   --   --  8.9*   MG  --   --   --   --  2.7*    < > = values in this interval not displayed. Vit D, 25-Hydroxy   Date Value Ref Range Status   12/06/2020 15 (L) 30 - 100 ng/mL Final     Comment:     <20 ng/mL. ........... Rhenda Kriss Deficient  20-30 ng/mL. ......... Rhenda Kriss Insufficient   ng/mL. ........ Rhenda Kriss Sufficient  >100 ng/mL. .......... Rhenda Kriss Toxic         PTH   Date Value Ref Range Status   12/06/2020 271 (H) 15 - 65 pg/mL Final Recent Labs     12/28/22  1723   ALT 32   AST 13   ALKPHOS 58   BILITOT 0.3         Recent Labs     12/28/22  1723   LABALBU 3.7         Ferritin   Date Value Ref Range Status   12/06/2020 239 ng/mL Final     Comment:     FERRITIN Reference Ranges:  Adult Males   20 - 60 years:    27 - 400 ng/mL  Adult females 16 - 61 years:    15 - 150 ng/mL  Adults greater than 60 years:   no established reference range  Pediatrics:                     no established reference range       Iron   Date Value Ref Range Status   12/06/2020 77 59 - 158 mcg/dL Final     TIBC   Date Value Ref Range Status   12/06/2020 215 (L) 250 - 450 mcg/dL Final       Vitamin B-12   Date Value Ref Range Status   12/06/2020 339 211 - 946 pg/mL Final   12/06/2020 359 211 - 946 pg/mL Final       Folate   Date Value Ref Range Status   12/06/2020 10.0 4.8 - 24.2 ng/mL Final   12/06/2020 10.2 4.8 - 24.2 ng/mL Final       Lab Results   Component Value Date/Time    COLORU Yellow 12/28/2022 09:33 PM    NITRU Negative 12/28/2022 09:33 PM    GLUCOSEU 100 12/28/2022 09:33 PM    KETUA Negative 12/28/2022 09:33 PM    UROBILINOGEN 0.2 12/28/2022 09:33 PM    BILIRUBINUR Negative 12/28/2022 09:33 PM       Lab Results   Component Value Date/Time    OSMOU 355 09/10/2020 07:43 PM       No components found for: URIC    No results found for: LIPIDPAN    Assessment and Plans:    ESRD on PD  Follows outpatient with Dr. Rachel Connor PD while inpatient  Strict I&O, daily weights  Monitor labs    2. Chest pain  Troponin 105 on 12/28  S/p heparin drip  For echo  Cardiology following    3. Hyperkalemia  in the setting of ESRD  K+ 6 on 12/29--> 5.6 today  S/p Lokelma  On Lokelma 10 g oral daily  kayexalate once  Repeat BMP this evening  Low potassium diet  Monitor labs    4. Secondary hyperparathyroidism of renal origin  With hyperphosphatemia   and phosphorus 6.2 on 12/16 in the outpatient setting  Phosphorus 8.9 today  On PhosLo  Monitor labs    5. Hypertension in CKD 5/ESRD  BP goal<140/90  BP at goal  Monitor on current regimen    6. Anemia of chronic kidney disease  Hemoglobin target 10-12  Hemoglobin 9.2 on 12/30-below target  Transfuse for hemoglobin<7  Monitor H&H    VIC Pat - CNP    Patient seen and examined all key components of the physical performed independently , case discussed with NP, all pertinent labs and radiologic tests personally reviewed agree with above.     Chest pain, possible pericarditis  Dialysis prescription increased  Mild hyperkalemia; treated with binders  Dietary potassium restriction reinforced  Repeat level    OK to discharge    Justen Kam MD

## 2022-12-30 NOTE — DISCHARGE SUMMARY
[facility]   Discharge summary   Patient ID:  Lisbet Agarwal  [id number]  50 y.o.  1970    Admit date: 12/28/2022    Discharge date and time: 12/30/2022    Admission Diagnoses:   Patient Active Problem List   Diagnosis    CONY (acute kidney injury) ([facility]    Asymmetric septal hypertrophy (HCC)    Hyperlipidemia    Hypertension    Mild aortic valve sclerosis    Erectile dysfunction    Thoracic aortic aneurysm without rupture    Hyperkalemia    BPH (benign prostatic hyperplasia)    Gout    Opioid dependence in remission ([facility])    Seasonal allergic rhinitis    Proteinuria    Acute heart failure with preserved ejection fraction ([facility])    CKD (chronic kidney disease) stage 5, GFR less than 15 ml/min ([facility])    Anemia in chronic kidney disease, on chronic dialysis ([facility])    SBP (spontaneous bacterial peritonitis) ([facility])    CKD (chronic kidney disease) stage V requiring chronic dialysis ([facility]    NSTEMI (non-ST elevated myocardial infarction) [facility])       Discharge Diagnoses: Pericardial effusion    Consults: cardiology and nephrology    Procedures: NOne    Hospital Course: The patient is a 50 y.o. male of Cydney Conrad MD     53-year-old male with a history of ESRD, hypertension presents to the ED with complaints of chest pain and is admitted to telemetry unit with     Elevated troponins are secondary to his renal disease, no evidence of NSTEMI  Pericardial effusion is of volume overload  Monitor labs-troponins-105  Continue heparin drip-low-dose nomogram  Cardiology following-no plans for any further evaluation or work-up  Nitropaste-monitor BP  Supplement O2 to keep saturation greater than 93%. Patient stable for discharge from medicine standpoint if repeat potassium at 5 PM returns within normal limits     ESRD/hyperkalemia  Continue CAPD  Consult nephrology   Status post 1 dose of Lokelma at 12 noon     Patient needs  BMP on Monday.   Patient educated on importance of compliance of medications and dialysis. Patient to follow up with PCP and nephrology within 2 weeks of discharge. Recent Labs     12/28/22  1723 12/29/22  0508 12/30/22  0515   WBC 17.9* 12.1* 10.1   HGB 10.1* 9.2* 9.2*   HCT 30.4* 28.1* 27.3*    231 234       Recent Labs     12/29/22  1716 12/30/22  0515 12/30/22  1454    137 135   K 5.2* 5.6* 5.3*   CL 93* 94* 91*   CO2 24 25 25   BUN 72* 74* 73*   CREATININE 15.7* 15.3* 16.8*   CALCIUM 9.9 9.3 9.5       CTA CHEST W CONTRAST    Result Date: 12/28/2022  EXAMINATION: CTA OF THE CHEST 12/28/2022 6:09 pm TECHNIQUE: CTA of the chest was performed after the administration of intravenous contrast.  Multiplanar reformatted images are provided for review. MIP images are provided for review. Automated exposure control, iterative reconstruction, and/or weight based adjustment of the mA/kV was utilized to reduce the radiation dose to as low as reasonably achievable. COMPARISON: None. HISTORY: ORDERING SYSTEM PROVIDED HISTORY: r/o dissection TECHNOLOGIST PROVIDED HISTORY: Reason for exam:->r/o dissection Decision Support Exception - unselect if not a suspected or confirmed emergency medical condition->Emergency Medical Condition (MA) What reading provider will be dictating this exam?->CRC FINDINGS: Mediastinum: Cardiomegaly small pericardial effusion. Mild dilatation of the ascending aorta up to 4.3 cm with no evidence for intramural hematoma or dissection. No obvious pulmonary embolus. Soft Tissues/Bones: No acute bone or soft tissue abnormality. Mild dilatation of the ascending aorta measuring up to 4.3 cm. No evidence for intramural hematoma or dissection involving the thoracic aorta. Cardiomegaly with small pericardial effusion.      CTA ABDOMEN PELVIS W CONTRAST    Result Date: 12/28/2022  EXAMINATION: CTA OF THE ABDOMEN AND PELVIS WITH CONTRAST 12/28/2022 6:09 pm: TECHNIQUE: CTA of the abdomen and pelvis was performed with the administration of intravenous contrast. Multiplanar reformatted images are provided for review. MIP images are provided for review. Automated exposure control, iterative reconstruction, and/or weight based adjustment of the mA/kV was utilized to reduce the radiation dose to as low as reasonably achievable. COMPARISON: None. HISTORY: ORDERING SYSTEM PROVIDED HISTORY: r/o dissection TECHNOLOGIST PROVIDED HISTORY: Reason for exam:->r/o dissection Decision Support Exception - unselect if not a suspected or confirmed emergency medical condition->Emergency Medical Condition (MA) What reading provider will be dictating this exam?->CRC FINDINGS: CTA ABDOMEN: Normal caliber abdominal aorta no evidence for intramural hematoma or dissection. The liver, spleen, adrenal glands, pancreas are normal.  Cholelithiasis in a mildly distended gallbladder with mild gallbladder wall thickening. Free fluid noted in the upper abdomen. Bilateral renal atrophy. CTA PELVIS: Normal caliber iliac arteries with no evidence for intramural hematoma or dissection. Grossly normal large and small bowel. Moderate volume 4 quadrant ascites. Prostatomegaly with diffuse urinary bladder wall thickening. Normal caliber abdominal aorta and iliac arteries with no evidence for intramural hematoma or dissection. Moderate volume 4 quadrant ascites. Cholelithiasis in a mildly distended gallbladder with mild gallbladder wall thickening. Correlate with right upper quadrant pain. Bilateral renal atrophy. Discharge Exam:    HEENT: NCAT,  PERRLA, No JVD  Heart:  RRR, no murmurs, gallops, or rubs.   Lungs:  CTA bilaterally, no wheeze, rales or rhonchi  Abd: bowel sounds present, nontender, nondistended, no masses  Extrem:  No clubbing, cyanosis, or edema    Disposition: home     Patient Condition at Discharge: stable    Patient Instructions:      Medication List        CHANGE how you take these medications      carvedilol 25 MG tablet  Commonly known as: COREG  Take 1 tablet by mouth 2 times daily (with meals)  What changed:   medication strength  how much to take     hydrALAZINE 100 MG tablet  Commonly known as: APRESOLINE  Take 1 tablet by mouth every 8 hours  What changed:   medication strength  how much to take            CONTINUE taking these medications      allopurinol 100 MG tablet  Commonly known as: ZYLOPRIM  Take 1 tablet by mouth daily     rosuvastatin 10 MG tablet  Commonly known as: CRESTOR  Take 1 tablet by mouth nightly     sevelamer 800 MG tablet  Commonly known as: RENVELA     sodium bicarbonate 650 MG tablet  Take 2 tablets by mouth 3 times daily     sodium zirconium cyclosilicate 10 g Pack oral suspension  Commonly known as: LOKELMA     Vitamin D3 1.25 MG (51298 UT) Caps            STOP taking these medications      calcium acetate 667 MG Caps capsule  Commonly known as: PHOSLO               Where to Get Your Medications        These medications were sent to [address] [place] [address]      Phone: [phone]   carvedilol 25 MG tablet  hydrALAZINE 100 MG tablet  sodium bicarbonate 650 MG tablet       Activity: activity as tolerated  Diet: renal diet    Pt has been advised to: Follow-up with King Ashley MD in 1 week. Follow-up with consultants as recommended by them    Note that over 30 minutes was spent in preparing discharge papers, discussing discharge with patient, medication review, etc.    Signed:  VIC Masters CNP  12/30/2022  4:21 PM     Above note edited to reflect my thoughts     I personally saw, examined and provided care for the patient. Radiographs, labs and medication list were reviewed by me independently. The case was discussed in detail and plans for care were established. Review of Radha BATEMAN CNP, documentation was conducted and revisions were made as appropriate directly by me.  I agree with the above documented exam, problem list, and plan of care.      Sergei Smart MD  12/30/2022

## 2022-12-30 NOTE — CARE COORDINATION
Social Work Discharge Planning:   Nephrology consulted. Plan currently remains home at discharge. SW will continue to follow and assist with transition of care. Electronically signed by RAE Interiano on 12/30/2022 at 12:11 PM   Addendum:Patient did not have a stent yesterday.   Electronically signed by Tori Dye on 12/30/2022 at 12:20 PM

## 2022-12-30 NOTE — FLOWSHEET NOTE
12/30/22 0700   Vitals   /71   Temp 98.4 °F (36.9 °C)   Heart Rate 95   Resp 16   Weight 214 lb 15.2 oz (97.5 kg)   Post-Treatment (Cycler)   Effluent Appearance Clear;Yellow   PD Output (mL) 1822 mL   Accessed from [facility] using aseptic technique, 1822 clear yellow effluent removed, tolerated well, site CDI, stable at dc

## 2022-12-31 LAB
EKG ATRIAL RATE: 91 BPM
EKG ATRIAL RATE: 99 BPM
EKG P AXIS: 37 DEGREES
EKG P AXIS: 47 DEGREES
EKG P-R INTERVAL: 154 MS
EKG P-R INTERVAL: 164 MS
EKG Q-T INTERVAL: 352 MS
EKG Q-T INTERVAL: 360 MS
EKG QRS DURATION: 102 MS
EKG QRS DURATION: 92 MS
EKG QTC CALCULATION (BAZETT): 442 MS
EKG QTC CALCULATION (BAZETT): 451 MS
EKG R AXIS: 25 DEGREES
EKG R AXIS: 32 DEGREES
EKG T AXIS: 33 DEGREES
EKG T AXIS: 38 DEGREES
EKG VENTRICULAR RATE: 91 BPM
EKG VENTRICULAR RATE: 99 BPM
URINE CULTURE, ROUTINE: NORMAL

## 2023-01-02 LAB
BLOOD CULTURE, ROUTINE: NORMAL
CULTURE, BLOOD 2: NORMAL

## 2023-01-03 LAB
EKG ATRIAL RATE: 107 BPM
EKG P AXIS: 51 DEGREES
EKG P-R INTERVAL: 154 MS
EKG Q-T INTERVAL: 322 MS
EKG QRS DURATION: 94 MS
EKG QTC CALCULATION (BAZETT): 429 MS
EKG R AXIS: 31 DEGREES
EKG T AXIS: 32 DEGREES
EKG VENTRICULAR RATE: 107 BPM

## 2023-01-04 NOTE — PROGRESS NOTES
Physician Progress Note      PATIENT:               Elijah Lucas  CSN #:                  [id number]  :                       1970  ADMIT DATE:       2022 5:37 PM  Gera Watkins DATE:        2022 5:10 PM  RESPONDING  PROVIDER #:        Kaden Valera MD          QUERY TEXT:    Patient admitted with fluid overload. Noted documentation of acute heart   failure with preserved ejection fraction as an active hospital problem in H&P   through discharge summary. ? Please document in progress notes the clinical   indicators to support this diagnosis on current admission or document if this   diagnosis, acute heart failure with preserved ejection fraction, is PMH only   or has been ruled out after study. ? Please update active hospital problems   appropriately to reflect response. The medical record reflects the following:  Risk Factors: HTN, ESRD  Clinical Indicators: Per discharge summary \". Tony Power Tony Power Patient active problem   list.. Tony Power Acute heart failure with preserved ejection fraction. Tony Power Tony Power \", Pro-   on 22, Pro-BNP on 20 was 1683, Echo on 22 \". ..Mild-moderate   left ventricular concentric hypertrophy noted. Tony Power Tony Power Estimated left ventricular   ejection fraction is 65?5%. Tony Power Tony Power Does not meet 50% diagnostic criteria for   diastolic dysfunction. Tony Power Tony Power \", Echo on 20 \". .. Mild asymmetric left   ventricular septal hypertrophy. Estimated left ventricular ejection fraction   is 60?5%. Lateral wall hypokinesis, minimally borderline. There is  Treatment: Carvedilol, Hydralazine, Echo, Cardiology consult    Thank you,  Jenny Moses RN, BSN, CDIS  Clinical Documentation Improvement  [email or web address]  Options provided:  -- Acute heart failure with preserved ejection fraction is currently being   treated/evaluated as evidenced by, Please document clinical support.   -- Acute heart failure with preserved ejection fraction is PMH only  -- Acute heart failure with preserved ejection fraction has been ruled out   after study  -- Other - I will add my own diagnosis  -- Disagree - Not applicable / Not valid  -- Disagree - Clinically unable to determine / Unknown  -- Refer to Clinical Documentation Reviewer    PROVIDER RESPONSE TEXT:    Acute heart failure with preserved ejection fraction is currently being   treated/evaluated as evidenced by elevated bnp and sob    Query created by: Kiersten Liao on 1/3/2023 1:57 PM      Electronically signed by:  Jenny Wilks MD 1/4/2023 11:18 AM

## 2023-02-10 ENCOUNTER — HOSPITAL ENCOUNTER (OUTPATIENT)
Dept: CT IMAGING | Age: 53
End: 2023-02-10
Payer: MEDICARE

## 2023-02-10 DIAGNOSIS — C18.9 MALIGNANT NEOPLASM OF COLON, UNSPECIFIED PART OF COLON (HCC): ICD-10-CM

## 2023-02-10 PROCEDURE — 74177 CT ABD & PELVIS W/CONTRAST: CPT

## 2023-02-10 PROCEDURE — 6360000004 HC RX CONTRAST MEDICATION: Performed by: RADIOLOGY

## 2023-02-10 RX ADMIN — IOPAMIDOL 75 ML: 755 INJECTION, SOLUTION INTRAVENOUS at 18:26

## 2023-02-10 RX ADMIN — IOPAMIDOL 18 ML: 755 INJECTION, SOLUTION INTRAVENOUS at 18:26

## 2023-03-31 ENCOUNTER — HOSPITAL ENCOUNTER (OUTPATIENT)
Dept: PREADMISSION TESTING | Age: 53
Discharge: HOME | End: 2023-03-31

## 2023-03-31 VITALS — BODY MASS INDEX: 29.12 KG/M2 | HEIGHT: 72 IN | WEIGHT: 215 LBS

## 2023-03-31 RX ORDER — SODIUM CHLORIDE 0.9 % (FLUSH) 0.9 %
5-40 SYRINGE (ML) INJECTION 2 TIMES DAILY
Status: CANCELLED | OUTPATIENT
Start: 2023-03-31

## 2023-03-31 RX ORDER — DILTIAZEM HYDROCHLORIDE 240 MG/1
480 CAPSULE, EXTENDED RELEASE ORAL NIGHTLY
Status: ON HOLD | COMMUNITY

## 2023-03-31 NOTE — PRE-PROCEDURE INSTRUCTIONS
[facility]                                                                                                                    PRE OP INSTRUCTIONS FOR  Pranay Jacobs. Date: 3/31/2023    Date of surgery: 4/3/23   Arrival Time: 0700    Do not eat or drink anything after MIDNIGHT prior to surgery. This includes no water, chewing gum, mints or ice chips. Take the following medications with a small sip of water on the morning of Surgery:  CARDIZEM & HYDRALAZINE    Diabetics may take evening dose of insulin but none after midnight. If you feel symptomatic or low blood sugar morning of surgery drink 1-2 ounces of apple juice only. Aspirin, Ibuprofen, Advil, Naproxen, Vitamin E and other Anti-inflammatory products should be stopped  before surgery  as directed by your physician. Take Tylenol only unless instructed otherwise by your surgeon. Check with your Doctor regarding stopping Plavix, Coumadin, Lovenox, Eliquis, Effient, or other blood thinners. Do not smoke,use illicit drugs and do not drink any alcoholic beverages 24 hours prior to surgery. You may brush your teeth the morning of surgery. DO NOT SWALLOW WATER    You MUST make arrangements for a responsible adult to take you home after your surgery. You will not be allowed to leave alone or drive yourself home. It is strongly suggested someone stay with you the first 24 hrs. Your surgery will be cancelled if you do not have a ride home. PEDIATRIC PATIENTS ONLY:  A parent/legal guardian must accompany a child scheduled for surgery and plan to stay at the hospital until the child is discharged. Please do not bring other children with you. Please wear simple, loose fitting clothing to the hospital.  Benjamine Si not bring valuables (money, credit cards, checkbooks, etc.) Do not wear any makeup (including no eye makeup) or nail polish on your fingers or toes. DO NOT wear any jewelry or piercings on day of surgery.

## 2023-04-03 ENCOUNTER — PREP FOR PROCEDURE (OUTPATIENT)
Dept: SURGERY | Age: 53
End: 2023-04-03

## 2023-04-03 ENCOUNTER — HOSPITAL ENCOUNTER (OUTPATIENT)
Dept: INTERVENTIONAL RADIOLOGY/VASCULAR | Age: 53
Discharge: HOME | End: 2023-04-03
Payer: MEDICARE

## 2023-04-03 VITALS
WEIGHT: 220 LBS | TEMPERATURE: 97.5 F | HEIGHT: 72 IN | OXYGEN SATURATION: 100 % | SYSTOLIC BLOOD PRESSURE: 162 MMHG | HEART RATE: 80 BPM | DIASTOLIC BLOOD PRESSURE: 100 MMHG | RESPIRATION RATE: 16 BRPM | BODY MASS INDEX: 29.8 KG/M2

## 2023-04-03 DIAGNOSIS — N19 RENAL FAILURE, UNSPECIFIED CHRONICITY: ICD-10-CM

## 2023-04-03 DIAGNOSIS — E83.39 HYPOPHOSPHATURIA: ICD-10-CM

## 2023-04-03 DIAGNOSIS — N18.6 END STAGE RENAL DISEASE (HCC): ICD-10-CM

## 2023-04-03 LAB
ANION GAP SERPL CALCULATED.3IONS-SCNC: 15 MMOL/L (ref 7–16)
APTT BLD: 32.3 SEC (ref 24.5–35.1)
BUN SERPL-MCNC: 59 MG/DL (ref 6–20)
CALCIUM SERPL-MCNC: 9.8 MG/DL (ref 8.6–10.2)
CHLORIDE SERPL-SCNC: 95 MMOL/L (ref 98–107)
CO2 SERPL-SCNC: 25 MMOL/L (ref 22–29)
CREAT SERPL-MCNC: 13.1 MG/DL (ref 0.7–1.2)
ERYTHROCYTE [DISTWIDTH] IN BLOOD BY AUTOMATED COUNT: 13.9 FL (ref 11.5–15)
GLUCOSE SERPL-MCNC: 109 MG/DL (ref 74–99)
HCT VFR BLD AUTO: 29.9 % (ref 37–54)
HGB BLD-MCNC: 10 G/DL (ref 12.5–16.5)
INR BLD: 0.9
MCH RBC QN AUTO: 32.3 PG (ref 26–35)
MCHC RBC AUTO-ENTMCNC: 33.4 % (ref 32–34.5)
MCV RBC AUTO: 96.5 FL (ref 80–99.9)
PLATELET # BLD AUTO: 312 E9/L (ref 130–450)
PMV BLD AUTO: 9.3 FL (ref 7–12)
POTASSIUM SERPL-SCNC: 4.6 MMOL/L (ref 3.5–5)
PROTHROMBIN TIME: 10.6 SEC (ref 9.3–12.4)
RBC # BLD AUTO: 3.1 E12/L (ref 3.8–5.8)
SODIUM SERPL-SCNC: 135 MMOL/L (ref 132–146)
WBC # BLD: 10.7 E9/L (ref 4.5–11.5)

## 2023-04-03 PROCEDURE — 76937 US GUIDE VASCULAR ACCESS: CPT

## 2023-04-03 PROCEDURE — 36415 COLL VENOUS BLD VENIPUNCTURE: CPT

## 2023-04-03 PROCEDURE — 77001 FLUOROGUIDE FOR VEIN DEVICE: CPT

## 2023-04-03 PROCEDURE — 7100000010 HC PHASE II RECOVERY - FIRST 15 MIN

## 2023-04-03 PROCEDURE — 80048 BASIC METABOLIC PNL TOTAL CA: CPT

## 2023-04-03 PROCEDURE — 6360000002 HC RX W HCPCS: Performed by: RADIOLOGY

## 2023-04-03 PROCEDURE — 85027 COMPLETE CBC AUTOMATED: CPT

## 2023-04-03 PROCEDURE — 7100000011 HC PHASE II RECOVERY - ADDTL 15 MIN

## 2023-04-03 PROCEDURE — 85730 THROMBOPLASTIN TIME PARTIAL: CPT

## 2023-04-03 PROCEDURE — 2709999900 IR FLUORO GUIDED CVA DEVICE PLMT/REPLACE/REMOVAL

## 2023-04-03 PROCEDURE — 85610 PROTHROMBIN TIME: CPT

## 2023-04-03 PROCEDURE — 36558 INSERT TUNNELED CV CATH: CPT

## 2023-04-03 RX ORDER — FENTANYL CITRATE 0.05 MG/ML
INJECTION, SOLUTION INTRAMUSCULAR; INTRAVENOUS PRN
Status: COMPLETED | OUTPATIENT
Start: 2023-04-03 | End: 2023-04-03

## 2023-04-03 RX ORDER — SODIUM CHLORIDE, SODIUM LACTATE, POTASSIUM CHLORIDE, CALCIUM CHLORIDE 600; 310; 30; 20 MG/100ML; MG/100ML; MG/100ML; MG/100ML
INJECTION, SOLUTION INTRAVENOUS CONTINUOUS
Status: CANCELLED | OUTPATIENT
Start: 2023-04-03

## 2023-04-03 RX ORDER — SODIUM CHLORIDE 0.9 % (FLUSH) 0.9 %
5-40 SYRINGE (ML) INJECTION PRN
Status: CANCELLED | OUTPATIENT
Start: 2023-04-03

## 2023-04-03 RX ORDER — ENOXAPARIN SODIUM 100 MG/ML
30 INJECTION SUBCUTANEOUS
Status: CANCELLED | OUTPATIENT
Start: 2023-04-03 | End: 2023-04-03

## 2023-04-03 RX ORDER — MIDAZOLAM HYDROCHLORIDE 1 MG/ML
INJECTION INTRAMUSCULAR; INTRAVENOUS PRN
Status: COMPLETED | OUTPATIENT
Start: 2023-04-03 | End: 2023-04-03

## 2023-04-03 RX ORDER — SODIUM CHLORIDE 0.9 % (FLUSH) 0.9 %
5-40 SYRINGE (ML) INJECTION EVERY 12 HOURS SCHEDULED
Status: CANCELLED | OUTPATIENT
Start: 2023-04-03

## 2023-04-03 RX ADMIN — FENTANYL CITRATE 50 MCG: 50 INJECTION INTRAMUSCULAR; INTRAVENOUS at 08:59

## 2023-04-03 RX ADMIN — MIDAZOLAM 1 MG: 1 INJECTION INTRAMUSCULAR; INTRAVENOUS at 08:59

## 2023-04-03 ASSESSMENT — PAIN - FUNCTIONAL ASSESSMENT
PAIN_FUNCTIONAL_ASSESSMENT: NONE - DENIES PAIN
PAIN_FUNCTIONAL_ASSESSMENT: NONE - DENIES PAIN

## 2023-04-03 NOTE — DISCHARGE INSTRUCTIONS
[facility]  __________________________________________________    Discharge Instructions  [address]    Patient Name:  Bao Landon. Date: 4/3/2023  Time:  10:10 AM      __x_ Rest today; all patients that received a general anesthetic should not drive for 24 hours. __x_ Keep dressing clean and dry. Suture removal will be arranged by your physician.    ___ Drink extra fluids (non-alcoholic) for the next 24 hours. ___ Refer to your physician's special handout. __x_ *Make an appointment to see your physician.    __x_ Call office tomorrow to make follow-up arrangements. __x_ You need to have a responsible adult stay with you for the next 24 hours after surgery. ___ Medications as follows:***    ___ Refer to the medication handout.    ___ Artist Narciso / Shower Instructions    __x_ Special Instructions specific to your care as follows: DO NOT GET CATHETER/DRESSING WET. REPORT BLEEDING, FEVER, ANY PROBLEMS. COMMENTS: Your recovery is expected to be uneventful; however you may or may not experience mild to moderate discomfort after being discharged from the hospital. As instructed by your physician, if you begin to experience increasing pain, excessive swelling, bleeding from the area of you operation, saturation of the dressing, or any other unusual event as outlined by your physician, contact your doctor. If you are not able to reach your physician, the hospital Emergency Room is available 24 hours a day. I HAVE READ AND UNDERSTAND THE ABOVE INSTRUCTIONS. EMERGENCY DEPT.   SIGNATURE ___________________ RN/PHYSICIAN  [address] ___________________ PATIENT  [phone]    SIGNATURE ___________________ RESPONSIBLE ADULT

## 2023-04-03 NOTE — PROGRESS NOTES
Patient came down to special procedures for tunneled dialysis catheter placement. Procedure was explained, questions were answered. Patient was educated about the amount of radiation used with today's procedure. Patient prepped secured and draped. 0859 sedation medication given    0901 - Procedure start /107  84  20  100% on 2LPM by nasal cannula supine     0915 - Procedure end /102  82  20  100% on 2LPM by nasal cannula supine     Tunneled dialysis catheter to right IJ/chest.  Catheter sutured in place. one Suture placed right IJ. Folded 4 x 4 and tegaderm applied to both right IJ and right chest.  CDI    Patient tolerated procedure    0930 - 15 minutes post procedure /103  80  16  99% on room air  no complaints of pain, CDI. Total amount of sedation medication given during procedure: 1 mg of IV Versed and 50mcg of IV Fentanyl     nurse to nurse called, spoke with Kaylah Roy, nurse notified of above information. Patient transported back to [facility].

## 2023-04-04 ENCOUNTER — HOSPITAL ENCOUNTER (OUTPATIENT)
Dept: PREADMISSION TESTING | Age: 53
Discharge: HOME | End: 2023-04-04
Payer: COMMERCIAL

## 2023-04-04 VITALS
HEART RATE: 89 BPM | RESPIRATION RATE: 18 BRPM | HEIGHT: 72 IN | DIASTOLIC BLOOD PRESSURE: 82 MMHG | BODY MASS INDEX: 29.66 KG/M2 | WEIGHT: 219 LBS | SYSTOLIC BLOOD PRESSURE: 141 MMHG | OXYGEN SATURATION: 98 % | TEMPERATURE: 98.3 F

## 2023-04-04 LAB
ABO + RH BLD: NORMAL
BLD GP AB SCN SERPL QL: NORMAL
CEA SERPL-MCNC: 2.9 NG/ML (ref 0–5.2)

## 2023-04-04 PROCEDURE — 82378 CARCINOEMBRYONIC ANTIGEN: CPT

## 2023-04-04 PROCEDURE — 86901 BLOOD TYPING SEROLOGIC RH(D): CPT

## 2023-04-04 PROCEDURE — 86900 BLOOD TYPING SEROLOGIC ABO: CPT

## 2023-04-04 PROCEDURE — 36415 COLL VENOUS BLD VENIPUNCTURE: CPT

## 2023-04-04 PROCEDURE — 87081 CULTURE SCREEN ONLY: CPT

## 2023-04-04 PROCEDURE — 86850 RBC ANTIBODY SCREEN: CPT

## 2023-04-04 RX ORDER — CALCITRIOL 0.25 UG/1
0.25 CAPSULE, LIQUID FILLED ORAL DAILY
Status: ON HOLD | COMMUNITY

## 2023-04-04 RX ORDER — MULTIVIT-MIN/IRON/FOLIC ACID/K 18-600-40
CAPSULE ORAL
COMMUNITY
End: 2023-04-04

## 2023-04-04 RX ORDER — SODIUM BICARBONATE 325 MG/1
650 TABLET ORAL DAILY
Status: ON HOLD | COMMUNITY

## 2023-04-04 NOTE — PROGRESS NOTES
Labs faxed to Dr. Fela Bernal office.
Reviewed with  pt CTA chest results 12/2022 indicating a  Mild dilatation of the ascending aorta of 4.3cm. Celestia Mo and pt renal hx. No orders other than protocol BW at this time.
Transfusion bracelet applied with instructions    [x] Shower with soap, lather and rinse well, and use CHG wipes provided the evening before surgery as instructed    [x] Incentive spirometer with instructions

## 2023-04-05 LAB — MRSA SPEC QL CULT: NORMAL

## 2023-04-06 ENCOUNTER — ANESTHESIA EVENT (OUTPATIENT)
Dept: OPERATING ROOM | Age: 53
End: 2023-04-06
Payer: COMMERCIAL

## 2023-04-06 ASSESSMENT — LIFESTYLE VARIABLES: SMOKING_STATUS: 0

## 2023-04-06 NOTE — ANESTHESIA PRE PROCEDURE
- DVT and PE.       ROS comment: Thoracic AA (4.3cm ascending). Other Findings: Peritoneal dialysis catheter  Temporary R dialysis catheter          Anesthesia Plan      general     ASA 4     (Modified RSI with HOB at 30 degrees RT  Pre-oxygenation x 3 minutes  20mg Ketamine  PONV prophylaxis)  Induction: intravenous. MIPS: Postoperative opioids intended and Prophylactic antiemetics administered. Anesthetic plan and risks discussed with patient. Use of blood products discussed with patient whom consented to blood products. Plan discussed with CRNA. Tammy Cottrell DO   4/6/2023     Chart reviewed and patient assessed. Agreed with above note.  Cyndy HO CRNA

## 2023-04-07 ENCOUNTER — ANESTHESIA (OUTPATIENT)
Dept: OPERATING ROOM | Age: 53
End: 2023-04-07
Payer: COMMERCIAL

## 2023-04-07 ENCOUNTER — HOSPITAL ENCOUNTER (INPATIENT)
Age: 53
LOS: 6 days | Discharge: HOME | DRG: 329 | End: 2023-04-13
Attending: SURGERY | Admitting: SURGERY
Payer: MEDICARE

## 2023-04-07 DIAGNOSIS — C18.2 MALIGNANT NEOPLASM OF ASCENDING COLON (HCC): ICD-10-CM

## 2023-04-07 DIAGNOSIS — Z90.49 S/P RIGHT HEMICOLECTOMY: Primary | ICD-10-CM

## 2023-04-07 LAB — POTASSIUM SERPL-SCNC: 4.2 MMOL/L (ref 3.5–5)

## 2023-04-07 PROCEDURE — 2580000003 HC RX 258: Performed by: SURGERY

## 2023-04-07 PROCEDURE — 8E0W4CZ ROBOTIC ASSISTED PROCEDURE OF TRUNK REGION, PERCUTANEOUS ENDOSCOPIC APPROACH: ICD-10-PCS | Performed by: SURGERY

## 2023-04-07 PROCEDURE — 5A1D70Z PERFORMANCE OF URINARY FILTRATION, INTERMITTENT, LESS THAN 6 HOURS PER DAY: ICD-10-PCS | Performed by: INTERNAL MEDICINE

## 2023-04-07 PROCEDURE — 3700000001 HC ADD 15 MINUTES (ANESTHESIA): Performed by: SURGERY

## 2023-04-07 PROCEDURE — 3600000009 HC SURGERY ROBOT BASE: Performed by: SURGERY

## 2023-04-07 PROCEDURE — 3700000000 HC ANESTHESIA ATTENDED CARE: Performed by: SURGERY

## 2023-04-07 PROCEDURE — 6360000002 HC RX W HCPCS: Performed by: SURGERY

## 2023-04-07 PROCEDURE — 6360000002 HC RX W HCPCS

## 2023-04-07 PROCEDURE — 2580000003 HC RX 258

## 2023-04-07 PROCEDURE — 2580000003 HC RX 258: Performed by: ANESTHESIOLOGY

## 2023-04-07 PROCEDURE — 6370000000 HC RX 637 (ALT 250 FOR IP): Performed by: ANESTHESIOLOGY

## 2023-04-07 PROCEDURE — 1200000000 HC SEMI PRIVATE

## 2023-04-07 PROCEDURE — 7100000000 HC PACU RECOVERY - FIRST 15 MIN: Performed by: SURGERY

## 2023-04-07 PROCEDURE — 0DTF0ZZ RESECTION OF RIGHT LARGE INTESTINE, OPEN APPROACH: ICD-10-PCS | Performed by: SURGERY

## 2023-04-07 PROCEDURE — 80074 ACUTE HEPATITIS PANEL: CPT

## 2023-04-07 PROCEDURE — 88361 TUMOR IMMUNOHISTOCHEM/COMPUT: CPT

## 2023-04-07 PROCEDURE — S2900 ROBOTIC SURGICAL SYSTEM: HCPCS | Performed by: SURGERY

## 2023-04-07 PROCEDURE — 36415 COLL VENOUS BLD VENIPUNCTURE: CPT

## 2023-04-07 PROCEDURE — 2500000003 HC RX 250 WO HCPCS

## 2023-04-07 PROCEDURE — A4216 STERILE WATER/SALINE, 10 ML: HCPCS | Performed by: ANESTHESIOLOGY

## 2023-04-07 PROCEDURE — 86706 HEP B SURFACE ANTIBODY: CPT

## 2023-04-07 PROCEDURE — 88309 TISSUE EXAM BY PATHOLOGIST: CPT

## 2023-04-07 PROCEDURE — 6370000000 HC RX 637 (ALT 250 FOR IP)

## 2023-04-07 PROCEDURE — 3600000019 HC SURGERY ROBOT ADDTL 15MIN: Performed by: SURGERY

## 2023-04-07 PROCEDURE — 2709999900 HC NON-CHARGEABLE SUPPLY: Performed by: SURGERY

## 2023-04-07 PROCEDURE — 7100000001 HC PACU RECOVERY - ADDTL 15 MIN: Performed by: SURGERY

## 2023-04-07 PROCEDURE — 84132 ASSAY OF SERUM POTASSIUM: CPT

## 2023-04-07 PROCEDURE — 2500000003 HC RX 250 WO HCPCS: Performed by: ANESTHESIOLOGY

## 2023-04-07 PROCEDURE — 6360000002 HC RX W HCPCS: Performed by: ANESTHESIOLOGY

## 2023-04-07 PROCEDURE — 2720000010 HC SURG SUPPLY STERILE: Performed by: SURGERY

## 2023-04-07 RX ORDER — SODIUM CHLORIDE 0.9 % (FLUSH) 0.9 %
5-40 SYRINGE (ML) INJECTION EVERY 12 HOURS SCHEDULED
Status: DISCONTINUED | OUTPATIENT
Start: 2023-04-07 | End: 2023-04-07 | Stop reason: HOSPADM

## 2023-04-07 RX ORDER — HEPARIN SODIUM 10000 [USP'U]/ML
5000 INJECTION, SOLUTION INTRAVENOUS; SUBCUTANEOUS EVERY 8 HOURS
Status: DISCONTINUED | OUTPATIENT
Start: 2023-04-08 | End: 2023-04-13 | Stop reason: HOSPADM

## 2023-04-07 RX ORDER — PROCHLORPERAZINE EDISYLATE 5 MG/ML
5 INJECTION INTRAMUSCULAR; INTRAVENOUS
Status: DISCONTINUED | OUTPATIENT
Start: 2023-04-07 | End: 2023-04-07 | Stop reason: HOSPADM

## 2023-04-07 RX ORDER — DEXTROSE AND SODIUM CHLORIDE 5; .45 G/100ML; G/100ML
INJECTION, SOLUTION INTRAVENOUS CONTINUOUS
Status: DISCONTINUED | OUTPATIENT
Start: 2023-04-07 | End: 2023-04-08

## 2023-04-07 RX ORDER — MEPERIDINE HYDROCHLORIDE 25 MG/ML
12.5 INJECTION INTRAMUSCULAR; INTRAVENOUS; SUBCUTANEOUS ONCE
Status: DISCONTINUED | OUTPATIENT
Start: 2023-04-07 | End: 2023-04-07 | Stop reason: HOSPADM

## 2023-04-07 RX ORDER — SODIUM CHLORIDE 0.9 % (FLUSH) 0.9 %
10 SYRINGE (ML) INJECTION PRN
Status: DISCONTINUED | OUTPATIENT
Start: 2023-04-07 | End: 2023-04-13 | Stop reason: HOSPADM

## 2023-04-07 RX ORDER — SODIUM CHLORIDE, SODIUM LACTATE, POTASSIUM CHLORIDE, CALCIUM CHLORIDE 600; 310; 30; 20 MG/100ML; MG/100ML; MG/100ML; MG/100ML
INJECTION, SOLUTION INTRAVENOUS CONTINUOUS
Status: DISCONTINUED | OUTPATIENT
Start: 2023-04-07 | End: 2023-04-07

## 2023-04-07 RX ORDER — OXYCODONE HYDROCHLORIDE 5 MG/1
10 TABLET ORAL EVERY 4 HOURS PRN
Status: DISCONTINUED | OUTPATIENT
Start: 2023-04-07 | End: 2023-04-13 | Stop reason: HOSPADM

## 2023-04-07 RX ORDER — SODIUM CHLORIDE 0.9 % (FLUSH) 0.9 %
5-40 SYRINGE (ML) INJECTION PRN
Status: DISCONTINUED | OUTPATIENT
Start: 2023-04-07 | End: 2023-04-07 | Stop reason: HOSPADM

## 2023-04-07 RX ORDER — ONDANSETRON 4 MG/1
4 TABLET, ORALLY DISINTEGRATING ORAL EVERY 8 HOURS PRN
Status: DISCONTINUED | OUTPATIENT
Start: 2023-04-07 | End: 2023-04-13 | Stop reason: HOSPADM

## 2023-04-07 RX ORDER — ONDANSETRON 2 MG/ML
INJECTION INTRAMUSCULAR; INTRAVENOUS PRN
Status: DISCONTINUED | OUTPATIENT
Start: 2023-04-07 | End: 2023-04-07 | Stop reason: SDUPTHER

## 2023-04-07 RX ORDER — ACETAMINOPHEN 500 MG
1000 TABLET ORAL ONCE
Status: COMPLETED | OUTPATIENT
Start: 2023-04-07 | End: 2023-04-07

## 2023-04-07 RX ORDER — KETAMINE HYDROCHLORIDE 10 MG/ML
INJECTION INTRAMUSCULAR; INTRAVENOUS PRN
Status: DISCONTINUED | OUTPATIENT
Start: 2023-04-07 | End: 2023-04-07 | Stop reason: SDUPTHER

## 2023-04-07 RX ORDER — OXYCODONE HYDROCHLORIDE 5 MG/1
5 TABLET ORAL EVERY 4 HOURS PRN
Status: DISCONTINUED | OUTPATIENT
Start: 2023-04-07 | End: 2023-04-13 | Stop reason: HOSPADM

## 2023-04-07 RX ORDER — SODIUM CHLORIDE 9 MG/ML
INJECTION, SOLUTION INTRAVENOUS PRN
Status: DISCONTINUED | OUTPATIENT
Start: 2023-04-07 | End: 2023-04-13 | Stop reason: HOSPADM

## 2023-04-07 RX ORDER — ACETAMINOPHEN 325 MG/1
650 TABLET ORAL EVERY 4 HOURS PRN
Status: DISCONTINUED | OUTPATIENT
Start: 2023-04-07 | End: 2023-04-13 | Stop reason: HOSPADM

## 2023-04-07 RX ORDER — EPHEDRINE SULFATE/0.9% NACL/PF 50 MG/5 ML
SYRINGE (ML) INTRAVENOUS PRN
Status: DISCONTINUED | OUTPATIENT
Start: 2023-04-07 | End: 2023-04-07 | Stop reason: SDUPTHER

## 2023-04-07 RX ORDER — FENTANYL CITRATE 50 UG/ML
INJECTION, SOLUTION INTRAMUSCULAR; INTRAVENOUS PRN
Status: DISCONTINUED | OUTPATIENT
Start: 2023-04-07 | End: 2023-04-07 | Stop reason: SDUPTHER

## 2023-04-07 RX ORDER — FENTANYL CITRATE 50 UG/ML
25 INJECTION, SOLUTION INTRAMUSCULAR; INTRAVENOUS EVERY 5 MIN PRN
Status: DISCONTINUED | OUTPATIENT
Start: 2023-04-07 | End: 2023-04-07 | Stop reason: HOSPADM

## 2023-04-07 RX ORDER — SODIUM CHLORIDE 0.9 % (FLUSH) 0.9 %
10 SYRINGE (ML) INJECTION EVERY 12 HOURS SCHEDULED
Status: DISCONTINUED | OUTPATIENT
Start: 2023-04-07 | End: 2023-04-13 | Stop reason: HOSPADM

## 2023-04-07 RX ORDER — ENOXAPARIN SODIUM 100 MG/ML
40 INJECTION SUBCUTANEOUS DAILY
Status: DISCONTINUED | OUTPATIENT
Start: 2023-04-07 | End: 2023-04-07

## 2023-04-07 RX ORDER — ONDANSETRON 2 MG/ML
4 INJECTION INTRAMUSCULAR; INTRAVENOUS EVERY 6 HOURS PRN
Status: DISCONTINUED | OUTPATIENT
Start: 2023-04-07 | End: 2023-04-13 | Stop reason: HOSPADM

## 2023-04-07 RX ORDER — DEXAMETHASONE SODIUM PHOSPHATE 4 MG/ML
INJECTION, SOLUTION INTRA-ARTICULAR; INTRALESIONAL; INTRAMUSCULAR; INTRAVENOUS; SOFT TISSUE PRN
Status: DISCONTINUED | OUTPATIENT
Start: 2023-04-07 | End: 2023-04-07 | Stop reason: SDUPTHER

## 2023-04-07 RX ORDER — SODIUM CHLORIDE 9 MG/ML
INJECTION, SOLUTION INTRAVENOUS PRN
Status: DISCONTINUED | OUTPATIENT
Start: 2023-04-07 | End: 2023-04-07 | Stop reason: HOSPADM

## 2023-04-07 RX ORDER — DIPHENHYDRAMINE HYDROCHLORIDE 50 MG/ML
12.5 INJECTION INTRAMUSCULAR; INTRAVENOUS
Status: DISCONTINUED | OUTPATIENT
Start: 2023-04-07 | End: 2023-04-07 | Stop reason: HOSPADM

## 2023-04-07 RX ORDER — LIDOCAINE HYDROCHLORIDE 20 MG/ML
INJECTION, SOLUTION EPIDURAL; INFILTRATION; INTRACAUDAL; PERINEURAL PRN
Status: DISCONTINUED | OUTPATIENT
Start: 2023-04-07 | End: 2023-04-07 | Stop reason: SDUPTHER

## 2023-04-07 RX ORDER — ENOXAPARIN SODIUM 100 MG/ML
30 INJECTION SUBCUTANEOUS
Status: COMPLETED | OUTPATIENT
Start: 2023-04-07 | End: 2023-04-07

## 2023-04-07 RX ORDER — SODIUM CHLORIDE 9 MG/ML
INJECTION, SOLUTION INTRAVENOUS CONTINUOUS PRN
Status: DISCONTINUED | OUTPATIENT
Start: 2023-04-07 | End: 2023-04-07 | Stop reason: SDUPTHER

## 2023-04-07 RX ORDER — PROPOFOL 10 MG/ML
INJECTION, EMULSION INTRAVENOUS PRN
Status: DISCONTINUED | OUTPATIENT
Start: 2023-04-07 | End: 2023-04-07 | Stop reason: SDUPTHER

## 2023-04-07 RX ORDER — ROCURONIUM BROMIDE 10 MG/ML
INJECTION, SOLUTION INTRAVENOUS PRN
Status: DISCONTINUED | OUTPATIENT
Start: 2023-04-07 | End: 2023-04-07 | Stop reason: SDUPTHER

## 2023-04-07 RX ORDER — PHENYLEPHRINE HCL IN 0.9% NACL 1 MG/10 ML
SYRINGE (ML) INTRAVENOUS PRN
Status: DISCONTINUED | OUTPATIENT
Start: 2023-04-07 | End: 2023-04-07 | Stop reason: SDUPTHER

## 2023-04-07 RX ORDER — METOCLOPRAMIDE HYDROCHLORIDE 5 MG/ML
10 INJECTION INTRAMUSCULAR; INTRAVENOUS ONCE
Status: COMPLETED | OUTPATIENT
Start: 2023-04-07 | End: 2023-04-07

## 2023-04-07 RX ORDER — HYDRALAZINE HYDROCHLORIDE 20 MG/ML
5 INJECTION INTRAMUSCULAR; INTRAVENOUS
Status: DISCONTINUED | OUTPATIENT
Start: 2023-04-07 | End: 2023-04-07 | Stop reason: HOSPADM

## 2023-04-07 RX ORDER — LABETALOL HYDROCHLORIDE 5 MG/ML
5 INJECTION, SOLUTION INTRAVENOUS
Status: DISCONTINUED | OUTPATIENT
Start: 2023-04-07 | End: 2023-04-07 | Stop reason: HOSPADM

## 2023-04-07 RX ADMIN — ACETAMINOPHEN 1000 MG: 500 TABLET ORAL at 06:23

## 2023-04-07 RX ADMIN — PROPOFOL 140 MG: 10 INJECTION, EMULSION INTRAVENOUS at 07:07

## 2023-04-07 RX ADMIN — FENTANYL CITRATE 100 MCG: 50 INJECTION, SOLUTION INTRAMUSCULAR; INTRAVENOUS at 07:07

## 2023-04-07 RX ADMIN — Medication 100 MCG: at 07:35

## 2023-04-07 RX ADMIN — ROCURONIUM BROMIDE 20 MG: 10 INJECTION, SOLUTION INTRAVENOUS at 07:34

## 2023-04-07 RX ADMIN — Medication 10 MG: at 09:04

## 2023-04-07 RX ADMIN — Medication 10 MG: at 07:47

## 2023-04-07 RX ADMIN — ROCURONIUM BROMIDE 50 MG: 10 INJECTION, SOLUTION INTRAVENOUS at 07:07

## 2023-04-07 RX ADMIN — Medication 150 MCG: at 07:52

## 2023-04-07 RX ADMIN — FENTANYL CITRATE 50 MCG: 50 INJECTION, SOLUTION INTRAMUSCULAR; INTRAVENOUS at 09:39

## 2023-04-07 RX ADMIN — KETAMINE HYDROCHLORIDE 30 MG: 10 INJECTION INTRAMUSCULAR; INTRAVENOUS at 07:07

## 2023-04-07 RX ADMIN — ROCURONIUM BROMIDE 10 MG: 10 INJECTION, SOLUTION INTRAVENOUS at 08:40

## 2023-04-07 RX ADMIN — DEXTROSE AND SODIUM CHLORIDE: 5; 450 INJECTION, SOLUTION INTRAVENOUS at 11:25

## 2023-04-07 RX ADMIN — Medication 50 MCG: at 07:29

## 2023-04-07 RX ADMIN — SODIUM CHLORIDE: 9 INJECTION, SOLUTION INTRAVENOUS at 08:47

## 2023-04-07 RX ADMIN — METOCLOPRAMIDE 10 MG: 5 INJECTION, SOLUTION INTRAMUSCULAR; INTRAVENOUS at 06:23

## 2023-04-07 RX ADMIN — CEFOXITIN SODIUM 2000 MG: 2 POWDER, FOR SOLUTION INTRAVENOUS at 09:07

## 2023-04-07 RX ADMIN — OXYCODONE 10 MG: 5 TABLET ORAL at 15:51

## 2023-04-07 RX ADMIN — FENTANYL CITRATE 50 MCG: 50 INJECTION, SOLUTION INTRAMUSCULAR; INTRAVENOUS at 08:40

## 2023-04-07 RX ADMIN — SODIUM CHLORIDE, PRESERVATIVE FREE 10 ML: 5 INJECTION INTRAVENOUS at 20:01

## 2023-04-07 RX ADMIN — Medication 10 MG: at 07:57

## 2023-04-07 RX ADMIN — SODIUM CHLORIDE: 9 INJECTION, SOLUTION INTRAVENOUS at 06:57

## 2023-04-07 RX ADMIN — OXYCODONE 10 MG: 5 TABLET ORAL at 20:00

## 2023-04-07 RX ADMIN — Medication 10 MG: at 07:38

## 2023-04-07 RX ADMIN — FAMOTIDINE 20 MG: 10 INJECTION, SOLUTION INTRAVENOUS at 06:22

## 2023-04-07 RX ADMIN — SUGAMMADEX 200 MG: 100 INJECTION, SOLUTION INTRAVENOUS at 09:33

## 2023-04-07 RX ADMIN — ONDANSETRON HYDROCHLORIDE 4 MG: 2 SOLUTION INTRAMUSCULAR; INTRAVENOUS at 07:14

## 2023-04-07 RX ADMIN — ENOXAPARIN SODIUM 30 MG: 30 INJECTION SUBCUTANEOUS at 06:21

## 2023-04-07 RX ADMIN — CEFOXITIN SODIUM 2000 MG: 2 POWDER, FOR SOLUTION INTRAVENOUS at 07:12

## 2023-04-07 RX ADMIN — FENTANYL CITRATE 50 MCG: 50 INJECTION, SOLUTION INTRAMUSCULAR; INTRAVENOUS at 09:46

## 2023-04-07 RX ADMIN — Medication 10 MG: at 08:17

## 2023-04-07 RX ADMIN — LIDOCAINE HYDROCHLORIDE 100 MG: 20 INJECTION, SOLUTION EPIDURAL; INFILTRATION; INTRACAUDAL; PERINEURAL at 07:07

## 2023-04-07 RX ADMIN — SODIUM CHLORIDE, PRESERVATIVE FREE 10 ML: 5 INJECTION INTRAVENOUS at 06:26

## 2023-04-07 RX ADMIN — OXYCODONE 10 MG: 5 TABLET ORAL at 11:27

## 2023-04-07 RX ADMIN — DEXAMETHASONE SODIUM PHOSPHATE 10 MG: 4 INJECTION, SOLUTION INTRAMUSCULAR; INTRAVENOUS at 07:14

## 2023-04-07 ASSESSMENT — PAIN SCALES - GENERAL
PAINLEVEL_OUTOF10: 0
PAINLEVEL_OUTOF10: 7
PAINLEVEL_OUTOF10: 7
PAINLEVEL_OUTOF10: 3
PAINLEVEL_OUTOF10: 0
PAINLEVEL_OUTOF10: 6

## 2023-04-07 ASSESSMENT — PAIN DESCRIPTION - LOCATION
LOCATION: ABDOMEN

## 2023-04-07 ASSESSMENT — PAIN - FUNCTIONAL ASSESSMENT
PAIN_FUNCTIONAL_ASSESSMENT: ACTIVITIES ARE NOT PREVENTED
PAIN_FUNCTIONAL_ASSESSMENT: PREVENTS OR INTERFERES SOME ACTIVE ACTIVITIES AND ADLS
PAIN_FUNCTIONAL_ASSESSMENT: 0-10

## 2023-04-07 ASSESSMENT — PAIN DESCRIPTION - ORIENTATION
ORIENTATION: MID
ORIENTATION: RIGHT;MID
ORIENTATION: MID

## 2023-04-07 ASSESSMENT — PAIN DESCRIPTION - DESCRIPTORS
DESCRIPTORS: ACHING;DISCOMFORT;DULL
DESCRIPTORS: ACHING
DESCRIPTORS: CRAMPING;SHARP;ACHING

## 2023-04-07 ASSESSMENT — LIFESTYLE VARIABLES
HOW OFTEN DO YOU HAVE A DRINK CONTAINING ALCOHOL: NEVER
HOW MANY STANDARD DRINKS CONTAINING ALCOHOL DO YOU HAVE ON A TYPICAL DAY: PATIENT DOES NOT DRINK

## 2023-04-07 NOTE — ANESTHESIA POSTPROCEDURE EVALUATION
Department of Anesthesiology  Postprocedure Note    Patient: Ezequiel Fletcher MRN: [id number]  YOB: 1970  Date of evaluation: 4/7/2023      Procedure Summary     Date: 04/07/23 Room / Location: [facility] OR 10 / [facility]    Anesthesia Start: 8267 Anesthesia Stop: 4919    Procedure: LAPAROSCOPIC ROBOTIC XI ASSISTED RIGHT COLECTOMY (Right: Abdomen) Diagnosis:       Malignant neoplasm of ascending colon ([address]      (Malignant neoplasm of ascending colon ([address] [C18.2])    Surgeons: Lary Powell MD Responsible Provider: Celena Torres DO    Anesthesia Type: general ASA Status: 4          Anesthesia Type: No value filed. Savanna Phase I: Savanna Score: 10    Savanna Phase II:        Anesthesia Post Evaluation    Patient location during evaluation: PACU  Patient participation: complete - patient participated  Level of consciousness: awake  Pain score: 2  Airway patency: patent  Nausea & Vomiting: no nausea and no vomiting  Complications: no  Cardiovascular status: blood pressure returned to baseline and hemodynamically stable  Respiratory status: acceptable  Hydration status: euvolemic  Comments: Seen and examined. Progressing as expected. No questions at this time.   Multimodal analgesia pain management approach

## 2023-04-08 LAB
ANION GAP SERPL CALCULATED.3IONS-SCNC: 15 MMOL/L (ref 7–16)
BASOPHILS # BLD: 0 E9/L (ref 0–0.2)
BASOPHILS NFR BLD: 0 % (ref 0–2)
BUN SERPL-MCNC: 59 MG/DL (ref 6–20)
CALCIUM SERPL-MCNC: 9 MG/DL (ref 8.6–10.2)
CHLORIDE SERPL-SCNC: 97 MMOL/L (ref 98–107)
CO2 SERPL-SCNC: 20 MMOL/L (ref 22–29)
CREAT SERPL-MCNC: 14.1 MG/DL (ref 0.7–1.2)
EOSINOPHIL # BLD: 0 E9/L (ref 0.05–0.5)
EOSINOPHIL NFR BLD: 0 % (ref 0–6)
ERYTHROCYTE [DISTWIDTH] IN BLOOD BY AUTOMATED COUNT: 13.9 FL (ref 11.5–15)
GLUCOSE SERPL-MCNC: 125 MG/DL (ref 74–99)
HCT VFR BLD AUTO: 25.2 % (ref 37–54)
HGB BLD-MCNC: 7.9 G/DL (ref 12.5–16.5)
HYPOCHROMIA: ABNORMAL
LYMPHOCYTES # BLD: 0.77 E9/L (ref 1.5–4)
LYMPHOCYTES NFR BLD: 5.2 % (ref 20–42)
MCH RBC QN AUTO: 32 PG (ref 26–35)
MCHC RBC AUTO-ENTMCNC: 31.3 % (ref 32–34.5)
MCV RBC AUTO: 102 FL (ref 80–99.9)
MONOCYTES # BLD: 0.92 E9/L (ref 0.1–0.95)
MONOCYTES NFR BLD: 6.1 % (ref 2–12)
NEUTROPHILS # BLD: 13.62 E9/L (ref 1.8–7.3)
NEUTS SEG NFR BLD: 88.7 % (ref 43–80)
NRBC BLD-RTO: 0 /100 WBC
OVALOCYTES: ABNORMAL
PHOSPHATE SERPL-MCNC: 6.6 MG/DL (ref 2.5–4.5)
PLATELET # BLD AUTO: 255 E9/L (ref 130–450)
PMV BLD AUTO: 9.5 FL (ref 7–12)
POIKILOCYTES: ABNORMAL
POLYCHROMASIA: ABNORMAL
POTASSIUM SERPL-SCNC: 5.8 MMOL/L (ref 3.5–5)
RBC # BLD AUTO: 2.47 E12/L (ref 3.8–5.8)
SODIUM SERPL-SCNC: 132 MMOL/L (ref 132–146)
TEAR DROP CELLS: ABNORMAL
WBC # BLD: 15.3 E9/L (ref 4.5–11.5)

## 2023-04-08 PROCEDURE — 6360000002 HC RX W HCPCS

## 2023-04-08 PROCEDURE — 84100 ASSAY OF PHOSPHORUS: CPT

## 2023-04-08 PROCEDURE — 90935 HEMODIALYSIS ONE EVALUATION: CPT

## 2023-04-08 PROCEDURE — 36415 COLL VENOUS BLD VENIPUNCTURE: CPT

## 2023-04-08 PROCEDURE — 1200000000 HC SEMI PRIVATE

## 2023-04-08 PROCEDURE — 6370000000 HC RX 637 (ALT 250 FOR IP): Performed by: INTERNAL MEDICINE

## 2023-04-08 PROCEDURE — 85025 COMPLETE CBC W/AUTO DIFF WBC: CPT

## 2023-04-08 PROCEDURE — 80048 BASIC METABOLIC PNL TOTAL CA: CPT

## 2023-04-08 PROCEDURE — 2580000003 HC RX 258

## 2023-04-08 PROCEDURE — 6370000000 HC RX 637 (ALT 250 FOR IP)

## 2023-04-08 RX ORDER — SEVELAMER CARBONATE 800 MG/1
800 TABLET, FILM COATED ORAL
Status: DISCONTINUED | OUTPATIENT
Start: 2023-04-08 | End: 2023-04-13 | Stop reason: HOSPADM

## 2023-04-08 RX ADMIN — HEPARIN SODIUM 5000 UNITS: 10000 INJECTION INTRAVENOUS; SUBCUTANEOUS at 15:17

## 2023-04-08 RX ADMIN — OXYCODONE 10 MG: 5 TABLET ORAL at 00:26

## 2023-04-08 RX ADMIN — HYDROMORPHONE HYDROCHLORIDE 0.5 MG: 0.5 INJECTION, SOLUTION INTRAMUSCULAR; INTRAVENOUS; SUBCUTANEOUS at 07:31

## 2023-04-08 RX ADMIN — OXYCODONE 10 MG: 5 TABLET ORAL at 12:49

## 2023-04-08 RX ADMIN — HEPARIN SODIUM 5000 UNITS: 10000 INJECTION INTRAVENOUS; SUBCUTANEOUS at 05:38

## 2023-04-08 RX ADMIN — SODIUM CHLORIDE, PRESERVATIVE FREE 10 ML: 5 INJECTION INTRAVENOUS at 07:31

## 2023-04-08 RX ADMIN — OXYCODONE 10 MG: 5 TABLET ORAL at 19:43

## 2023-04-08 RX ADMIN — HYDROMORPHONE HYDROCHLORIDE 0.5 MG: 0.5 INJECTION, SOLUTION INTRAMUSCULAR; INTRAVENOUS; SUBCUTANEOUS at 15:42

## 2023-04-08 RX ADMIN — SEVELAMER CARBONATE 800 MG: 800 TABLET, FILM COATED ORAL at 19:46

## 2023-04-08 RX ADMIN — SODIUM CHLORIDE, PRESERVATIVE FREE 10 ML: 5 INJECTION INTRAVENOUS at 22:32

## 2023-04-08 RX ADMIN — HEPARIN SODIUM 5000 UNITS: 10000 INJECTION INTRAVENOUS; SUBCUTANEOUS at 22:32

## 2023-04-08 ASSESSMENT — PAIN DESCRIPTION - ONSET
ONSET: ON-GOING
ONSET: ON-GOING

## 2023-04-08 ASSESSMENT — PAIN SCALES - GENERAL
PAINLEVEL_OUTOF10: 4
PAINLEVEL_OUTOF10: 7
PAINLEVEL_OUTOF10: 4
PAINLEVEL_OUTOF10: 10
PAINLEVEL_OUTOF10: 9
PAINLEVEL_OUTOF10: 7
PAINLEVEL_OUTOF10: 5

## 2023-04-08 ASSESSMENT — PAIN - FUNCTIONAL ASSESSMENT
PAIN_FUNCTIONAL_ASSESSMENT: ACTIVITIES ARE NOT PREVENTED
PAIN_FUNCTIONAL_ASSESSMENT: PREVENTS OR INTERFERES WITH MANY ACTIVE NOT PASSIVE ACTIVITIES
PAIN_FUNCTIONAL_ASSESSMENT: PREVENTS OR INTERFERES SOME ACTIVE ACTIVITIES AND ADLS
PAIN_FUNCTIONAL_ASSESSMENT: PREVENTS OR INTERFERES WITH MANY ACTIVE NOT PASSIVE ACTIVITIES
PAIN_FUNCTIONAL_ASSESSMENT: PREVENTS OR INTERFERES WITH MANY ACTIVE NOT PASSIVE ACTIVITIES

## 2023-04-08 ASSESSMENT — PAIN DESCRIPTION - LOCATION
LOCATION: ABDOMEN
LOCATION: ABDOMEN;PENIS
LOCATION: ABDOMEN

## 2023-04-08 ASSESSMENT — PAIN DESCRIPTION - DESCRIPTORS
DESCRIPTORS: STABBING;SHARP;PENETRATING
DESCRIPTORS: ACHING;PRESSURE;SHARP
DESCRIPTORS: ACHING;DISCOMFORT;SORE

## 2023-04-08 ASSESSMENT — PAIN DESCRIPTION - PAIN TYPE
TYPE: SURGICAL PAIN
TYPE: ACUTE PAIN

## 2023-04-08 ASSESSMENT — PAIN DESCRIPTION - FREQUENCY
FREQUENCY: INTERMITTENT
FREQUENCY: INTERMITTENT

## 2023-04-08 ASSESSMENT — PAIN DESCRIPTION - ORIENTATION
ORIENTATION: LOWER
ORIENTATION: MID
ORIENTATION: MID;UPPER
ORIENTATION: UPPER;LOWER

## 2023-04-09 ENCOUNTER — APPOINTMENT (OUTPATIENT)
Dept: GENERAL RADIOLOGY | Age: 53
DRG: 329 | End: 2023-04-09
Attending: SURGERY
Payer: MEDICARE

## 2023-04-09 PROBLEM — N18.6 ESRD (END STAGE RENAL DISEASE) (HCC): Status: ACTIVE | Noted: 2023-04-09

## 2023-04-09 PROBLEM — R10.13 EPIGASTRIC PAIN: Status: ACTIVE | Noted: 2023-04-09

## 2023-04-09 PROBLEM — R00.0 SINUS TACHYCARDIA: Status: ACTIVE | Noted: 2023-04-09

## 2023-04-09 LAB
ANION GAP SERPL CALCULATED.3IONS-SCNC: 13 MMOL/L (ref 7–16)
ANION GAP SERPL CALCULATED.3IONS-SCNC: 13 MMOL/L (ref 7–16)
B.E.: 1.3 MMOL/L (ref -3–3)
BASOPHILS # BLD: 0.01 E9/L (ref 0–0.2)
BASOPHILS # BLD: 0.02 E9/L (ref 0–0.2)
BASOPHILS NFR BLD: 0.1 % (ref 0–2)
BASOPHILS NFR BLD: 0.2 % (ref 0–2)
BUN SERPL-MCNC: 31 MG/DL (ref 6–20)
BUN SERPL-MCNC: 37 MG/DL (ref 6–20)
CALCIUM SERPL-MCNC: 9 MG/DL (ref 8.6–10.2)
CALCIUM SERPL-MCNC: 9.1 MG/DL (ref 8.6–10.2)
CHLORIDE SERPL-SCNC: 93 MMOL/L (ref 98–107)
CHLORIDE SERPL-SCNC: 96 MMOL/L (ref 98–107)
CO2 SERPL-SCNC: 25 MMOL/L (ref 22–29)
CO2 SERPL-SCNC: 25 MMOL/L (ref 22–29)
COHB: 1.1 % (ref 0–1.5)
CREAT SERPL-MCNC: 10.3 MG/DL (ref 0.7–1.2)
CREAT SERPL-MCNC: 8.8 MG/DL (ref 0.7–1.2)
CRITICAL: ABNORMAL
DATE ANALYZED: ABNORMAL
DATE OF COLLECTION: ABNORMAL
EOSINOPHIL # BLD: 0.01 E9/L (ref 0.05–0.5)
EOSINOPHIL # BLD: 0.06 E9/L (ref 0.05–0.5)
EOSINOPHIL NFR BLD: 0.1 % (ref 0–6)
EOSINOPHIL NFR BLD: 0.7 % (ref 0–6)
ERYTHROCYTE [DISTWIDTH] IN BLOOD BY AUTOMATED COUNT: 13.8 FL (ref 11.5–15)
ERYTHROCYTE [DISTWIDTH] IN BLOOD BY AUTOMATED COUNT: 13.9 FL (ref 11.5–15)
GLUCOSE SERPL-MCNC: 101 MG/DL (ref 74–99)
GLUCOSE SERPL-MCNC: 115 MG/DL (ref 74–99)
HCO3: 24.5 MMOL/L (ref 22–26)
HCT VFR BLD AUTO: 26.1 % (ref 37–54)
HCT VFR BLD AUTO: 28.7 % (ref 37–54)
HGB BLD-MCNC: 8.3 G/DL (ref 12.5–16.5)
HGB BLD-MCNC: 9.4 G/DL (ref 12.5–16.5)
HHB: 2.9 % (ref 0–5)
IMM GRANULOCYTES # BLD: 0.04 E9/L
IMM GRANULOCYTES # BLD: 0.05 E9/L
IMM GRANULOCYTES NFR BLD: 0.3 % (ref 0–5)
IMM GRANULOCYTES NFR BLD: 0.5 % (ref 0–5)
LAB: ABNORMAL
LACTATE BLDV-SCNC: 1.2 MMOL/L (ref 0.5–2.2)
LYMPHOCYTES # BLD: 0.35 E9/L (ref 1.5–4)
LYMPHOCYTES # BLD: 0.69 E9/L (ref 1.5–4)
LYMPHOCYTES NFR BLD: 2.9 % (ref 20–42)
LYMPHOCYTES NFR BLD: 7.5 % (ref 20–42)
Lab: ABNORMAL
MAGNESIUM SERPL-MCNC: 2.1 MG/DL (ref 1.6–2.6)
MCH RBC QN AUTO: 32 PG (ref 26–35)
MCH RBC QN AUTO: 32.3 PG (ref 26–35)
MCHC RBC AUTO-ENTMCNC: 31.8 % (ref 32–34.5)
MCHC RBC AUTO-ENTMCNC: 32.8 % (ref 32–34.5)
MCV RBC AUTO: 100.8 FL (ref 80–99.9)
MCV RBC AUTO: 98.6 FL (ref 80–99.9)
METER GLUCOSE: 113 MG/DL (ref 74–99)
METHB: 0.3 % (ref 0–1.5)
MODE: ABNORMAL
MONOCYTES # BLD: 0.51 E9/L (ref 0.1–0.95)
MONOCYTES # BLD: 0.79 E9/L (ref 0.1–0.95)
MONOCYTES NFR BLD: 4.3 % (ref 2–12)
MONOCYTES NFR BLD: 8.6 % (ref 2–12)
NEUTROPHILS # BLD: 11.07 E9/L (ref 1.8–7.3)
NEUTROPHILS # BLD: 7.55 E9/L (ref 1.8–7.3)
NEUTS SEG NFR BLD: 82.5 % (ref 43–80)
NEUTS SEG NFR BLD: 92.3 % (ref 43–80)
O2 CONTENT: 13.9 ML/DL
O2 SATURATION: 97.1 % (ref 92–98.5)
O2HB: 95.7 % (ref 94–97)
OPERATOR ID: 316
OVALOCYTES: ABNORMAL
PATIENT TEMP: 37 C
PCO2: 33.5 MMHG (ref 35–45)
PH BLOOD GAS: 7.48 (ref 7.35–7.45)
PHOSPHATE SERPL-MCNC: 5.8 MG/DL (ref 2.5–4.5)
PLATELET # BLD AUTO: 243 E9/L (ref 130–450)
PLATELET # BLD AUTO: 293 E9/L (ref 130–450)
PMV BLD AUTO: 9.1 FL (ref 7–12)
PMV BLD AUTO: 9.3 FL (ref 7–12)
PO2: 91.9 MMHG (ref 75–100)
POIKILOCYTES: ABNORMAL
POTASSIUM SERPL-SCNC: 4.8 MMOL/L (ref 3.5–5)
POTASSIUM SERPL-SCNC: 4.8 MMOL/L (ref 3.5–5)
RBC # BLD AUTO: 2.59 E12/L (ref 3.8–5.8)
RBC # BLD AUTO: 2.91 E12/L (ref 3.8–5.8)
SODIUM SERPL-SCNC: 131 MMOL/L (ref 132–146)
SODIUM SERPL-SCNC: 134 MMOL/L (ref 132–146)
SOURCE, BLOOD GAS: ABNORMAL
THB: 10.2 G/DL (ref 11.5–16.5)
TIME ANALYZED: 1604
TROPONIN, HIGH SENSITIVITY: 121 NG/L (ref 0–11)
WBC # BLD: 12 E9/L (ref 4.5–11.5)
WBC # BLD: 9.2 E9/L (ref 4.5–11.5)

## 2023-04-09 PROCEDURE — 36600 WITHDRAWAL OF ARTERIAL BLOOD: CPT

## 2023-04-09 PROCEDURE — 6370000000 HC RX 637 (ALT 250 FOR IP)

## 2023-04-09 PROCEDURE — 36415 COLL VENOUS BLD VENIPUNCTURE: CPT

## 2023-04-09 PROCEDURE — 2580000003 HC RX 258

## 2023-04-09 PROCEDURE — 93005 ELECTROCARDIOGRAM TRACING: CPT

## 2023-04-09 PROCEDURE — 84484 ASSAY OF TROPONIN QUANT: CPT

## 2023-04-09 PROCEDURE — 6370000000 HC RX 637 (ALT 250 FOR IP): Performed by: INTERNAL MEDICINE

## 2023-04-09 PROCEDURE — 2060000000 HC ICU INTERMEDIATE R&B

## 2023-04-09 PROCEDURE — 74018 RADEX ABDOMEN 1 VIEW: CPT

## 2023-04-09 PROCEDURE — 83735 ASSAY OF MAGNESIUM: CPT

## 2023-04-09 PROCEDURE — 71045 X-RAY EXAM CHEST 1 VIEW: CPT

## 2023-04-09 PROCEDURE — 82805 BLOOD GASES W/O2 SATURATION: CPT

## 2023-04-09 PROCEDURE — 6360000002 HC RX W HCPCS

## 2023-04-09 PROCEDURE — 85025 COMPLETE CBC W/AUTO DIFF WBC: CPT

## 2023-04-09 PROCEDURE — 80048 BASIC METABOLIC PNL TOTAL CA: CPT

## 2023-04-09 PROCEDURE — 84100 ASSAY OF PHOSPHORUS: CPT

## 2023-04-09 PROCEDURE — 82962 GLUCOSE BLOOD TEST: CPT

## 2023-04-09 PROCEDURE — 83605 ASSAY OF LACTIC ACID: CPT

## 2023-04-09 PROCEDURE — 99291 CRITICAL CARE FIRST HOUR: CPT | Performed by: INTERNAL MEDICINE

## 2023-04-09 PROCEDURE — 2580000003 HC RX 258: Performed by: INTERNAL MEDICINE

## 2023-04-09 RX ORDER — 0.9 % SODIUM CHLORIDE 0.9 %
250 INTRAVENOUS SOLUTION INTRAVENOUS ONCE
Status: COMPLETED | OUTPATIENT
Start: 2023-04-09 | End: 2023-04-09

## 2023-04-09 RX ORDER — 0.9 % SODIUM CHLORIDE 0.9 %
500 INTRAVENOUS SOLUTION INTRAVENOUS ONCE
Status: DISCONTINUED | OUTPATIENT
Start: 2023-04-09 | End: 2023-04-09

## 2023-04-09 RX ORDER — DILTIAZEM HYDROCHLORIDE 240 MG/1
480 CAPSULE, COATED, EXTENDED RELEASE ORAL DAILY
Status: DISCONTINUED | OUTPATIENT
Start: 2023-04-09 | End: 2023-04-13 | Stop reason: HOSPADM

## 2023-04-09 RX ORDER — DILTIAZEM HYDROCHLORIDE 240 MG/1
480 CAPSULE, COATED, EXTENDED RELEASE ORAL NIGHTLY
Status: DISCONTINUED | OUTPATIENT
Start: 2023-04-09 | End: 2023-04-09

## 2023-04-09 RX ADMIN — SEVELAMER CARBONATE 800 MG: 800 TABLET, FILM COATED ORAL at 08:20

## 2023-04-09 RX ADMIN — SODIUM CHLORIDE, PRESERVATIVE FREE 10 ML: 5 INJECTION INTRAVENOUS at 08:17

## 2023-04-09 RX ADMIN — OXYCODONE 10 MG: 5 TABLET ORAL at 11:23

## 2023-04-09 RX ADMIN — HEPARIN SODIUM 5000 UNITS: 10000 INJECTION INTRAVENOUS; SUBCUTANEOUS at 05:33

## 2023-04-09 RX ADMIN — HYDROMORPHONE HYDROCHLORIDE 0.5 MG: 0.5 INJECTION, SOLUTION INTRAMUSCULAR; INTRAVENOUS; SUBCUTANEOUS at 20:05

## 2023-04-09 RX ADMIN — SEVELAMER CARBONATE 800 MG: 800 TABLET, FILM COATED ORAL at 11:23

## 2023-04-09 RX ADMIN — SEVELAMER CARBONATE 800 MG: 800 TABLET, FILM COATED ORAL at 16:48

## 2023-04-09 RX ADMIN — OXYCODONE 5 MG: 5 TABLET ORAL at 07:15

## 2023-04-09 RX ADMIN — HYDROMORPHONE HYDROCHLORIDE 0.5 MG: 0.5 INJECTION, SOLUTION INTRAMUSCULAR; INTRAVENOUS; SUBCUTANEOUS at 23:42

## 2023-04-09 RX ADMIN — SODIUM CHLORIDE 250 ML: 9 INJECTION, SOLUTION INTRAVENOUS at 16:47

## 2023-04-09 RX ADMIN — HEPARIN SODIUM 5000 UNITS: 10000 INJECTION INTRAVENOUS; SUBCUTANEOUS at 23:12

## 2023-04-09 RX ADMIN — DILTIAZEM HYDROCHLORIDE 480 MG: 240 CAPSULE, COATED, EXTENDED RELEASE ORAL at 16:48

## 2023-04-09 RX ADMIN — SODIUM CHLORIDE, PRESERVATIVE FREE 10 ML: 5 INJECTION INTRAVENOUS at 20:05

## 2023-04-09 RX ADMIN — HEPARIN SODIUM 5000 UNITS: 10000 INJECTION INTRAVENOUS; SUBCUTANEOUS at 15:22

## 2023-04-09 ASSESSMENT — PAIN DESCRIPTION - LOCATION
LOCATION: ABDOMEN

## 2023-04-09 ASSESSMENT — PAIN SCALES - GENERAL
PAINLEVEL_OUTOF10: 6
PAINLEVEL_OUTOF10: 7
PAINLEVEL_OUTOF10: 10

## 2023-04-09 ASSESSMENT — PAIN DESCRIPTION - DESCRIPTORS
DESCRIPTORS: ACHING
DESCRIPTORS: ACHING;DISCOMFORT;SORE

## 2023-04-09 ASSESSMENT — PAIN - FUNCTIONAL ASSESSMENT
PAIN_FUNCTIONAL_ASSESSMENT: PREVENTS OR INTERFERES SOME ACTIVE ACTIVITIES AND ADLS
PAIN_FUNCTIONAL_ASSESSMENT: PREVENTS OR INTERFERES SOME ACTIVE ACTIVITIES AND ADLS

## 2023-04-09 ASSESSMENT — PAIN DESCRIPTION - PAIN TYPE: TYPE: ACUTE PAIN

## 2023-04-09 ASSESSMENT — PAIN DESCRIPTION - FREQUENCY: FREQUENCY: INTERMITTENT

## 2023-04-09 ASSESSMENT — PAIN DESCRIPTION - ORIENTATION: ORIENTATION: RIGHT;UPPER

## 2023-04-09 ASSESSMENT — PAIN DESCRIPTION - ONSET: ONSET: GRADUAL

## 2023-04-10 LAB
ANION GAP SERPL CALCULATED.3IONS-SCNC: 13 MMOL/L (ref 7–16)
ANION GAP SERPL CALCULATED.3IONS-SCNC: 14 MMOL/L (ref 7–16)
BASOPHILS # BLD: 0.18 E9/L (ref 0–0.2)
BASOPHILS NFR BLD: 1.7 % (ref 0–2)
BUN SERPL-MCNC: 27 MG/DL (ref 6–20)
BUN SERPL-MCNC: 42 MG/DL (ref 6–20)
CALCIUM SERPL-MCNC: 8.9 MG/DL (ref 8.6–10.2)
CALCIUM SERPL-MCNC: 9.1 MG/DL (ref 8.6–10.2)
CHLORIDE SERPL-SCNC: 93 MMOL/L (ref 98–107)
CHLORIDE SERPL-SCNC: 96 MMOL/L (ref 98–107)
CO2 SERPL-SCNC: 26 MMOL/L (ref 22–29)
CO2 SERPL-SCNC: 26 MMOL/L (ref 22–29)
CREAT SERPL-MCNC: 11.8 MG/DL (ref 0.7–1.2)
CREAT SERPL-MCNC: 7.8 MG/DL (ref 0.7–1.2)
EOSINOPHIL # BLD: 0 E9/L (ref 0.05–0.5)
EOSINOPHIL NFR BLD: 0 % (ref 0–6)
ERYTHROCYTE [DISTWIDTH] IN BLOOD BY AUTOMATED COUNT: 14.1 FL (ref 11.5–15)
GLUCOSE SERPL-MCNC: 115 MG/DL (ref 74–99)
GLUCOSE SERPL-MCNC: 120 MG/DL (ref 74–99)
HAV IGM SERPL QL IA: NORMAL
HBV CORE IGM SERPL QL IA: NORMAL
HBV SURFACE AB SERPL IA-ACNC: NORMAL M[IU]/ML
HBV SURFACE AG SERPL QL IA: NORMAL
HCT VFR BLD AUTO: 26.6 % (ref 37–54)
HCV AB SERPL QL IA: NORMAL
HGB BLD-MCNC: 8.4 G/DL (ref 12.5–16.5)
LYMPHOCYTES # BLD: 0.41 E9/L (ref 1.5–4)
LYMPHOCYTES NFR BLD: 4.4 % (ref 20–42)
MCH RBC QN AUTO: 31.9 PG (ref 26–35)
MCHC RBC AUTO-ENTMCNC: 31.6 % (ref 32–34.5)
MCV RBC AUTO: 101.1 FL (ref 80–99.9)
MONOCYTES # BLD: 0.62 E9/L (ref 0.1–0.95)
MONOCYTES NFR BLD: 6.1 % (ref 2–12)
NEUTROPHILS # BLD: 9.06 E9/L (ref 1.8–7.3)
NEUTS SEG NFR BLD: 87.8 % (ref 43–80)
NRBC BLD-RTO: 0 /100 WBC
OVALOCYTES: ABNORMAL
PLATELET # BLD AUTO: 258 E9/L (ref 130–450)
PMV BLD AUTO: 9.7 FL (ref 7–12)
POIKILOCYTES: ABNORMAL
POLYCHROMASIA: ABNORMAL
POTASSIUM SERPL-SCNC: 4.5 MMOL/L (ref 3.5–5)
POTASSIUM SERPL-SCNC: 5.3 MMOL/L (ref 3.5–5)
RBC # BLD AUTO: 2.63 E12/L (ref 3.8–5.8)
SODIUM SERPL-SCNC: 132 MMOL/L (ref 132–146)
SODIUM SERPL-SCNC: 136 MMOL/L (ref 132–146)
STOMATOCYTES: ABNORMAL
WBC # BLD: 10.3 E9/L (ref 4.5–11.5)

## 2023-04-10 PROCEDURE — 85025 COMPLETE CBC W/AUTO DIFF WBC: CPT

## 2023-04-10 PROCEDURE — 80048 BASIC METABOLIC PNL TOTAL CA: CPT

## 2023-04-10 PROCEDURE — 36415 COLL VENOUS BLD VENIPUNCTURE: CPT

## 2023-04-10 PROCEDURE — 6370000000 HC RX 637 (ALT 250 FOR IP): Performed by: INTERNAL MEDICINE

## 2023-04-10 PROCEDURE — 6360000002 HC RX W HCPCS

## 2023-04-10 PROCEDURE — 90935 HEMODIALYSIS ONE EVALUATION: CPT

## 2023-04-10 PROCEDURE — 6360000002 HC RX W HCPCS: Performed by: INTERNAL MEDICINE

## 2023-04-10 PROCEDURE — 2580000003 HC RX 258

## 2023-04-10 PROCEDURE — 2060000000 HC ICU INTERMEDIATE R&B

## 2023-04-10 PROCEDURE — 6370000000 HC RX 637 (ALT 250 FOR IP)

## 2023-04-10 RX ORDER — CALCITRIOL 0.25 UG/1
0.25 CAPSULE, LIQUID FILLED ORAL
Status: DISCONTINUED | OUTPATIENT
Start: 2023-04-11 | End: 2023-04-13 | Stop reason: HOSPADM

## 2023-04-10 RX ADMIN — SODIUM ZIRCONIUM CYCLOSILICATE 10 G: 10 POWDER, FOR SUSPENSION ORAL at 10:29

## 2023-04-10 RX ADMIN — HYDROMORPHONE HYDROCHLORIDE 0.5 MG: 0.5 INJECTION, SOLUTION INTRAMUSCULAR; INTRAVENOUS; SUBCUTANEOUS at 16:35

## 2023-04-10 RX ADMIN — HYDROMORPHONE HYDROCHLORIDE 0.5 MG: 0.5 INJECTION, SOLUTION INTRAMUSCULAR; INTRAVENOUS; SUBCUTANEOUS at 11:45

## 2023-04-10 RX ADMIN — SODIUM CHLORIDE, PRESERVATIVE FREE 10 ML: 5 INJECTION INTRAVENOUS at 11:47

## 2023-04-10 RX ADMIN — SEVELAMER CARBONATE 800 MG: 800 TABLET, FILM COATED ORAL at 08:15

## 2023-04-10 RX ADMIN — HYDROMORPHONE HYDROCHLORIDE 0.5 MG: 0.5 INJECTION, SOLUTION INTRAMUSCULAR; INTRAVENOUS; SUBCUTANEOUS at 08:17

## 2023-04-10 RX ADMIN — HYDROMORPHONE HYDROCHLORIDE 0.5 MG: 0.5 INJECTION, SOLUTION INTRAMUSCULAR; INTRAVENOUS; SUBCUTANEOUS at 21:54

## 2023-04-10 RX ADMIN — HEPARIN SODIUM 5000 UNITS: 10000 INJECTION INTRAVENOUS; SUBCUTANEOUS at 21:57

## 2023-04-10 RX ADMIN — SEVELAMER CARBONATE 800 MG: 800 TABLET, FILM COATED ORAL at 11:43

## 2023-04-10 RX ADMIN — EPOETIN ALFA-EPBX 2940 UNITS: 3000 INJECTION, SOLUTION INTRAVENOUS; SUBCUTANEOUS at 08:15

## 2023-04-10 RX ADMIN — HYDROMORPHONE HYDROCHLORIDE 0.5 MG: 0.5 INJECTION, SOLUTION INTRAMUSCULAR; INTRAVENOUS; SUBCUTANEOUS at 05:39

## 2023-04-10 RX ADMIN — SODIUM CHLORIDE, PRESERVATIVE FREE 10 ML: 5 INJECTION INTRAVENOUS at 08:17

## 2023-04-10 RX ADMIN — HEPARIN SODIUM 5000 UNITS: 10000 INJECTION INTRAVENOUS; SUBCUTANEOUS at 04:25

## 2023-04-10 RX ADMIN — HYDROMORPHONE HYDROCHLORIDE 0.5 MG: 0.5 INJECTION, SOLUTION INTRAMUSCULAR; INTRAVENOUS; SUBCUTANEOUS at 02:42

## 2023-04-10 RX ADMIN — DILTIAZEM HYDROCHLORIDE 480 MG: 240 CAPSULE, COATED, EXTENDED RELEASE ORAL at 08:15

## 2023-04-10 RX ADMIN — SEVELAMER CARBONATE 800 MG: 800 TABLET, FILM COATED ORAL at 16:35

## 2023-04-10 RX ADMIN — SODIUM CHLORIDE, PRESERVATIVE FREE 10 ML: 5 INJECTION INTRAVENOUS at 21:55

## 2023-04-10 ASSESSMENT — PAIN SCALES - GENERAL
PAINLEVEL_OUTOF10: 7
PAINLEVEL_OUTOF10: 4
PAINLEVEL_OUTOF10: 7
PAINLEVEL_OUTOF10: 8
PAINLEVEL_OUTOF10: 7
PAINLEVEL_OUTOF10: 5
PAINLEVEL_OUTOF10: 7

## 2023-04-10 ASSESSMENT — PAIN DESCRIPTION - DESCRIPTORS
DESCRIPTORS: ACHING;DISCOMFORT
DESCRIPTORS: BURNING;ACHING
DESCRIPTORS: SHARP
DESCRIPTORS: SORE;STABBING
DESCRIPTORS: ACHING

## 2023-04-10 ASSESSMENT — PAIN DESCRIPTION - LOCATION
LOCATION: ABDOMEN

## 2023-04-10 ASSESSMENT — PAIN DESCRIPTION - ORIENTATION
ORIENTATION: MID
ORIENTATION: RIGHT
ORIENTATION: RIGHT;MID
ORIENTATION: MID
ORIENTATION: RIGHT;MID

## 2023-04-10 ASSESSMENT — PAIN DESCRIPTION - PAIN TYPE
TYPE: SURGICAL PAIN
TYPE: SURGICAL PAIN

## 2023-04-10 NOTE — PLAN OF CARE
Problem: Discharge Planning  Goal: Discharge to home or other facility with appropriate resources  4/9/2023 2145 by Lisa Murguia RN  Outcome: Progressing  4/9/2023 0918 by Blayne Ellsworth RN  Outcome: Progressing  Flowsheets (Taken 4/8/2023 2005 by Bren Morales RN)  Discharge to home or other facility with appropriate resources: Identify barriers to discharge with patient and caregiver     Problem: Pain  Goal: Verbalizes/displays adequate comfort level or baseline comfort level  4/9/2023 2145 by Lisa Murguia RN  Outcome: Progressing  4/9/2023 0918 by Blayne Ellsworth RN  Outcome: Progressing     Problem: Safety - Adult  Goal: Free from fall injury  4/9/2023 2145 by Lisa Murguia RN  Outcome: Progressing  4/9/2023 0918 by Blayne Ellsworth RN  Outcome: Progressing

## 2023-04-10 NOTE — CARE COORDINATION
Received return call from [facility] @ [facility]- referral made to [address] chair time( pt prefers TTS evening shift)- [facility] does not need any further info faxed to them per [facility]. POD # 3 colectomy. S/p tunneled cath 4/3. Plan remains to return home on discharge.  Will follow Maggy Mondragon RN case manager

## 2023-04-10 NOTE — FLOWSHEET NOTE
04/10/23 1600   Vital Signs   BP (!) 145/91   Temp (!) 96.2 °F (35.7 °C)   Heart Rate 99   Resp 18   Post-Hemodialysis Assessment   Post-Treatment Procedures Blood returned;Catheter capped, clamped with Citrate x 2 ports   Machine Disinfection Process Acid/Vinegar Clean;Heat Disinfect; Exterior Machine Disinfection   Rinseback Volume (ml) 300 ml   Dialyzer Clearance Clear   Duration of Treatment (minutes) 180 minutes   Heparin Amount Administered During Treatment (mL) 0 mL   Hemodialysis Intake (ml) 300 ml   Hemodialysis Output (ml) 1300 ml   NET Removed (ml) 1000   Tolerated Treatment Good   Patient Response to Treatment 1000ml removed   Time Off 1544   Patient Disposition Return to room

## 2023-04-10 NOTE — PLAN OF CARE
Patient's chart updated to reflect:      . - HF care plan, HF education points and HF discharge instructions.  -Orders:  daily weights, I/O.  -PCP and/or Cardiologist appointment to be scheduled within 7 days of hospital discharge.  -History of HF, not primary admission Dx. Patient admitted for treatment of malignant neoplasm of the ascending colon.      Kate Cline RN BSN  Heart Failure Navigator

## 2023-04-10 NOTE — DISCHARGE INSTRUCTIONS
HEART FAILURE  / CONGESTIVE HEART FAILURE  DISCHARGE INSTRUCTIONS:  GUIDELINES TO FOLLOW AT HOME    Self- Managed Care:     MEDICATIONS:  Take your medication as directed. If you are experiencing any side effects, inform your doctor, Do not stop taking any of your medications without letting your doctor know. Check with your doctor before taking any over-the-counter medications / herbal / or dietary supplements. They may interfere with your other medications. Do not take ibuprofen (Advil or Motrin) and naproxen (Aleve) without talking to your doctor first. They could make your heart failure worse. WEIGHT MONITORING:   Weigh yourself everyday (with the same scale) around the same time of the day and write it down. (you can chart them on a calendar or keep track of them on paper. Notify your doctor of a weight gain of 3 pounds or more in 1 day   OR a total of 5 pounds or more in 1 week    Take your weight record to your doctor visits  Also, the same goes if you loose more than 3# in one day, let your heart doctor know. DIET:   Cardiac heart healthy diet- Low saturated / low trans fat, no added salt, caffeine restricted, Low sodium diet-   No more than 2,000mg (2 grams) of salt / sodium per day (which equals to a little less than  a teaspoon of salt)  If your doctor wants you on a fluid restriction. ..it is usually recommended a fluid limit of 2,000cc -  Fluid restriction- 2,000 ml (milliliters) = 64 ounces = you can have 8 glasses of fluid per day (each glass 8 ounces)    Follow a low salt diet - avoid using salt at the table, avoid / limit use of canned soups, processed / packaged foods, salted snacks, olives and pickles. Do not use a salt substitute without checking with your doctor, they may contain a high amount of potassioum. (Mrs. Ernesto Latham is safe to use).     Limit the use of alcohol       CALL YOUR DOCTOR THE FIRST DAY YOU NOTICE ANY OF THESE   SYMPTOMS:  You have a

## 2023-04-11 ENCOUNTER — APPOINTMENT (OUTPATIENT)
Dept: GENERAL RADIOLOGY | Age: 53
DRG: 329 | End: 2023-04-11
Attending: SURGERY
Payer: MEDICARE

## 2023-04-11 LAB
ACANTHOCYTES: ABNORMAL
ANION GAP SERPL CALCULATED.3IONS-SCNC: 13 MMOL/L (ref 7–16)
BASOPHILIC STIPPLING: ABNORMAL
BASOPHILS # BLD: 0 E9/L (ref 0–0.2)
BASOPHILS NFR BLD: 0 % (ref 0–2)
BUN SERPL-MCNC: 36 MG/DL (ref 6–20)
CALCIUM SERPL-MCNC: 9.1 MG/DL (ref 8.6–10.2)
CHLORIDE SERPL-SCNC: 95 MMOL/L (ref 98–107)
CO2 SERPL-SCNC: 25 MMOL/L (ref 22–29)
CREAT SERPL-MCNC: 10.1 MG/DL (ref 0.7–1.2)
EOSINOPHIL # BLD: 0.31 E9/L (ref 0.05–0.5)
EOSINOPHIL NFR BLD: 3.5 % (ref 0–6)
ERYTHROCYTE [DISTWIDTH] IN BLOOD BY AUTOMATED COUNT: 14 FL (ref 11.5–15)
GLUCOSE SERPL-MCNC: 162 MG/DL (ref 74–99)
HCT VFR BLD AUTO: 24.3 % (ref 37–54)
HGB BLD-MCNC: 7.8 G/DL (ref 12.5–16.5)
LYMPHOCYTES # BLD: 0.27 E9/L (ref 1.5–4)
LYMPHOCYTES NFR BLD: 2.6 % (ref 20–42)
MCH RBC QN AUTO: 31.7 PG (ref 26–35)
MCHC RBC AUTO-ENTMCNC: 32.1 % (ref 32–34.5)
MCV RBC AUTO: 98.8 FL (ref 80–99.9)
MONOCYTES # BLD: 0.27 E9/L (ref 0.1–0.95)
MONOCYTES NFR BLD: 2.6 % (ref 2–12)
NEUTROPHILS # BLD: 8.1 E9/L (ref 1.8–7.3)
NEUTS SEG NFR BLD: 91.3 % (ref 43–80)
NRBC BLD-RTO: 0 /100 WBC
OVALOCYTES: ABNORMAL
PLATELET # BLD AUTO: 263 E9/L (ref 130–450)
PMV BLD AUTO: 9.4 FL (ref 7–12)
POLYCHROMASIA: ABNORMAL
POTASSIUM SERPL-SCNC: 4.8 MMOL/L (ref 3.5–5)
RBC # BLD AUTO: 2.46 E12/L (ref 3.8–5.8)
SODIUM SERPL-SCNC: 133 MMOL/L (ref 132–146)
WBC # BLD: 8.9 E9/L (ref 4.5–11.5)

## 2023-04-11 PROCEDURE — 6370000000 HC RX 637 (ALT 250 FOR IP): Performed by: INTERNAL MEDICINE

## 2023-04-11 PROCEDURE — 85025 COMPLETE CBC W/AUTO DIFF WBC: CPT

## 2023-04-11 PROCEDURE — 6360000002 HC RX W HCPCS

## 2023-04-11 PROCEDURE — 97530 THERAPEUTIC ACTIVITIES: CPT

## 2023-04-11 PROCEDURE — 2060000000 HC ICU INTERMEDIATE R&B

## 2023-04-11 PROCEDURE — 90935 HEMODIALYSIS ONE EVALUATION: CPT

## 2023-04-11 PROCEDURE — 6370000000 HC RX 637 (ALT 250 FOR IP)

## 2023-04-11 PROCEDURE — 97161 PT EVAL LOW COMPLEX 20 MIN: CPT

## 2023-04-11 PROCEDURE — 36415 COLL VENOUS BLD VENIPUNCTURE: CPT

## 2023-04-11 PROCEDURE — 80048 BASIC METABOLIC PNL TOTAL CA: CPT

## 2023-04-11 PROCEDURE — 74019 RADEX ABDOMEN 2 VIEWS: CPT

## 2023-04-11 PROCEDURE — 2580000003 HC RX 258

## 2023-04-11 RX ADMIN — HEPARIN SODIUM 5000 UNITS: 10000 INJECTION INTRAVENOUS; SUBCUTANEOUS at 06:16

## 2023-04-11 RX ADMIN — ACETAMINOPHEN 650 MG: 325 TABLET ORAL at 16:48

## 2023-04-11 RX ADMIN — SODIUM ZIRCONIUM CYCLOSILICATE 10 G: 10 POWDER, FOR SUSPENSION ORAL at 11:12

## 2023-04-11 RX ADMIN — DILTIAZEM HYDROCHLORIDE 480 MG: 240 CAPSULE, COATED, EXTENDED RELEASE ORAL at 11:12

## 2023-04-11 RX ADMIN — SODIUM CHLORIDE, PRESERVATIVE FREE 10 ML: 5 INJECTION INTRAVENOUS at 06:05

## 2023-04-11 RX ADMIN — SEVELAMER CARBONATE 800 MG: 800 TABLET, FILM COATED ORAL at 11:12

## 2023-04-11 RX ADMIN — HYDROMORPHONE HYDROCHLORIDE 0.5 MG: 0.5 INJECTION, SOLUTION INTRAMUSCULAR; INTRAVENOUS; SUBCUTANEOUS at 21:35

## 2023-04-11 RX ADMIN — SODIUM CHLORIDE, PRESERVATIVE FREE 10 ML: 5 INJECTION INTRAVENOUS at 21:35

## 2023-04-11 RX ADMIN — HYDROMORPHONE HYDROCHLORIDE 0.5 MG: 0.5 INJECTION, SOLUTION INTRAMUSCULAR; INTRAVENOUS; SUBCUTANEOUS at 18:39

## 2023-04-11 RX ADMIN — HYDROMORPHONE HYDROCHLORIDE 0.5 MG: 0.5 INJECTION, SOLUTION INTRAMUSCULAR; INTRAVENOUS; SUBCUTANEOUS at 14:43

## 2023-04-11 RX ADMIN — HYDROMORPHONE HYDROCHLORIDE 0.5 MG: 0.5 INJECTION, SOLUTION INTRAMUSCULAR; INTRAVENOUS; SUBCUTANEOUS at 11:12

## 2023-04-11 RX ADMIN — HEPARIN SODIUM 5000 UNITS: 10000 INJECTION INTRAVENOUS; SUBCUTANEOUS at 14:43

## 2023-04-11 RX ADMIN — HEPARIN SODIUM 5000 UNITS: 10000 INJECTION INTRAVENOUS; SUBCUTANEOUS at 21:37

## 2023-04-11 RX ADMIN — CALCITRIOL 0.25 MCG: 0.25 CAPSULE ORAL at 11:11

## 2023-04-11 RX ADMIN — SODIUM CHLORIDE, PRESERVATIVE FREE 10 ML: 5 INJECTION INTRAVENOUS at 11:13

## 2023-04-11 RX ADMIN — HYDROMORPHONE HYDROCHLORIDE 0.5 MG: 0.5 INJECTION, SOLUTION INTRAMUSCULAR; INTRAVENOUS; SUBCUTANEOUS at 06:05

## 2023-04-11 ASSESSMENT — PAIN DESCRIPTION - DESCRIPTORS
DESCRIPTORS: GNAWING
DESCRIPTORS: ACHING
DESCRIPTORS: ACHING

## 2023-04-11 ASSESSMENT — PAIN SCALES - GENERAL
PAINLEVEL_OUTOF10: 7
PAINLEVEL_OUTOF10: 0
PAINLEVEL_OUTOF10: 8
PAINLEVEL_OUTOF10: 7
PAINLEVEL_OUTOF10: 7
PAINLEVEL_OUTOF10: 8
PAINLEVEL_OUTOF10: 2

## 2023-04-11 ASSESSMENT — PAIN DESCRIPTION - ORIENTATION
ORIENTATION: RIGHT
ORIENTATION: RIGHT;MID
ORIENTATION: RIGHT

## 2023-04-11 ASSESSMENT — PAIN DESCRIPTION - LOCATION
LOCATION: ABDOMEN

## 2023-04-11 NOTE — CARE COORDINATION
POD # 4. Patient is off unit in dialysis. Per [address] [phone], chair time is T-Th-Sat 12 noon- patient is agreeable to schedule days and time- notify [facility] when pt is discharging. Plan remains to return home alone on discharge.  Will follow Natalie Ball RN case manager

## 2023-04-11 NOTE — FLOWSHEET NOTE
Pt completed 3 hrs of HD on a 3K bath with 1.3L of UF removed safely. 04/11/23 1030   Vital Signs   BP (!) 137/93   Temp 98.2 °F (36.8 °C)   Heart Rate 96   Resp 16   Weight 207 lb 3.7 oz (94 kg)   Percent Weight Change -1.36   Pain Assessment   Pain Assessment None - Denies Pain   Pain Level 0   Post-Hemodialysis Assessment   Post-Treatment Procedures Blood returned;Catheter capped, clamped with Citrate x 2 ports   Machine Disinfection Process Acid/Vinegar Clean;Heat Disinfect; Exterior Machine Disinfection   Rinseback Volume (ml) 300 ml   Blood Volume Processed (Liters) 50.6 l/min   Dialyzer Clearance Lightly streaked   Duration of Treatment (minutes) 180 minutes   Hemodialysis Intake (ml) 300 ml   Hemodialysis Output (ml) 1600 ml   NET Removed (ml) 1300   Tolerated Treatment Good   Patient Response to Treatment tolerated well; post report to [address]; pt stable at discharg   Bilateral Breath Sounds Clear   Time Off 1013   Patient Disposition Return to room

## 2023-04-12 LAB
ALBUMIN SERPL-MCNC: 2.6 G/DL (ref 3.5–5.2)
ALP SERPL-CCNC: 50 U/L (ref 40–129)
ALT SERPL-CCNC: 9 U/L (ref 0–40)
ANION GAP SERPL CALCULATED.3IONS-SCNC: 14 MMOL/L (ref 7–16)
ANISOCYTOSIS: ABNORMAL
AST SERPL-CCNC: 7 U/L (ref 0–39)
BASOPHILS # BLD: 0.02 E9/L (ref 0–0.2)
BASOPHILS NFR BLD: 0.2 % (ref 0–2)
BILIRUB SERPL-MCNC: 0.3 MG/DL (ref 0–1.2)
BUN SERPL-MCNC: 31 MG/DL (ref 6–20)
CALCIUM SERPL-MCNC: 9.1 MG/DL (ref 8.6–10.2)
CHLORIDE SERPL-SCNC: 95 MMOL/L (ref 98–107)
CO2 SERPL-SCNC: 27 MMOL/L (ref 22–29)
CREAT SERPL-MCNC: 8.4 MG/DL (ref 0.7–1.2)
EOSINOPHIL # BLD: 0.3 E9/L (ref 0.05–0.5)
EOSINOPHIL NFR BLD: 3.5 % (ref 0–6)
ERYTHROCYTE [DISTWIDTH] IN BLOOD BY AUTOMATED COUNT: 14 FL (ref 11.5–15)
GLUCOSE SERPL-MCNC: 106 MG/DL (ref 74–99)
HCT VFR BLD AUTO: 24.5 % (ref 37–54)
HGB BLD-MCNC: 7.8 G/DL (ref 12.5–16.5)
IMM GRANULOCYTES # BLD: 0.09 E9/L
IMM GRANULOCYTES NFR BLD: 1 % (ref 0–5)
LYMPHOCYTES # BLD: 0.58 E9/L (ref 1.5–4)
LYMPHOCYTES NFR BLD: 6.7 % (ref 20–42)
MAGNESIUM SERPL-MCNC: 2.4 MG/DL (ref 1.6–2.6)
MCH RBC QN AUTO: 31.8 PG (ref 26–35)
MCHC RBC AUTO-ENTMCNC: 31.8 % (ref 32–34.5)
MCV RBC AUTO: 100 FL (ref 80–99.9)
MONOCYTES # BLD: 1.03 E9/L (ref 0.1–0.95)
MONOCYTES NFR BLD: 11.9 % (ref 2–12)
NEUTROPHILS # BLD: 6.6 E9/L (ref 1.8–7.3)
NEUTS SEG NFR BLD: 76.7 % (ref 43–80)
OVALOCYTES: ABNORMAL
PHOSPHATE SERPL-MCNC: 6.3 MG/DL (ref 2.5–4.5)
PLATELET # BLD AUTO: 305 E9/L (ref 130–450)
PMV BLD AUTO: 9.6 FL (ref 7–12)
POLYCHROMASIA: ABNORMAL
POTASSIUM SERPL-SCNC: 4.1 MMOL/L (ref 3.5–5)
PROT SERPL-MCNC: 5.9 G/DL (ref 6.4–8.3)
RBC # BLD AUTO: 2.45 E12/L (ref 3.8–5.8)
SODIUM SERPL-SCNC: 136 MMOL/L (ref 132–146)
WBC # BLD: 8.6 E9/L (ref 4.5–11.5)

## 2023-04-12 PROCEDURE — 85025 COMPLETE CBC W/AUTO DIFF WBC: CPT

## 2023-04-12 PROCEDURE — 80053 COMPREHEN METABOLIC PANEL: CPT

## 2023-04-12 PROCEDURE — 36415 COLL VENOUS BLD VENIPUNCTURE: CPT

## 2023-04-12 PROCEDURE — 83735 ASSAY OF MAGNESIUM: CPT

## 2023-04-12 PROCEDURE — 6370000000 HC RX 637 (ALT 250 FOR IP)

## 2023-04-12 PROCEDURE — 2060000000 HC ICU INTERMEDIATE R&B

## 2023-04-12 PROCEDURE — 84100 ASSAY OF PHOSPHORUS: CPT

## 2023-04-12 PROCEDURE — 97165 OT EVAL LOW COMPLEX 30 MIN: CPT

## 2023-04-12 PROCEDURE — 6360000002 HC RX W HCPCS

## 2023-04-12 PROCEDURE — 2580000003 HC RX 258

## 2023-04-12 PROCEDURE — 6370000000 HC RX 637 (ALT 250 FOR IP): Performed by: INTERNAL MEDICINE

## 2023-04-12 RX ADMIN — HEPARIN SODIUM 5000 UNITS: 10000 INJECTION INTRAVENOUS; SUBCUTANEOUS at 06:30

## 2023-04-12 RX ADMIN — SODIUM CHLORIDE, PRESERVATIVE FREE 10 ML: 5 INJECTION INTRAVENOUS at 06:29

## 2023-04-12 RX ADMIN — HYDROMORPHONE HYDROCHLORIDE 0.5 MG: 0.5 INJECTION, SOLUTION INTRAMUSCULAR; INTRAVENOUS; SUBCUTANEOUS at 12:37

## 2023-04-12 RX ADMIN — SODIUM CHLORIDE, PRESERVATIVE FREE 10 ML: 5 INJECTION INTRAVENOUS at 20:47

## 2023-04-12 RX ADMIN — HYDROMORPHONE HYDROCHLORIDE 0.5 MG: 0.5 INJECTION, SOLUTION INTRAMUSCULAR; INTRAVENOUS; SUBCUTANEOUS at 17:37

## 2023-04-12 RX ADMIN — OXYCODONE 10 MG: 5 TABLET ORAL at 15:04

## 2023-04-12 RX ADMIN — HEPARIN SODIUM 5000 UNITS: 10000 INJECTION INTRAVENOUS; SUBCUTANEOUS at 14:55

## 2023-04-12 RX ADMIN — HYDROMORPHONE HYDROCHLORIDE 0.5 MG: 0.5 INJECTION, SOLUTION INTRAMUSCULAR; INTRAVENOUS; SUBCUTANEOUS at 06:29

## 2023-04-12 RX ADMIN — DILTIAZEM HYDROCHLORIDE 480 MG: 240 CAPSULE, COATED, EXTENDED RELEASE ORAL at 08:01

## 2023-04-12 RX ADMIN — HYDROMORPHONE HYDROCHLORIDE 0.5 MG: 0.5 INJECTION, SOLUTION INTRAMUSCULAR; INTRAVENOUS; SUBCUTANEOUS at 09:27

## 2023-04-12 RX ADMIN — SODIUM CHLORIDE, PRESERVATIVE FREE 10 ML: 5 INJECTION INTRAVENOUS at 08:01

## 2023-04-12 RX ADMIN — HEPARIN SODIUM 5000 UNITS: 10000 INJECTION INTRAVENOUS; SUBCUTANEOUS at 22:11

## 2023-04-12 RX ADMIN — HYDROMORPHONE HYDROCHLORIDE 0.5 MG: 0.5 INJECTION, SOLUTION INTRAMUSCULAR; INTRAVENOUS; SUBCUTANEOUS at 20:46

## 2023-04-12 ASSESSMENT — PAIN DESCRIPTION - ORIENTATION
ORIENTATION: RIGHT
ORIENTATION: RIGHT

## 2023-04-12 ASSESSMENT — PAIN SCALES - GENERAL
PAINLEVEL_OUTOF10: 7
PAINLEVEL_OUTOF10: 7
PAINLEVEL_OUTOF10: 8
PAINLEVEL_OUTOF10: 8
PAINLEVEL_OUTOF10: 7
PAINLEVEL_OUTOF10: 3
PAINLEVEL_OUTOF10: 3
PAINLEVEL_OUTOF10: 8
PAINLEVEL_OUTOF10: 3
PAINLEVEL_OUTOF10: 3

## 2023-04-12 ASSESSMENT — PAIN - FUNCTIONAL ASSESSMENT
PAIN_FUNCTIONAL_ASSESSMENT: ACTIVITIES ARE NOT PREVENTED

## 2023-04-12 ASSESSMENT — PAIN DESCRIPTION - FREQUENCY
FREQUENCY: INTERMITTENT

## 2023-04-12 ASSESSMENT — PAIN DESCRIPTION - LOCATION
LOCATION: ABDOMEN

## 2023-04-12 ASSESSMENT — PAIN DESCRIPTION - DESCRIPTORS
DESCRIPTORS: ACHING;DISCOMFORT
DESCRIPTORS: ACHING
DESCRIPTORS: ACHING
DESCRIPTORS: ACHING;DISCOMFORT

## 2023-04-12 ASSESSMENT — PAIN DESCRIPTION - PAIN TYPE
TYPE: SURGICAL PAIN

## 2023-04-12 ASSESSMENT — PAIN DESCRIPTION - ONSET
ONSET: ON-GOING

## 2023-04-12 NOTE — PLAN OF CARE
Problem: Discharge Planning  Goal: Discharge to home or other facility with appropriate resources  4/12/2023 0907 by Jules Acuña RN  Outcome: Progressing  4/12/2023 0907 by Jules Acuña RN  Outcome: Progressing  4/12/2023 0906 by Jules Acuña RN  Outcome: Progressing  4/12/2023 0052 by Fina Padilla RN  Outcome: Progressing     Problem: Safety - Adult  Goal: Free from fall injury  4/12/2023 0907 by Jules Acuña, RN  Outcome: Progressing  4/12/2023 0907 by Jules Acuña RN  Outcome: Progressing  4/12/2023 0906 by Jules Acuña RN  Outcome: Progressing  4/12/2023 0052 by Fina Padilla RN  Outcome: Progressing     Problem: Pain  Goal: Verbalizes/displays adequate comfort level or baseline comfort level  4/12/2023 0907 by Jules Acuña RN  Outcome: Progressing  4/12/2023 0907 by Jules Acuña RN  Outcome: Progressing  4/12/2023 0906 by Jules Acuña RN  Outcome: Progressing  4/12/2023 0052 by Fina Padilla RN  Outcome: Not Progressing

## 2023-04-12 NOTE — PLAN OF CARE
Problem: Pain  Goal: Verbalizes/displays adequate comfort level or baseline comfort level  Outcome: Not Progressing     Problem: Discharge Planning  Goal: Discharge to home or other facility with appropriate resources  Outcome: Progressing     Problem: Safety - Adult  Goal: Free from fall injury  Outcome: Progressing     Problem: ABCDS Injury Assessment  Goal: Absence of physical injury  Outcome: Progressing     Problem: Chronic Conditions and Co-morbidities  Goal: Patient's chronic conditions and co-morbidity symptoms are monitored and maintained or improved  Outcome: Progressing     Problem: Pain  Goal: Verbalizes/displays adequate comfort level or baseline comfort level  Outcome: Not Progressing

## 2023-04-12 NOTE — CARE COORDINATION
POD # 5. Will receive OPT HD @ [address] [facility] TTS, chair time is 12 noon- notify [address] [place] when pt is discharging [phone]. Plan remains to return home alone on discharge- surg and renal continue to follow- poss discharge later today vs tomorrow per surg note .  Will follow Cliff Carter RN case manager

## 2023-04-13 VITALS
BODY MASS INDEX: 27.83 KG/M2 | SYSTOLIC BLOOD PRESSURE: 129 MMHG | OXYGEN SATURATION: 93 % | WEIGHT: 205.47 LBS | HEIGHT: 72 IN | HEART RATE: 92 BPM | RESPIRATION RATE: 18 BRPM | TEMPERATURE: 97.6 F | DIASTOLIC BLOOD PRESSURE: 87 MMHG

## 2023-04-13 LAB
ALBUMIN SERPL-MCNC: 2.8 G/DL (ref 3.5–5.2)
ALP SERPL-CCNC: 53 U/L (ref 40–129)
ALT SERPL-CCNC: 8 U/L (ref 0–40)
ANION GAP SERPL CALCULATED.3IONS-SCNC: 14 MMOL/L (ref 7–16)
ANISOCYTOSIS: ABNORMAL
AST SERPL-CCNC: 7 U/L (ref 0–39)
BASOPHILS # BLD: 0.09 E9/L (ref 0–0.2)
BASOPHILS NFR BLD: 1.7 % (ref 0–2)
BILIRUB SERPL-MCNC: 0.3 MG/DL (ref 0–1.2)
BUN SERPL-MCNC: 46 MG/DL (ref 6–20)
CALCIUM SERPL-MCNC: 9 MG/DL (ref 8.6–10.2)
CHLORIDE SERPL-SCNC: 93 MMOL/L (ref 98–107)
CO2 SERPL-SCNC: 26 MMOL/L (ref 22–29)
CREAT SERPL-MCNC: 11 MG/DL (ref 0.7–1.2)
EKG ATRIAL RATE: 136 BPM
EKG P AXIS: 42 DEGREES
EKG P-R INTERVAL: 134 MS
EKG Q-T INTERVAL: 284 MS
EKG QRS DURATION: 78 MS
EKG QTC CALCULATION (BAZETT): 427 MS
EKG R AXIS: 20 DEGREES
EKG T AXIS: 32 DEGREES
EKG VENTRICULAR RATE: 136 BPM
EOSINOPHIL # BLD: 0.33 E9/L (ref 0.05–0.5)
EOSINOPHIL NFR BLD: 6.1 % (ref 0–6)
ERYTHROCYTE [DISTWIDTH] IN BLOOD BY AUTOMATED COUNT: 14.2 FL (ref 11.5–15)
GLUCOSE SERPL-MCNC: 105 MG/DL (ref 74–99)
HCT VFR BLD AUTO: 24.1 % (ref 37–54)
HGB BLD-MCNC: 7.7 G/DL (ref 12.5–16.5)
LYMPHOCYTES # BLD: 0.43 E9/L (ref 1.5–4)
LYMPHOCYTES NFR BLD: 6.9 % (ref 20–42)
MAGNESIUM SERPL-MCNC: 2.6 MG/DL (ref 1.6–2.6)
MCH RBC QN AUTO: 31.4 PG (ref 26–35)
MCHC RBC AUTO-ENTMCNC: 32 % (ref 32–34.5)
MCV RBC AUTO: 98.4 FL (ref 80–99.9)
MONOCYTES # BLD: 0.92 E9/L (ref 0.1–0.95)
MONOCYTES NFR BLD: 17.4 % (ref 2–12)
NEUTROPHILS # BLD: 3.62 E9/L (ref 1.8–7.3)
NEUTS SEG NFR BLD: 67 % (ref 43–80)
NRBC BLD-RTO: 0 /100 WBC
OVALOCYTES: ABNORMAL
PHOSPHATE SERPL-MCNC: 7.1 MG/DL (ref 2.5–4.5)
PLATELET # BLD AUTO: 359 E9/L (ref 130–450)
PMV BLD AUTO: 9.3 FL (ref 7–12)
POLYCHROMASIA: ABNORMAL
POTASSIUM SERPL-SCNC: 4.3 MMOL/L (ref 3.5–5)
PROT SERPL-MCNC: 5.9 G/DL (ref 6.4–8.3)
RBC # BLD AUTO: 2.45 E12/L (ref 3.8–5.8)
SODIUM SERPL-SCNC: 133 MMOL/L (ref 132–146)
VARIANT LYMPHS NFR BLD: 0.9 % (ref 0–4)
WBC # BLD: 5.4 E9/L (ref 4.5–11.5)

## 2023-04-13 PROCEDURE — 83735 ASSAY OF MAGNESIUM: CPT

## 2023-04-13 PROCEDURE — 6360000002 HC RX W HCPCS

## 2023-04-13 PROCEDURE — 6370000000 HC RX 637 (ALT 250 FOR IP): Performed by: INTERNAL MEDICINE

## 2023-04-13 PROCEDURE — 90935 HEMODIALYSIS ONE EVALUATION: CPT

## 2023-04-13 PROCEDURE — 36415 COLL VENOUS BLD VENIPUNCTURE: CPT

## 2023-04-13 PROCEDURE — 85025 COMPLETE CBC W/AUTO DIFF WBC: CPT

## 2023-04-13 PROCEDURE — 2580000003 HC RX 258

## 2023-04-13 PROCEDURE — 84100 ASSAY OF PHOSPHORUS: CPT

## 2023-04-13 PROCEDURE — 80053 COMPREHEN METABOLIC PANEL: CPT

## 2023-04-13 RX ORDER — OXYCODONE HYDROCHLORIDE AND ACETAMINOPHEN 5; 325 MG/1; MG/1
1 TABLET ORAL EVERY 6 HOURS PRN
Qty: 28 TABLET | Refills: 0 | Status: SHIPPED | OUTPATIENT
Start: 2023-04-13 | End: 2023-04-20

## 2023-04-13 RX ADMIN — HYDROMORPHONE HYDROCHLORIDE 0.5 MG: 0.5 INJECTION, SOLUTION INTRAMUSCULAR; INTRAVENOUS; SUBCUTANEOUS at 13:02

## 2023-04-13 RX ADMIN — HYDROMORPHONE HYDROCHLORIDE 0.5 MG: 0.5 INJECTION, SOLUTION INTRAMUSCULAR; INTRAVENOUS; SUBCUTANEOUS at 06:45

## 2023-04-13 RX ADMIN — DILTIAZEM HYDROCHLORIDE 480 MG: 240 CAPSULE, COATED, EXTENDED RELEASE ORAL at 07:35

## 2023-04-13 RX ADMIN — HEPARIN SODIUM 5000 UNITS: 10000 INJECTION INTRAVENOUS; SUBCUTANEOUS at 05:54

## 2023-04-13 RX ADMIN — SODIUM CHLORIDE, PRESERVATIVE FREE 10 ML: 5 INJECTION INTRAVENOUS at 06:46

## 2023-04-13 RX ADMIN — SODIUM CHLORIDE, PRESERVATIVE FREE 10 ML: 5 INJECTION INTRAVENOUS at 07:35

## 2023-04-13 ASSESSMENT — PAIN SCALES - GENERAL
PAINLEVEL_OUTOF10: 7
PAINLEVEL_OUTOF10: 0
PAINLEVEL_OUTOF10: 7
PAINLEVEL_OUTOF10: 0
PAINLEVEL_OUTOF10: 2
PAINLEVEL_OUTOF10: 2

## 2023-04-13 ASSESSMENT — PAIN DESCRIPTION - LOCATION
LOCATION: ABDOMEN
LOCATION: GENERALIZED

## 2023-04-13 ASSESSMENT — PAIN DESCRIPTION - PAIN TYPE: TYPE: SURGICAL PAIN

## 2023-04-13 ASSESSMENT — PAIN DESCRIPTION - DESCRIPTORS
DESCRIPTORS: ACHING;CRAMPING;DISCOMFORT
DESCRIPTORS: ACHING

## 2023-04-13 ASSESSMENT — PAIN DESCRIPTION - ORIENTATION: ORIENTATION: RIGHT

## 2023-04-13 ASSESSMENT — PAIN DESCRIPTION - FREQUENCY: FREQUENCY: INTERMITTENT

## 2023-04-13 ASSESSMENT — PAIN - FUNCTIONAL ASSESSMENT: PAIN_FUNCTIONAL_ASSESSMENT: ACTIVITIES ARE NOT PREVENTED

## 2023-04-13 ASSESSMENT — PAIN DESCRIPTION - ONSET: ONSET: ON-GOING

## 2023-04-13 NOTE — PROGRESS NOTES
CLINICAL PHARMACY NOTE: MEDS TO BEDS    Total # of Prescriptions Filled: 1   The following medications were delivered to the patient:  Percocet 5-325 mg    Additional Documentation:
Department of Internal Medicine  Nephrology Attending Progress Note    SUBJECTIVE:  We are following this patient for end-stage renal failure . From the 4/7 note of Dr. Claudia Rodriguez:  the pt is a 51 yo male with a pmh of esrd on pd, hyperlipidemia, htn, bph, taa who recently had screening colonoscopy and was found to have colon cancer. He underwent surgery today with r colectomy. He has a tunneled hd line in the IJ so he can get hemodialysis until he is stable for pd again which may be in a week or so. Will need to monitor his abd fn. Labs show k of 4.2. vitals show bp 121/71, hr 82, rr 18, temp 97. He is on d5 1/2 at 50. Family members present. 4/12/23: Pt awake alert resting in bed.  He states he had 2BM's today, abd still distended and he ate some food    PROBLEM LIST:    Patient Active Problem List   Diagnosis    CONY (acute kidney injury) ([address]    Asymmetric septal hypertrophy (HCC)    Hyperlipidemia    Hypertension    Mild aortic valve sclerosis    Erectile dysfunction    Thoracic aortic aneurysm without rupture    Hyperkalemia    BPH (benign prostatic hyperplasia)    Gout    Opioid dependence in remission (HCC)    Seasonal allergic rhinitis    Proteinuria    Acute heart failure with preserved ejection fraction (HCC)    CKD (chronic kidney disease) stage 5, GFR less than 15 ml/min (HCC)    Anemia in chronic kidney disease, on chronic dialysis (HCC)    SBP (spontaneous bacterial peritonitis) (HCC)    CKD (chronic kidney disease) stage V requiring chronic dialysis (HCC)    NSTEMI (non-ST elevated myocardial infarction) ([address]    Malignant neoplasm of ascending colon (HCC)    Sinus tachycardia    Epigastric pain    ESRD (end stage renal disease) ([address]        PAST MEDICAL HISTORY:    Past Medical History:   Diagnosis Date    Acute heart failure with preserved ejection fraction ([address] 09/11/2020    CONY (acute kidney injury) ([address] 09/10/2020    Anemia in chronic kidney disease, on chronic dialysis ([address] 12/05/2020
Department of Internal Medicine  Nephrology Attending Progress Note    SUBJECTIVE:  We are following this patient for end-stage renal failure . From the 4/7 note of Dr. Eldon Vargas:  the pt is a 51 yo male with a pmh of esrd on pd, hyperlipidemia, htn, bph, taa who recently had screening colonoscopy and was found to have colon cancer. He underwent surgery today with r colectomy. He has a tunneled hd line in the IJ so he can get hemodialysis until he is stable for pd again which may be in a week or so. Will need to monitor his abd fn. Labs show k of 4.2. vitals show bp 121/71, hr 82, rr 18, temp 97. He is on d5 1/2 at 50. Family members present.      4/10/23: Pt awake alert and seen on dilaysis, he has no new specific complaint of CP or SOB    PROBLEM LIST:    Patient Active Problem List   Diagnosis    CONY (acute kidney injury) ([address]    Asymmetric septal hypertrophy (HCC)    Hyperlipidemia    Hypertension    Mild aortic valve sclerosis    Erectile dysfunction    Thoracic aortic aneurysm without rupture    Hyperkalemia    BPH (benign prostatic hyperplasia)    Gout    Opioid dependence in remission (HCC)    Seasonal allergic rhinitis    Proteinuria    Acute heart failure with preserved ejection fraction (HCC)    CKD (chronic kidney disease) stage 5, GFR less than 15 ml/min (HCC)    Anemia in chronic kidney disease, on chronic dialysis (HCC)    SBP (spontaneous bacterial peritonitis) (HCC)    CKD (chronic kidney disease) stage V requiring chronic dialysis (HCC)    NSTEMI (non-ST elevated myocardial infarction) ([address]    Malignant neoplasm of ascending colon (HCC)    Sinus tachycardia    Epigastric pain    ESRD (end stage renal disease) ([address]        PAST MEDICAL HISTORY:    Past Medical History:   Diagnosis Date    Acute heart failure with preserved ejection fraction ([address] 09/11/2020    CONY (acute kidney injury) ([address] 09/10/2020    Anemia in chronic kidney disease, on chronic dialysis ([address] 12/05/2020    Anemia in stage 5
Department of Internal Medicine  Nephrology Attending Progress Note    SUBJECTIVE:  We are following this patient for end-stage renal failure . From the 4/7 note of Dr. Joyce Chaudhari:  the pt is a 51 yo male with a pmh of esrd on pd, hyperlipidemia, htn, bph, taa who recently had screening colonoscopy and was found to have colon cancer. He underwent surgery today with r colectomy. He has a tunneled hd line in the IJ so he can get hemodialysis until he is stable for pd again which may be in a week or so. Will need to monitor his abd fn. Labs show k of 4.2. vitals show bp 121/71, hr 82, rr 18, temp 97. He is on d5 1/2 at 50. Family members present. 4/11/23: Pt awake alert up in chair at the time of my visit. He is still having significant post-op pain.  Family at bedside and updated to renal status    PROBLEM LIST:    Patient Active Problem List   Diagnosis    CONY (acute kidney injury) ([address]    Asymmetric septal hypertrophy (HCC)    Hyperlipidemia    Hypertension    Mild aortic valve sclerosis    Erectile dysfunction    Thoracic aortic aneurysm without rupture    Hyperkalemia    BPH (benign prostatic hyperplasia)    Gout    Opioid dependence in remission (HCC)    Seasonal allergic rhinitis    Proteinuria    Acute heart failure with preserved ejection fraction (HCC)    CKD (chronic kidney disease) stage 5, GFR less than 15 ml/min (HCC)    Anemia in chronic kidney disease, on chronic dialysis (HCC)    SBP (spontaneous bacterial peritonitis) (HCC)    CKD (chronic kidney disease) stage V requiring chronic dialysis (HCC)    NSTEMI (non-ST elevated myocardial infarction) ([address]    Malignant neoplasm of ascending colon (HCC)    Sinus tachycardia    Epigastric pain    ESRD (end stage renal disease) ([address]        PAST MEDICAL HISTORY:    Past Medical History:   Diagnosis Date    Acute heart failure with preserved ejection fraction ([address] 09/11/2020    CONY (acute kidney injury) ([address] 09/10/2020    Anemia in chronic kidney disease,
GENERAL SURGERY  DAILY PROGRESS NOTE  4/10/2023    No chief complaint on file. Subjective:  RRT yesterday for tachypnea and tachycardia s/p dialysis. Patient reports feelings much better this AM. Pain controlled, no further episodes of feeling lightheaded. No nausea or emesis but still having poor appetite. Having bowel function. Objective:  BP (!) 121/96   Pulse (!) 109   Temp 99.1 °F (37.3 °C) (Oral)   Resp 22   Ht 6' (1.829 m)   Wt 213 lb 14.4 oz (97 kg)   SpO2 95%   BMI 29.01 kg/m²     Tmax 99.7F     GENERAL:  Laying in bed, awake, alert, cooperative, no apparent distress  HEAD: Normocephalic, atraumatic  EYES: No sclera icterus, pupils equal  LUNGS:  No increased work of breathing  CARDIOVASCULAR:  tachycardia   ABDOMEN:  Soft, appropriately tender without guarding or rebound.  Incisions c/d/I   EXTREMITIES: No edema or swelling  SKIN: Warm and dry    Wbc 10.3 (12.0)   Hgb 8.4 (9.4)  Cr 11.8     Assessment/Plan:  50 y.o. male s/p Robotic laparoscopic right hemicolectomy 4/7 for ascending colon adenocarcinoma     - advance to low fiber diet   - monitor tachycardia   - appreciate nephrology recs   - OOB, ambulate   - sub q heparin   - dc planning, possible dc home 4/11    Discussed with Dr. Hauser First     Electronically signed by Srinivas Chadwick MD on 4/10/2023 at 6:37 AM
GENERAL SURGERY  DAILY PROGRESS NOTE  4/11/2023    No chief complaint on file. Subjective:  Feeling more bloated this AM. Still passing flatus. Last BM 4/9. No nausea or vomiting. Tolerating regular diet. HD yesterday with -1.3L. Objective:  BP (!) 131/90   Pulse 98   Temp 98.3 °F (36.8 °C) (Oral)   Resp 20   Ht 6' (1.829 m)   Wt 210 lb (95.3 kg)   SpO2 91%   BMI 28.48 kg/m²     1 time     GENERAL:  Laying in bed, awake, alert, cooperative, no apparent distress  HEAD: Normocephalic, atraumatic  EYES: No sclera icterus, pupils equal  LUNGS:  No increased work of breathing  CARDIOVASCULAR:  regular rate and rhythm    ABDOMEN:  Soft, appropriately tender without guarding or rebound. Moderately distended.  Incisions c/d/I   EXTREMITIES: No edema or swelling  SKIN: Warm and dry    Wbc 8.9  Hgb 7.8    Assessment/Plan:  50 y.o. male s/p Robotic laparoscopic right hemicolectomy 4/7 for ascending colon adenocarcinoma     - KUB to assess abdominal distention   - continue low fiber diet   - monitor tachycardia   - appreciate nephrology recs   - OOB, ambulate   - sub q heparin   - dc planning    Discussed with Dr. Nela Raman     Electronically signed by Marta Graham MD on 4/11/2023 at 7:12 AM
GENERAL SURGERY  DAILY PROGRESS NOTE  4/12/2023    No chief complaint on file. Subjective:  Feeling well this AM. Still feeling somewhat distended but passing flatus and had a BM overnight. Tolerated diet yesterday. Objective:  /80   Pulse 88   Temp 98 °F (36.7 °C) (Oral)   Resp 18   Ht 6' (1.829 m)   Wt 207 lb (93.9 kg)   SpO2 91%   BMI 28.07 kg/m²     GENERAL:  Laying in bed, awake, alert, cooperative, no apparent distress  HEAD: Normocephalic, atraumatic  EYES: No sclera icterus, pupils equal  LUNGS:  No increased work of breathing  CARDIOVASCULAR:  regular rate and rhythm    ABDOMEN:  Soft, appropriately tender without guarding or rebound. Moderately distended. Incisions c/d/I   EXTREMITIES: No edema or swelling  SKIN: Warm and dry    Assessment/Plan:  50 y.o. male s/p Robotic laparoscopic right hemicolectomy 4/7 for ascending colon adenocarcinoma     - continue low fiber diet   - tachycardia resolved   - subq heparin  - IS, pulm hygiene  - appreciate nephrology recs   - OOB, ambulate   - dc planning; if ok with nephrology possible dc PM  vs tomorrow     Discussed with Dr. Cecilia Hein     Electronically signed by Jimena Vasquez MD on 4/12/2023 at 7:39 AM    The patient was seen and examined and the chart was reviewed. I agree with the assessment and plan. The patient is having bowel function. The patient is less distended. The patient is on a low residue diet.
GENERAL SURGERY  DAILY PROGRESS NOTE  4/13/2023    No chief complaint on file. Subjective:  Feeling well this AM. Less distended, having flatus and BM. Tolerating diet. Objective:  /82   Pulse 88   Temp 98.1 °F (36.7 °C)   Resp 18   Ht 6' (1.829 m)   Wt 210 lb 8 oz (95.5 kg)   SpO2 93%   BMI 28.55 kg/m²     GENERAL:  Laying in bed, awake, alert, cooperative, no apparent distress  HEAD: Normocephalic, atraumatic  EYES: No sclera icterus, pupils equal  LUNGS:  No increased work of breathing  CARDIOVASCULAR:  regular rate and rhythm    ABDOMEN:  Soft, appropriately tender without guarding or rebound. Improving distension. Incisions c/d/I   EXTREMITIES: No edema or swelling  SKIN: Warm and dry    Assessment/Plan:  50 y.o. male s/p Robotic laparoscopic right hemicolectomy 4/7 for ascending colon adenocarcinoma     - continue low fiber diet   - tachycardia resolved   - subq heparin  - IS, pulm hygiene  - appreciate nephrology recs   - OOB, ambulate   - dc planning; if ok with nephrology possible dc today    Discussed with Dr. Awais Cole     Electronically signed by Monica Gardner MD on 4/13/2023 at 7:04 AM    The patient was seen and examined and the chart was reviewed. I agree with the assessment and plan. The pathology showed 22 negative lymph nodes. The patient is less distended and having bowel function. The patient is tolerating diet. Discharge will be based upon his medical issues.
Notified Dr. Anali Leyva of [facility] of 11.8.
Occupational Therapy  Date:4/11/2023  Patient Name: Alex Davey. Room: [facility]-A     Occupational Therapy (OT) order received, patient's medical record reviewed, and OT evaluation attempted this morning; patient unavailable secondary to radiology staff members at bedside. OT evaluation to be re-attempted at later time/date, as able/appropriate. Coty Edwards OTR/L  License Number: [id number]
Occupational Therapy  OCCUPATIONAL THERAPY INITIAL EVALUATION  [facility] [address]    Date: 2023     Patient Name: Kael Amato. MRN: [id number]  : 1970  Room: [address] [place] [address]    Evaluating OT: Vivian Hough, OTR/L - [id number]    Referring Provider: Rinda Bernheim, MD  Specific Provider Orders/Date: \"OT eval and treat\" - 4/10/2023    Diagnosis: Malignant neoplasm of ascending colon [facility]) [C18.2]     Surgery: Patient underwent robotic laparoscopic right hemicolectomy on 2023 for ascending colon adenocarcinoma. Pertinent Medical History: ESRD, BPH, HTN    Precautions: fall risk, abdominal splinting    Assessment of Current Deficits:    [x] Functional mobility   [x] ADLs  [x] Strength               [x] Cognition   [x] Functional transfers   [x] IADLs         [x] Safety Awareness   [x] Endurance   [] Fine Motor Coordination  [x] Balance      [] Vision/Perception   [x] Sensation    [] Gross Motor Coordination [] ROM          [] Delirium                  [] Motor Control     OT PLAN OF CARE   OT POC is based on physician orders, patient diagnosis, and results of clinical assessment.   Frequency/Duration 2-5 days/week for 2 weeks PRN   Specific OT Treatment Interventions to Include:   * Instruction/training on adapted ADL techniques and AE recommendations to increase functional independence within precautions       * Training on energy conservation strategies, correct breathing pattern and techniques to improve independence/tolerance for self-care routine  * Functional transfer/mobility training/DME recommendations for increased independence, safety, and fall prevention  * Patient/Family education to increase follow through with safety techniques and functional independence  * Recommendation of environmental modifications for increased safety with functional transfers/mobility and ADLs  * Therapeutic exercise to
Physical Therapy  Facility/Department: [address] INTERNAL MEDICINE 2  Physical Therapy Initial Assessment    Name: Devora Grullon. : 1970  MRN: [id number]  Date of Service: 2023       Patient Diagnosis(es): The encounter diagnosis was Malignant neoplasm of ascending colon ([address] Past Medical History:  has a past medical history of Acute heart failure with preserved ejection fraction ([address] CONY (acute kidney injury) ([address] Anemia in chronic kidney disease, on chronic dialysis ([address] Anemia in stage 5 chronic kidney disease, not on chronic dialysis ([address] Asymmetric septal hypertrophy ([address] BPH (benign prostatic hyperplasia), CKD (chronic kidney disease) stage 5, GFR less than 15 ml/min ([address] CKD (chronic kidney disease) stage V requiring chronic dialysis ([address] Erectile dysfunction, Gout, Hyperkalemia, Hyperlipidemia, Hypertension, Mild aortic sclerosis, Mild aortic valve sclerosis, Opioid dependence in remission ([address] Proteinuria, SBP (spontaneous bacterial peritonitis) ([address] Seasonal allergic rhinitis, and Thoracic aortic aneurysm without rupture ([address] Past Surgical History:  has a past surgical history that includes Tonsillectomy; Tympanoplasty; Dialysis Catheter Insertion (N/A, 2020); Dialysis Catheter Insertion (2023); and colectomy (Right, 2023). Referring provider:  Kelly Woody MD    PT Order:  PT eval and treat     Evaluating PT:  Devorah Dunaway PT, DPT PT [id number]    Room #:  8687/9511-M  Diagnosis:  Malignant neoplasm of ascending colon ([facility] [C18.2]  Procedure/Surgery:  LAPAROSCOPIC ROBOTIC XI ASSISTED RIGHT COLECTOMY (23)  Precautions:  fall risk, abdominal splinting  Equipment Needs:  none    SUBJECTIVE:    Pt lives alone in a 2 story home with  stairs to enter and 1 rail. Bed and bath is on second floor. Pt ambulated with no device PTA.     OBJECTIVE:   Initial Evaluation  Date:  Treatment Short Term/ Long Term   Goals   Was pt agreeable to
Verified patient diet change from npo to regular after xray results. Charge nurse yves ayala sx about change they verified the diet. Patient notified of new diet.
Results   Component Value Date    FERRITIN 239 12/06/2020    IRON 77 12/06/2020    TIBC 215 (L) 12/06/2020     Vitamin B-12   Date Value Ref Range Status   12/06/2020 339 211 - 946 pg/mL Final   12/06/2020 359 211 - 946 pg/mL Final     Folate   Date Value Ref Range Status   12/06/2020 10.0 4.8 - 24.2 ng/mL Final   12/06/2020 10.2 4.8 - 24.2 ng/mL Final       Bone disease:  Lab Results   Component Value Date    MG 2.6 04/13/2023    PHOS 7.1 (H) 04/13/2023     Vit D, 25-Hydroxy   Date Value Ref Range Status   12/06/2020 15 (L) 30 - 100 ng/mL Final     Comment:     <20 ng/mL.............Deficient  20-30 ng/mL...........Insufficient   ng/mL..........Sufficient  >100 ng/mL............Toxic       PTH   Date Value Ref Range Status   12/06/2020 271 (H) 15 - 65 pg/mL Final       No components found for: URIC    Lab Results   Component Value Date/Time    COLORU Yellow 12/28/2022 09:33 PM    NITRU Negative 12/28/2022 09:33 PM    GLUCOSEU 100 12/28/2022 09:33 PM    KETUA Negative 12/28/2022 09:33 PM    UROBILINOGEN 0.2 12/28/2022 09:33 PM    BILIRUBINUR Negative 12/28/2022 09:33 PM       No results found for: LIPIDPAN        IMPRESSION/RECOMMENDATIONS:      ESKD  Holding pd due to r colectomy 4/7  Has R IJ tdc in place  PLAN:  Case management set up outpt hd at [facility] as outpt for a TTS schedule  Seen on IHD today with 2.3L vol removal targeted-tolerating the treatment     2. Colon cancer  Sp r colectomy 4/7 4/11/23 Abd X-Ray showed ileus vs bowel obstruction  PLAN:  Per gen surgery    3. Htn with CKD G5/ESKD  BP goal <140/90-at/near goal  PLAN:  1. Continue Outpt hydralazine on hold  and follow on the diltiazem     4. Sec hyperparathyroidism of Renal origin  Ca++ WNL  PLAN:  1.  Hold renvela for present-until the abd distension improved  2. Hold rocaltrol for present-until the abd distension improved     5. Anemia in CKD   Hgb 10>7.9>8.3>9.4>8.4>7.8>7.7  PLAN:  Hold on JUANITA with the Dx of the Colon cancer    6.

## 2023-04-13 NOTE — FLOWSHEET NOTE
Pt completed 4 hrs of HD on a 3K bath with 2L of UF removed safely. 04/13/23 1215   Vital Signs   BP (!) 145/91   Temp 97.9 °F (36.6 °C)   Heart Rate 88   Resp 18   Weight 205 lb 7.5 oz (93.2 kg)   Weight Method Bed scale   Percent Weight Change -2.41   Pain Assessment   Pain Assessment None - Denies Pain   Pain Level 0   Post-Hemodialysis Assessment   Post-Treatment Procedures Catheter capped, clamped with Citrate x 2 ports   Machine Disinfection Process Acid/Vinegar Clean;Heat Disinfect; Exterior Machine Disinfection   Rinseback Volume (ml) 300 ml   Blood Volume Processed (Liters) 74.8 l/min   Dialyzer Clearance Moderately streaked   Duration of Treatment (minutes) 240 minutes   Hemodialysis Intake (ml) 300 ml   Hemodialysis Output (ml) 2300 ml   NET Removed (ml) 2000   Tolerated Treatment Good   Patient Response to Treatment toelrated well; post report to [facility]; Dr. Vanessa Raphael rounded during tx; pt stable at discharge   Bilateral Breath Sounds Clear   Time Off

## 2023-04-13 NOTE — CARE COORDINATION
POD # 6. Will receive OPT HD @ [facility] TTS, chair time is 12 noon- notified [facility] [phone] pt is discharging today after dialysis- [facility] has pt on schedule for Saturday. Plan remains to return home alone on discharge.  Sandi Hendrix RN case manager

## 2023-04-13 NOTE — ACP (ADVANCE CARE PLANNING)
Advance Care Planning   Healthcare Decision Maker:    Primary Decision Maker: Joannephillip Ken - [facility] - [phone]    Click here to complete Healthcare Decision Makers including selection of the Healthcare Decision Maker Relationship (ie \"Primary\").

## 2023-04-27 ENCOUNTER — APPOINTMENT (OUTPATIENT)
Dept: CT IMAGING | Age: 53
DRG: 919 | End: 2023-04-27
Payer: COMMERCIAL

## 2023-04-27 ENCOUNTER — HOSPITAL ENCOUNTER (INPATIENT)
Age: 53
LOS: 6 days | Discharge: HOME | DRG: 919 | End: 2023-05-03
Attending: STUDENT IN AN ORGANIZED HEALTH CARE EDUCATION/TRAINING PROGRAM | Admitting: INTERNAL MEDICINE
Payer: COMMERCIAL

## 2023-04-27 DIAGNOSIS — K65.1 INTRA-ABDOMINAL ABSCESS (HCC): Primary | ICD-10-CM

## 2023-04-27 DIAGNOSIS — R10.84 GENERALIZED ABDOMINAL PAIN: ICD-10-CM

## 2023-04-27 DIAGNOSIS — K65.2 SBP (SPONTANEOUS BACTERIAL PERITONITIS) (HCC): ICD-10-CM

## 2023-04-27 DIAGNOSIS — Z87.448 HISTORY OF END STAGE RENAL DISEASE: ICD-10-CM

## 2023-04-27 DIAGNOSIS — E87.5 HYPERKALEMIA: ICD-10-CM

## 2023-04-27 PROBLEM — I71.20 THORACIC AORTIC ANEURYSM WITHOUT RUPTURE (HCC): Status: ACTIVE | Noted: 2020-09-11

## 2023-04-27 PROBLEM — I51.89 GRADE I DIASTOLIC DYSFUNCTION: Status: ACTIVE | Noted: 2020-09-11

## 2023-04-27 LAB
ALBUMIN SERPL-MCNC: 2.9 G/DL (ref 3.5–5.2)
ALP SERPL-CCNC: 80 U/L (ref 40–129)
ALT SERPL-CCNC: 10 U/L (ref 0–40)
ANION GAP SERPL CALCULATED.3IONS-SCNC: 14 MMOL/L (ref 7–16)
APPEARANCE FLUID: CLEAR
AST SERPL-CCNC: 6 U/L (ref 0–39)
BASOPHILS # BLD: 0.07 E9/L (ref 0–0.2)
BASOPHILS NFR BLD: 0.3 % (ref 0–2)
BILIRUB SERPL-MCNC: 0.4 MG/DL (ref 0–1.2)
BUN SERPL-MCNC: 43 MG/DL (ref 6–20)
CALCIUM SERPL-MCNC: 9.4 MG/DL (ref 8.6–10.2)
CELL COUNT FLUID TYPE: NORMAL
CHLORIDE SERPL-SCNC: 96 MMOL/L (ref 98–107)
CO2 SERPL-SCNC: 24 MMOL/L (ref 22–29)
COLOR FLUID: YELLOW
CREAT SERPL-MCNC: 13.5 MG/DL (ref 0.7–1.2)
EOSINOPHIL # BLD: 0.01 E9/L (ref 0.05–0.5)
EOSINOPHIL NFR BLD: 0 % (ref 0–6)
ERYTHROCYTE [DISTWIDTH] IN BLOOD BY AUTOMATED COUNT: 13.1 FL (ref 11.5–15)
GLUCOSE SERPL-MCNC: 116 MG/DL (ref 74–99)
HCT VFR BLD AUTO: 25.5 % (ref 37–54)
HGB BLD-MCNC: 8.1 G/DL (ref 12.5–16.5)
IMM GRANULOCYTES # BLD: 0.21 E9/L
IMM GRANULOCYTES NFR BLD: 1 % (ref 0–5)
LACTATE BLDV-SCNC: 0.9 MMOL/L (ref 0.5–1.9)
LIPASE: 27 U/L (ref 13–60)
LYMPHOCYTES # BLD: 0.64 E9/L (ref 1.5–4)
LYMPHOCYTES NFR BLD: 3 % (ref 20–42)
MCH RBC QN AUTO: 31.6 PG (ref 26–35)
MCHC RBC AUTO-ENTMCNC: 31.8 % (ref 32–34.5)
MCV RBC AUTO: 99.6 FL (ref 80–99.9)
MONOCYTE, FLUID: 66 %
MONOCYTES # BLD: 1.46 E9/L (ref 0.1–0.95)
MONOCYTES NFR BLD: 6.8 % (ref 2–12)
NEUTROPHIL, FLUID: 34 %
NEUTROPHILS # BLD: 19.09 E9/L (ref 1.8–7.3)
NEUTS SEG NFR BLD: 88.9 % (ref 43–80)
NUCLEATED CELLS FLUID: 169 /UL
PLATELET # BLD AUTO: 544 E9/L (ref 130–450)
PMV BLD AUTO: 9 FL (ref 7–12)
POTASSIUM SERPL-SCNC: 6 MMOL/L (ref 3.5–5)
PROT SERPL-MCNC: 6.5 G/DL (ref 6.4–8.3)
RBC # BLD AUTO: 2.56 E12/L (ref 3.8–5.8)
RBC FLUID: <2000 /UL
SODIUM SERPL-SCNC: 134 MMOL/L (ref 132–146)
WBC # BLD: 21.5 E9/L (ref 4.5–11.5)

## 2023-04-27 PROCEDURE — 5A1D70Z PERFORMANCE OF URINARY FILTRATION, INTERMITTENT, LESS THAN 6 HOURS PER DAY: ICD-10-PCS | Performed by: INTERNAL MEDICINE

## 2023-04-27 PROCEDURE — 96375 TX/PRO/DX INJ NEW DRUG ADDON: CPT

## 2023-04-27 PROCEDURE — 85025 COMPLETE CBC W/AUTO DIFF WBC: CPT

## 2023-04-27 PROCEDURE — 2580000003 HC RX 258: Performed by: STUDENT IN AN ORGANIZED HEALTH CARE EDUCATION/TRAINING PROGRAM

## 2023-04-27 PROCEDURE — 6360000002 HC RX W HCPCS: Performed by: INTERNAL MEDICINE

## 2023-04-27 PROCEDURE — 83690 ASSAY OF LIPASE: CPT

## 2023-04-27 PROCEDURE — 6360000004 HC RX CONTRAST MEDICATION: Performed by: RADIOLOGY

## 2023-04-27 PROCEDURE — 83605 ASSAY OF LACTIC ACID: CPT

## 2023-04-27 PROCEDURE — 80053 COMPREHEN METABOLIC PANEL: CPT

## 2023-04-27 PROCEDURE — 87040 BLOOD CULTURE FOR BACTERIA: CPT

## 2023-04-27 PROCEDURE — 89051 BODY FLUID CELL COUNT: CPT

## 2023-04-27 PROCEDURE — 2580000003 HC RX 258: Performed by: SPECIALIST

## 2023-04-27 PROCEDURE — 87205 SMEAR GRAM STAIN: CPT

## 2023-04-27 PROCEDURE — 2580000003 HC RX 258: Performed by: INTERNAL MEDICINE

## 2023-04-27 PROCEDURE — 74177 CT ABD & PELVIS W/CONTRAST: CPT

## 2023-04-27 PROCEDURE — 90935 HEMODIALYSIS ONE EVALUATION: CPT

## 2023-04-27 PROCEDURE — 99285 EMERGENCY DEPT VISIT HI MDM: CPT

## 2023-04-27 PROCEDURE — 96374 THER/PROPH/DIAG INJ IV PUSH: CPT

## 2023-04-27 PROCEDURE — 6360000002 HC RX W HCPCS: Performed by: SPECIALIST

## 2023-04-27 PROCEDURE — 6360000002 HC RX W HCPCS: Performed by: STUDENT IN AN ORGANIZED HEALTH CARE EDUCATION/TRAINING PROGRAM

## 2023-04-27 PROCEDURE — 2060000000 HC ICU INTERMEDIATE R&B

## 2023-04-27 PROCEDURE — 87070 CULTURE OTHR SPECIMN AEROBIC: CPT

## 2023-04-27 RX ORDER — ACETAMINOPHEN 325 MG/1
650 TABLET ORAL EVERY 4 HOURS PRN
Status: DISCONTINUED | OUTPATIENT
Start: 2023-04-27 | End: 2023-05-03 | Stop reason: HOSPADM

## 2023-04-27 RX ORDER — SEVELAMER CARBONATE 800 MG/1
800 TABLET, FILM COATED ORAL
Status: DISCONTINUED | OUTPATIENT
Start: 2023-04-28 | End: 2023-05-03 | Stop reason: HOSPADM

## 2023-04-27 RX ORDER — HYDRALAZINE HYDROCHLORIDE 50 MG/1
100 TABLET, FILM COATED ORAL EVERY 8 HOURS SCHEDULED
Status: DISCONTINUED | OUTPATIENT
Start: 2023-04-27 | End: 2023-05-03 | Stop reason: HOSPADM

## 2023-04-27 RX ORDER — HEPARIN SODIUM 10000 [USP'U]/ML
5000 INJECTION, SOLUTION INTRAVENOUS; SUBCUTANEOUS EVERY 8 HOURS
Status: DISCONTINUED | OUTPATIENT
Start: 2023-04-27 | End: 2023-05-01

## 2023-04-27 RX ORDER — SEVELAMER CARBONATE 800 MG/1
1 TABLET, FILM COATED ORAL
Status: ON HOLD | COMMUNITY
End: 2023-05-03 | Stop reason: HOSPADM

## 2023-04-27 RX ORDER — NEPHROCAP 1 MG
1 CAP ORAL DAILY
Status: DISCONTINUED | OUTPATIENT
Start: 2023-04-27 | End: 2023-05-03 | Stop reason: HOSPADM

## 2023-04-27 RX ORDER — DILTIAZEM HYDROCHLORIDE 120 MG/1
480 CAPSULE, COATED, EXTENDED RELEASE ORAL NIGHTLY
Status: DISCONTINUED | OUTPATIENT
Start: 2023-04-27 | End: 2023-05-03 | Stop reason: HOSPADM

## 2023-04-27 RX ORDER — CALCIUM GLUCONATE 94 MG/ML
1000 INJECTION, SOLUTION INTRAVENOUS ONCE
Status: COMPLETED | OUTPATIENT
Start: 2023-04-27 | End: 2023-04-27

## 2023-04-27 RX ORDER — 0.9 % SODIUM CHLORIDE 0.9 %
250 INTRAVENOUS SOLUTION INTRAVENOUS ONCE
Status: COMPLETED | OUTPATIENT
Start: 2023-04-27 | End: 2023-04-27

## 2023-04-27 RX ORDER — PANTOPRAZOLE SODIUM 40 MG/1
40 TABLET, DELAYED RELEASE ORAL
Status: DISCONTINUED | OUTPATIENT
Start: 2023-04-28 | End: 2023-05-03 | Stop reason: HOSPADM

## 2023-04-27 RX ORDER — SODIUM BICARBONATE 650 MG/1
650 TABLET ORAL DAILY
Status: DISCONTINUED | OUTPATIENT
Start: 2023-04-27 | End: 2023-05-03 | Stop reason: HOSPADM

## 2023-04-27 RX ORDER — HEPARIN SODIUM 1000 [USP'U]/ML
INJECTION, SOLUTION INTRAVENOUS; SUBCUTANEOUS
Status: DISPENSED
Start: 2023-04-27 | End: 2023-04-28

## 2023-04-27 RX ORDER — CALCITRIOL 0.25 UG/1
0.25 CAPSULE, LIQUID FILLED ORAL DAILY
Status: DISCONTINUED | OUTPATIENT
Start: 2023-04-27 | End: 2023-05-03 | Stop reason: HOSPADM

## 2023-04-27 RX ORDER — FENTANYL CITRATE 50 UG/ML
50 INJECTION, SOLUTION INTRAMUSCULAR; INTRAVENOUS ONCE
Status: COMPLETED | OUTPATIENT
Start: 2023-04-27 | End: 2023-04-27

## 2023-04-27 RX ADMIN — IOPAMIDOL 75 ML: 755 INJECTION, SOLUTION INTRAVENOUS at 13:03

## 2023-04-27 RX ADMIN — CALCIUM GLUCONATE 1000 MG: 98 INJECTION, SOLUTION INTRAVENOUS at 14:49

## 2023-04-27 RX ADMIN — FENTANYL CITRATE 50 MCG: 50 INJECTION, SOLUTION INTRAMUSCULAR; INTRAVENOUS at 11:51

## 2023-04-27 RX ADMIN — HYDROMORPHONE HYDROCHLORIDE 1 MG: 1 INJECTION, SOLUTION INTRAMUSCULAR; INTRAVENOUS; SUBCUTANEOUS at 21:08

## 2023-04-27 RX ADMIN — PIPERACILLIN AND TAZOBACTAM 4500 MG: 4; .5 INJECTION, POWDER, LYOPHILIZED, FOR SOLUTION INTRAVENOUS at 21:53

## 2023-04-27 RX ADMIN — SODIUM CHLORIDE 250 ML: 9 INJECTION, SOLUTION INTRAVENOUS at 11:47

## 2023-04-27 RX ADMIN — HYDROMORPHONE HYDROCHLORIDE 1 MG: 1 INJECTION, SOLUTION INTRAMUSCULAR; INTRAVENOUS; SUBCUTANEOUS at 15:21

## 2023-04-27 RX ADMIN — CEFTRIAXONE SODIUM 2000 MG: 2 INJECTION, POWDER, FOR SOLUTION INTRAMUSCULAR; INTRAVENOUS at 21:08

## 2023-04-27 ASSESSMENT — ENCOUNTER SYMPTOMS
EYE REDNESS: 0
DIARRHEA: 0
WHEEZING: 0
COUGH: 0
SORE THROAT: 0
BACK PAIN: 0
SINUS PRESSURE: 0
NAUSEA: 0
EYE PAIN: 0
SHORTNESS OF BREATH: 0
VOMITING: 0
ABDOMINAL PAIN: 1
EYE DISCHARGE: 0

## 2023-04-27 ASSESSMENT — PAIN SCALES - GENERAL
PAINLEVEL_OUTOF10: 8
PAINLEVEL_OUTOF10: 9
PAINLEVEL_OUTOF10: 8
PAINLEVEL_OUTOF10: 8

## 2023-04-27 ASSESSMENT — PAIN DESCRIPTION - LOCATION
LOCATION: ABDOMEN
LOCATION: ABDOMEN

## 2023-04-27 ASSESSMENT — PAIN DESCRIPTION - DESCRIPTORS
DESCRIPTORS: DULL;STABBING
DESCRIPTORS: STABBING

## 2023-04-27 ASSESSMENT — PAIN DESCRIPTION - ORIENTATION
ORIENTATION: RIGHT;LEFT;MID
ORIENTATION: RIGHT

## 2023-04-27 ASSESSMENT — PAIN - FUNCTIONAL ASSESSMENT: PAIN_FUNCTIONAL_ASSESSMENT: 0-10

## 2023-04-28 ENCOUNTER — APPOINTMENT (OUTPATIENT)
Dept: CT IMAGING | Age: 53
DRG: 919 | End: 2023-04-28
Payer: COMMERCIAL

## 2023-04-28 LAB
ALBUMIN SERPL-MCNC: 2.2 G/DL (ref 3.5–5.2)
ALP SERPL-CCNC: 69 U/L (ref 40–129)
ALT SERPL-CCNC: 8 U/L (ref 0–40)
ANION GAP SERPL CALCULATED.3IONS-SCNC: 11 MMOL/L (ref 7–16)
ANTISTREPTOLYSIN-O: 24 IU/ML (ref 0–200)
AST SERPL-CCNC: 9 U/L (ref 0–39)
BACTERIA URNS QL MICRO: NORMAL /HPF
BASOPHILS # BLD: 0.1 E9/L (ref 0–0.2)
BASOPHILS NFR BLD: 0.5 % (ref 0–2)
BILIRUB DIRECT SERPL-MCNC: <0.2 MG/DL (ref 0–0.3)
BILIRUB INDIRECT SERPL-MCNC: ABNORMAL MG/DL (ref 0–1)
BILIRUB SERPL-MCNC: 0.2 MG/DL (ref 0–1.2)
BILIRUB UR QL STRIP: NEGATIVE
BNP BLD-MCNC: 3856 PG/ML (ref 0–125)
BUN SERPL-MCNC: 29 MG/DL (ref 6–20)
CA-I BLD-SCNC: 1.18 MMOL/L (ref 1.15–1.33)
CALCIUM SERPL-MCNC: 8.3 MG/DL (ref 8.6–10.2)
CEA SERPL-MCNC: 2.8 NG/ML (ref 0–5.2)
CHLORIDE SERPL-SCNC: 95 MMOL/L (ref 98–107)
CHOLESTEROL, TOTAL: 112 MG/DL (ref 0–199)
CLARITY UR: CLEAR
CO2 SERPL-SCNC: 26 MMOL/L (ref 22–29)
COLOR UR: YELLOW
CREAT SERPL-MCNC: 8.9 MG/DL (ref 0.7–1.2)
CRP SERPL HS-MCNC: 28.2 MG/DL (ref 0–0.4)
CRP SERPL HS-MCNC: 30.7 MG/DL (ref 0–0.4)
EOSINOPHIL # BLD: 0.12 E9/L (ref 0.05–0.5)
EOSINOPHIL NFR BLD: 0.6 % (ref 0–6)
ERYTHROCYTE [DISTWIDTH] IN BLOOD BY AUTOMATED COUNT: 13.1 FL (ref 11.5–15)
ERYTHROCYTE [SEDIMENTATION RATE] IN BLOOD BY WESTERGREN METHOD: 117 MM/HR (ref 0–15)
FERRITIN SERPL-MCNC: 2379 NG/ML
FOLATE SERPL-MCNC: 12.8 NG/ML (ref 4.8–24.2)
GLUCOSE SERPL-MCNC: 109 MG/DL (ref 74–99)
GLUCOSE UR STRIP-MCNC: NEGATIVE MG/DL
HBA1C MFR BLD: 5 % (ref 4–5.6)
HCT VFR BLD AUTO: 21.6 % (ref 37–54)
HDLC SERPL-MCNC: 23 MG/DL
HGB BLD-MCNC: 7 G/DL (ref 12.5–16.5)
HGB UR QL STRIP: ABNORMAL
HYPOCHROMIA: ABNORMAL
IMM GRANULOCYTES # BLD: 0.15 E9/L
IMM GRANULOCYTES NFR BLD: 0.8 % (ref 0–5)
INR BLD: 1.6
IRON SATN MFR SERPL: 15 % (ref 20–55)
IRON SERPL-MCNC: 17 MCG/DL (ref 59–158)
KETONES UR STRIP-MCNC: NEGATIVE MG/DL
LACTATE BLDV-SCNC: 0.7 MMOL/L (ref 0.5–1.9)
LDLC SERPL CALC-MCNC: 58 MG/DL (ref 0–99)
LEUKOCYTE ESTERASE UR QL STRIP: NEGATIVE
LIPASE: 30 U/L (ref 13–60)
LYMPHOCYTES # BLD: 0.79 E9/L (ref 1.5–4)
LYMPHOCYTES NFR BLD: 4.2 % (ref 20–42)
MAGNESIUM SERPL-MCNC: 1.9 MG/DL (ref 1.6–2.6)
MAGNESIUM SERPL-MCNC: 1.9 MG/DL (ref 1.6–2.6)
MCH RBC QN AUTO: 32.6 PG (ref 26–35)
MCHC RBC AUTO-ENTMCNC: 32.4 % (ref 32–34.5)
MCV RBC AUTO: 100.5 FL (ref 80–99.9)
MONOCYTES # BLD: 1.74 E9/L (ref 0.1–0.95)
MONOCYTES NFR BLD: 9.4 % (ref 2–12)
NEUTROPHILS # BLD: 15.69 E9/L (ref 1.8–7.3)
NEUTS SEG NFR BLD: 84.5 % (ref 43–80)
NITRITE UR QL STRIP: NEGATIVE
OVALOCYTES: ABNORMAL
PH UR STRIP: 8 [PH] (ref 5–9)
PHOSPHATE SERPL-MCNC: 5.9 MG/DL (ref 2.5–4.5)
PHOSPHATE SERPL-MCNC: 6 MG/DL (ref 2.5–4.5)
PLATELET # BLD AUTO: 463 E9/L (ref 130–450)
PMV BLD AUTO: 9.5 FL (ref 7–12)
POIKILOCYTES: ABNORMAL
POTASSIUM SERPL-SCNC: 5.2 MMOL/L (ref 3.5–5)
PROCALCITONIN: 3.23 NG/ML (ref 0–0.08)
PROT SERPL-MCNC: 5.2 G/DL (ref 6.4–8.3)
PROT UR STRIP-MCNC: 100 MG/DL
PROTHROMBIN TIME: 17.9 SEC (ref 9.3–12.4)
PSA SERPL-MCNC: 0.3 NG/ML (ref 0–4)
RBC # BLD AUTO: 2.15 E12/L (ref 3.8–5.8)
RBC #/AREA URNS HPF: NORMAL /HPF (ref 0–2)
SODIUM SERPL-SCNC: 132 MMOL/L (ref 132–146)
SP GR UR STRIP: 1.01 (ref 1–1.03)
T4 FREE SERPL-MCNC: 1.3 NG/DL (ref 0.93–1.7)
TARGET CELLS: ABNORMAL
TIBC SERPL-MCNC: 110 MCG/DL (ref 250–450)
TRIGL SERPL-MCNC: 155 MG/DL (ref 0–149)
TSH SERPL-MCNC: 1.05 UIU/ML (ref 0.27–4.2)
URATE SERPL-MCNC: 4.3 MG/DL (ref 3.4–7)
UROBILINOGEN UR STRIP-ACNC: 0.2 E.U./DL
VIT B12 SERPL-MCNC: 1150 PG/ML (ref 211–946)
VLDLC SERPL CALC-MCNC: 31 MG/DL
WBC # BLD: 18.6 E9/L (ref 4.5–11.5)
WBC #/AREA URNS HPF: NORMAL /HPF (ref 0–5)

## 2023-04-28 PROCEDURE — 36415 COLL VENOUS BLD VENIPUNCTURE: CPT

## 2023-04-28 PROCEDURE — 84443 ASSAY THYROID STIM HORMONE: CPT

## 2023-04-28 PROCEDURE — 83735 ASSAY OF MAGNESIUM: CPT

## 2023-04-28 PROCEDURE — 84145 PROCALCITONIN (PCT): CPT

## 2023-04-28 PROCEDURE — 97161 PT EVAL LOW COMPLEX 20 MIN: CPT

## 2023-04-28 PROCEDURE — 87077 CULTURE AEROBIC IDENTIFY: CPT

## 2023-04-28 PROCEDURE — 6360000002 HC RX W HCPCS: Performed by: REGISTERED NURSE

## 2023-04-28 PROCEDURE — 85025 COMPLETE CBC W/AUTO DIFF WBC: CPT

## 2023-04-28 PROCEDURE — 85610 PROTHROMBIN TIME: CPT

## 2023-04-28 PROCEDURE — 82378 CARCINOEMBRYONIC ANTIGEN: CPT

## 2023-04-28 PROCEDURE — 0W9G30Z DRAINAGE OF PERITONEAL CAVITY WITH DRAINAGE DEVICE, PERCUTANEOUS APPROACH: ICD-10-PCS | Performed by: RADIOLOGY

## 2023-04-28 PROCEDURE — 84100 ASSAY OF PHOSPHORUS: CPT

## 2023-04-28 PROCEDURE — 6370000000 HC RX 637 (ALT 250 FOR IP): Performed by: INTERNAL MEDICINE

## 2023-04-28 PROCEDURE — 2580000003 HC RX 258: Performed by: REGISTERED NURSE

## 2023-04-28 PROCEDURE — C1729 CATH, DRAINAGE: HCPCS

## 2023-04-28 PROCEDURE — 81001 URINALYSIS AUTO W/SCOPE: CPT

## 2023-04-28 PROCEDURE — 2500000003 HC RX 250 WO HCPCS: Performed by: RADIOLOGY

## 2023-04-28 PROCEDURE — 82746 ASSAY OF FOLIC ACID SERUM: CPT

## 2023-04-28 PROCEDURE — 80048 BASIC METABOLIC PNL TOTAL CA: CPT

## 2023-04-28 PROCEDURE — 87070 CULTURE OTHR SPECIMN AEROBIC: CPT

## 2023-04-28 PROCEDURE — 87205 SMEAR GRAM STAIN: CPT

## 2023-04-28 PROCEDURE — 83540 ASSAY OF IRON: CPT

## 2023-04-28 PROCEDURE — 83605 ASSAY OF LACTIC ACID: CPT

## 2023-04-28 PROCEDURE — 82330 ASSAY OF CALCIUM: CPT

## 2023-04-28 PROCEDURE — 83550 IRON BINDING TEST: CPT

## 2023-04-28 PROCEDURE — 86060 ANTISTREPTOLYSIN O TITER: CPT

## 2023-04-28 PROCEDURE — 87088 URINE BACTERIA CULTURE: CPT

## 2023-04-28 PROCEDURE — 83880 ASSAY OF NATRIURETIC PEPTIDE: CPT

## 2023-04-28 PROCEDURE — 83036 HEMOGLOBIN GLYCOSYLATED A1C: CPT

## 2023-04-28 PROCEDURE — 80061 LIPID PANEL: CPT

## 2023-04-28 PROCEDURE — 6360000002 HC RX W HCPCS: Performed by: INTERNAL MEDICINE

## 2023-04-28 PROCEDURE — 82607 VITAMIN B-12: CPT

## 2023-04-28 PROCEDURE — 85651 RBC SED RATE NONAUTOMATED: CPT

## 2023-04-28 PROCEDURE — 86140 C-REACTIVE PROTEIN: CPT

## 2023-04-28 PROCEDURE — 87186 SC STD MICRODIL/AGAR DIL: CPT

## 2023-04-28 PROCEDURE — 82728 ASSAY OF FERRITIN: CPT

## 2023-04-28 PROCEDURE — 83690 ASSAY OF LIPASE: CPT

## 2023-04-28 PROCEDURE — 80076 HEPATIC FUNCTION PANEL: CPT

## 2023-04-28 PROCEDURE — 2060000000 HC ICU INTERMEDIATE R&B

## 2023-04-28 PROCEDURE — 84439 ASSAY OF FREE THYROXINE: CPT

## 2023-04-28 PROCEDURE — 84550 ASSAY OF BLOOD/URIC ACID: CPT

## 2023-04-28 PROCEDURE — 84153 ASSAY OF PSA TOTAL: CPT

## 2023-04-28 PROCEDURE — 87075 CULTR BACTERIA EXCEPT BLOOD: CPT

## 2023-04-28 RX ORDER — LIDOCAINE HYDROCHLORIDE 20 MG/ML
INJECTION, SOLUTION INFILTRATION; PERINEURAL PRN
Status: COMPLETED | OUTPATIENT
Start: 2023-04-28 | End: 2023-04-28

## 2023-04-28 RX ADMIN — SEVELAMER CARBONATE 800 MG: 800 TABLET, FILM COATED ORAL at 11:36

## 2023-04-28 RX ADMIN — SEVELAMER CARBONATE 800 MG: 800 TABLET, FILM COATED ORAL at 08:40

## 2023-04-28 RX ADMIN — HYDROMORPHONE HYDROCHLORIDE 1 MG: 1 INJECTION, SOLUTION INTRAMUSCULAR; INTRAVENOUS; SUBCUTANEOUS at 01:27

## 2023-04-28 RX ADMIN — PIPERACILLIN AND TAZOBACTAM 4500 MG: 4; .5 INJECTION, POWDER, LYOPHILIZED, FOR SOLUTION INTRAVENOUS at 23:04

## 2023-04-28 RX ADMIN — HYDROMORPHONE HYDROCHLORIDE 1 MG: 1 INJECTION, SOLUTION INTRAMUSCULAR; INTRAVENOUS; SUBCUTANEOUS at 20:55

## 2023-04-28 RX ADMIN — HYDROMORPHONE HYDROCHLORIDE 1 MG: 1 INJECTION, SOLUTION INTRAMUSCULAR; INTRAVENOUS; SUBCUTANEOUS at 11:09

## 2023-04-28 RX ADMIN — PIPERACILLIN AND TAZOBACTAM 4500 MG: 4; .5 INJECTION, POWDER, LYOPHILIZED, FOR SOLUTION INTRAVENOUS at 11:34

## 2023-04-28 RX ADMIN — HYDROMORPHONE HYDROCHLORIDE 1 MG: 1 INJECTION, SOLUTION INTRAMUSCULAR; INTRAVENOUS; SUBCUTANEOUS at 16:40

## 2023-04-28 RX ADMIN — HYDROMORPHONE HYDROCHLORIDE 1 MG: 1 INJECTION, SOLUTION INTRAMUSCULAR; INTRAVENOUS; SUBCUTANEOUS at 06:09

## 2023-04-28 RX ADMIN — LIDOCAINE HYDROCHLORIDE 5 ML: 20 INJECTION, SOLUTION INFILTRATION; PERINEURAL at 15:06

## 2023-04-28 RX ADMIN — SEVELAMER CARBONATE 800 MG: 800 TABLET, FILM COATED ORAL at 16:41

## 2023-04-28 ASSESSMENT — PAIN DESCRIPTION - LOCATION
LOCATION: ABDOMEN

## 2023-04-28 ASSESSMENT — PAIN SCALES - GENERAL
PAINLEVEL_OUTOF10: 8
PAINLEVEL_OUTOF10: 7

## 2023-04-28 ASSESSMENT — PAIN DESCRIPTION - DESCRIPTORS
DESCRIPTORS: ACHING;DISCOMFORT
DESCRIPTORS: ACHING;DISCOMFORT

## 2023-04-28 ASSESSMENT — PAIN DESCRIPTION - ORIENTATION
ORIENTATION: RIGHT
ORIENTATION: RIGHT

## 2023-04-29 LAB
ABO + RH BLD: NORMAL
ANION GAP SERPL CALCULATED.3IONS-SCNC: 13 MMOL/L (ref 7–16)
BASOPHILS # BLD: 0.03 E9/L (ref 0–0.2)
BASOPHILS NFR BLD: 0.2 % (ref 0–2)
BLD GP AB SCN SERPL QL: NORMAL
BUN SERPL-MCNC: 39 MG/DL (ref 6–20)
CALCIUM SERPL-MCNC: 8.8 MG/DL (ref 8.6–10.2)
CHLORIDE SERPL-SCNC: 94 MMOL/L (ref 98–107)
CO2 SERPL-SCNC: 25 MMOL/L (ref 22–29)
CREAT SERPL-MCNC: 11.1 MG/DL (ref 0.7–1.2)
CRP SERPL HS-MCNC: 29.1 MG/DL (ref 0–0.4)
EOSINOPHIL # BLD: 0.16 E9/L (ref 0.05–0.5)
EOSINOPHIL NFR BLD: 1.2 % (ref 0–6)
ERYTHROCYTE [DISTWIDTH] IN BLOOD BY AUTOMATED COUNT: 13 FL (ref 11.5–15)
ERYTHROCYTE [SEDIMENTATION RATE] IN BLOOD BY WESTERGREN METHOD: 98 MM/HR (ref 0–15)
GLUCOSE SERPL-MCNC: 111 MG/DL (ref 74–99)
HCT VFR BLD AUTO: 20.5 % (ref 37–54)
HCT VFR BLD AUTO: 21.5 % (ref 37–54)
HGB BLD-MCNC: 6.6 G/DL (ref 12.5–16.5)
HGB BLD-MCNC: 6.9 G/DL (ref 12.5–16.5)
IMM GRANULOCYTES # BLD: 0.18 E9/L
IMM GRANULOCYTES NFR BLD: 1.3 % (ref 0–5)
LACTATE BLDV-SCNC: 0.6 MMOL/L (ref 0.5–1.9)
LYMPHOCYTES # BLD: 0.83 E9/L (ref 1.5–4)
LYMPHOCYTES NFR BLD: 6.2 % (ref 20–42)
MAGNESIUM SERPL-MCNC: 2 MG/DL (ref 1.6–2.6)
MCH RBC QN AUTO: 32 PG (ref 26–35)
MCHC RBC AUTO-ENTMCNC: 32.2 % (ref 32–34.5)
MCV RBC AUTO: 99.5 FL (ref 80–99.9)
MONOCYTES # BLD: 1.13 E9/L (ref 0.1–0.95)
MONOCYTES NFR BLD: 8.5 % (ref 2–12)
NEUTROPHILS # BLD: 11.01 E9/L (ref 1.8–7.3)
NEUTS SEG NFR BLD: 82.6 % (ref 43–80)
PHOSPHATE SERPL-MCNC: 6.1 MG/DL (ref 2.5–4.5)
PLATELET # BLD AUTO: 421 E9/L (ref 130–450)
PMV BLD AUTO: 9 FL (ref 7–12)
POTASSIUM SERPL-SCNC: 5.1 MMOL/L (ref 3.5–5)
RBC # BLD AUTO: 2.06 E12/L (ref 3.8–5.8)
SODIUM SERPL-SCNC: 132 MMOL/L (ref 132–146)
WBC # BLD: 13.3 E9/L (ref 4.5–11.5)

## 2023-04-29 PROCEDURE — 87070 CULTURE OTHR SPECIMN AEROBIC: CPT

## 2023-04-29 PROCEDURE — 2060000000 HC ICU INTERMEDIATE R&B

## 2023-04-29 PROCEDURE — 84100 ASSAY OF PHOSPHORUS: CPT

## 2023-04-29 PROCEDURE — 36415 COLL VENOUS BLD VENIPUNCTURE: CPT

## 2023-04-29 PROCEDURE — 85014 HEMATOCRIT: CPT

## 2023-04-29 PROCEDURE — 6360000002 HC RX W HCPCS: Performed by: INTERNAL MEDICINE

## 2023-04-29 PROCEDURE — 80048 BASIC METABOLIC PNL TOTAL CA: CPT

## 2023-04-29 PROCEDURE — 83735 ASSAY OF MAGNESIUM: CPT

## 2023-04-29 PROCEDURE — 85018 HEMOGLOBIN: CPT

## 2023-04-29 PROCEDURE — 86900 BLOOD TYPING SEROLOGIC ABO: CPT

## 2023-04-29 PROCEDURE — 87075 CULTR BACTERIA EXCEPT BLOOD: CPT

## 2023-04-29 PROCEDURE — 6370000000 HC RX 637 (ALT 250 FOR IP): Performed by: INTERNAL MEDICINE

## 2023-04-29 PROCEDURE — 90935 HEMODIALYSIS ONE EVALUATION: CPT

## 2023-04-29 PROCEDURE — 86850 RBC ANTIBODY SCREEN: CPT

## 2023-04-29 PROCEDURE — 6360000002 HC RX W HCPCS: Performed by: REGISTERED NURSE

## 2023-04-29 PROCEDURE — 87186 SC STD MICRODIL/AGAR DIL: CPT

## 2023-04-29 PROCEDURE — 83605 ASSAY OF LACTIC ACID: CPT

## 2023-04-29 PROCEDURE — 30233N1 TRANSFUSION OF NONAUTOLOGOUS RED BLOOD CELLS INTO PERIPHERAL VEIN, PERCUTANEOUS APPROACH: ICD-10-PCS | Performed by: SURGERY

## 2023-04-29 PROCEDURE — 87205 SMEAR GRAM STAIN: CPT

## 2023-04-29 PROCEDURE — 87077 CULTURE AEROBIC IDENTIFY: CPT

## 2023-04-29 PROCEDURE — 85651 RBC SED RATE NONAUTOMATED: CPT

## 2023-04-29 PROCEDURE — 86923 COMPATIBILITY TEST ELECTRIC: CPT

## 2023-04-29 PROCEDURE — 86140 C-REACTIVE PROTEIN: CPT

## 2023-04-29 PROCEDURE — 86901 BLOOD TYPING SEROLOGIC RH(D): CPT

## 2023-04-29 PROCEDURE — 2580000003 HC RX 258: Performed by: REGISTERED NURSE

## 2023-04-29 PROCEDURE — 85025 COMPLETE CBC W/AUTO DIFF WBC: CPT

## 2023-04-29 PROCEDURE — 82270 OCCULT BLOOD FECES: CPT

## 2023-04-29 PROCEDURE — P9016 RBC LEUKOCYTES REDUCED: HCPCS

## 2023-04-29 RX ORDER — HEPARIN SODIUM 1000 [USP'U]/ML
INJECTION, SOLUTION INTRAVENOUS; SUBCUTANEOUS
Status: DISPENSED
Start: 2023-04-29 | End: 2023-04-30

## 2023-04-29 RX ORDER — SODIUM CHLORIDE 0.9 % (FLUSH) 0.9 %
SYRINGE (ML) INJECTION
Status: DISPENSED
Start: 2023-04-29 | End: 2023-04-29

## 2023-04-29 RX ORDER — SODIUM CHLORIDE 9 MG/ML
INJECTION, SOLUTION INTRAVENOUS PRN
Status: DISCONTINUED | OUTPATIENT
Start: 2023-04-29 | End: 2023-05-03 | Stop reason: HOSPADM

## 2023-04-29 RX ADMIN — PIPERACILLIN AND TAZOBACTAM 4500 MG: 4; .5 INJECTION, POWDER, LYOPHILIZED, FOR SOLUTION INTRAVENOUS at 15:03

## 2023-04-29 RX ADMIN — HYDROMORPHONE HYDROCHLORIDE 1 MG: 1 INJECTION, SOLUTION INTRAMUSCULAR; INTRAVENOUS; SUBCUTANEOUS at 23:43

## 2023-04-29 RX ADMIN — HYDROMORPHONE HYDROCHLORIDE 1 MG: 1 INJECTION, SOLUTION INTRAMUSCULAR; INTRAVENOUS; SUBCUTANEOUS at 15:03

## 2023-04-29 RX ADMIN — SEVELAMER CARBONATE 800 MG: 800 TABLET, FILM COATED ORAL at 16:41

## 2023-04-29 RX ADMIN — PIPERACILLIN AND TAZOBACTAM 4500 MG: 4; .5 INJECTION, POWDER, LYOPHILIZED, FOR SOLUTION INTRAVENOUS at 23:45

## 2023-04-29 RX ADMIN — HYDROMORPHONE HYDROCHLORIDE 1 MG: 1 INJECTION, SOLUTION INTRAMUSCULAR; INTRAVENOUS; SUBCUTANEOUS at 19:46

## 2023-04-29 RX ADMIN — HYDROMORPHONE HYDROCHLORIDE 1 MG: 1 INJECTION, SOLUTION INTRAMUSCULAR; INTRAVENOUS; SUBCUTANEOUS at 01:00

## 2023-04-29 RX ADMIN — ALTEPLASE 4 MG: 2.2 INJECTION, POWDER, LYOPHILIZED, FOR SOLUTION INTRAVENOUS at 10:45

## 2023-04-29 RX ADMIN — HYDROMORPHONE HYDROCHLORIDE 1 MG: 1 INJECTION, SOLUTION INTRAMUSCULAR; INTRAVENOUS; SUBCUTANEOUS at 05:31

## 2023-04-29 RX ADMIN — HYDROMORPHONE HYDROCHLORIDE 1 MG: 1 INJECTION, SOLUTION INTRAMUSCULAR; INTRAVENOUS; SUBCUTANEOUS at 09:48

## 2023-04-29 ASSESSMENT — PAIN SCALES - GENERAL
PAINLEVEL_OUTOF10: 7
PAINLEVEL_OUTOF10: 8
PAINLEVEL_OUTOF10: 7
PAINLEVEL_OUTOF10: 0
PAINLEVEL_OUTOF10: 7
PAINLEVEL_OUTOF10: 8
PAINLEVEL_OUTOF10: 8
PAINLEVEL_OUTOF10: 7

## 2023-04-29 ASSESSMENT — PAIN DESCRIPTION - ORIENTATION
ORIENTATION: LOWER;RIGHT
ORIENTATION: RIGHT
ORIENTATION: RIGHT
ORIENTATION: LOWER

## 2023-04-29 ASSESSMENT — PAIN DESCRIPTION - DESCRIPTORS
DESCRIPTORS: ACHING;DISCOMFORT
DESCRIPTORS: ACHING;BURNING

## 2023-04-29 ASSESSMENT — PAIN DESCRIPTION - LOCATION
LOCATION: ABDOMEN

## 2023-04-29 ASSESSMENT — PAIN SCALES - WONG BAKER: WONGBAKER_NUMERICALRESPONSE: 0

## 2023-04-29 ASSESSMENT — PAIN - FUNCTIONAL ASSESSMENT: PAIN_FUNCTIONAL_ASSESSMENT: PREVENTS OR INTERFERES SOME ACTIVE ACTIVITIES AND ADLS

## 2023-04-30 LAB
ANION GAP SERPL CALCULATED.3IONS-SCNC: 8 MMOL/L (ref 7–16)
BACTERIA FLD AEROBE CULT: NORMAL
BACTERIA UR CULT: NORMAL
BASOPHILS # BLD: 0.06 E9/L (ref 0–0.2)
BASOPHILS NFR BLD: 0.5 % (ref 0–2)
BUN SERPL-MCNC: 22 MG/DL (ref 6–20)
CALCIUM SERPL-MCNC: 8.2 MG/DL (ref 8.6–10.2)
CHLORIDE SERPL-SCNC: 96 MMOL/L (ref 98–107)
CO2 SERPL-SCNC: 30 MMOL/L (ref 22–29)
CREAT SERPL-MCNC: 7.3 MG/DL (ref 0.7–1.2)
CRP SERPL HS-MCNC: 23.4 MG/DL (ref 0–0.4)
EOSINOPHIL # BLD: 0.1 E9/L (ref 0.05–0.5)
EOSINOPHIL NFR BLD: 0.8 % (ref 0–6)
ERYTHROCYTE [DISTWIDTH] IN BLOOD BY AUTOMATED COUNT: 15.7 FL (ref 11.5–15)
ERYTHROCYTE [SEDIMENTATION RATE] IN BLOOD BY WESTERGREN METHOD: 100 MM/HR (ref 0–15)
GLUCOSE SERPL-MCNC: 118 MG/DL (ref 74–99)
GRAM STN SPEC: NORMAL
HCT VFR BLD AUTO: 22.2 % (ref 37–54)
HEMOCCULT SP1 STL QL: NORMAL
HGB BLD-MCNC: 7 G/DL (ref 12.5–16.5)
IMM GRANULOCYTES # BLD: 0.22 E9/L
IMM GRANULOCYTES NFR BLD: 1.8 % (ref 0–5)
IRON SATN MFR SERPL: 27 % (ref 20–55)
IRON SERPL-MCNC: 31 MCG/DL (ref 59–158)
LACTATE BLDV-SCNC: 0.7 MMOL/L (ref 0.5–1.9)
LYMPHOCYTES # BLD: 0.66 E9/L (ref 1.5–4)
LYMPHOCYTES NFR BLD: 5.4 % (ref 20–42)
MAGNESIUM SERPL-MCNC: 2 MG/DL (ref 1.6–2.6)
MCH RBC QN AUTO: 30.6 PG (ref 26–35)
MCHC RBC AUTO-ENTMCNC: 31.5 % (ref 32–34.5)
MCV RBC AUTO: 96.9 FL (ref 80–99.9)
MONOCYTES # BLD: 0.98 E9/L (ref 0.1–0.95)
MONOCYTES NFR BLD: 8 % (ref 2–12)
NEUTROPHILS # BLD: 10.25 E9/L (ref 1.8–7.3)
NEUTS SEG NFR BLD: 83.5 % (ref 43–80)
PHOSPHATE SERPL-MCNC: 4.2 MG/DL (ref 2.5–4.5)
PLATELET # BLD AUTO: 448 E9/L (ref 130–450)
PMV BLD AUTO: 9.2 FL (ref 7–12)
POTASSIUM SERPL-SCNC: 4.5 MMOL/L (ref 3.5–5)
RBC # BLD AUTO: 2.29 E12/L (ref 3.8–5.8)
SODIUM SERPL-SCNC: 134 MMOL/L (ref 132–146)
TIBC SERPL-MCNC: 115 MCG/DL (ref 250–450)
WBC # BLD: 12.3 E9/L (ref 4.5–11.5)

## 2023-04-30 PROCEDURE — 6360000002 HC RX W HCPCS: Performed by: REGISTERED NURSE

## 2023-04-30 PROCEDURE — 83540 ASSAY OF IRON: CPT

## 2023-04-30 PROCEDURE — 6360000002 HC RX W HCPCS: Performed by: INTERNAL MEDICINE

## 2023-04-30 PROCEDURE — 2580000003 HC RX 258: Performed by: REGISTERED NURSE

## 2023-04-30 PROCEDURE — 80048 BASIC METABOLIC PNL TOTAL CA: CPT

## 2023-04-30 PROCEDURE — 83735 ASSAY OF MAGNESIUM: CPT

## 2023-04-30 PROCEDURE — 6370000000 HC RX 637 (ALT 250 FOR IP): Performed by: INTERNAL MEDICINE

## 2023-04-30 PROCEDURE — 36415 COLL VENOUS BLD VENIPUNCTURE: CPT

## 2023-04-30 PROCEDURE — 85025 COMPLETE CBC W/AUTO DIFF WBC: CPT

## 2023-04-30 PROCEDURE — 84100 ASSAY OF PHOSPHORUS: CPT

## 2023-04-30 PROCEDURE — 83550 IRON BINDING TEST: CPT

## 2023-04-30 PROCEDURE — 83605 ASSAY OF LACTIC ACID: CPT

## 2023-04-30 PROCEDURE — 2580000003 HC RX 258: Performed by: INTERNAL MEDICINE

## 2023-04-30 PROCEDURE — 85651 RBC SED RATE NONAUTOMATED: CPT

## 2023-04-30 PROCEDURE — 2060000000 HC ICU INTERMEDIATE R&B

## 2023-04-30 PROCEDURE — 86140 C-REACTIVE PROTEIN: CPT

## 2023-04-30 RX ORDER — ONDANSETRON 2 MG/ML
4 INJECTION INTRAMUSCULAR; INTRAVENOUS EVERY 6 HOURS PRN
Status: DISCONTINUED | OUTPATIENT
Start: 2023-04-30 | End: 2023-05-03 | Stop reason: HOSPADM

## 2023-04-30 RX ORDER — CALCIUM CARBONATE 200(500)MG
1000 TABLET,CHEWABLE ORAL
Status: DISCONTINUED | OUTPATIENT
Start: 2023-04-30 | End: 2023-05-03 | Stop reason: HOSPADM

## 2023-04-30 RX ADMIN — PIPERACILLIN AND TAZOBACTAM 4500 MG: 4; .5 INJECTION, POWDER, LYOPHILIZED, FOR SOLUTION INTRAVENOUS at 22:44

## 2023-04-30 RX ADMIN — SEVELAMER CARBONATE 800 MG: 800 TABLET, FILM COATED ORAL at 08:07

## 2023-04-30 RX ADMIN — SODIUM ZIRCONIUM CYCLOSILICATE 10 G: 10 POWDER, FOR SUSPENSION ORAL at 08:06

## 2023-04-30 RX ADMIN — CALCITRIOL 0.25 MCG: 0.25 CAPSULE ORAL at 08:07

## 2023-04-30 RX ADMIN — HYDROMORPHONE HYDROCHLORIDE 1 MG: 1 INJECTION, SOLUTION INTRAMUSCULAR; INTRAVENOUS; SUBCUTANEOUS at 12:11

## 2023-04-30 RX ADMIN — HYDROMORPHONE HYDROCHLORIDE 1 MG: 1 INJECTION, SOLUTION INTRAMUSCULAR; INTRAVENOUS; SUBCUTANEOUS at 08:03

## 2023-04-30 RX ADMIN — ONDANSETRON 4 MG: 2 INJECTION INTRAMUSCULAR; INTRAVENOUS at 03:53

## 2023-04-30 RX ADMIN — CALCIUM CARBONATE 1000 MG: 500 TABLET, CHEWABLE ORAL at 03:53

## 2023-04-30 RX ADMIN — PIPERACILLIN AND TAZOBACTAM 4500 MG: 4; .5 INJECTION, POWDER, LYOPHILIZED, FOR SOLUTION INTRAVENOUS at 10:37

## 2023-04-30 RX ADMIN — SODIUM BICARBONATE 650 MG: 650 TABLET ORAL at 08:07

## 2023-04-30 RX ADMIN — SODIUM CHLORIDE 25 MG: 9 INJECTION, SOLUTION INTRAVENOUS at 10:23

## 2023-04-30 RX ADMIN — HYDROMORPHONE HYDROCHLORIDE 1 MG: 1 INJECTION, SOLUTION INTRAMUSCULAR; INTRAVENOUS; SUBCUTANEOUS at 16:58

## 2023-04-30 RX ADMIN — HYDROMORPHONE HYDROCHLORIDE 1 MG: 1 INJECTION, SOLUTION INTRAMUSCULAR; INTRAVENOUS; SUBCUTANEOUS at 03:57

## 2023-04-30 RX ADMIN — HYDROMORPHONE HYDROCHLORIDE 1 MG: 1 INJECTION, SOLUTION INTRAMUSCULAR; INTRAVENOUS; SUBCUTANEOUS at 20:55

## 2023-04-30 RX ADMIN — SEVELAMER CARBONATE 800 MG: 800 TABLET, FILM COATED ORAL at 16:48

## 2023-04-30 RX ADMIN — SODIUM CHLORIDE 100 MG: 9 INJECTION, SOLUTION INTRAVENOUS at 11:46

## 2023-04-30 RX ADMIN — SEVELAMER CARBONATE 800 MG: 800 TABLET, FILM COATED ORAL at 11:29

## 2023-04-30 ASSESSMENT — PAIN SCALES - GENERAL
PAINLEVEL_OUTOF10: 7
PAINLEVEL_OUTOF10: 6
PAINLEVEL_OUTOF10: 7
PAINLEVEL_OUTOF10: 7
PAINLEVEL_OUTOF10: 8

## 2023-04-30 ASSESSMENT — PAIN - FUNCTIONAL ASSESSMENT
PAIN_FUNCTIONAL_ASSESSMENT: PREVENTS OR INTERFERES SOME ACTIVE ACTIVITIES AND ADLS
PAIN_FUNCTIONAL_ASSESSMENT: ACTIVITIES ARE NOT PREVENTED

## 2023-04-30 ASSESSMENT — PAIN DESCRIPTION - ORIENTATION
ORIENTATION: RIGHT
ORIENTATION: RIGHT;LEFT
ORIENTATION: RIGHT

## 2023-04-30 ASSESSMENT — PAIN DESCRIPTION - LOCATION
LOCATION: ABDOMEN

## 2023-04-30 ASSESSMENT — PAIN DESCRIPTION - DESCRIPTORS
DESCRIPTORS: ACHING;SORE;THROBBING
DESCRIPTORS: ACHING;CRAMPING;DISCOMFORT
DESCRIPTORS: ACHING;DISCOMFORT

## 2023-04-30 ASSESSMENT — PAIN DESCRIPTION - PAIN TYPE: TYPE: SURGICAL PAIN

## 2023-05-01 PROBLEM — N25.81 SECONDARY HYPERPARATHYROIDISM OF RENAL ORIGIN (HCC): Status: ACTIVE | Noted: 2023-05-01

## 2023-05-01 PROBLEM — A41.9 SEPSIS (HCC): Status: ACTIVE | Noted: 2023-05-01

## 2023-05-01 LAB
ANION GAP SERPL CALCULATED.3IONS-SCNC: 14 MMOL/L (ref 7–16)
BASOPHILS # BLD: 0.08 E9/L (ref 0–0.2)
BASOPHILS NFR BLD: 0.7 % (ref 0–2)
BLOOD BANK DISPENSE STATUS: NORMAL
BLOOD BANK DISPENSE STATUS: NORMAL
BLOOD BANK PRODUCT CODE: NORMAL
BLOOD BANK PRODUCT CODE: NORMAL
BPU ID: NORMAL
BPU ID: NORMAL
BUN SERPL-MCNC: 27 MG/DL (ref 6–20)
CALCIUM SERPL-MCNC: 8.6 MG/DL (ref 8.6–10.2)
CHLORIDE SERPL-SCNC: 94 MMOL/L (ref 98–107)
CO2 SERPL-SCNC: 27 MMOL/L (ref 22–29)
CREAT SERPL-MCNC: 9.6 MG/DL (ref 0.7–1.2)
CRP SERPL HS-MCNC: 14 MG/DL (ref 0–0.4)
DESCRIPTION BLOOD BANK: NORMAL
DESCRIPTION BLOOD BANK: NORMAL
EOSINOPHIL # BLD: 0.29 E9/L (ref 0.05–0.5)
EOSINOPHIL NFR BLD: 2.6 % (ref 0–6)
ERYTHROCYTE [DISTWIDTH] IN BLOOD BY AUTOMATED COUNT: 14.8 FL (ref 11.5–15)
ERYTHROCYTE [SEDIMENTATION RATE] IN BLOOD BY WESTERGREN METHOD: 100 MM/HR (ref 0–15)
GLUCOSE SERPL-MCNC: 115 MG/DL (ref 74–99)
HCT VFR BLD AUTO: 20.7 % (ref 37–54)
HCT VFR BLD AUTO: 22.3 % (ref 37–54)
HGB BLD-MCNC: 6.4 G/DL (ref 12.5–16.5)
HGB BLD-MCNC: 7.2 G/DL (ref 12.5–16.5)
IMM GRANULOCYTES # BLD: 0.12 E9/L
IMM GRANULOCYTES NFR BLD: 1.1 % (ref 0–5)
LACTATE BLDV-SCNC: 0.7 MMOL/L (ref 0.5–1.9)
LYMPHOCYTES # BLD: 0.93 E9/L (ref 1.5–4)
LYMPHOCYTES NFR BLD: 8.2 % (ref 20–42)
MAGNESIUM SERPL-MCNC: 2.2 MG/DL (ref 1.6–2.6)
MCH RBC QN AUTO: 29.9 PG (ref 26–35)
MCHC RBC AUTO-ENTMCNC: 30.9 % (ref 32–34.5)
MCV RBC AUTO: 96.7 FL (ref 80–99.9)
MONOCYTES # BLD: 0.86 E9/L (ref 0.1–0.95)
MONOCYTES NFR BLD: 7.6 % (ref 2–12)
NEUTROPHILS # BLD: 9.01 E9/L (ref 1.8–7.3)
NEUTS SEG NFR BLD: 79.8 % (ref 43–80)
PHOSPHATE SERPL-MCNC: 5.1 MG/DL (ref 2.5–4.5)
PLATELET # BLD AUTO: 415 E9/L (ref 130–450)
PMV BLD AUTO: 9.1 FL (ref 7–12)
POTASSIUM SERPL-SCNC: 4.3 MMOL/L (ref 3.5–5)
RBC # BLD AUTO: 2.14 E12/L (ref 3.8–5.8)
SODIUM SERPL-SCNC: 135 MMOL/L (ref 132–146)
WBC # BLD: 11.3 E9/L (ref 4.5–11.5)

## 2023-05-01 PROCEDURE — 6360000002 HC RX W HCPCS: Performed by: REGISTERED NURSE

## 2023-05-01 PROCEDURE — 2060000000 HC ICU INTERMEDIATE R&B

## 2023-05-01 PROCEDURE — 80048 BASIC METABOLIC PNL TOTAL CA: CPT

## 2023-05-01 PROCEDURE — 85025 COMPLETE CBC W/AUTO DIFF WBC: CPT

## 2023-05-01 PROCEDURE — 83735 ASSAY OF MAGNESIUM: CPT

## 2023-05-01 PROCEDURE — 6370000000 HC RX 637 (ALT 250 FOR IP): Performed by: INTERNAL MEDICINE

## 2023-05-01 PROCEDURE — 2580000003 HC RX 258: Performed by: REGISTERED NURSE

## 2023-05-01 PROCEDURE — 6360000002 HC RX W HCPCS: Performed by: INTERNAL MEDICINE

## 2023-05-01 PROCEDURE — 83605 ASSAY OF LACTIC ACID: CPT

## 2023-05-01 PROCEDURE — 36415 COLL VENOUS BLD VENIPUNCTURE: CPT

## 2023-05-01 PROCEDURE — 36430 TRANSFUSION BLD/BLD COMPNT: CPT

## 2023-05-01 PROCEDURE — 84100 ASSAY OF PHOSPHORUS: CPT

## 2023-05-01 PROCEDURE — 85018 HEMOGLOBIN: CPT

## 2023-05-01 PROCEDURE — 86140 C-REACTIVE PROTEIN: CPT

## 2023-05-01 PROCEDURE — 85014 HEMATOCRIT: CPT

## 2023-05-01 PROCEDURE — 85651 RBC SED RATE NONAUTOMATED: CPT

## 2023-05-01 RX ORDER — OXYCODONE HYDROCHLORIDE AND ACETAMINOPHEN 5; 325 MG/1; MG/1
1 TABLET ORAL EVERY 4 HOURS PRN
Status: DISCONTINUED | OUTPATIENT
Start: 2023-05-01 | End: 2023-05-03

## 2023-05-01 RX ORDER — SODIUM CHLORIDE 9 MG/ML
INJECTION, SOLUTION INTRAVENOUS PRN
Status: DISCONTINUED | OUTPATIENT
Start: 2023-05-01 | End: 2023-05-03 | Stop reason: HOSPADM

## 2023-05-01 RX ADMIN — SEVELAMER CARBONATE 800 MG: 800 TABLET, FILM COATED ORAL at 16:50

## 2023-05-01 RX ADMIN — EPOETIN ALFA-EPBX 3000 UNITS: 3000 INJECTION, SOLUTION INTRAVENOUS; SUBCUTANEOUS at 08:36

## 2023-05-01 RX ADMIN — PIPERACILLIN AND TAZOBACTAM 4500 MG: 4; .5 INJECTION, POWDER, LYOPHILIZED, FOR SOLUTION INTRAVENOUS at 21:46

## 2023-05-01 RX ADMIN — SODIUM BICARBONATE 650 MG: 650 TABLET ORAL at 08:36

## 2023-05-01 RX ADMIN — HYDROMORPHONE HYDROCHLORIDE 1 MG: 1 INJECTION, SOLUTION INTRAMUSCULAR; INTRAVENOUS; SUBCUTANEOUS at 09:26

## 2023-05-01 RX ADMIN — PIPERACILLIN AND TAZOBACTAM 4500 MG: 4; .5 INJECTION, POWDER, LYOPHILIZED, FOR SOLUTION INTRAVENOUS at 10:48

## 2023-05-01 RX ADMIN — HYDROMORPHONE HYDROCHLORIDE 1 MG: 1 INJECTION, SOLUTION INTRAMUSCULAR; INTRAVENOUS; SUBCUTANEOUS at 13:58

## 2023-05-01 RX ADMIN — SEVELAMER CARBONATE 800 MG: 800 TABLET, FILM COATED ORAL at 06:59

## 2023-05-01 RX ADMIN — HYDROMORPHONE HYDROCHLORIDE 1 MG: 1 INJECTION, SOLUTION INTRAMUSCULAR; INTRAVENOUS; SUBCUTANEOUS at 00:58

## 2023-05-01 RX ADMIN — SEVELAMER CARBONATE 800 MG: 800 TABLET, FILM COATED ORAL at 10:46

## 2023-05-01 RX ADMIN — HYDROMORPHONE HYDROCHLORIDE 1 MG: 1 INJECTION, SOLUTION INTRAMUSCULAR; INTRAVENOUS; SUBCUTANEOUS at 18:15

## 2023-05-01 RX ADMIN — HYDROMORPHONE HYDROCHLORIDE 1 MG: 1 INJECTION, SOLUTION INTRAMUSCULAR; INTRAVENOUS; SUBCUTANEOUS at 22:33

## 2023-05-01 RX ADMIN — HYDROMORPHONE HYDROCHLORIDE 1 MG: 1 INJECTION, SOLUTION INTRAMUSCULAR; INTRAVENOUS; SUBCUTANEOUS at 05:04

## 2023-05-01 ASSESSMENT — PAIN DESCRIPTION - DESCRIPTORS
DESCRIPTORS: SHARP;SHOOTING
DESCRIPTORS: ACHING
DESCRIPTORS: STABBING;SORE;PRESSURE

## 2023-05-01 ASSESSMENT — PAIN DESCRIPTION - LOCATION
LOCATION: ABDOMEN

## 2023-05-01 ASSESSMENT — PAIN SCALES - GENERAL
PAINLEVEL_OUTOF10: 4
PAINLEVEL_OUTOF10: 7
PAINLEVEL_OUTOF10: 10
PAINLEVEL_OUTOF10: 7

## 2023-05-01 ASSESSMENT — PAIN - FUNCTIONAL ASSESSMENT
PAIN_FUNCTIONAL_ASSESSMENT: PREVENTS OR INTERFERES SOME ACTIVE ACTIVITIES AND ADLS
PAIN_FUNCTIONAL_ASSESSMENT: PREVENTS OR INTERFERES WITH ALL ACTIVE AND SOME PASSIVE ACTIVITIES

## 2023-05-01 ASSESSMENT — PAIN DESCRIPTION - ORIENTATION
ORIENTATION: RIGHT

## 2023-05-01 ASSESSMENT — PAIN DESCRIPTION - FREQUENCY: FREQUENCY: INTERMITTENT

## 2023-05-01 ASSESSMENT — PAIN DESCRIPTION - PAIN TYPE: TYPE: ACUTE PAIN

## 2023-05-01 NOTE — CONSENT
Informed Consent for Blood Component Transfusion Note    I have discussed with the patient the rationale for blood component transfusion; its benefits in treating or preventing fatigue, organ damage, or death; and its risk which includes mild transfusion reactions, rare risk of blood borne infection, or more serious but rare reactions. I have discussed the alternatives to transfusion, including the risk and consequences of not receiving transfusion. The patient had an opportunity to ask questions and had agreed to proceed with transfusion of blood components.     Electronically signed by Niki Edouard DO on 5/1/23 at 11:23 AM EDT

## 2023-05-01 NOTE — CARE COORDINATION
Social work / Discharge Planning:           Patient goes to [facility] [phone] TTS. Per CM note, [facility] is following. ID is following.    Electronically signed by RAE Neely on 5/1/2023 at 10:47 AM

## 2023-05-02 LAB
ANION GAP SERPL CALCULATED.3IONS-SCNC: 16 MMOL/L (ref 7–16)
BACTERIA BLD CULT ORG #2: NORMAL
BACTERIA BLD CULT: NORMAL
BASOPHILS # BLD: 0.09 E9/L (ref 0–0.2)
BASOPHILS NFR BLD: 0.7 % (ref 0–2)
BUN SERPL-MCNC: 31 MG/DL (ref 6–20)
CALCIUM SERPL-MCNC: 8.5 MG/DL (ref 8.6–10.2)
CHLORIDE SERPL-SCNC: 94 MMOL/L (ref 98–107)
CO2 SERPL-SCNC: 26 MMOL/L (ref 22–29)
CREAT SERPL-MCNC: 11.4 MG/DL (ref 0.7–1.2)
CRP SERPL HS-MCNC: 11.6 MG/DL (ref 0–0.4)
EOSINOPHIL # BLD: 0.34 E9/L (ref 0.05–0.5)
EOSINOPHIL NFR BLD: 2.7 % (ref 0–6)
ERYTHROCYTE [DISTWIDTH] IN BLOOD BY AUTOMATED COUNT: 15.4 FL (ref 11.5–15)
ERYTHROCYTE [SEDIMENTATION RATE] IN BLOOD BY WESTERGREN METHOD: 95 MM/HR (ref 0–15)
GLUCOSE SERPL-MCNC: 113 MG/DL (ref 74–99)
GRAM STAIN ORDERABLE: NORMAL
HCT VFR BLD AUTO: 21.8 % (ref 37–54)
HGB BLD-MCNC: 7 G/DL (ref 12.5–16.5)
IMM GRANULOCYTES # BLD: 0.2 E9/L
IMM GRANULOCYTES NFR BLD: 1.6 % (ref 0–5)
LACTATE BLDV-SCNC: 0.6 MMOL/L (ref 0.5–1.9)
LYMPHOCYTES # BLD: 1.01 E9/L (ref 1.5–4)
LYMPHOCYTES NFR BLD: 8.1 % (ref 20–42)
MAGNESIUM SERPL-MCNC: 2 MG/DL (ref 1.6–2.6)
MCH RBC QN AUTO: 30.2 PG (ref 26–35)
MCHC RBC AUTO-ENTMCNC: 32.1 % (ref 32–34.5)
MCV RBC AUTO: 94 FL (ref 80–99.9)
MONOCYTES # BLD: 0.95 E9/L (ref 0.1–0.95)
MONOCYTES NFR BLD: 7.6 % (ref 2–12)
NEUTROPHILS # BLD: 9.86 E9/L (ref 1.8–7.3)
NEUTS SEG NFR BLD: 79.3 % (ref 43–80)
PHOSPHATE SERPL-MCNC: 5.8 MG/DL (ref 2.5–4.5)
PLATELET # BLD AUTO: 414 E9/L (ref 130–450)
PMV BLD AUTO: 8.9 FL (ref 7–12)
POTASSIUM SERPL-SCNC: 4.3 MMOL/L (ref 3.5–5)
RBC # BLD AUTO: 2.32 E12/L (ref 3.8–5.8)
SODIUM SERPL-SCNC: 136 MMOL/L (ref 132–146)
WBC # BLD: 12.5 E9/L (ref 4.5–11.5)

## 2023-05-02 PROCEDURE — A4216 STERILE WATER/SALINE, 10 ML: HCPCS | Performed by: REGISTERED NURSE

## 2023-05-02 PROCEDURE — 83605 ASSAY OF LACTIC ACID: CPT

## 2023-05-02 PROCEDURE — 2060000000 HC ICU INTERMEDIATE R&B

## 2023-05-02 PROCEDURE — 6370000000 HC RX 637 (ALT 250 FOR IP): Performed by: INTERNAL MEDICINE

## 2023-05-02 PROCEDURE — 6360000002 HC RX W HCPCS: Performed by: REGISTERED NURSE

## 2023-05-02 PROCEDURE — 36415 COLL VENOUS BLD VENIPUNCTURE: CPT

## 2023-05-02 PROCEDURE — 84100 ASSAY OF PHOSPHORUS: CPT

## 2023-05-02 PROCEDURE — 2580000003 HC RX 258

## 2023-05-02 PROCEDURE — 6360000002 HC RX W HCPCS: Performed by: INTERNAL MEDICINE

## 2023-05-02 PROCEDURE — 2580000003 HC RX 258: Performed by: REGISTERED NURSE

## 2023-05-02 PROCEDURE — 85025 COMPLETE CBC W/AUTO DIFF WBC: CPT

## 2023-05-02 PROCEDURE — 80074 ACUTE HEPATITIS PANEL: CPT

## 2023-05-02 PROCEDURE — 85651 RBC SED RATE NONAUTOMATED: CPT

## 2023-05-02 PROCEDURE — 86140 C-REACTIVE PROTEIN: CPT

## 2023-05-02 PROCEDURE — 97165 OT EVAL LOW COMPLEX 30 MIN: CPT

## 2023-05-02 PROCEDURE — 83735 ASSAY OF MAGNESIUM: CPT

## 2023-05-02 PROCEDURE — 90935 HEMODIALYSIS ONE EVALUATION: CPT

## 2023-05-02 PROCEDURE — 86706 HEP B SURFACE ANTIBODY: CPT

## 2023-05-02 PROCEDURE — 80048 BASIC METABOLIC PNL TOTAL CA: CPT

## 2023-05-02 RX ORDER — SODIUM CHLORIDE 0.9 % (FLUSH) 0.9 %
SYRINGE (ML) INJECTION
Status: COMPLETED
Start: 2023-05-02 | End: 2023-05-02

## 2023-05-02 RX ADMIN — OXYCODONE HYDROCHLORIDE AND ACETAMINOPHEN 1 TABLET: 5; 325 TABLET ORAL at 11:52

## 2023-05-02 RX ADMIN — HYDROMORPHONE HYDROCHLORIDE 1 MG: 1 INJECTION, SOLUTION INTRAMUSCULAR; INTRAVENOUS; SUBCUTANEOUS at 02:38

## 2023-05-02 RX ADMIN — SODIUM CHLORIDE, PRESERVATIVE FREE 10 ML: 5 INJECTION INTRAVENOUS at 13:34

## 2023-05-02 RX ADMIN — OXYCODONE HYDROCHLORIDE AND ACETAMINOPHEN 1 TABLET: 5; 325 TABLET ORAL at 21:39

## 2023-05-02 RX ADMIN — SEVELAMER CARBONATE 800 MG: 800 TABLET, FILM COATED ORAL at 15:37

## 2023-05-02 RX ADMIN — PIPERACILLIN AND TAZOBACTAM 4500 MG: 4; .5 INJECTION, POWDER, LYOPHILIZED, FOR SOLUTION INTRAVENOUS at 11:48

## 2023-05-02 RX ADMIN — SEVELAMER CARBONATE 800 MG: 800 TABLET, FILM COATED ORAL at 06:45

## 2023-05-02 RX ADMIN — PANTOPRAZOLE SODIUM 40 MG: 40 TABLET, DELAYED RELEASE ORAL at 05:45

## 2023-05-02 RX ADMIN — OXYCODONE HYDROCHLORIDE AND ACETAMINOPHEN 1 TABLET: 5; 325 TABLET ORAL at 17:46

## 2023-05-02 RX ADMIN — NEPHROCAP 1 MG: 1 CAP ORAL at 23:12

## 2023-05-02 RX ADMIN — SEVELAMER CARBONATE 800 MG: 800 TABLET, FILM COATED ORAL at 11:52

## 2023-05-02 RX ADMIN — SODIUM BICARBONATE 650 MG: 650 TABLET ORAL at 11:46

## 2023-05-02 RX ADMIN — OXYCODONE HYDROCHLORIDE AND ACETAMINOPHEN 1 TABLET: 5; 325 TABLET ORAL at 06:45

## 2023-05-02 RX ADMIN — DAPTOMYCIN 350 MG: 500 INJECTION, POWDER, LYOPHILIZED, FOR SOLUTION INTRAVENOUS at 13:32

## 2023-05-02 RX ADMIN — PIPERACILLIN AND TAZOBACTAM 4500 MG: 4; .5 INJECTION, POWDER, LYOPHILIZED, FOR SOLUTION INTRAVENOUS at 23:14

## 2023-05-02 RX ADMIN — HYDROMORPHONE HYDROCHLORIDE 1 MG: 1 INJECTION, SOLUTION INTRAMUSCULAR; INTRAVENOUS; SUBCUTANEOUS at 15:37

## 2023-05-02 ASSESSMENT — PAIN DESCRIPTION - LOCATION
LOCATION: ABDOMEN

## 2023-05-02 ASSESSMENT — PAIN DESCRIPTION - ORIENTATION
ORIENTATION: RIGHT;INNER;LOWER
ORIENTATION: RIGHT

## 2023-05-02 ASSESSMENT — PAIN SCALES - GENERAL
PAINLEVEL_OUTOF10: 7
PAINLEVEL_OUTOF10: 0
PAINLEVEL_OUTOF10: 7
PAINLEVEL_OUTOF10: 8
PAINLEVEL_OUTOF10: 8
PAINLEVEL_OUTOF10: 4
PAINLEVEL_OUTOF10: 6
PAINLEVEL_OUTOF10: 6
PAINLEVEL_OUTOF10: 7
PAINLEVEL_OUTOF10: 0

## 2023-05-02 ASSESSMENT — PAIN SCALES - WONG BAKER: WONGBAKER_NUMERICALRESPONSE: 0

## 2023-05-02 ASSESSMENT — PAIN DESCRIPTION - DESCRIPTORS
DESCRIPTORS: STABBING
DESCRIPTORS: TIGHTNESS;SORE
DESCRIPTORS: ACHING;STABBING
DESCRIPTORS: PRESSURE

## 2023-05-02 NOTE — CARE COORDINATION
Patient goes to [facility] [phone] TTS. Ramez Needs is following for possible IV antibiotic need, await final cultures/ID plan. Will follow.   Ernesto Caicedo, BSN, RN  [facility] Case Management  [phone]

## 2023-05-02 NOTE — FLOWSHEET NOTE
Pt completed 4hrs of HD on a 3K bath with 1L of UF removed safely. 05/02/23 1105   Vital Signs   BP (!) 172/109   Temp 97.9 °F (36.6 °C)   Heart Rate 86   Resp 18   Weight 192 lb 3.9 oz (87.2 kg)   Percent Weight Change -1.13   Pain Assessment   Pain Assessment None - Denies Pain   Pain Level 0   Post-Hemodialysis Assessment   Post-Treatment Procedures Blood returned;Catheter capped, clamped and heparinized x 2 ports   Machine Disinfection Process Acid/Vinegar Clean;Heat Disinfect; Exterior Machine Disinfection   Rinseback Volume (ml) 300 ml   Blood Volume Processed (Liters) 88 l/min   Dialyzer Clearance Moderately streaked   Duration of Treatment (minutes) 240 minutes   Hemodialysis Intake (ml) 300 ml   Hemodialysis Output (ml) 1300 ml   NET Removed (ml) 1000   Tolerated Treatment Good   Patient Response to Treatment tolerated well; post report to [facility] 5879; pt stable at discharge   Bilateral Breath Sounds Clear   Time Off 1058

## 2023-05-03 VITALS
RESPIRATION RATE: 16 BRPM | TEMPERATURE: 98 F | HEART RATE: 96 BPM | SYSTOLIC BLOOD PRESSURE: 152 MMHG | HEIGHT: 72 IN | BODY MASS INDEX: 26.57 KG/M2 | DIASTOLIC BLOOD PRESSURE: 89 MMHG | OXYGEN SATURATION: 95 % | WEIGHT: 196.2 LBS

## 2023-05-03 LAB
ANION GAP SERPL CALCULATED.3IONS-SCNC: 11 MMOL/L (ref 7–16)
BACTERIA FLD AEROBE CULT: ABNORMAL
BACTERIA SPEC ANAEROBE CULT: ABNORMAL
BACTERIA SPEC ANAEROBE CULT: ABNORMAL
BACTERIA SPEC ANAEROBE CULT: NORMAL
BASOPHILS # BLD: 0.08 E9/L (ref 0–0.2)
BASOPHILS NFR BLD: 0.6 % (ref 0–2)
BUN SERPL-MCNC: 17 MG/DL (ref 6–20)
CALCIUM SERPL-MCNC: 8.6 MG/DL (ref 8.6–10.2)
CHLORIDE SERPL-SCNC: 101 MMOL/L (ref 98–107)
CO2 SERPL-SCNC: 26 MMOL/L (ref 22–29)
CREAT SERPL-MCNC: 7.9 MG/DL (ref 0.7–1.2)
CRP SERPL HS-MCNC: 10.2 MG/DL (ref 0–0.4)
EOSINOPHIL # BLD: 0.33 E9/L (ref 0.05–0.5)
EOSINOPHIL NFR BLD: 2.7 % (ref 0–6)
ERYTHROCYTE [DISTWIDTH] IN BLOOD BY AUTOMATED COUNT: 14.8 FL (ref 11.5–15)
ERYTHROCYTE [SEDIMENTATION RATE] IN BLOOD BY WESTERGREN METHOD: 97 MM/HR (ref 0–15)
GLUCOSE SERPL-MCNC: 94 MG/DL (ref 74–99)
GRAM STN SPEC: ABNORMAL
HAV IGM SERPL QL IA: NORMAL
HBV CORE IGM SERPL QL IA: NORMAL
HBV SURFACE AB SERPL IA-ACNC: NORMAL M[IU]/ML
HBV SURFACE AG SERPL QL IA: NORMAL
HCT VFR BLD AUTO: 22.7 % (ref 37–54)
HCV AB SERPL QL IA: NORMAL
HGB BLD-MCNC: 7.1 G/DL (ref 12.5–16.5)
IMM GRANULOCYTES # BLD: 0.43 E9/L
IMM GRANULOCYTES NFR BLD: 3.5 % (ref 0–5)
LACTATE BLDV-SCNC: 0.7 MMOL/L (ref 0.5–1.9)
LYMPHOCYTES # BLD: 0.99 E9/L (ref 1.5–4)
LYMPHOCYTES NFR BLD: 8 % (ref 20–42)
MAGNESIUM SERPL-MCNC: 1.9 MG/DL (ref 1.6–2.6)
MCH RBC QN AUTO: 30.5 PG (ref 26–35)
MCHC RBC AUTO-ENTMCNC: 31.3 % (ref 32–34.5)
MCV RBC AUTO: 97.4 FL (ref 80–99.9)
MONOCYTES # BLD: 0.89 E9/L (ref 0.1–0.95)
MONOCYTES NFR BLD: 7.2 % (ref 2–12)
NEUTROPHILS # BLD: 9.6 E9/L (ref 1.8–7.3)
NEUTS SEG NFR BLD: 78 % (ref 43–80)
ORGANISM: ABNORMAL
PHOSPHATE SERPL-MCNC: 4.3 MG/DL (ref 2.5–4.5)
PLATELET # BLD AUTO: 431 E9/L (ref 130–450)
PMV BLD AUTO: 8.9 FL (ref 7–12)
POTASSIUM SERPL-SCNC: 4.2 MMOL/L (ref 3.5–5)
RBC # BLD AUTO: 2.33 E12/L (ref 3.8–5.8)
SODIUM SERPL-SCNC: 138 MMOL/L (ref 132–146)
WBC # BLD: 12.3 E9/L (ref 4.5–11.5)

## 2023-05-03 PROCEDURE — 97535 SELF CARE MNGMENT TRAINING: CPT

## 2023-05-03 PROCEDURE — 36415 COLL VENOUS BLD VENIPUNCTURE: CPT

## 2023-05-03 PROCEDURE — 85651 RBC SED RATE NONAUTOMATED: CPT

## 2023-05-03 PROCEDURE — 83735 ASSAY OF MAGNESIUM: CPT

## 2023-05-03 PROCEDURE — 6370000000 HC RX 637 (ALT 250 FOR IP): Performed by: REGISTERED NURSE

## 2023-05-03 PROCEDURE — 80048 BASIC METABOLIC PNL TOTAL CA: CPT

## 2023-05-03 PROCEDURE — 6360000002 HC RX W HCPCS: Performed by: INTERNAL MEDICINE

## 2023-05-03 PROCEDURE — 85025 COMPLETE CBC W/AUTO DIFF WBC: CPT

## 2023-05-03 PROCEDURE — 84100 ASSAY OF PHOSPHORUS: CPT

## 2023-05-03 PROCEDURE — 6370000000 HC RX 637 (ALT 250 FOR IP): Performed by: INTERNAL MEDICINE

## 2023-05-03 PROCEDURE — 86140 C-REACTIVE PROTEIN: CPT

## 2023-05-03 PROCEDURE — 6370000000 HC RX 637 (ALT 250 FOR IP): Performed by: STUDENT IN AN ORGANIZED HEALTH CARE EDUCATION/TRAINING PROGRAM

## 2023-05-03 PROCEDURE — 83605 ASSAY OF LACTIC ACID: CPT

## 2023-05-03 RX ORDER — METRONIDAZOLE 500 MG/1
500 TABLET ORAL EVERY 8 HOURS SCHEDULED
Status: DISCONTINUED | OUTPATIENT
Start: 2023-05-03 | End: 2023-05-03 | Stop reason: HOSPADM

## 2023-05-03 RX ORDER — OXYCODONE HYDROCHLORIDE AND ACETAMINOPHEN 5; 325 MG/1; MG/1
1 TABLET ORAL EVERY 4 HOURS PRN
Qty: 30 TABLET | Refills: 0 | Status: SHIPPED | OUTPATIENT
Start: 2023-05-03 | End: 2023-06-02

## 2023-05-03 RX ORDER — OXYCODONE HYDROCHLORIDE AND ACETAMINOPHEN 5; 325 MG/1; MG/1
1 TABLET ORAL EVERY 4 HOURS PRN
Status: DISCONTINUED | OUTPATIENT
Start: 2023-05-03 | End: 2023-05-03 | Stop reason: HOSPADM

## 2023-05-03 RX ORDER — PANTOPRAZOLE SODIUM 40 MG/1
40 TABLET, DELAYED RELEASE ORAL
Qty: 30 TABLET | Refills: 3 | Status: SHIPPED | OUTPATIENT
Start: 2023-05-04

## 2023-05-03 RX ORDER — SENNA AND DOCUSATE SODIUM 50; 8.6 MG/1; MG/1
2 TABLET, FILM COATED ORAL 2 TIMES DAILY
Qty: 30 TABLET | Refills: 1 | Status: SHIPPED | OUTPATIENT
Start: 2023-05-03

## 2023-05-03 RX ORDER — LINEZOLID 600 MG/1
600 TABLET, FILM COATED ORAL EVERY 12 HOURS SCHEDULED
Qty: 28 TABLET | Refills: 0 | Status: SHIPPED | OUTPATIENT
Start: 2023-05-03 | End: 2023-05-17

## 2023-05-03 RX ORDER — SENNA AND DOCUSATE SODIUM 50; 8.6 MG/1; MG/1
2 TABLET, FILM COATED ORAL 2 TIMES DAILY
Status: DISCONTINUED | OUTPATIENT
Start: 2023-05-03 | End: 2023-05-03 | Stop reason: HOSPADM

## 2023-05-03 RX ORDER — BISACODYL 10 MG
10 SUPPOSITORY, RECTAL RECTAL ONCE
Status: COMPLETED | OUTPATIENT
Start: 2023-05-03 | End: 2023-05-03

## 2023-05-03 RX ORDER — METRONIDAZOLE 500 MG/1
500 TABLET ORAL EVERY 8 HOURS SCHEDULED
Qty: 42 TABLET | Refills: 0 | Status: SHIPPED | OUTPATIENT
Start: 2023-05-03 | End: 2023-05-17

## 2023-05-03 RX ORDER — POLYETHYLENE GLYCOL 3350 17 G/17G
17 POWDER, FOR SOLUTION ORAL 2 TIMES DAILY
Qty: 527 G | Refills: 1 | Status: SHIPPED | OUTPATIENT
Start: 2023-05-03 | End: 2023-06-02

## 2023-05-03 RX ORDER — POLYETHYLENE GLYCOL 3350 17 G/17G
17 POWDER, FOR SOLUTION ORAL 2 TIMES DAILY
Status: DISCONTINUED | OUTPATIENT
Start: 2023-05-03 | End: 2023-05-03 | Stop reason: HOSPADM

## 2023-05-03 RX ORDER — LINEZOLID 600 MG/1
600 TABLET, FILM COATED ORAL EVERY 12 HOURS SCHEDULED
Status: DISCONTINUED | OUTPATIENT
Start: 2023-05-03 | End: 2023-05-03 | Stop reason: HOSPADM

## 2023-05-03 RX ADMIN — BISACODYL 10 MG: 10 SUPPOSITORY RECTAL at 14:30

## 2023-05-03 RX ADMIN — OXYCODONE HYDROCHLORIDE AND ACETAMINOPHEN 1 TABLET: 5; 325 TABLET ORAL at 10:35

## 2023-05-03 RX ADMIN — OXYCODONE HYDROCHLORIDE AND ACETAMINOPHEN 1 TABLET: 5; 325 TABLET ORAL at 06:14

## 2023-05-03 RX ADMIN — CALCITRIOL 0.25 MCG: 0.25 CAPSULE ORAL at 09:48

## 2023-05-03 RX ADMIN — SEVELAMER CARBONATE 800 MG: 800 TABLET, FILM COATED ORAL at 11:52

## 2023-05-03 RX ADMIN — POLYETHYLENE GLYCOL 3350 17 G: 17 POWDER, FOR SOLUTION ORAL at 14:30

## 2023-05-03 RX ADMIN — OXYCODONE HYDROCHLORIDE AND ACETAMINOPHEN 1 TABLET: 5; 325 TABLET ORAL at 14:29

## 2023-05-03 RX ADMIN — METRONIDAZOLE 500 MG: 500 TABLET ORAL at 14:29

## 2023-05-03 RX ADMIN — PANTOPRAZOLE SODIUM 40 MG: 40 TABLET, DELAYED RELEASE ORAL at 06:14

## 2023-05-03 RX ADMIN — EPOETIN ALFA-EPBX 3000 UNITS: 3000 INJECTION, SOLUTION INTRAVENOUS; SUBCUTANEOUS at 09:48

## 2023-05-03 RX ADMIN — SEVELAMER CARBONATE 800 MG: 800 TABLET, FILM COATED ORAL at 09:49

## 2023-05-03 RX ADMIN — DOCUSATE SODIUM 50 MG AND SENNOSIDES 8.6 MG 2 TABLET: 8.6; 5 TABLET, FILM COATED ORAL at 14:30

## 2023-05-03 RX ADMIN — OXYCODONE HYDROCHLORIDE AND ACETAMINOPHEN 1 TABLET: 5; 325 TABLET ORAL at 01:44

## 2023-05-03 RX ADMIN — HYDRALAZINE HYDROCHLORIDE 100 MG: 50 TABLET, FILM COATED ORAL at 12:08

## 2023-05-03 RX ADMIN — SODIUM BICARBONATE 650 MG: 650 TABLET ORAL at 09:48

## 2023-05-03 ASSESSMENT — PAIN DESCRIPTION - DESCRIPTORS
DESCRIPTORS: CRAMPING
DESCRIPTORS: CRAMPING

## 2023-05-03 ASSESSMENT — PAIN DESCRIPTION - ORIENTATION
ORIENTATION: RIGHT

## 2023-05-03 ASSESSMENT — PAIN DESCRIPTION - LOCATION
LOCATION: ABDOMEN

## 2023-05-03 ASSESSMENT — PAIN SCALES - GENERAL
PAINLEVEL_OUTOF10: 4
PAINLEVEL_OUTOF10: 3
PAINLEVEL_OUTOF10: 4
PAINLEVEL_OUTOF10: 5
PAINLEVEL_OUTOF10: 2

## 2023-05-03 ASSESSMENT — PAIN - FUNCTIONAL ASSESSMENT: PAIN_FUNCTIONAL_ASSESSMENT: ACTIVITIES ARE NOT PREVENTED

## 2023-05-03 NOTE — CARE COORDINATION
PA request initiated at this time via Cover My Meds.    PA Case ID: [id number]  Determination: Approved    Spoke with [employer] pharmacy, approved and $15.00 copay noted    Yeni Romeo, MSN, [address] [place] [address]  Cell: [phone]

## 2023-05-03 NOTE — CARE COORDINATION
Don Telles on unit notified of [employer] PA approval. He will notify Nereyda Servin, dar RN.     Lore Burns, THA, [address] [place] [address]  Cell: [phone]

## 2023-05-03 NOTE — FLOWSHEET NOTE
05/03/23 0738   Vital Signs   Temp 98.8 °F (37.1 °C)   Temp Source Oral   Pulse 87   Heart Rate Source Monitor   Respirations 16   BP (!) 164/97   MAP (Calculated) 119      paged regarding BP.awaiting call back. Dr. Maradiaga Shows directed to talk to nephrology. Add: secure message sent to Dr. Stephen Espinoza. No new order.

## 2023-05-03 NOTE — PLAN OF CARE
Problem: Pain  Goal: Verbalizes/displays adequate comfort level or baseline comfort level  Outcome: Completed     Problem: Skin/Tissue Integrity  Goal: Absence of new skin breakdown  Description: 1. Monitor for areas of redness and/or skin breakdown  2. Assess vascular access sites hourly  3. Every 4-6 hours minimum:  Change oxygen saturation probe site  4. Every 4-6 hours:  If on nasal continuous positive airway pressure, respiratory therapy assess nares and determine need for appliance change or resting period.   Outcome: Completed     Problem: Safety - Adult  Goal: Free from fall injury  Outcome: Completed     Problem: ABCDS Injury Assessment  Goal: Absence of physical injury  Outcome: Completed  Flowsheets (Taken 5/3/2023 1891)  Absence of Physical Injury: Implement safety measures based on patient assessment     Problem: Nutrition Deficit:  Goal: Optimize nutritional status  Outcome: Completed

## 2023-05-04 LAB
BACTERIA FLD AEROBE CULT: ABNORMAL
GRAM STN SPEC: ABNORMAL
ORGANISM: ABNORMAL

## 2023-05-04 NOTE — DISCHARGE SUMMARY
elevated at 100. Iron and TIBC both low. Culture from body fluid from 4/29 as follows--     Component 4/29/23 1508   Gram Stain Result Gram stain performed on unspun fluid   Polymorphonuclear leukocytes not seen   Epithelial cells not seen   Few Gram positive cocci   Rare Gram positive diplococci   Few Gram positive pleomorphic rods   Rare Gram negative rods    Organism Streptococcus species Abnormal  P    Body Fluid Culture, Sterile Identification and sensitivity to follow       Gram stain from body fluid in process. Anaerobic culture in process. Stool for occult blood negative. Surgical note from today, improvement with abdominal pain tolerating regular diet. Continue diet as tolerated continue antibiotics. Encourage ambulation. ID note from today, lying in bed feeling better each day. Continue Zosyn every 12 hours await IR drainage cultures, pending. Nephrology note from today, still having abdominal pain. Discontinue oral bicarbonate. Continue hemodialysis. Transfuse hemoglobin less than 7. Secondary hyperparathyroidism. 5/2/2023-patient laying quietly in bed; no chest pain no dyspnea. Family members present. Tolerated dialysis without difficulty. He agrees to packed red cell transfusion if needed. He states since changed to Percocet pain has improved. I advised him at length he cannot go home with IV hydromorphone needs to be able to control pain with oral medications. He is having bowel movements. Highest temperature last 24 hours 99. Current temperature 98.3. Blood pressure 150/101. SPO2 96 on room air. Intake and output -1100 cc. BUN 31 creatinine 11.4. Fasting glucose 113 phosphorus 5.8. WBC 12.5 hemoglobin 7.0. ESR 95. Body fluid culture growing Enterococcus faecium. Sensitivities to follow. Surgery note from today patient feeling better. Okay for diet as tolerated. Continue antibiotics. No further plans from general surgery.    note from today, await

## 2023-05-04 NOTE — PROGRESS NOTES
[id number] Kim Alexis for d/c per Dr Mariajose Hyman
[address] [place] [address] [facility]  Progress Note    SUBJECTIVE:  Chief Complaint   Patient presents with    Other    Abdominal Pain     Problem with peritoneal dialysis port     Patient is lying in bed  Had hemo today - no issues  Feels that abdominal pain is improving each day  No fevers    Review of systems:  As stated above in the chief complaint, otherwise negative. Medications:  Scheduled Meds:   sodium chloride flush        epoetin ashley-epbx  3,000 Units SubCUTAneous Once per day on     piperacillin-tazobactam  4,500 mg IntraVENous Q12H    calcitRIOL  0.25 mcg Oral Daily    [Held by provider] dilTIAZem  480 mg Oral Nightly    [Held by provider] hydrALAZINE  100 mg Oral 3 times per day    Virt-Caps  1 capsule Oral Daily    sevelamer  800 mg Oral TID WC    sodium bicarbonate  650 mg Oral Daily    sodium zirconium cyclosilicate  10 g Oral Daily    pantoprazole  40 mg Oral QAM AC     Continuous Infusions:   sodium chloride      sodium chloride       PRN Meds:sodium chloride, oxyCODONE-acetaminophen, calcium carbonate, ondansetron, sodium chloride, HYDROmorphone **OR** HYDROmorphone, acetaminophen    OBJECTIVE:  BP (!) 172/109   Pulse 86   Temp 97.9 °F (36.6 °C)   Resp 18   Ht 6' (1.829 m)   Wt 192 lb 3.9 oz (87.2 kg)   SpO2 96%   BMI 26.07 kg/m²   Temp  Av.6 °F (37 °C)  Min: 97.9 °F (36.6 °C)  Max: 99 °F (37.2 °C)  Constitutional: The patient is awake, alert, and oriented. In no distress. Skin: Warm and dry. No rashes were noted. HEENT: Round and reactive pupils. Moist mucous membranes. No ulcerations or thrush. Neck: Supple to movements. Chest: No respiratory distress. Symmetrical expansion. No wheezing, crackles or rhonchi. Cardiovascular: S1 and S2 are rhythmic and regular. No murmurs appreciated. Abdomen: Positive bowel sounds to auscultation. Tender to palpation , on the right especially. PD cath on left. IR drain on right with yellow cloudy output.
[address] [place] [address] [facility]  [facility]  Progress Note    SUBJECTIVE:  Chief Complaint   Patient presents with    Other    Abdominal Pain     Problem with peritoneal dialysis port     Sitting up in bed, in no distress  Says his pain is much better today   No fevers  Tolerating antibiotics    Review of systems:  As stated above in the chief complaint, otherwise negative. Medications:  Scheduled Meds:   [START ON 2023] daptomycin (CUBICIN) in NS IV syringe  4 mg/kg IntraVENous Q48H    epoetin ashley-epbx  3,000 Units SubCUTAneous Once per day on     calcitRIOL  0.25 mcg Oral Daily    [Held by provider] dilTIAZem  480 mg Oral Nightly    [Held by provider] hydrALAZINE  100 mg Oral 3 times per day    Virt-Caps  1 capsule Oral Daily    sevelamer  800 mg Oral TID WC    sodium bicarbonate  650 mg Oral Daily    sodium zirconium cyclosilicate  10 g Oral Daily    pantoprazole  40 mg Oral QAM AC     Continuous Infusions:   sodium chloride      sodium chloride       PRN Meds:sodium chloride, oxyCODONE-acetaminophen, calcium carbonate, ondansetron, sodium chloride, HYDROmorphone **OR** HYDROmorphone, acetaminophen    OBJECTIVE:  BP (!) 164/97   Pulse 87   Temp 98.8 °F (37.1 °C) (Oral)   Resp 16   Ht 6' (1.829 m)   Wt 196 lb 3.2 oz (89 kg)   SpO2 95%   BMI 26.61 kg/m²   Temp  Av.5 °F (36.9 °C)  Min: 97.9 °F (36.6 °C)  Max: 99 °F (37.2 °C)  Constitutional: The patient is awake, alert, and oriented. Sitting up in bed. Skin: Warm and dry. No rashes were noted. HEENT: Round and reactive pupils. Moist mucous membranes. No ulcerations or thrush. Neck: Supple to movements. Chest: No respiratory distress. Symmetrical expansion. No wheezing, crackles or rhonchi. Cardiovascular: S1 and S2 are rhythmic and regular. No murmurs appreciated. Abdomen: Positive bowel sounds to auscultation. PD cath on left. IR drain on right with yellow purulent drainage.  Minimal tenderness to palpation
CLINICAL PHARMACY NOTE: MEDS TO BEDS    Total # of Prescriptions Filled: 6   The following medications were delivered to the patient:  FLAGYL 500 MG   SENOKOT S 8.6/50 MG   PERCOCET 5/325 MG   PROTONIX 40 MG   MIRALAX   ZYVOX 600 MG     Additional Documentation:
Dr Eva Espinoza notified of patient's hgb 6.4 this morning. See new order to transfuse 1 unit PRBC.
Dr. Alma Celis notified of pts critical Crt of 11.4 via [employer] message, pt does go for dialysis today, no new orders at this time.
GENERAL SURGERY  DAILY PROGRESS NOTE  5/1/2023  Chief Complaint   Patient presents with    Other    Abdominal Pain     Problem with peritoneal dialysis port       Subjective:  Patient with improvement in his abdominal pain. Tolerating regular diet. HgB 6.4 from 7. Ordered 1 unit pRBCs. Objective:  BP (!) 153/103   Pulse 85   Temp 98.9 °F (37.2 °C) (Oral)   Resp 18   Ht 6' (1.829 m)   Wt 190 lb (86.2 kg)   SpO2 95%   BMI 25.77 kg/m²     General appearance: alert, cooperative and in no acute distress. Eyes: grossly normal  Lungs: nonlabored breathing on room air. Heart: regular rate, hypertensive  Abdomen: soft, RLQ tenderness, non distended. Drain seropurulent 200 cc output. Incision C/D/I. Skin: No skin abnormalities  Neurologic: Alert and oriented x 3. Grossly normal  Musculoskeletal: No clubbing cyanosis or edema    Assessment/Plan:  50 y.o. male with intra-abdominal abscess s/p IR drain placement. Ok for diet as tolerated from surgery POV. Antibiotics per ID. Encourage OOB and ambulation. DVT ppx, SCDs. Pain/nausea control prn. Electronically signed by Araceli Francois MD on 5/1/2023 at 8:19 AM    The patient was seen and examined and the chart was reviewed. I agree with the assessment.
GENERAL SURGERY  DAILY PROGRESS NOTE  5/2/2023  Chief Complaint   Patient presents with    Other    Abdominal Pain     Problem with peritoneal dialysis port       Subjective:  Patient feels better this AM.    Drain growing Streptococcus. Objective:  BP (!) 141/90   Pulse 86   Temp 98.3 °F (36.8 °C)   Resp 18   Ht 6' (1.829 m)   Wt 194 lb 7.1 oz (88.2 kg)   SpO2 96%   BMI 26.37 kg/m²     General appearance: alert, cooperative and in no acute distress. Eyes: grossly normal  Lungs: nonlabored breathing on room air. Heart: regular rate, hypertensive  Abdomen: soft, RLQ tenderness, non distended. Drain 150 purulent output. Incision C/D/I. Skin: No skin abnormalities  Neurologic: Alert and oriented x 3. Grossly normal  Musculoskeletal: No clubbing cyanosis or edema    Assessment/Plan:  50 y.o. male with intra-abdominal abscess s/p IR drain placement. Ok for diet as tolerated from surgery POV. Antibiotics per ID. Encourage OOB and ambulation. DVT ppx, SCDs. Pain/nausea control prn. No further plans from general surgery POV. Please message [facility] with any questions or concerns. Electronically signed by Sandra Boudreaux MD on 5/2/2023 at 8:03 AM    The patient was seen and examined and the chart was reviewed. I agree with the assessment and plan.
Lab called with critical hemoglobin of 6.4
Occupational Therapy  OCCUPATIONAL THERAPY INITIAL EVALUATION  [facility] [address]    Date: 2023     Patient Name: Mario Choe MRN: [id number]  : 1970  Room: [address] [place] [address]    Evaluating OT: Sergio Man OTR/YESENIA - [id number]    Referring Provider: Wilfrido Watson DO  Specific Provider Orders/Date: \"OT eval and treat\" - 2023    Diagnosis: Generalized abdominal pain [R10.84]  Intra-abdominal abscess ([facility] [K65.1]  SBP (spontaneous bacterial peritonitis) ([facility] [K65.2]      Patient underwent CT guided drainage on 2023. Pertinent Medical History: recent robotic laparoscopic right hemicolectomy for ascending colon adenocarcinoma (2023), BPH, CKD, HTN     Precautions: fall risk, drain    Assessment of Current Deficits:    [x] Functional mobility   [x] ADLs  [x] Strength               [x] Cognition   [x] Functional transfers   [x] IADLs         [x] Safety Awareness   [x] Endurance   [] Fine Motor Coordination  [x] Balance      [] Vision/Perception   [x] Sensation    [] Gross Motor Coordination [] ROM          [] Delirium                  [] Motor Control     OT PLAN OF CARE   OT POC is based on physician orders, patient diagnosis, and results of clinical assessment.   Frequency/Duration 2-5 days/week for 2 weeks PRN   Specific OT Treatment Interventions to Include:   * Instruction/training on adapted ADL techniques and AE recommendations to increase functional independence within precautions       * Training on energy conservation strategies, correct breathing pattern and techniques to improve independence/tolerance for self-care routine  * Functional transfer/mobility training/DME recommendations for increased independence, safety, and fall prevention  * Patient/Family education to increase follow through with safety techniques and functional independence  * Recommendation of environmental modifications for
Occupational Therapy  OT BEDSIDE TREATMENT NOTE      Date:5/3/2023  Patient Name: Devora Grullon. MRN: [id number]  : 1970  Room: [address] [place] [address]     Evaluating OT: Carol Man, OTR/YESENIA - [id number]     Referring Provider: Darion Wahl DO  Specific Provider Orders/Date: \"OT eval and treat\" - 2023     Diagnosis: Generalized abdominal pain [R10.84]  Intra-abdominal abscess ([facility] [address] [K65.1]  SBP (spontaneous bacterial peritonitis) ([facility] [K65.2]      Patient underwent CT guided drainage on 2023. Pertinent Medical History: recent robotic laparoscopic right hemicolectomy for ascending colon adenocarcinoma (2023), BPH, CKD, HTN      Precautions: fall risk, drain, isolation      Assessment of Current Deficits:    [x] Functional mobility             [x] ADLs          [x] Strength                  [x] Cognition   [x] Functional transfers           [x] IADLs         [x] Safety Awareness   [x] Endurance   [] Fine Motor Coordination    [x] Balance      [] Vision/Perception   [x] Sensation    [] Gross Motor Coordination [] ROM          [] Delirium                  [] Motor Control      OT PLAN OF CARE   OT POC is based on physician orders, patient diagnosis, and results of clinical assessment.   Frequency/Duration 2-5 days/week for 2 weeks PRN   Specific OT Treatment Interventions to Include:   * Instruction/training on adapted ADL techniques and AE recommendations to increase functional independence within precautions       * Training on energy conservation strategies, correct breathing pattern and techniques to improve independence/tolerance for self-care routine  * Functional transfer/mobility training/DME recommendations for increased independence, safety, and fall prevention  * Patient/Family education to increase follow through with safety techniques and functional independence  * Recommendation of environmental modifications for increased safety with functional transfers/mobility and ADLs  *
P Quality Flow/Interdisciplinary Rounds Progress Note        Quality Flow Rounds held on May 3, 2023    Disciplines Attending:  Bedside Nurse, , , and Nursing Unit Leadership    Marquis Rodas was admitted on 4/27/2023 11:12 AM    Anticipated Discharge Date:  Expected Discharge Date: 05/03/23    Disposition:    Lobo Score:  Lobo Scale Score: 20    Readmission Risk              Risk of Unplanned Readmission:  33           Discussed patient goal for the day, patient clinical progression, and barriers to discharge.   The following Goal(s) of the Day/Commitment(s) have been identified:   hd, await final cd for abx plan      Rina Carrillo RN  May 3, 2023
PROGRESS  NOTE --                                                          INTERNAL  MEDICINE                                                                              I  PERSONALLY SAW , EXAMINED, AND CARED [address] [facility]. TODAY, 5/3/2023     LABS, XRAY ,CHART, AND MEDICATIONS  REVIEWED BY ME       Chief complaint: Abdominal pain      4/29/2023-SUBJECTIVE: Daniel Cardoza. is alert awake and cooperative; oriented ×3. Denies any chest pain dyspnea nausea emesis. Seen while in dialysis. States he did have bowel movement yesterday. Pain is less today although still present. Highest temperature last 24 hours 100; currently 98.9. Pulse 92 respirations 16. Blood pressure 132/85. SPO2 97 on room air. Intake and output +650 cc. Creatinine 11.1 BUN 39. Potassium 5.1. Fasting glucose 111. Phosphorus 6.1 CRP 29.1. Hemoglobin dropped to 6.6 this a.m. with white count of 13.3. ESR slightly better 98. INR from yesterday 1.6. CEA 2.8 PSA 0.30. ESR normal at 24. Blood cultures negative to date. Gram stain of peritoneal fluid rare polys; no epithelial cells seen no organisms seen. Peritoneal culture no growth but urine culture in process. Incubation continues. ID consult from yesterday appreciated. Some guarding and rebound tenderness right abdomen. CT of abdomen does not show free fluid but fluid on the right side is locked related and has bubbles in it. This makes me think it is an abscess related to his surgery and not peritonitis related to PD catheter. Ask IR to perform stat CT-guided drainage of fluid right side. Continue Zosyn. If this cannot be done today he will need to be transferred to [facility] to have it done tomorrow. Patient refused nightly dose of diltiazem and hydralazine; he states he was told to stop these medicines postop on 4/7/2023.   I was notified this morning regarding hemoglobin
PROGRESS  NOTE --                                                          INTERNAL  MEDICINE                                                                              I  PERSONALLY SAW , EXAMINED, AND CARED [address] [facility]. TODAY, 5/2/2023     LABS, XRAY ,CHART, AND MEDICATIONS  REVIEWED BY ME       Chief complaint: Abdominal pain      4/29/2023-SUBJECTIVE: Alvarado Canavan. is alert awake and cooperative; oriented ×3. Denies any chest pain dyspnea nausea emesis. Seen while in dialysis. States he did have bowel movement yesterday. Pain is less today although still present. Highest temperature last 24 hours 100; currently 98.9. Pulse 92 respirations 16. Blood pressure 132/85. SPO2 97 on room air. Intake and output +650 cc. Creatinine 11.1 BUN 39. Potassium 5.1. Fasting glucose 111. Phosphorus 6.1 CRP 29.1. Hemoglobin dropped to 6.6 this a.m. with white count of 13.3. ESR slightly better 98. INR from yesterday 1.6. CEA 2.8 PSA 0.30. ESR normal at 24. Blood cultures negative to date. Gram stain of peritoneal fluid rare polys; no epithelial cells seen no organisms seen. Peritoneal culture no growth but urine culture in process. Incubation continues. ID consult from yesterday appreciated. Some guarding and rebound tenderness right abdomen. CT of abdomen does not show free fluid but fluid on the right side is locked related and has bubbles in it. This makes me think it is an abscess related to his surgery and not peritonitis related to PD catheter. Ask IR to perform stat CT-guided drainage of fluid right side. Continue Zosyn. If this cannot be done today he will need to be transferred to [facility] to have it done tomorrow. Patient refused nightly dose of diltiazem and hydralazine; he states he was told to stop these medicines postop on 4/7/2023.   I was notified this morning regarding hemoglobin
Physical Therapy  Facility/Department: [address] INTERMEDIATE    NAME: Brian Jackson. : 1970  MRN: [id number]    Chart reviewed and PT treatment attempted this am.  Pt performed bed mobility and transfers independent and ambulated around his room independent. Pt upset that he is on contact isolation now and unable to ambulate in the hallways. Nursing came to discussed with the pt. Pt is independent with all functional mobility. Steps would not be able to be assessed due to contact isolation. Will discontinue pt from PT caseload due to independent functional mobility level.       Eunice Dean, [address]
Physical Therapy  Facility/Department: [address] INTERMEDIATE        NAME: Robbin Boland. : 1970  MRN: [id number]      Chart reviewed and PT treatment attempted this am.  Pt out of the room at this time. Will check back at later time/date.      Zafar Louis, [address]
Physician Progress Note      PATIENT:               Sudhir Fortune  [facility] #:                  [id number]  :                       1970  ADMIT DATE:       2023 11:12 AM  DISCH DATE:  RESPONDING  PROVIDER #:        Marija Villagran DO          QUERY TEXT:    Dr. Olivia Villafana,    Patient admitted with an infected peritoneal dialysis catheter and noted to   have leukocytosis, elevated CRP, elevated procalcitonin and tachycardia. If   possible, please document in the progress notes and discharge summary if you   are evaluating and /or treating any of the following: The medical record reflects the following:  Risk Factors: ESRD on PD, recent right hemicolectomy  Clinical Indicators: wbc 21.5, CRP 30.7, procalcitonin 3.23, , max temp   100  Treatment: ID Consult, IV Zosyn, Ct guided drain placement, IV Rocephin    Thank you,  Bunny Paez RN, CCDS  Clinical Documentation Integrity  [email or web address]  Options provided:  -- Sepsis, present on admission  -- Infected PD catheter without Sepsis  -- Other - I will add my own diagnosis  -- Disagree - Not applicable / Not valid  -- Disagree - Clinically unable to determine / Unknown  -- Refer to Clinical Documentation Reviewer    PROVIDER RESPONSE TEXT:    This patient has sepsis which was present on admission.     Query created by: Donis Bray on 2023 10:39 AM      Electronically signed by:  Jade Hartman DO 2023 11:24 AM
Progress Note  5/1/2023 12:33 PM  Subjective:   Admit Date: 4/27/2023  PCP: Rhina Conklin MD  Interval History:     4/30: Patient examined doing better abdo pain better     5/1: pt seen and examined. Feels ok, has abd pain. He is on a conference call. Diet: ADULT DIET; Regular; No Added Salt (3-4 gm); Low Potassium (Less than 3000 mg/day)    Data:   Scheduled Meds:   epoetin ashley-epbx  3,000 Units SubCUTAneous Once per day on Mon Wed Fri    piperacillin-tazobactam  4,500 mg IntraVENous Q12H    calcitRIOL  0.25 mcg Oral Daily    [Held by provider] dilTIAZem  480 mg Oral Nightly    [Held by provider] hydrALAZINE  100 mg Oral 3 times per day    Virt-Caps  1 capsule Oral Daily    sevelamer  800 mg Oral TID WC    sodium bicarbonate  650 mg Oral Daily    sodium zirconium cyclosilicate  10 g Oral Daily    pantoprazole  40 mg Oral QAM AC    heparin (porcine)  5,000 Units SubCUTAneous Q8H     Continuous Infusions:   sodium chloride      sodium chloride       PRN Meds:sodium chloride, oxyCODONE-acetaminophen, calcium carbonate, ondansetron, sodium chloride, HYDROmorphone **OR** HYDROmorphone, acetaminophen  I/O last 3 completed shifts:  In: -   Out: 350 [Urine:150; Drains:200]  No intake/output data recorded. Intake/Output Summary (Last 24 hours) at 5/1/2023 1233  Last data filed at 5/1/2023 0509  Gross per 24 hour   Intake --   Output 350 ml   Net -350 ml     CBC:   Recent Labs     04/29/23  0540 04/29/23  1545 04/30/23  0320 05/01/23  0505 05/01/23  1201   WBC 13.3*  --  12.3* 11.3  --    HGB 6.6*   < > 7.0* 6.4* 7.2*     --  448 415  --     < > = values in this interval not displayed. BMP:    Recent Labs     04/29/23  0540 04/30/23  0320 05/01/23  0505    134 135   K 5.1* 4.5 4.3   CL 94* 96* 94*   CO2 25 30* 27   BUN 39* 22* 27*   CREATININE 11.1* 7.3* 9.6*   GLUCOSE 111* 118* 115*     Hepatic:   No results for input(s): AST, ALT, ALB, BILITOT, ALKPHOS in the last 72 hours.     Troponin: No
Progress Note  5/2/2023 3:03 PM  Subjective:   Admit Date: 4/27/2023  PCP: Alvina Skinner MD  Interval History:     4/30: Patient examined doing better abdo pain better     5/1: pt seen and examined. Feels ok, has abd pain. He is on a conference call. 5/2: pt sp hd today with 1 L off, pd cath in place also, asked him on 4/30 why the pd cath wasn't removed and he said he didn't know. I said I think it should be removed. Per note today id suggested removal also and he refused. Diet: ADULT DIET; Regular; No Added Salt (3-4 gm);  Low Potassium (Less than 3000 mg/day)    Data:   Scheduled Meds:   epoetin ashley-epbx  3,000 Units SubCUTAneous Once per day on Mon Wed Fri    piperacillin-tazobactam  4,500 mg IntraVENous Q12H    calcitRIOL  0.25 mcg Oral Daily    [Held by provider] dilTIAZem  480 mg Oral Nightly    [Held by provider] hydrALAZINE  100 mg Oral 3 times per day    Virt-Caps  1 capsule Oral Daily    sevelamer  800 mg Oral TID WC    sodium bicarbonate  650 mg Oral Daily    sodium zirconium cyclosilicate  10 g Oral Daily    pantoprazole  40 mg Oral QAM AC     Continuous Infusions:   sodium chloride      sodium chloride       PRN Meds:sodium chloride, oxyCODONE-acetaminophen, calcium carbonate, ondansetron, sodium chloride, HYDROmorphone **OR** HYDROmorphone, acetaminophen  I/O last 3 completed shifts:  In: -   Out: 900 [Urine:550; Drains:350]  I/O this shift:  In: 300   Out: 1300     Intake/Output Summary (Last 24 hours) at 5/2/2023 1503  Last data filed at 5/2/2023 1105  Gross per 24 hour   Intake 300 ml   Output 1850 ml   Net -1550 ml     CBC:   Recent Labs     04/30/23  0320 05/01/23  0505 05/01/23  1201 05/02/23  0200   WBC 12.3* 11.3  --  12.5*   HGB 7.0* 6.4* 7.2* 7.0*    415  --  414     BMP:    Recent Labs     04/30/23  0320 05/01/23  0505 05/02/23  0200    135 136   K 4.5 4.3 4.3   CL 96* 94* 94*   CO2 30* 27 26   BUN 22* 27* 31*   CREATININE 7.3* 9.6* 11.4*   GLUCOSE 118* 115* 113*
Pt cleared for discharge by providers. Tele/iv removed. Discharge education/instructions given. Pt verbalized understanding. Educated on Avdimou care. Nil issues at time of discharge. Pt left unit with his father.
[facility] Quality Flow/Interdisciplinary Rounds Progress Note        Quality Flow Rounds held on May 2, 2023    Disciplines Attending:  Bedside Nurse, , , and Nursing Unit Leadership    Domingo Gibbs. was admitted on 4/27/2023 11:12 AM    Anticipated Discharge Date:  Expected Discharge Date: 05/03/23    Disposition:    Lobo Score:  Lobo Scale Score: 19    Readmission Risk              Risk of Unplanned Readmission:  40           Discussed patient goal for the day, patient clinical progression, and barriers to discharge.   The following Goal(s) of the Day/Commitment(s) have been identified:   iv abx, await final cx, monitor labs      Junior Nichols RN  May 2, 2023
output. Extremities: No edema. Lines: Peripheral.  Right chest HD cath    Laboratory and Tests Review:  Lab Results   Component Value Date    WBC 11.3 05/01/2023    WBC 12.3 (H) 04/30/2023    WBC 13.3 (H) 04/29/2023    HGB 6.4 (LL) 05/01/2023    HCT 20.7 (L) 05/01/2023    MCV 96.7 05/01/2023     05/01/2023     Lab Results   Component Value Date    NEUTROABS 9.01 (H) 05/01/2023    NEUTROABS 10.25 (H) 04/30/2023    NEUTROABS 11.01 (H) 04/29/2023     No results found for: CRPHS  Lab Results   Component Value Date    ALT 8 04/28/2023    AST 9 04/28/2023    ALKPHOS 69 04/28/2023    BILITOT 0.2 04/28/2023     Lab Results   Component Value Date/Time     05/01/2023 05:05 AM    K 4.3 05/01/2023 05:05 AM    K 4.8 04/11/2023 06:15 AM    CL 94 05/01/2023 05:05 AM    CO2 27 05/01/2023 05:05 AM    BUN 27 05/01/2023 05:05 AM    CREATININE 9.6 05/01/2023 05:05 AM    CREATININE 7.3 04/30/2023 03:20 AM    CREATININE 11.1 04/29/2023 05:40 AM    GFRAA 9 12/06/2020 04:58 AM    LABGLOM 6 05/01/2023 05:05 AM    GLUCOSE 115 05/01/2023 05:05 AM    PROT 5.2 04/28/2023 05:17 AM    LABALBU 2.2 04/28/2023 05:17 AM    CALCIUM 8.6 05/01/2023 05:05 AM    BILITOT 0.2 04/28/2023 05:17 AM    ALKPHOS 69 04/28/2023 05:17 AM    AST 9 04/28/2023 05:17 AM    ALT 8 04/28/2023 05:17 AM     Lab Results   Component Value Date    CRP 14.0 (H) 05/01/2023    CRP 23.4 (H) 04/30/2023    CRP 29.1 (H) 04/29/2023     Lab Results   Component Value Date    SEDRATE 100 (H) 04/30/2023    SEDRATE 98 (H) 04/29/2023    SEDRATE 117 (H) 04/28/2023     Radiology:  Reviewed     Microbiology:   Blood cultures 4/27: negative so far  Peritoneal fluid 427: no organisms  Body fluid culture 4/28: g/s showing rare GNR, few GPC, rare GPdiplococci    No results for input(s): PROCAL in the last 72 hours.     ASSESSMENT:  Right intraabdominal abscess, Status post CT guided drainage 4/29/2023  Leukocytosis associated to the above, improving  Recent right hemicolectomy on 4/7
05/01/2023 05:05 AM    BUN 27 05/01/2023 05:05 AM    CREATININE 9.6 05/01/2023 05:05 AM    GFRAA 9 12/06/2020 04:58 AM    LABGLOM 6 05/01/2023 05:05 AM    GLUCOSE 115 05/01/2023 05:05 AM    PROT 5.2 04/28/2023 05:17 AM    LABALBU 2.2 04/28/2023 05:17 AM    CALCIUM 8.6 05/01/2023 05:05 AM    BILITOT 0.2 04/28/2023 05:17 AM    ALKPHOS 69 04/28/2023 05:17 AM    AST 9 04/28/2023 05:17 AM    ALT 8 04/28/2023 05:17 AM     BMP:    Lab Results   Component Value Date/Time     05/01/2023 05:05 AM    K 4.3 05/01/2023 05:05 AM    K 4.8 04/11/2023 06:15 AM    CL 94 05/01/2023 05:05 AM    CO2 27 05/01/2023 05:05 AM    BUN 27 05/01/2023 05:05 AM    LABALBU 2.2 04/28/2023 05:17 AM    CREATININE 9.6 05/01/2023 05:05 AM    CALCIUM 8.6 05/01/2023 05:05 AM    GFRAA 9 12/06/2020 04:58 AM    LABGLOM 6 05/01/2023 05:05 AM    GLUCOSE 115 05/01/2023 05:05 AM     Hepatic Function Panel:    Lab Results   Component Value Date/Time    ALKPHOS 69 04/28/2023 05:17 AM    ALT 8 04/28/2023 05:17 AM    AST 9 04/28/2023 05:17 AM    PROT 5.2 04/28/2023 05:17 AM    BILITOT 0.2 04/28/2023 05:17 AM    BILIDIR <0.2 04/28/2023 05:17 AM    IBILI see below 04/28/2023 05:17 AM    LABALBU 2.2 04/28/2023 05:17 AM     Ionized Calcium:  No results found for: IONCA  Magnesium:    Lab Results   Component Value Date/Time    MG 2.2 05/01/2023 05:05 AM     Phosphorus:    Lab Results   Component Value Date/Time    PHOS 5.1 05/01/2023 05:05 AM     LDH:    Lab Results   Component Value Date/Time     09/11/2020 11:54 AM     Uric Acid:    Lab Results   Component Value Date/Time    LABURIC 4.3 04/28/2023 05:17 AM     PT/INR:    Lab Results   Component Value Date/Time    PROTIME 17.9 04/28/2023 01:56 PM    INR 1.6 04/28/2023 01:56 PM     Warfarin PT/INR:  No components found for: PTPATWAR, PTINRWAR  PTT:    Lab Results   Component Value Date/Time    APTT 32.3 04/03/2023 07:30 AM   [APTT}  Troponin:    Lab Results   Component Value Date/Time    TROPONINI <0.01
tachycardia     Epigastric pain     ESRD (end stage renal disease) ([facility] [address]     Intra-abdominal abscess (HCC)     Thoracic aortic aneurysm without rupture (HCC)     Grade I diastolic dysfunction    Plan:     Assessment/Plan     1) ESRD  hemodialysis planned TTS via tunneled catheter   Dc'd oral hco3     2) CA colon   recent rt hemicolectomy ( 4/7/2023) Robotic      3) HTN   Goal < 140/90  Inc hydralazine     4) HLD      5) Anemia of CKD  Blood loss  transfuse if HB drops < 7   pablo     6)  Intra abdominal abscess   On zosyn  Peritonitis  Cx sent growing e faecium vanc resistant  Dapto added 5/2  Agree with advice from id to remove pd cath, if no improvement  Drain placed      PD fluid Gram stain - Gram stain performed from tissue touch prep   Rare Polymorphonuclear leukocytes   Epithelial cells not seen   No organisms seen   blood and urine c/s so far negative , PD fluid c/s so far negative     7. Sec hyperparathyroidism  On renvela  P 5.1        Thank you for allowing us to participate in the care of Alex Davey.        Soto Nunez MD

## 2023-05-04 NOTE — CONSENT
Informed Consent for Blood Component Transfusion Note    I have discussed with the patient the rationale for blood component transfusion; its benefits in treating or preventing fatigue, organ damage, or death; and its risk which includes mild transfusion reactions, rare risk of blood borne infection, or more serious but rare reactions. I have discussed the alternatives to transfusion, including the risk and consequences of not receiving transfusion. The patient had an opportunity to ask questions and had agreed to proceed with transfusion of blood components.     Electronically signed by Priscilla Harper DO on 5/4/23 at 3:47 PM EDT

## 2023-05-12 ENCOUNTER — HOSPITAL ENCOUNTER (OUTPATIENT)
Dept: CT IMAGING | Age: 53
Discharge: HOME | End: 2023-05-12
Payer: COMMERCIAL

## 2023-05-12 PROCEDURE — 49424 ASSESS CYST CONTRAST INJECT: CPT

## 2023-05-12 PROCEDURE — 6360000004 HC RX CONTRAST MEDICATION: Performed by: RADIOLOGY

## 2023-05-12 PROCEDURE — 76000 FLUOROSCOPY <1 HR PHYS/QHP: CPT

## 2023-05-12 RX ADMIN — IOPAMIDOL 1 ML: 755 INJECTION, SOLUTION INTRAVENOUS at 09:26

## 2023-05-12 NOTE — PROGRESS NOTES
Patient arrived to radiology for tube check with CT imaging. Site appears clean/dry/intact with 100 cc of drainage noted. 10 cc Isovue 370 contrast injected, images taken and reviewed by Florentin Garcia. Site cleansed and M-Fixx dressing applied. Flushing instructions reviewed with patient. Next check scheduled for 5/26/2023 at 0900, order placed and date and time given to patient. Patient tolerated procedure well. Patient did not have home health care set up for outpatient. Spoke with Dr. Lazarus Hora and he needs the drain flushed daily. Patient was in room 628 during admission.  Little called from [place] [id number] to obtain home health. Order placed for 10 cc sterile sale daily flushing. Order placed for next tube check. Patient cellphone and address given to  who will attempt to obtain home health care for patient. Patient in understanding and discharge instructions given to patient.

## 2023-05-26 ENCOUNTER — HOSPITAL ENCOUNTER (OUTPATIENT)
Dept: CT IMAGING | Age: 53
End: 2023-05-26
Payer: MEDICARE

## 2023-05-26 DIAGNOSIS — L02.91 ABSCESS: ICD-10-CM

## 2023-05-26 PROCEDURE — 76000 FLUOROSCOPY <1 HR PHYS/QHP: CPT

## 2023-05-26 PROCEDURE — 76080 X-RAY EXAM OF FISTULA: CPT

## 2023-05-26 PROCEDURE — 6360000004 HC RX CONTRAST MEDICATION: Performed by: RADIOLOGY

## 2023-05-26 PROCEDURE — 49424 ASSESS CYST CONTRAST INJECT: CPT

## 2023-05-26 RX ADMIN — IOPAMIDOL 1 ML: 755 INJECTION, SOLUTION INTRAVENOUS at 08:41

## 2023-05-30 ENCOUNTER — CLINICAL DOCUMENTATION (OUTPATIENT)
Dept: SURGERY | Age: 53
End: 2023-05-30

## 2023-05-30 NOTE — PROGRESS NOTES
Gregoria from [employer] called and wanted Dr Jose J Amos to remove patient's PD catheter. MA spoke with Dr Jose J Amos who stated that since Dr Michelle pereira did colon surgery on patient, they should refer to him for PD catheter. MA informed Lyubov Hilton at [employer].   Electronically signed by Adriana Montanez MA on 5/30/2023 at 12:40 PM

## 2023-06-09 ENCOUNTER — HOSPITAL ENCOUNTER (OUTPATIENT)
Dept: CT IMAGING | Age: 53
End: 2023-06-09
Payer: COMMERCIAL

## 2023-06-09 DIAGNOSIS — L02.91 ABSCESS: ICD-10-CM

## 2023-06-09 PROCEDURE — 49424 ASSESS CYST CONTRAST INJECT: CPT

## 2023-06-09 NOTE — PROGRESS NOTES
Patient arrived to CT for abd abscess tube check. Tube checked with CT imaging. Instructions given, questions answered and understanding expressed. Site appearance (patient reports leaking around the site w/ recent flushes). Amount of drainage 150 ml out within the last 2 weeks. Ordered contrast injected, images taken and reviewed by Dr. Tereso Harvey. Dr Tereso Harvey ordered to d/c the drain today. Patient tolerated removal well. Dry sterile dressing applied to site. Patient instructed to leave the dressing on x 24 hrs. Discharge papers printed and discussed with the patient. Patient ambulatory at discharge from the dept.

## 2023-06-09 NOTE — DISCHARGE INSTRUCTIONS
Clean area with soap and water only, pat dry. Keep dressing dry for 24 hours, then may remove. May shower after dressing is removed. No baths for 14 days. No alcohol, neosporin, or peroxide to site. Site may leak for a few days until healed, may apply new dressing if needed.

## 2023-06-13 ENCOUNTER — PREP FOR PROCEDURE (OUTPATIENT)
Dept: SURGERY | Age: 53
End: 2023-06-13

## 2023-06-13 RX ORDER — SODIUM CHLORIDE 9 MG/ML
INJECTION, SOLUTION INTRAVENOUS PRN
Status: CANCELLED | OUTPATIENT
Start: 2023-06-13

## 2023-06-13 RX ORDER — SODIUM CHLORIDE 0.9 % (FLUSH) 0.9 %
5-40 SYRINGE (ML) INJECTION EVERY 12 HOURS SCHEDULED
Status: CANCELLED | OUTPATIENT
Start: 2023-06-13

## 2023-06-13 RX ORDER — SODIUM CHLORIDE 0.9 % (FLUSH) 0.9 %
5-40 SYRINGE (ML) INJECTION PRN
Status: CANCELLED | OUTPATIENT
Start: 2023-06-13

## 2023-06-13 RX ORDER — SODIUM CHLORIDE, SODIUM LACTATE, POTASSIUM CHLORIDE, CALCIUM CHLORIDE 600; 310; 30; 20 MG/100ML; MG/100ML; MG/100ML; MG/100ML
INJECTION, SOLUTION INTRAVENOUS CONTINUOUS
Status: CANCELLED | OUTPATIENT
Start: 2023-06-13

## 2023-06-14 ENCOUNTER — APPOINTMENT (OUTPATIENT)
Dept: CT IMAGING | Age: 53
DRG: 907 | End: 2023-06-14
Payer: COMMERCIAL

## 2023-06-14 ENCOUNTER — HOSPITAL ENCOUNTER (INPATIENT)
Age: 53
LOS: 4 days | Discharge: HOME | DRG: 907 | End: 2023-06-18
Attending: STUDENT IN AN ORGANIZED HEALTH CARE EDUCATION/TRAINING PROGRAM | Admitting: INTERNAL MEDICINE
Payer: COMMERCIAL

## 2023-06-14 DIAGNOSIS — N19 RENAL FAILURE, UNSPECIFIED CHRONICITY: ICD-10-CM

## 2023-06-14 DIAGNOSIS — N18.6 END STAGE RENAL DISEASE ON DIALYSIS (HCC): ICD-10-CM

## 2023-06-14 DIAGNOSIS — K65.1 INTRA-ABDOMINAL ABSCESS (HCC): Primary | ICD-10-CM

## 2023-06-14 DIAGNOSIS — Z99.2 END STAGE RENAL DISEASE ON DIALYSIS (HCC): ICD-10-CM

## 2023-06-14 LAB
ALBUMIN SERPL-MCNC: 3.8 G/DL (ref 3.5–5.2)
ALP SERPL-CCNC: 56 U/L (ref 40–129)
ALT SERPL-CCNC: 7 U/L (ref 0–40)
ANION GAP SERPL CALCULATED.3IONS-SCNC: 12 MMOL/L (ref 7–16)
AST SERPL-CCNC: 5 U/L (ref 0–39)
BACTERIA URNS QL MICRO: NORMAL /HPF
BASOPHILS # BLD: 0.07 E9/L (ref 0–0.2)
BASOPHILS NFR BLD: 0.6 % (ref 0–2)
BILIRUB SERPL-MCNC: 0.3 MG/DL (ref 0–1.2)
BILIRUB UR QL STRIP: NEGATIVE
BUN SERPL-MCNC: 43 MG/DL (ref 6–20)
CALCIUM SERPL-MCNC: 10.2 MG/DL (ref 8.6–10.2)
CHLORIDE SERPL-SCNC: 100 MMOL/L (ref 98–107)
CLARITY UR: CLEAR
CO2 SERPL-SCNC: 28 MMOL/L (ref 22–29)
COLOR UR: YELLOW
CREAT SERPL-MCNC: 8.4 MG/DL (ref 0.7–1.2)
CRP SERPL HS-MCNC: 5.2 MG/DL (ref 0–0.4)
EOSINOPHIL # BLD: 0.23 E9/L (ref 0.05–0.5)
EOSINOPHIL NFR BLD: 2 % (ref 0–6)
EPI CELLS #/AREA URNS HPF: NORMAL /HPF
ERYTHROCYTE [DISTWIDTH] IN BLOOD BY AUTOMATED COUNT: 15.2 FL (ref 11.5–15)
ERYTHROCYTE [SEDIMENTATION RATE] IN BLOOD BY WESTERGREN METHOD: 67 MM/HR (ref 0–15)
GLUCOSE SERPL-MCNC: 106 MG/DL (ref 74–99)
GLUCOSE UR STRIP-MCNC: 100 MG/DL
HCT VFR BLD AUTO: 25.4 % (ref 37–54)
HGB BLD-MCNC: 8 G/DL (ref 12.5–16.5)
HGB UR QL STRIP: ABNORMAL
IMM GRANULOCYTES # BLD: 0.1 E9/L
IMM GRANULOCYTES NFR BLD: 0.9 % (ref 0–5)
KETONES UR STRIP-MCNC: NEGATIVE MG/DL
LACTATE BLDV-SCNC: 1 MMOL/L (ref 0.5–2.2)
LEUKOCYTE ESTERASE UR QL STRIP: NEGATIVE
LYMPHOCYTES # BLD: 1.34 E9/L (ref 1.5–4)
LYMPHOCYTES NFR BLD: 11.5 % (ref 20–42)
MCH RBC QN AUTO: 31.7 PG (ref 26–35)
MCHC RBC AUTO-ENTMCNC: 31.5 % (ref 32–34.5)
MCV RBC AUTO: 100.8 FL (ref 80–99.9)
MONOCYTES # BLD: 1.17 E9/L (ref 0.1–0.95)
MONOCYTES NFR BLD: 10.1 % (ref 2–12)
NEUTROPHILS # BLD: 8.73 E9/L (ref 1.8–7.3)
NEUTS SEG NFR BLD: 74.9 % (ref 43–80)
NITRITE UR QL STRIP: NEGATIVE
PH UR STRIP: 8 [PH] (ref 5–9)
PLATELET # BLD AUTO: 288 E9/L (ref 130–450)
PMV BLD AUTO: 9.4 FL (ref 7–12)
POTASSIUM SERPL-SCNC: 5.4 MMOL/L (ref 3.5–5)
PROT SERPL-MCNC: 7 G/DL (ref 6.4–8.3)
PROT UR STRIP-MCNC: 100 MG/DL
RBC # BLD AUTO: 2.52 E12/L (ref 3.8–5.8)
RBC #/AREA URNS HPF: NORMAL /HPF (ref 0–2)
SODIUM SERPL-SCNC: 140 MMOL/L (ref 132–146)
SP GR UR STRIP: 1.01 (ref 1–1.03)
UROBILINOGEN UR STRIP-ACNC: 0.2 E.U./DL
WBC # BLD: 11.6 E9/L (ref 4.5–11.5)
WBC #/AREA URNS HPF: NORMAL /HPF (ref 0–5)

## 2023-06-14 PROCEDURE — 85651 RBC SED RATE NONAUTOMATED: CPT

## 2023-06-14 PROCEDURE — 87186 SC STD MICRODIL/AGAR DIL: CPT

## 2023-06-14 PROCEDURE — 87070 CULTURE OTHR SPECIMN AEROBIC: CPT

## 2023-06-14 PROCEDURE — 81001 URINALYSIS AUTO W/SCOPE: CPT

## 2023-06-14 PROCEDURE — 87077 CULTURE AEROBIC IDENTIFY: CPT

## 2023-06-14 PROCEDURE — 88112 CYTOPATH CELL ENHANCE TECH: CPT

## 2023-06-14 PROCEDURE — 6370000000 HC RX 637 (ALT 250 FOR IP): Performed by: PHYSICIAN ASSISTANT

## 2023-06-14 PROCEDURE — 80053 COMPREHEN METABOLIC PANEL: CPT

## 2023-06-14 PROCEDURE — 88305 TISSUE EXAM BY PATHOLOGIST: CPT

## 2023-06-14 PROCEDURE — 86140 C-REACTIVE PROTEIN: CPT

## 2023-06-14 PROCEDURE — 74177 CT ABD & PELVIS W/CONTRAST: CPT

## 2023-06-14 PROCEDURE — 87040 BLOOD CULTURE FOR BACTERIA: CPT

## 2023-06-14 PROCEDURE — 6360000004 HC RX CONTRAST MEDICATION: Performed by: RADIOLOGY

## 2023-06-14 PROCEDURE — 87205 SMEAR GRAM STAIN: CPT

## 2023-06-14 PROCEDURE — 2580000003 HC RX 258: Performed by: PHYSICIAN ASSISTANT

## 2023-06-14 PROCEDURE — 83605 ASSAY OF LACTIC ACID: CPT

## 2023-06-14 PROCEDURE — 99285 EMERGENCY DEPT VISIT HI MDM: CPT

## 2023-06-14 PROCEDURE — 85025 COMPLETE CBC W/AUTO DIFF WBC: CPT

## 2023-06-14 PROCEDURE — 2060000000 HC ICU INTERMEDIATE R&B

## 2023-06-14 RX ORDER — OXYCODONE HYDROCHLORIDE AND ACETAMINOPHEN 5; 325 MG/1; MG/1
1 TABLET ORAL ONCE
Status: COMPLETED | OUTPATIENT
Start: 2023-06-14 | End: 2023-06-14

## 2023-06-14 RX ORDER — CALCITRIOL 0.25 UG/1
0.25 CAPSULE, LIQUID FILLED ORAL
Status: DISCONTINUED | OUTPATIENT
Start: 2023-06-14 | End: 2023-06-18 | Stop reason: HOSPADM

## 2023-06-14 RX ORDER — 0.9 % SODIUM CHLORIDE 0.9 %
1000 INTRAVENOUS SOLUTION INTRAVENOUS ONCE
Status: DISCONTINUED | OUTPATIENT
Start: 2023-06-14 | End: 2023-06-14

## 2023-06-14 RX ORDER — SEVELAMER CARBONATE 800 MG/1
1600 TABLET, FILM COATED ORAL
Status: DISCONTINUED | OUTPATIENT
Start: 2023-06-15 | End: 2023-06-18 | Stop reason: HOSPADM

## 2023-06-14 RX ORDER — HYDROCODONE BITARTRATE AND ACETAMINOPHEN 5; 325 MG/1; MG/1
1 TABLET ORAL EVERY 4 HOURS PRN
Status: DISCONTINUED | OUTPATIENT
Start: 2023-06-14 | End: 2023-06-15

## 2023-06-14 RX ORDER — NEPHROCAP 1 MG
1 CAP ORAL DAILY
Status: DISCONTINUED | OUTPATIENT
Start: 2023-06-15 | End: 2023-06-18 | Stop reason: HOSPADM

## 2023-06-14 RX ORDER — HYDRALAZINE HYDROCHLORIDE 50 MG/1
100 TABLET, FILM COATED ORAL EVERY 8 HOURS SCHEDULED
Status: DISCONTINUED | OUTPATIENT
Start: 2023-06-14 | End: 2023-06-18 | Stop reason: HOSPADM

## 2023-06-14 RX ORDER — 0.9 % SODIUM CHLORIDE 0.9 %
500 INTRAVENOUS SOLUTION INTRAVENOUS ONCE
Status: COMPLETED | OUTPATIENT
Start: 2023-06-14 | End: 2023-06-14

## 2023-06-14 RX ORDER — KETOROLAC TROMETHAMINE 30 MG/ML
30 INJECTION, SOLUTION INTRAMUSCULAR; INTRAVENOUS ONCE
Status: DISCONTINUED | OUTPATIENT
Start: 2023-06-14 | End: 2023-06-14

## 2023-06-14 RX ADMIN — SODIUM CHLORIDE 500 ML: 9 INJECTION, SOLUTION INTRAVENOUS at 17:29

## 2023-06-14 RX ADMIN — IOPAMIDOL 75 ML: 755 INJECTION, SOLUTION INTRAVENOUS at 19:26

## 2023-06-14 RX ADMIN — OXYCODONE HYDROCHLORIDE AND ACETAMINOPHEN 1 TABLET: 5; 325 TABLET ORAL at 17:30

## 2023-06-14 ASSESSMENT — PAIN DESCRIPTION - LOCATION
LOCATION: ABDOMEN

## 2023-06-14 ASSESSMENT — PAIN DESCRIPTION - ONSET: ONSET: ON-GOING

## 2023-06-14 ASSESSMENT — PAIN - FUNCTIONAL ASSESSMENT
PAIN_FUNCTIONAL_ASSESSMENT: 0-10
PAIN_FUNCTIONAL_ASSESSMENT: ACTIVITIES ARE NOT PREVENTED
PAIN_FUNCTIONAL_ASSESSMENT: 0-10
PAIN_FUNCTIONAL_ASSESSMENT: ACTIVITIES ARE NOT PREVENTED

## 2023-06-14 ASSESSMENT — PAIN DESCRIPTION - ORIENTATION
ORIENTATION: LEFT

## 2023-06-14 ASSESSMENT — PAIN DESCRIPTION - PAIN TYPE: TYPE: ACUTE PAIN

## 2023-06-14 ASSESSMENT — PAIN SCALES - GENERAL
PAINLEVEL_OUTOF10: 6
PAINLEVEL_OUTOF10: 4
PAINLEVEL_OUTOF10: 6
PAINLEVEL_OUTOF10: 4
PAINLEVEL_OUTOF10: 4

## 2023-06-14 ASSESSMENT — PAIN DESCRIPTION - DESCRIPTORS
DESCRIPTORS: PRESSURE
DESCRIPTORS: PRESSURE

## 2023-06-14 ASSESSMENT — PAIN DESCRIPTION - FREQUENCY: FREQUENCY: INTERMITTENT

## 2023-06-15 PROBLEM — E44.1 MILD PROTEIN-CALORIE MALNUTRITION (HCC): Status: ACTIVE | Noted: 2023-06-15

## 2023-06-15 LAB
ALBUMIN SERPL-MCNC: 3.8 G/DL (ref 3.5–5.2)
ALP SERPL-CCNC: 56 U/L (ref 40–129)
ALT SERPL-CCNC: 7 U/L (ref 0–40)
ANION GAP SERPL CALCULATED.3IONS-SCNC: 15 MMOL/L (ref 7–16)
AST SERPL-CCNC: 7 U/L (ref 0–39)
BILIRUB SERPL-MCNC: 0.4 MG/DL (ref 0–1.2)
BUN SERPL-MCNC: 46 MG/DL (ref 6–20)
CALCIUM SERPL-MCNC: 10.5 MG/DL (ref 8.6–10.2)
CHLORIDE SERPL-SCNC: 98 MMOL/L (ref 98–107)
CO2 SERPL-SCNC: 25 MMOL/L (ref 22–29)
CREAT SERPL-MCNC: 8.9 MG/DL (ref 0.7–1.2)
ERYTHROCYTE [DISTWIDTH] IN BLOOD BY AUTOMATED COUNT: 15.2 FL (ref 11.5–15)
FERRITIN SERPL-MCNC: 1197 NG/ML
GLUCOSE SERPL-MCNC: 109 MG/DL (ref 74–99)
HCT VFR BLD AUTO: 25.9 % (ref 37–54)
HGB BLD-MCNC: 8 G/DL (ref 12.5–16.5)
IRON SATN MFR SERPL: 16 % (ref 20–55)
IRON SERPL-MCNC: 32 MCG/DL (ref 59–158)
MAGNESIUM SERPL-MCNC: 2.4 MG/DL (ref 1.6–2.6)
MCH RBC QN AUTO: 31.7 PG (ref 26–35)
MCHC RBC AUTO-ENTMCNC: 30.9 % (ref 32–34.5)
MCV RBC AUTO: 102.8 FL (ref 80–99.9)
PHOSPHATE SERPL-MCNC: 5 MG/DL (ref 2.5–4.5)
PLATELET # BLD AUTO: 284 E9/L (ref 130–450)
PMV BLD AUTO: 9.3 FL (ref 7–12)
POTASSIUM SERPL-SCNC: 4.8 MMOL/L (ref 3.5–5)
PROT SERPL-MCNC: 7.3 G/DL (ref 6.4–8.3)
RBC # BLD AUTO: 2.52 E12/L (ref 3.8–5.8)
SODIUM SERPL-SCNC: 138 MMOL/L (ref 132–146)
TIBC SERPL-MCNC: 200 MCG/DL (ref 250–450)
WBC # BLD: 10.7 E9/L (ref 4.5–11.5)

## 2023-06-15 PROCEDURE — 6360000002 HC RX W HCPCS: Performed by: SPECIALIST

## 2023-06-15 PROCEDURE — 83735 ASSAY OF MAGNESIUM: CPT

## 2023-06-15 PROCEDURE — 2580000003 HC RX 258: Performed by: STUDENT IN AN ORGANIZED HEALTH CARE EDUCATION/TRAINING PROGRAM

## 2023-06-15 PROCEDURE — 85027 COMPLETE CBC AUTOMATED: CPT

## 2023-06-15 PROCEDURE — 83540 ASSAY OF IRON: CPT

## 2023-06-15 PROCEDURE — 6370000000 HC RX 637 (ALT 250 FOR IP): Performed by: INTERNAL MEDICINE

## 2023-06-15 PROCEDURE — 90935 HEMODIALYSIS ONE EVALUATION: CPT

## 2023-06-15 PROCEDURE — 36415 COLL VENOUS BLD VENIPUNCTURE: CPT

## 2023-06-15 PROCEDURE — 80053 COMPREHEN METABOLIC PANEL: CPT

## 2023-06-15 PROCEDURE — 82728 ASSAY OF FERRITIN: CPT

## 2023-06-15 PROCEDURE — 2060000000 HC ICU INTERMEDIATE R&B

## 2023-06-15 PROCEDURE — 6360000002 HC RX W HCPCS: Performed by: STUDENT IN AN ORGANIZED HEALTH CARE EDUCATION/TRAINING PROGRAM

## 2023-06-15 PROCEDURE — 87340 HEPATITIS B SURFACE AG IA: CPT

## 2023-06-15 PROCEDURE — 2580000003 HC RX 258: Performed by: SPECIALIST

## 2023-06-15 PROCEDURE — 2580000003 HC RX 258: Performed by: INTERNAL MEDICINE

## 2023-06-15 PROCEDURE — 5A1D70Z PERFORMANCE OF URINARY FILTRATION, INTERMITTENT, LESS THAN 6 HOURS PER DAY: ICD-10-PCS | Performed by: INTERNAL MEDICINE

## 2023-06-15 PROCEDURE — 83550 IRON BINDING TEST: CPT

## 2023-06-15 PROCEDURE — 84100 ASSAY OF PHOSPHORUS: CPT

## 2023-06-15 RX ORDER — SODIUM CHLORIDE 0.9 % (FLUSH) 0.9 %
5-40 SYRINGE (ML) INJECTION 2 TIMES DAILY
Status: DISCONTINUED | OUTPATIENT
Start: 2023-06-15 | End: 2023-06-18 | Stop reason: HOSPADM

## 2023-06-15 RX ORDER — SODIUM CHLORIDE 0.9 % (FLUSH) 0.9 %
5-40 SYRINGE (ML) INJECTION PRN
Status: DISCONTINUED | OUTPATIENT
Start: 2023-06-15 | End: 2023-06-18 | Stop reason: HOSPADM

## 2023-06-15 RX ORDER — OXYCODONE AND ACETAMINOPHEN 10; 325 MG/1; MG/1
1 TABLET ORAL EVERY 4 HOURS PRN
Status: DISCONTINUED | OUTPATIENT
Start: 2023-06-15 | End: 2023-06-18 | Stop reason: HOSPADM

## 2023-06-15 RX ADMIN — HYDROCODONE BITARTRATE AND ACETAMINOPHEN 1 TABLET: 5; 325 TABLET ORAL at 00:12

## 2023-06-15 RX ADMIN — HYDROCODONE BITARTRATE AND ACETAMINOPHEN 1 TABLET: 5; 325 TABLET ORAL at 08:05

## 2023-06-15 RX ADMIN — OXYCODONE AND ACETAMINOPHEN 1 TABLET: 10; 325 TABLET ORAL at 21:01

## 2023-06-15 RX ADMIN — MEROPENEM 1000 MG: 1 INJECTION, POWDER, FOR SOLUTION INTRAVENOUS at 00:08

## 2023-06-15 RX ADMIN — HYDRALAZINE HYDROCHLORIDE 100 MG: 50 TABLET, FILM COATED ORAL at 21:01

## 2023-06-15 RX ADMIN — VANCOMYCIN HYDROCHLORIDE 1750 MG: 1 INJECTION, POWDER, LYOPHILIZED, FOR SOLUTION INTRAVENOUS at 00:54

## 2023-06-15 RX ADMIN — HYDRALAZINE HYDROCHLORIDE 100 MG: 50 TABLET, FILM COATED ORAL at 13:44

## 2023-06-15 RX ADMIN — OXYCODONE AND ACETAMINOPHEN 1 TABLET: 10; 325 TABLET ORAL at 16:35

## 2023-06-15 RX ADMIN — SEVELAMER CARBONATE 1600 MG: 800 TABLET, FILM COATED ORAL at 16:21

## 2023-06-15 RX ADMIN — HYDROCODONE BITARTRATE AND ACETAMINOPHEN 1 TABLET: 5; 325 TABLET ORAL at 04:58

## 2023-06-15 RX ADMIN — PIPERACILLIN AND TAZOBACTAM 3375 MG: 3; .375 INJECTION, POWDER, LYOPHILIZED, FOR SOLUTION INTRAVENOUS at 13:44

## 2023-06-15 RX ADMIN — Medication 10 ML: at 21:01

## 2023-06-15 RX ADMIN — PIPERACILLIN AND TAZOBACTAM 3375 MG: 3; .375 INJECTION, POWDER, LYOPHILIZED, FOR SOLUTION INTRAVENOUS at 23:59

## 2023-06-15 RX ADMIN — SODIUM ZIRCONIUM CYCLOSILICATE 10 G: 10 POWDER, FOR SUSPENSION ORAL at 00:12

## 2023-06-15 RX ADMIN — HYDRALAZINE HYDROCHLORIDE 100 MG: 50 TABLET, FILM COATED ORAL at 00:12

## 2023-06-15 RX ADMIN — HYDRALAZINE HYDROCHLORIDE 100 MG: 50 TABLET, FILM COATED ORAL at 04:58

## 2023-06-15 RX ADMIN — NEPHROCAP 1 MG: 1 CAP ORAL at 08:06

## 2023-06-15 RX ADMIN — SEVELAMER CARBONATE 1600 MG: 800 TABLET, FILM COATED ORAL at 08:05

## 2023-06-15 ASSESSMENT — PAIN SCALES - GENERAL
PAINLEVEL_OUTOF10: 0
PAINLEVEL_OUTOF10: 5
PAINLEVEL_OUTOF10: 4
PAINLEVEL_OUTOF10: 5
PAINLEVEL_OUTOF10: 0
PAINLEVEL_OUTOF10: 5

## 2023-06-15 ASSESSMENT — PAIN DESCRIPTION - LOCATION
LOCATION: ABDOMEN

## 2023-06-15 ASSESSMENT — ENCOUNTER SYMPTOMS
TROUBLE SWALLOWING: 0
ABDOMINAL PAIN: 1
CONSTIPATION: 0
COUGH: 0
ABDOMINAL DISTENTION: 1
VOMITING: 0
SHORTNESS OF BREATH: 0
COLOR CHANGE: 0
FACIAL SWELLING: 0

## 2023-06-15 ASSESSMENT — PAIN DESCRIPTION - DESCRIPTORS
DESCRIPTORS: DISCOMFORT
DESCRIPTORS: DISCOMFORT

## 2023-06-15 ASSESSMENT — PAIN DESCRIPTION - PAIN TYPE
TYPE: ACUTE PAIN
TYPE: ACUTE PAIN

## 2023-06-15 ASSESSMENT — PAIN DESCRIPTION - FREQUENCY
FREQUENCY: INTERMITTENT
FREQUENCY: INTERMITTENT

## 2023-06-15 ASSESSMENT — PAIN DESCRIPTION - ONSET
ONSET: ON-GOING
ONSET: ON-GOING

## 2023-06-15 ASSESSMENT — PAIN DESCRIPTION - ORIENTATION
ORIENTATION: LEFT

## 2023-06-15 ASSESSMENT — PAIN - FUNCTIONAL ASSESSMENT
PAIN_FUNCTIONAL_ASSESSMENT: ACTIVITIES ARE NOT PREVENTED
PAIN_FUNCTIONAL_ASSESSMENT: ACTIVITIES ARE NOT PREVENTED

## 2023-06-16 LAB
ALBUMIN SERPL-MCNC: 3.1 G/DL (ref 3.5–5.2)
ALP SERPL-CCNC: 47 U/L (ref 40–129)
ALT SERPL-CCNC: <5 U/L (ref 0–40)
ANION GAP SERPL CALCULATED.3IONS-SCNC: 10 MMOL/L (ref 7–16)
AST SERPL-CCNC: 6 U/L (ref 0–39)
BILIRUB SERPL-MCNC: 0.3 MG/DL (ref 0–1.2)
BUN SERPL-MCNC: 25 MG/DL (ref 6–20)
CALCIUM SERPL-MCNC: 9.3 MG/DL (ref 8.6–10.2)
CHLORIDE SERPL-SCNC: 100 MMOL/L (ref 98–107)
CO2 SERPL-SCNC: 29 MMOL/L (ref 22–29)
CREAT SERPL-MCNC: 6.4 MG/DL (ref 0.7–1.2)
ERYTHROCYTE [DISTWIDTH] IN BLOOD BY AUTOMATED COUNT: 15.2 FL (ref 11.5–15)
GLUCOSE SERPL-MCNC: 95 MG/DL (ref 74–99)
HBV SURFACE AG SERPL QL IA: NORMAL
HCT VFR BLD AUTO: 21.9 % (ref 37–54)
HCT VFR BLD AUTO: 25.3 % (ref 37–54)
HGB BLD-MCNC: 7 G/DL (ref 12.5–16.5)
HGB BLD-MCNC: 8.1 G/DL (ref 12.5–16.5)
MAGNESIUM SERPL-MCNC: 2.1 MG/DL (ref 1.6–2.6)
MCH RBC QN AUTO: 32.3 PG (ref 26–35)
MCHC RBC AUTO-ENTMCNC: 32 % (ref 32–34.5)
MCV RBC AUTO: 100.9 FL (ref 80–99.9)
PHOSPHATE SERPL-MCNC: 5.1 MG/DL (ref 2.5–4.5)
PLATELET # BLD AUTO: 239 E9/L (ref 130–450)
PMV BLD AUTO: 9.2 FL (ref 7–12)
POTASSIUM SERPL-SCNC: 4.5 MMOL/L (ref 3.5–5)
PROT SERPL-MCNC: 6.2 G/DL (ref 6.4–8.3)
RBC # BLD AUTO: 2.17 E12/L (ref 3.8–5.8)
SODIUM SERPL-SCNC: 139 MMOL/L (ref 132–146)
WBC # BLD: 8.3 E9/L (ref 4.5–11.5)

## 2023-06-16 PROCEDURE — 7100000000 HC PACU RECOVERY - FIRST 15 MIN: Performed by: SURGERY

## 2023-06-16 PROCEDURE — 36415 COLL VENOUS BLD VENIPUNCTURE: CPT

## 2023-06-16 PROCEDURE — 2709999900 HC NON-CHARGEABLE SUPPLY: Performed by: SURGERY

## 2023-06-16 PROCEDURE — 2060000000 HC ICU INTERMEDIATE R&B

## 2023-06-16 PROCEDURE — 6360000002 HC RX W HCPCS: Performed by: ANESTHESIOLOGY

## 2023-06-16 PROCEDURE — 6370000000 HC RX 637 (ALT 250 FOR IP): Performed by: INTERNAL MEDICINE

## 2023-06-16 PROCEDURE — 87077 CULTURE AEROBIC IDENTIFY: CPT

## 2023-06-16 PROCEDURE — 3600000004 HC SURGERY LEVEL 4 BASE: Performed by: SURGERY

## 2023-06-16 PROCEDURE — 80074 ACUTE HEPATITIS PANEL: CPT

## 2023-06-16 PROCEDURE — 84100 ASSAY OF PHOSPHORUS: CPT

## 2023-06-16 PROCEDURE — 3600000014 HC SURGERY LEVEL 4 ADDTL 15MIN: Performed by: SURGERY

## 2023-06-16 PROCEDURE — 86706 HEP B SURFACE ANTIBODY: CPT

## 2023-06-16 PROCEDURE — 7100000001 HC PACU RECOVERY - ADDTL 15 MIN: Performed by: SURGERY

## 2023-06-16 PROCEDURE — 83735 ASSAY OF MAGNESIUM: CPT

## 2023-06-16 PROCEDURE — 85027 COMPLETE CBC AUTOMATED: CPT

## 2023-06-16 PROCEDURE — 0WPG43Z REMOVAL OF INFUSION DEVICE FROM PERITONEAL CAVITY, PERCUTANEOUS ENDOSCOPIC APPROACH: ICD-10-PCS | Performed by: SURGERY

## 2023-06-16 PROCEDURE — 85014 HEMATOCRIT: CPT

## 2023-06-16 PROCEDURE — 6360000002 HC RX W HCPCS: Performed by: SPECIALIST

## 2023-06-16 PROCEDURE — 3700000001 HC ADD 15 MINUTES (ANESTHESIA): Performed by: SURGERY

## 2023-06-16 PROCEDURE — 87070 CULTURE OTHR SPECIMN AEROBIC: CPT

## 2023-06-16 PROCEDURE — 85018 HEMOGLOBIN: CPT

## 2023-06-16 PROCEDURE — 3700000000 HC ANESTHESIA ATTENDED CARE: Performed by: SURGERY

## 2023-06-16 PROCEDURE — 80053 COMPREHEN METABOLIC PANEL: CPT

## 2023-06-16 PROCEDURE — 2580000003 HC RX 258: Performed by: INTERNAL MEDICINE

## 2023-06-16 PROCEDURE — 2580000003 HC RX 258: Performed by: SPECIALIST

## 2023-06-16 RX ORDER — SODIUM CHLORIDE 9 MG/ML
INJECTION, SOLUTION INTRAVENOUS PRN
Status: DISCONTINUED | OUTPATIENT
Start: 2023-06-16 | End: 2023-06-16 | Stop reason: HOSPADM

## 2023-06-16 RX ORDER — FENTANYL CITRATE 50 UG/ML
25 INJECTION, SOLUTION INTRAMUSCULAR; INTRAVENOUS EVERY 5 MIN PRN
Status: DISCONTINUED | OUTPATIENT
Start: 2023-06-16 | End: 2023-06-16 | Stop reason: HOSPADM

## 2023-06-16 RX ORDER — SODIUM CHLORIDE 0.9 % (FLUSH) 0.9 %
5-40 SYRINGE (ML) INJECTION EVERY 12 HOURS SCHEDULED
Status: DISCONTINUED | OUTPATIENT
Start: 2023-06-16 | End: 2023-06-16 | Stop reason: HOSPADM

## 2023-06-16 RX ORDER — LABETALOL HYDROCHLORIDE 5 MG/ML
5 INJECTION, SOLUTION INTRAVENOUS EVERY 4 HOURS PRN
Status: DISCONTINUED | OUTPATIENT
Start: 2023-06-16 | End: 2023-06-18 | Stop reason: HOSPADM

## 2023-06-16 RX ORDER — SODIUM CHLORIDE 0.9 % (FLUSH) 0.9 %
5-40 SYRINGE (ML) INJECTION PRN
Status: DISCONTINUED | OUTPATIENT
Start: 2023-06-16 | End: 2023-06-16 | Stop reason: HOSPADM

## 2023-06-16 RX ADMIN — SEVELAMER CARBONATE 1600 MG: 800 TABLET, FILM COATED ORAL at 11:44

## 2023-06-16 RX ADMIN — SEVELAMER CARBONATE 1600 MG: 800 TABLET, FILM COATED ORAL at 15:53

## 2023-06-16 RX ADMIN — OXYCODONE AND ACETAMINOPHEN 1 TABLET: 10; 325 TABLET ORAL at 16:53

## 2023-06-16 RX ADMIN — HYDRALAZINE HYDROCHLORIDE 100 MG: 50 TABLET, FILM COATED ORAL at 15:15

## 2023-06-16 RX ADMIN — PIPERACILLIN AND TAZOBACTAM 3375 MG: 3; .375 INJECTION, POWDER, LYOPHILIZED, FOR SOLUTION INTRAVENOUS at 11:44

## 2023-06-16 RX ADMIN — HYDRALAZINE HYDROCHLORIDE 100 MG: 50 TABLET, FILM COATED ORAL at 20:54

## 2023-06-16 RX ADMIN — OXYCODONE AND ACETAMINOPHEN 1 TABLET: 10; 325 TABLET ORAL at 12:48

## 2023-06-16 RX ADMIN — OXYCODONE AND ACETAMINOPHEN 1 TABLET: 10; 325 TABLET ORAL at 20:54

## 2023-06-16 RX ADMIN — Medication 10 ML: at 20:54

## 2023-06-16 RX ADMIN — OXYCODONE AND ACETAMINOPHEN 1 TABLET: 10; 325 TABLET ORAL at 05:48

## 2023-06-16 RX ADMIN — HYDROMORPHONE HYDROCHLORIDE 0.5 MG: 0.5 INJECTION, SOLUTION INTRAMUSCULAR; INTRAVENOUS; SUBCUTANEOUS at 10:18

## 2023-06-16 RX ADMIN — PIPERACILLIN AND TAZOBACTAM 3375 MG: 3; .375 INJECTION, POWDER, LYOPHILIZED, FOR SOLUTION INTRAVENOUS at 23:40

## 2023-06-16 RX ADMIN — HYDRALAZINE HYDROCHLORIDE 100 MG: 50 TABLET, FILM COATED ORAL at 05:48

## 2023-06-16 ASSESSMENT — PAIN DESCRIPTION - PAIN TYPE
TYPE: SURGICAL PAIN
TYPE: SURGICAL PAIN

## 2023-06-16 ASSESSMENT — PAIN DESCRIPTION - LOCATION
LOCATION: ABDOMEN

## 2023-06-16 ASSESSMENT — PAIN DESCRIPTION - ORIENTATION
ORIENTATION: LEFT;UPPER
ORIENTATION: LEFT;UPPER
ORIENTATION: LEFT

## 2023-06-16 ASSESSMENT — PAIN SCALES - GENERAL
PAINLEVEL_OUTOF10: 6
PAINLEVEL_OUTOF10: 4

## 2023-06-16 ASSESSMENT — PAIN - FUNCTIONAL ASSESSMENT: PAIN_FUNCTIONAL_ASSESSMENT: ACTIVITIES ARE NOT PREVENTED

## 2023-06-16 ASSESSMENT — PAIN DESCRIPTION - DESCRIPTORS: DESCRIPTORS: DISCOMFORT

## 2023-06-17 LAB
ALBUMIN SERPL-MCNC: 3.5 G/DL (ref 3.5–5.2)
ALP SERPL-CCNC: 47 U/L (ref 40–129)
ALT SERPL-CCNC: <5 U/L (ref 0–40)
ANION GAP SERPL CALCULATED.3IONS-SCNC: 11 MMOL/L (ref 7–16)
AST SERPL-CCNC: 11 U/L (ref 0–39)
BILIRUB SERPL-MCNC: <0.2 MG/DL (ref 0–1.2)
BUN SERPL-MCNC: 43 MG/DL (ref 6–20)
CALCIUM SERPL-MCNC: 10 MG/DL (ref 8.6–10.2)
CHLORIDE SERPL-SCNC: 98 MMOL/L (ref 98–107)
CO2 SERPL-SCNC: 26 MMOL/L (ref 22–29)
CREAT SERPL-MCNC: 9.3 MG/DL (ref 0.7–1.2)
ERYTHROCYTE [DISTWIDTH] IN BLOOD BY AUTOMATED COUNT: 15.6 FL (ref 11.5–15)
GLUCOSE SERPL-MCNC: 117 MG/DL (ref 74–99)
HCT VFR BLD AUTO: 23.2 % (ref 37–54)
HGB BLD-MCNC: 7.3 G/DL (ref 12.5–16.5)
MAGNESIUM SERPL-MCNC: 2.5 MG/DL (ref 1.6–2.6)
MCH RBC QN AUTO: 31.9 PG (ref 26–35)
MCHC RBC AUTO-ENTMCNC: 31.5 % (ref 32–34.5)
MCV RBC AUTO: 101.3 FL (ref 80–99.9)
PHOSPHATE SERPL-MCNC: 5.4 MG/DL (ref 2.5–4.5)
PLATELET # BLD AUTO: 317 E9/L (ref 130–450)
PMV BLD AUTO: 9.3 FL (ref 7–12)
POTASSIUM SERPL-SCNC: 3.9 MMOL/L (ref 3.5–5)
POTASSIUM SERPL-SCNC: 6 MMOL/L (ref 3.5–5)
PROT SERPL-MCNC: 6.7 G/DL (ref 6.4–8.3)
RBC # BLD AUTO: 2.29 E12/L (ref 3.8–5.8)
SODIUM SERPL-SCNC: 135 MMOL/L (ref 132–146)
WBC # BLD: 10.3 E9/L (ref 4.5–11.5)

## 2023-06-17 PROCEDURE — 2580000003 HC RX 258: Performed by: SPECIALIST

## 2023-06-17 PROCEDURE — 36415 COLL VENOUS BLD VENIPUNCTURE: CPT

## 2023-06-17 PROCEDURE — 80053 COMPREHEN METABOLIC PANEL: CPT

## 2023-06-17 PROCEDURE — 90935 HEMODIALYSIS ONE EVALUATION: CPT

## 2023-06-17 PROCEDURE — 84132 ASSAY OF SERUM POTASSIUM: CPT

## 2023-06-17 PROCEDURE — 2060000000 HC ICU INTERMEDIATE R&B

## 2023-06-17 PROCEDURE — 2580000003 HC RX 258: Performed by: INTERNAL MEDICINE

## 2023-06-17 PROCEDURE — 83735 ASSAY OF MAGNESIUM: CPT

## 2023-06-17 PROCEDURE — 6360000002 HC RX W HCPCS: Performed by: SPECIALIST

## 2023-06-17 PROCEDURE — 6370000000 HC RX 637 (ALT 250 FOR IP): Performed by: INTERNAL MEDICINE

## 2023-06-17 PROCEDURE — 84100 ASSAY OF PHOSPHORUS: CPT

## 2023-06-17 PROCEDURE — 85027 COMPLETE CBC AUTOMATED: CPT

## 2023-06-17 RX ADMIN — SODIUM CHLORIDE, PRESERVATIVE FREE 10 ML: 5 INJECTION INTRAVENOUS at 15:49

## 2023-06-17 RX ADMIN — OXYCODONE AND ACETAMINOPHEN 1 TABLET: 10; 325 TABLET ORAL at 17:39

## 2023-06-17 RX ADMIN — SEVELAMER CARBONATE 1600 MG: 800 TABLET, FILM COATED ORAL at 16:14

## 2023-06-17 RX ADMIN — OXYCODONE AND ACETAMINOPHEN 1 TABLET: 10; 325 TABLET ORAL at 21:39

## 2023-06-17 RX ADMIN — HYDRALAZINE HYDROCHLORIDE 100 MG: 50 TABLET, FILM COATED ORAL at 05:12

## 2023-06-17 RX ADMIN — HYDRALAZINE HYDROCHLORIDE 100 MG: 50 TABLET, FILM COATED ORAL at 21:39

## 2023-06-17 RX ADMIN — OXYCODONE AND ACETAMINOPHEN 1 TABLET: 10; 325 TABLET ORAL at 01:26

## 2023-06-17 RX ADMIN — PIPERACILLIN AND TAZOBACTAM 3375 MG: 3; .375 INJECTION, POWDER, LYOPHILIZED, FOR SOLUTION INTRAVENOUS at 16:18

## 2023-06-17 RX ADMIN — Medication 10 ML: at 21:42

## 2023-06-17 RX ADMIN — Medication 10 ML: at 13:16

## 2023-06-17 RX ADMIN — NEPHROCAP 1 MG: 1 CAP ORAL at 13:22

## 2023-06-17 RX ADMIN — HYDRALAZINE HYDROCHLORIDE 100 MG: 50 TABLET, FILM COATED ORAL at 13:30

## 2023-06-17 RX ADMIN — OXYCODONE AND ACETAMINOPHEN 1 TABLET: 10; 325 TABLET ORAL at 05:29

## 2023-06-17 RX ADMIN — OXYCODONE AND ACETAMINOPHEN 1 TABLET: 10; 325 TABLET ORAL at 13:14

## 2023-06-17 RX ADMIN — SEVELAMER CARBONATE 1600 MG: 800 TABLET, FILM COATED ORAL at 13:15

## 2023-06-17 ASSESSMENT — PAIN SCALES - GENERAL
PAINLEVEL_OUTOF10: 0
PAINLEVEL_OUTOF10: 7
PAINLEVEL_OUTOF10: 8
PAINLEVEL_OUTOF10: 0

## 2023-06-18 VITALS
HEIGHT: 72 IN | SYSTOLIC BLOOD PRESSURE: 119 MMHG | OXYGEN SATURATION: 99 % | RESPIRATION RATE: 20 BRPM | BODY MASS INDEX: 24.79 KG/M2 | DIASTOLIC BLOOD PRESSURE: 76 MMHG | TEMPERATURE: 98.1 F | WEIGHT: 183 LBS | HEART RATE: 90 BPM

## 2023-06-18 LAB
ALBUMIN SERPL-MCNC: 3.3 G/DL (ref 3.5–5.2)
ALP SERPL-CCNC: 42 U/L (ref 40–129)
ALT SERPL-CCNC: <5 U/L (ref 0–40)
ANION GAP SERPL CALCULATED.3IONS-SCNC: 12 MMOL/L (ref 7–16)
AST SERPL-CCNC: 5 U/L (ref 0–39)
BACTERIA FLD AEROBE CULT: ABNORMAL
BACTERIA FLD AEROBE CULT: ABNORMAL
BILIRUB SERPL-MCNC: <0.2 MG/DL (ref 0–1.2)
BUN SERPL-MCNC: 29 MG/DL (ref 6–20)
CALCIUM SERPL-MCNC: 9.5 MG/DL (ref 8.6–10.2)
CHLORIDE SERPL-SCNC: 100 MMOL/L (ref 98–107)
CO2 SERPL-SCNC: 25 MMOL/L (ref 22–29)
CREAT SERPL-MCNC: 6.4 MG/DL (ref 0.7–1.2)
ERYTHROCYTE [DISTWIDTH] IN BLOOD BY AUTOMATED COUNT: 15.4 FL (ref 11.5–15)
GLUCOSE SERPL-MCNC: 87 MG/DL (ref 74–99)
GRAM STN SPEC: ABNORMAL
HCT VFR BLD AUTO: 23.1 % (ref 37–54)
HGB BLD-MCNC: 7.2 G/DL (ref 12.5–16.5)
MAGNESIUM SERPL-MCNC: 2.1 MG/DL (ref 1.6–2.6)
MCH RBC QN AUTO: 32.3 PG (ref 26–35)
MCHC RBC AUTO-ENTMCNC: 31.2 % (ref 32–34.5)
MCV RBC AUTO: 103.6 FL (ref 80–99.9)
ORGANISM: ABNORMAL
ORGANISM: ABNORMAL
PHOSPHATE SERPL-MCNC: 5.2 MG/DL (ref 2.5–4.5)
PLATELET # BLD AUTO: 320 E9/L (ref 130–450)
PMV BLD AUTO: 9.2 FL (ref 7–12)
POTASSIUM SERPL-SCNC: 4.6 MMOL/L (ref 3.5–5)
PROT SERPL-MCNC: 6.3 G/DL (ref 6.4–8.3)
RBC # BLD AUTO: 2.23 E12/L (ref 3.8–5.8)
SODIUM SERPL-SCNC: 137 MMOL/L (ref 132–146)
WBC # BLD: 7.3 E9/L (ref 4.5–11.5)

## 2023-06-18 PROCEDURE — 2580000003 HC RX 258: Performed by: INTERNAL MEDICINE

## 2023-06-18 PROCEDURE — 36415 COLL VENOUS BLD VENIPUNCTURE: CPT

## 2023-06-18 PROCEDURE — 6370000000 HC RX 637 (ALT 250 FOR IP): Performed by: INTERNAL MEDICINE

## 2023-06-18 PROCEDURE — 84100 ASSAY OF PHOSPHORUS: CPT

## 2023-06-18 PROCEDURE — 2580000003 HC RX 258: Performed by: SPECIALIST

## 2023-06-18 PROCEDURE — 85027 COMPLETE CBC AUTOMATED: CPT

## 2023-06-18 PROCEDURE — 6360000002 HC RX W HCPCS: Performed by: SPECIALIST

## 2023-06-18 PROCEDURE — 6370000000 HC RX 637 (ALT 250 FOR IP): Performed by: SPECIALIST

## 2023-06-18 PROCEDURE — 80053 COMPREHEN METABOLIC PANEL: CPT

## 2023-06-18 PROCEDURE — 83735 ASSAY OF MAGNESIUM: CPT

## 2023-06-18 RX ORDER — LINEZOLID 600 MG/1
600 TABLET, FILM COATED ORAL EVERY 12 HOURS SCHEDULED
Qty: 28 TABLET | Refills: 0 | Status: SHIPPED | OUTPATIENT
Start: 2023-06-18 | End: 2023-07-02

## 2023-06-18 RX ORDER — OXYCODONE AND ACETAMINOPHEN 10; 325 MG/1; MG/1
1 TABLET ORAL EVERY 8 HOURS PRN
Qty: 20 TABLET | Refills: 0 | Status: SHIPPED | OUTPATIENT
Start: 2023-06-18 | End: 2023-06-18 | Stop reason: SDUPTHER

## 2023-06-18 RX ORDER — CEFDINIR 300 MG/1
300 CAPSULE ORAL DAILY
Qty: 14 CAPSULE | Refills: 0 | Status: SHIPPED | OUTPATIENT
Start: 2023-06-18 | End: 2023-06-18 | Stop reason: SDUPTHER

## 2023-06-18 RX ORDER — OXYCODONE AND ACETAMINOPHEN 10; 325 MG/1; MG/1
1 TABLET ORAL EVERY 8 HOURS PRN
Qty: 20 TABLET | Refills: 0 | Status: SHIPPED | OUTPATIENT
Start: 2023-06-18 | End: 2023-07-02

## 2023-06-18 RX ORDER — CEFDINIR 300 MG/1
300 CAPSULE ORAL DAILY
Qty: 14 CAPSULE | Refills: 0 | Status: SHIPPED | OUTPATIENT
Start: 2023-06-18 | End: 2023-07-02

## 2023-06-18 RX ORDER — LINEZOLID 600 MG/1
600 TABLET, FILM COATED ORAL EVERY 12 HOURS SCHEDULED
Status: DISCONTINUED | OUTPATIENT
Start: 2023-06-18 | End: 2023-06-18 | Stop reason: HOSPADM

## 2023-06-18 RX ORDER — LINEZOLID 600 MG/1
600 TABLET, FILM COATED ORAL EVERY 12 HOURS SCHEDULED
Qty: 28 TABLET | Refills: 0 | Status: SHIPPED | OUTPATIENT
Start: 2023-06-18 | End: 2023-06-18 | Stop reason: SDUPTHER

## 2023-06-18 RX ORDER — METRONIDAZOLE 500 MG/1
500 TABLET ORAL EVERY 8 HOURS SCHEDULED
Qty: 30 TABLET | Refills: 0 | Status: SHIPPED | OUTPATIENT
Start: 2023-06-18 | End: 2023-06-28

## 2023-06-18 RX ORDER — METRONIDAZOLE 500 MG/1
500 TABLET ORAL EVERY 8 HOURS SCHEDULED
Qty: 30 TABLET | Refills: 0 | Status: SHIPPED | OUTPATIENT
Start: 2023-06-18 | End: 2023-06-18 | Stop reason: SDUPTHER

## 2023-06-18 RX ORDER — METRONIDAZOLE 500 MG/1
500 TABLET ORAL EVERY 8 HOURS SCHEDULED
Status: DISCONTINUED | OUTPATIENT
Start: 2023-06-18 | End: 2023-06-18 | Stop reason: HOSPADM

## 2023-06-18 RX ORDER — CEFDINIR 300 MG/1
300 CAPSULE ORAL DAILY
Status: DISCONTINUED | OUTPATIENT
Start: 2023-06-18 | End: 2023-06-18 | Stop reason: HOSPADM

## 2023-06-18 RX ADMIN — OXYCODONE AND ACETAMINOPHEN 1 TABLET: 10; 325 TABLET ORAL at 14:44

## 2023-06-18 RX ADMIN — HYDRALAZINE HYDROCHLORIDE 100 MG: 50 TABLET, FILM COATED ORAL at 04:59

## 2023-06-18 RX ADMIN — OXYCODONE AND ACETAMINOPHEN 1 TABLET: 10; 325 TABLET ORAL at 10:43

## 2023-06-18 RX ADMIN — Medication 10 ML: at 08:10

## 2023-06-18 RX ADMIN — PIPERACILLIN AND TAZOBACTAM 3375 MG: 3; .375 INJECTION, POWDER, LYOPHILIZED, FOR SOLUTION INTRAVENOUS at 05:01

## 2023-06-18 RX ADMIN — METRONIDAZOLE 500 MG: 500 TABLET ORAL at 13:31

## 2023-06-18 RX ADMIN — SEVELAMER CARBONATE 1600 MG: 800 TABLET, FILM COATED ORAL at 06:11

## 2023-06-18 RX ADMIN — SEVELAMER CARBONATE 1600 MG: 800 TABLET, FILM COATED ORAL at 10:43

## 2023-06-18 RX ADMIN — OXYCODONE AND ACETAMINOPHEN 1 TABLET: 10; 325 TABLET ORAL at 01:36

## 2023-06-18 RX ADMIN — CEFDINIR 300 MG: 300 CAPSULE ORAL at 13:31

## 2023-06-18 RX ADMIN — OXYCODONE AND ACETAMINOPHEN 1 TABLET: 10; 325 TABLET ORAL at 06:11

## 2023-06-18 RX ADMIN — NEPHROCAP 1 MG: 1 CAP ORAL at 08:03

## 2023-06-18 RX ADMIN — HYDRALAZINE HYDROCHLORIDE 100 MG: 50 TABLET, FILM COATED ORAL at 10:43

## 2023-06-18 ASSESSMENT — PAIN DESCRIPTION - LOCATION: LOCATION: ABDOMEN

## 2023-06-18 ASSESSMENT — PAIN DESCRIPTION - ORIENTATION: ORIENTATION: RIGHT

## 2023-06-18 ASSESSMENT — PAIN DESCRIPTION - DESCRIPTORS: DESCRIPTORS: ACHING;DISCOMFORT

## 2023-06-18 ASSESSMENT — PAIN SCALES - GENERAL: PAINLEVEL_OUTOF10: 4

## 2023-06-19 LAB
BACTERIA BLD CULT ORG #2: NORMAL
BACTERIA BLD CULT: NORMAL
HAV IGM SERPL QL IA: NORMAL
HBV CORE IGM SERPL QL IA: NORMAL
HBV SURFACE AB SERPL IA-ACNC: NORMAL M[IU]/ML
HBV SURFACE AG SERPL QL IA: NORMAL
HCV AB SERPL QL IA: NORMAL

## 2023-06-20 LAB
BACTERIA CATH TIP CULT: ABNORMAL
BACTERIA CATH TIP CULT: ABNORMAL
ORGANISM: ABNORMAL
ORGANISM: ABNORMAL

## 2023-06-20 NOTE — DISCHARGE SUMMARY
Exam:  See progress note from today    Discharge Medication List as of 6/18/2023  2:20 PM        CONTINUE these medications which have CHANGED    Details   linezolid (ZYVOX) 600 MG tablet Take 1 tablet by mouth every 12 hours for 14 days, Disp-28 tablet, R-0Print      metroNIDAZOLE (FLAGYL) 500 MG tablet Take 1 tablet by mouth every 8 hours for 10 days, Disp-30 tablet, R-0Normal      cefdinir (OMNICEF) 300 MG capsule Take 1 capsule by mouth daily for 14 days, Disp-14 capsule, R-0Normal      oxyCODONE-acetaminophen (PERCOCET)  MG per tablet Take 1 tablet by mouth every 8 hours as needed for Pain for up to 14 days. Max Daily Amount: 3 tablets, Disp-20 tablet, R-0Normal           CONTINUE these medications which have NOT CHANGED    Details   calcitRIOL (ROCALTROL) 0.25 MCG capsule Take 1 capsule by mouth three times a week Tuesday, Thursday, SaturdayHistorical Med      Multiple Vitamins-Minerals (RENAPLEX-D PO) Take 800 mcg by mouth dailyHistorical Med      hydrALAZINE (APRESOLINE) 100 MG tablet Take 1 tablet by mouth every 8 hours, Disp-90 tablet, R-3Normal      sevelamer (RENVELA) 800 MG tablet Take 1,600 tablets by mouth 3 times daily (with meals)Historical Med           STOP taking these medications       sodium bicarbonate 325 MG tablet Comments:   Reason for Stopping:               Time Spent on discharge is more than 30 minutes --      TIME  INCLUDES TIME THAT WAS  SPENT WITH DISCHARGE PAPERS, MEDICATION REVIEW, MEDICATION RECONCILIATION,   PRESCRIPTIONS, CHART REVIEW, PATIENT EXAM , FINAL PROGRESS NOTE, DISCUSSION OF FINDINGS WITH PATIENT AND AVAILABLE FAMILY , AND DICTATION  WHERE NEEDED ; Home Health Care [facility]) FORMS COMPLETED ; N-17  COMPLETION ;  H&P UPDATED ; DURABLE EQUIPMENT FORMS.      Active Hospital Problems    Diagnosis     CKD (chronic kidney disease) stage V requiring chronic dialysis ([facility] [address] [N18.6, Z99.2]      Priority: Medium    Mild protein-calorie malnutrition ([facility] [address] [E44.1]     Secondary

## 2023-07-07 ENCOUNTER — HOSPITAL ENCOUNTER (OUTPATIENT)
Dept: ULTRASOUND IMAGING | Age: 53
End: 2023-07-07
Attending: INTERNAL MEDICINE
Payer: MEDICARE

## 2023-07-07 DIAGNOSIS — N18.6 END STAGE RENAL DISEASE (HCC): ICD-10-CM

## 2023-07-07 DIAGNOSIS — Z01.818 PRE-OPERATIVE CLEARANCE: ICD-10-CM

## 2023-07-07 PROCEDURE — 93970 EXTREMITY STUDY: CPT

## 2023-08-08 ENCOUNTER — APPOINTMENT (OUTPATIENT)
Dept: CT IMAGING | Age: 53
DRG: 640 | End: 2023-08-08
Payer: MEDICARE

## 2023-08-08 ENCOUNTER — HOSPITAL ENCOUNTER (INPATIENT)
Age: 53
LOS: 2 days | Discharge: HOME | DRG: 640 | End: 2023-08-10
Attending: EMERGENCY MEDICINE | Admitting: INTERNAL MEDICINE
Payer: MEDICARE

## 2023-08-08 ENCOUNTER — APPOINTMENT (OUTPATIENT)
Dept: GENERAL RADIOLOGY | Age: 53
DRG: 640 | End: 2023-08-08
Payer: MEDICARE

## 2023-08-08 DIAGNOSIS — G44.209 ACUTE NON INTRACTABLE TENSION-TYPE HEADACHE: ICD-10-CM

## 2023-08-08 DIAGNOSIS — I16.1 HYPERTENSIVE EMERGENCY: Primary | ICD-10-CM

## 2023-08-08 DIAGNOSIS — E87.5 HYPERKALEMIA: ICD-10-CM

## 2023-08-08 DIAGNOSIS — N18.6 ESRD (END STAGE RENAL DISEASE) (HCC): ICD-10-CM

## 2023-08-08 PROBLEM — I16.9 HYPERTENSIVE CRISIS: Status: ACTIVE | Noted: 2023-08-08

## 2023-08-08 LAB
ALBUMIN SERPL-MCNC: 3.8 G/DL (ref 3.5–5.2)
ALBUMIN SERPL-MCNC: 4.1 G/DL (ref 3.5–5.2)
ALP SERPL-CCNC: 56 U/L (ref 40–129)
ALP SERPL-CCNC: 59 U/L (ref 40–129)
ALT SERPL-CCNC: 22 U/L (ref 0–40)
ALT SERPL-CCNC: 27 U/L (ref 0–40)
ANION GAP SERPL CALCULATED.3IONS-SCNC: 16 MMOL/L (ref 7–16)
ANION GAP SERPL CALCULATED.3IONS-SCNC: 18 MMOL/L (ref 7–16)
AST SERPL-CCNC: 13 U/L (ref 0–39)
AST SERPL-CCNC: 14 U/L (ref 0–39)
BASOPHILS # BLD: 0.05 K/UL (ref 0–0.2)
BASOPHILS # BLD: 0.05 K/UL (ref 0–0.2)
BASOPHILS NFR BLD: 1 % (ref 0–2)
BASOPHILS NFR BLD: 1 % (ref 0–2)
BILIRUB SERPL-MCNC: 0.2 MG/DL (ref 0–1.2)
BILIRUB SERPL-MCNC: 0.3 MG/DL (ref 0–1.2)
BNP SERPL-MCNC: ABNORMAL PG/ML (ref 0–125)
BUN SERPL-MCNC: 63 MG/DL (ref 6–20)
BUN SERPL-MCNC: 66 MG/DL (ref 6–20)
CALCIUM SERPL-MCNC: 9.6 MG/DL (ref 8.6–10.2)
CALCIUM SERPL-MCNC: 9.7 MG/DL (ref 8.6–10.2)
CHLORIDE SERPL-SCNC: 102 MMOL/L (ref 98–107)
CHLORIDE SERPL-SCNC: 99 MMOL/L (ref 98–107)
CO2 SERPL-SCNC: 19 MMOL/L (ref 22–29)
CO2 SERPL-SCNC: 22 MMOL/L (ref 22–29)
CREAT SERPL-MCNC: 13 MG/DL (ref 0.7–1.2)
CREAT SERPL-MCNC: 13.4 MG/DL (ref 0.7–1.2)
EKG ATRIAL RATE: 101 BPM
EKG P AXIS: 60 DEGREES
EKG P-R INTERVAL: 152 MS
EKG Q-T INTERVAL: 360 MS
EKG QRS DURATION: 86 MS
EKG QTC CALCULATION (BAZETT): 466 MS
EKG R AXIS: -9 DEGREES
EKG T AXIS: 61 DEGREES
EKG VENTRICULAR RATE: 101 BPM
EOSINOPHIL # BLD: 0.14 K/UL (ref 0.05–0.5)
EOSINOPHIL # BLD: 0.34 K/UL (ref 0.05–0.5)
EOSINOPHILS RELATIVE PERCENT: 1 % (ref 0–6)
EOSINOPHILS RELATIVE PERCENT: 3 % (ref 0–6)
ERYTHROCYTE [DISTWIDTH] IN BLOOD BY AUTOMATED COUNT: 14.2 % (ref 11.5–15)
ERYTHROCYTE [DISTWIDTH] IN BLOOD BY AUTOMATED COUNT: 14.3 % (ref 11.5–15)
GFR SERPL CREATININE-BSD FRML MDRD: 4 ML/MIN/1.73M2
GFR SERPL CREATININE-BSD FRML MDRD: 4 ML/MIN/1.73M2
GLUCOSE SERPL-MCNC: 103 MG/DL (ref 74–99)
GLUCOSE SERPL-MCNC: 83 MG/DL (ref 74–99)
HCT VFR BLD AUTO: 28.4 % (ref 37–54)
HCT VFR BLD AUTO: 30 % (ref 37–54)
HGB BLD-MCNC: 8.9 G/DL (ref 12.5–16.5)
HGB BLD-MCNC: 9.6 G/DL (ref 12.5–16.5)
IMM GRANULOCYTES # BLD AUTO: 0.05 K/UL (ref 0–0.58)
IMM GRANULOCYTES # BLD AUTO: 0.05 K/UL (ref 0–0.58)
IMM GRANULOCYTES NFR BLD: 1 % (ref 0–5)
IMM GRANULOCYTES NFR BLD: 1 % (ref 0–5)
LACTATE BLDV-SCNC: 1 MMOL/L (ref 0.5–1.9)
LIPASE SERPL-CCNC: 41 U/L (ref 13–60)
LYMPHOCYTES NFR BLD: 0.68 K/UL (ref 1.5–4)
LYMPHOCYTES NFR BLD: 0.74 K/UL (ref 1.5–4)
LYMPHOCYTES RELATIVE PERCENT: 6 % (ref 20–42)
LYMPHOCYTES RELATIVE PERCENT: 8 % (ref 20–42)
MCH RBC QN AUTO: 33.1 PG (ref 26–35)
MCH RBC QN AUTO: 33.2 PG (ref 26–35)
MCHC RBC AUTO-ENTMCNC: 31.3 G/DL (ref 32–34.5)
MCHC RBC AUTO-ENTMCNC: 32 G/DL (ref 32–34.5)
MCV RBC AUTO: 103.4 FL (ref 80–99.9)
MCV RBC AUTO: 106 FL (ref 80–99.9)
METER GLUCOSE: 100 MG/DL (ref 74–99)
METER GLUCOSE: 124 MG/DL (ref 74–99)
METER GLUCOSE: 129 MG/DL (ref 74–99)
METER GLUCOSE: 51 MG/DL (ref 74–99)
MONOCYTES NFR BLD: 0.66 K/UL (ref 0.1–0.95)
MONOCYTES NFR BLD: 0.72 K/UL (ref 0.1–0.95)
MONOCYTES NFR BLD: 6 % (ref 2–12)
MONOCYTES NFR BLD: 7 % (ref 2–12)
NEUTROPHILS NFR BLD: 83 % (ref 43–80)
NEUTROPHILS NFR BLD: 84 % (ref 43–80)
NEUTS SEG NFR BLD: 8.07 K/UL (ref 1.8–7.3)
NEUTS SEG NFR BLD: 9.08 K/UL (ref 1.8–7.3)
PLATELET # BLD AUTO: 275 K/UL (ref 130–450)
PLATELET # BLD AUTO: 289 K/UL (ref 130–450)
PMV BLD AUTO: 9.1 FL (ref 7–12)
PMV BLD AUTO: 9.3 FL (ref 7–12)
POTASSIUM SERPL-SCNC: 6.1 MMOL/L (ref 3.5–5)
POTASSIUM SERPL-SCNC: 6.4 MMOL/L (ref 3.5–5)
PROT SERPL-MCNC: 6.3 G/DL (ref 6.4–8.3)
PROT SERPL-MCNC: 7 G/DL (ref 6.4–8.3)
RBC # BLD AUTO: 2.68 M/UL (ref 3.8–5.8)
RBC # BLD AUTO: 2.9 M/UL (ref 3.8–5.8)
SODIUM SERPL-SCNC: 137 MMOL/L (ref 132–146)
SODIUM SERPL-SCNC: 139 MMOL/L (ref 132–146)
TROPONIN I SERPL HS-MCNC: 122 NG/L (ref 0–11)
TROPONIN I SERPL HS-MCNC: 128 NG/L (ref 0–11)
WBC OTHER # BLD: 10.9 K/UL (ref 4.5–11.5)
WBC OTHER # BLD: 9.8 K/UL (ref 4.5–11.5)

## 2023-08-08 PROCEDURE — 6370000000 HC RX 637 (ALT 250 FOR IP): Performed by: EMERGENCY MEDICINE

## 2023-08-08 PROCEDURE — 90935 HEMODIALYSIS ONE EVALUATION: CPT

## 2023-08-08 PROCEDURE — 96375 TX/PRO/DX INJ NEW DRUG ADDON: CPT

## 2023-08-08 PROCEDURE — 80074 ACUTE HEPATITIS PANEL: CPT

## 2023-08-08 PROCEDURE — 2580000003 HC RX 258: Performed by: EMERGENCY MEDICINE

## 2023-08-08 PROCEDURE — 93010 ELECTROCARDIOGRAM REPORT: CPT | Performed by: INTERNAL MEDICINE

## 2023-08-08 PROCEDURE — 83605 ASSAY OF LACTIC ACID: CPT

## 2023-08-08 PROCEDURE — 6370000000 HC RX 637 (ALT 250 FOR IP): Performed by: INTERNAL MEDICINE

## 2023-08-08 PROCEDURE — 71275 CT ANGIOGRAPHY CHEST: CPT

## 2023-08-08 PROCEDURE — 6360000002 HC RX W HCPCS: Performed by: INTERNAL MEDICINE

## 2023-08-08 PROCEDURE — 71045 X-RAY EXAM CHEST 1 VIEW: CPT

## 2023-08-08 PROCEDURE — 85025 COMPLETE CBC W/AUTO DIFF WBC: CPT

## 2023-08-08 PROCEDURE — 84484 ASSAY OF TROPONIN QUANT: CPT

## 2023-08-08 PROCEDURE — 6360000002 HC RX W HCPCS: Performed by: EMERGENCY MEDICINE

## 2023-08-08 PROCEDURE — 6360000002 HC RX W HCPCS

## 2023-08-08 PROCEDURE — 99285 EMERGENCY DEPT VISIT HI MDM: CPT

## 2023-08-08 PROCEDURE — 96365 THER/PROPH/DIAG IV INF INIT: CPT

## 2023-08-08 PROCEDURE — 1200000000 HC SEMI PRIVATE

## 2023-08-08 PROCEDURE — 2500000003 HC RX 250 WO HCPCS: Performed by: EMERGENCY MEDICINE

## 2023-08-08 PROCEDURE — 70450 CT HEAD/BRAIN W/O DYE: CPT

## 2023-08-08 PROCEDURE — 82962 GLUCOSE BLOOD TEST: CPT

## 2023-08-08 PROCEDURE — 83690 ASSAY OF LIPASE: CPT

## 2023-08-08 PROCEDURE — 83880 ASSAY OF NATRIURETIC PEPTIDE: CPT

## 2023-08-08 PROCEDURE — 80053 COMPREHEN METABOLIC PANEL: CPT

## 2023-08-08 PROCEDURE — 6370000000 HC RX 637 (ALT 250 FOR IP)

## 2023-08-08 PROCEDURE — 86317 IMMUNOASSAY INFECTIOUS AGENT: CPT

## 2023-08-08 PROCEDURE — 2580000003 HC RX 258: Performed by: INTERNAL MEDICINE

## 2023-08-08 PROCEDURE — 6360000004 HC RX CONTRAST MEDICATION: Performed by: RADIOLOGY

## 2023-08-08 PROCEDURE — 93005 ELECTROCARDIOGRAM TRACING: CPT | Performed by: EMERGENCY MEDICINE

## 2023-08-08 RX ORDER — POTASSIUM CHLORIDE 20 MEQ/1
40 TABLET, EXTENDED RELEASE ORAL PRN
Status: DISCONTINUED | OUTPATIENT
Start: 2023-08-08 | End: 2023-08-08 | Stop reason: CLARIF

## 2023-08-08 RX ORDER — DEXTROSE MONOHYDRATE 100 MG/ML
INJECTION, SOLUTION INTRAVENOUS CONTINUOUS PRN
Status: DISCONTINUED | OUTPATIENT
Start: 2023-08-08 | End: 2023-08-10 | Stop reason: HOSPADM

## 2023-08-08 RX ORDER — SODIUM CHLORIDE 9 MG/ML
INJECTION, SOLUTION INTRAVENOUS PRN
Status: DISCONTINUED | OUTPATIENT
Start: 2023-08-08 | End: 2023-08-10 | Stop reason: HOSPADM

## 2023-08-08 RX ORDER — DILTIAZEM HYDROCHLORIDE 180 MG/1
180 CAPSULE, COATED, EXTENDED RELEASE ORAL DAILY
Status: DISCONTINUED | OUTPATIENT
Start: 2023-08-08 | End: 2023-08-09

## 2023-08-08 RX ORDER — SEVELAMER CARBONATE 800 MG/1
800 TABLET, FILM COATED ORAL PRN
Status: DISCONTINUED | OUTPATIENT
Start: 2023-08-08 | End: 2023-08-10 | Stop reason: HOSPADM

## 2023-08-08 RX ORDER — FENTANYL CITRATE 50 UG/ML
50 INJECTION, SOLUTION INTRAMUSCULAR; INTRAVENOUS ONCE
Status: COMPLETED | OUTPATIENT
Start: 2023-08-08 | End: 2023-08-08

## 2023-08-08 RX ORDER — MAGNESIUM SULFATE IN WATER 40 MG/ML
2000 INJECTION, SOLUTION INTRAVENOUS PRN
Status: DISCONTINUED | OUTPATIENT
Start: 2023-08-08 | End: 2023-08-08 | Stop reason: CLARIF

## 2023-08-08 RX ORDER — POTASSIUM CHLORIDE 7.45 MG/ML
10 INJECTION INTRAVENOUS PRN
Status: DISCONTINUED | OUTPATIENT
Start: 2023-08-08 | End: 2023-08-08 | Stop reason: CLARIF

## 2023-08-08 RX ORDER — ACETAMINOPHEN 650 MG/1
650 SUPPOSITORY RECTAL EVERY 6 HOURS PRN
Status: DISCONTINUED | OUTPATIENT
Start: 2023-08-08 | End: 2023-08-10 | Stop reason: HOSPADM

## 2023-08-08 RX ORDER — SENNOSIDES A AND B 8.6 MG/1
1 TABLET, FILM COATED ORAL DAILY PRN
Status: DISCONTINUED | OUTPATIENT
Start: 2023-08-08 | End: 2023-08-10 | Stop reason: HOSPADM

## 2023-08-08 RX ORDER — SEVELAMER CARBONATE 800 MG/1
800 TABLET, FILM COATED ORAL 2 TIMES DAILY WITH MEALS
Status: DISCONTINUED | OUTPATIENT
Start: 2023-08-08 | End: 2023-08-08

## 2023-08-08 RX ORDER — HYDRALAZINE HYDROCHLORIDE 20 MG/ML
10 INJECTION INTRAMUSCULAR; INTRAVENOUS EVERY 4 HOURS PRN
Status: DISCONTINUED | OUTPATIENT
Start: 2023-08-08 | End: 2023-08-10 | Stop reason: HOSPADM

## 2023-08-08 RX ORDER — SEVELAMER CARBONATE 800 MG/1
800 TABLET, FILM COATED ORAL
Status: DISCONTINUED | OUTPATIENT
Start: 2023-08-08 | End: 2023-08-10 | Stop reason: HOSPADM

## 2023-08-08 RX ORDER — SEVELAMER CARBONATE 800 MG/1
1 TABLET, FILM COATED ORAL
COMMUNITY

## 2023-08-08 RX ORDER — SODIUM CHLORIDE 0.9 % (FLUSH) 0.9 %
10 SYRINGE (ML) INJECTION EVERY 12 HOURS SCHEDULED
Status: DISCONTINUED | OUTPATIENT
Start: 2023-08-08 | End: 2023-08-10 | Stop reason: HOSPADM

## 2023-08-08 RX ORDER — LABETALOL HYDROCHLORIDE 5 MG/ML
20 INJECTION, SOLUTION INTRAVENOUS ONCE
Status: COMPLETED | OUTPATIENT
Start: 2023-08-08 | End: 2023-08-08

## 2023-08-08 RX ORDER — ACETAMINOPHEN 325 MG/1
650 TABLET ORAL EVERY 6 HOURS PRN
Status: DISCONTINUED | OUTPATIENT
Start: 2023-08-08 | End: 2023-08-10 | Stop reason: HOSPADM

## 2023-08-08 RX ORDER — ONDANSETRON 2 MG/ML
4 INJECTION INTRAMUSCULAR; INTRAVENOUS EVERY 6 HOURS PRN
Status: DISCONTINUED | OUTPATIENT
Start: 2023-08-08 | End: 2023-08-10 | Stop reason: HOSPADM

## 2023-08-08 RX ORDER — HEPARIN SODIUM 10000 [USP'U]/ML
5000 INJECTION, SOLUTION INTRAVENOUS; SUBCUTANEOUS EVERY 8 HOURS SCHEDULED
Status: DISCONTINUED | OUTPATIENT
Start: 2023-08-08 | End: 2023-08-10 | Stop reason: HOSPADM

## 2023-08-08 RX ORDER — LABETALOL HYDROCHLORIDE 5 MG/ML
20 INJECTION, SOLUTION INTRAVENOUS EVERY 4 HOURS PRN
Status: DISCONTINUED | OUTPATIENT
Start: 2023-08-08 | End: 2023-08-10 | Stop reason: HOSPADM

## 2023-08-08 RX ORDER — ONDANSETRON 4 MG/1
4 TABLET, ORALLY DISINTEGRATING ORAL EVERY 8 HOURS PRN
Status: DISCONTINUED | OUTPATIENT
Start: 2023-08-08 | End: 2023-08-10 | Stop reason: HOSPADM

## 2023-08-08 RX ORDER — SODIUM CHLORIDE 0.9 % (FLUSH) 0.9 %
10 SYRINGE (ML) INJECTION PRN
Status: DISCONTINUED | OUTPATIENT
Start: 2023-08-08 | End: 2023-08-10 | Stop reason: HOSPADM

## 2023-08-08 RX ORDER — HYDRALAZINE HYDROCHLORIDE 50 MG/1
100 TABLET, FILM COATED ORAL EVERY 8 HOURS SCHEDULED
Status: DISCONTINUED | OUTPATIENT
Start: 2023-08-08 | End: 2023-08-10 | Stop reason: HOSPADM

## 2023-08-08 RX ORDER — ONDANSETRON 2 MG/ML
4 INJECTION INTRAMUSCULAR; INTRAVENOUS ONCE
Status: COMPLETED | OUTPATIENT
Start: 2023-08-08 | End: 2023-08-08

## 2023-08-08 RX ADMIN — SEVELAMER CARBONATE 800 MG: 800 TABLET, FILM COATED ORAL at 16:53

## 2023-08-08 RX ADMIN — HYDROMORPHONE HYDROCHLORIDE 0.25 MG: 1 INJECTION, SOLUTION INTRAMUSCULAR; INTRAVENOUS; SUBCUTANEOUS at 22:29

## 2023-08-08 RX ADMIN — SODIUM BICARBONATE 50 MEQ: 84 INJECTION, SOLUTION INTRAVENOUS at 09:44

## 2023-08-08 RX ADMIN — HEPARIN SODIUM 5000 UNITS: 10000 INJECTION INTRAVENOUS; SUBCUTANEOUS at 21:51

## 2023-08-08 RX ADMIN — HYDROMORPHONE HYDROCHLORIDE 0.25 MG: 1 INJECTION, SOLUTION INTRAMUSCULAR; INTRAVENOUS; SUBCUTANEOUS at 14:09

## 2023-08-08 RX ADMIN — HYDRALAZINE HYDROCHLORIDE 100 MG: 50 TABLET, FILM COATED ORAL at 16:53

## 2023-08-08 RX ADMIN — HYDROMORPHONE HYDROCHLORIDE 0.25 MG: 1 INJECTION, SOLUTION INTRAMUSCULAR; INTRAVENOUS; SUBCUTANEOUS at 18:14

## 2023-08-08 RX ADMIN — DEXTROSE MONOHYDRATE 250 ML: 100 INJECTION, SOLUTION INTRAVENOUS at 09:43

## 2023-08-08 RX ADMIN — DEXTROSE MONOHYDRATE 125 ML: 100 INJECTION, SOLUTION INTRAVENOUS at 11:04

## 2023-08-08 RX ADMIN — ACETAMINOPHEN 650 MG: 325 TABLET ORAL at 18:20

## 2023-08-08 RX ADMIN — DILTIAZEM HYDROCHLORIDE 180 MG: 180 CAPSULE, COATED, EXTENDED RELEASE ORAL at 16:53

## 2023-08-08 RX ADMIN — ONDANSETRON 4 MG: 2 INJECTION INTRAMUSCULAR; INTRAVENOUS at 07:11

## 2023-08-08 RX ADMIN — FENTANYL CITRATE 50 MCG: 50 INJECTION, SOLUTION INTRAMUSCULAR; INTRAVENOUS at 11:00

## 2023-08-08 RX ADMIN — LABETALOL HYDROCHLORIDE 20 MG: 5 INJECTION INTRAVENOUS at 07:11

## 2023-08-08 RX ADMIN — HYDRALAZINE HYDROCHLORIDE 10 MG: 20 INJECTION INTRAMUSCULAR; INTRAVENOUS at 21:51

## 2023-08-08 RX ADMIN — FENTANYL CITRATE 50 MCG: 50 INJECTION, SOLUTION INTRAMUSCULAR; INTRAVENOUS at 07:11

## 2023-08-08 RX ADMIN — CALCIUM GLUCONATE 1000 MG: 98 INJECTION, SOLUTION INTRAVENOUS at 10:16

## 2023-08-08 RX ADMIN — INSULIN HUMAN 10 UNITS: 100 INJECTION, SOLUTION PARENTERAL at 09:44

## 2023-08-08 RX ADMIN — HEPARIN SODIUM 5000 UNITS: 10000 INJECTION INTRAVENOUS; SUBCUTANEOUS at 16:55

## 2023-08-08 RX ADMIN — IOPAMIDOL 75 ML: 755 INJECTION, SOLUTION INTRAVENOUS at 08:33

## 2023-08-08 ASSESSMENT — PAIN SCALES - GENERAL
PAINLEVEL_OUTOF10: 0
PAINLEVEL_OUTOF10: 8
PAINLEVEL_OUTOF10: 0

## 2023-08-08 ASSESSMENT — PAIN DESCRIPTION - DESCRIPTORS
DESCRIPTORS: ACHING
DESCRIPTORS: ACHING;DISCOMFORT;THROBBING
DESCRIPTORS: ACHING

## 2023-08-08 ASSESSMENT — PAIN DESCRIPTION - LOCATION
LOCATION: HEAD
LOCATION: NECK;HEAD
LOCATION: HEAD

## 2023-08-08 ASSESSMENT — PAIN DESCRIPTION - ORIENTATION
ORIENTATION: MID
ORIENTATION: MID

## 2023-08-08 ASSESSMENT — PAIN - FUNCTIONAL ASSESSMENT
PAIN_FUNCTIONAL_ASSESSMENT: 0-10
PAIN_FUNCTIONAL_ASSESSMENT: PREVENTS OR INTERFERES SOME ACTIVE ACTIVITIES AND ADLS

## 2023-08-08 NOTE — PROGRESS NOTES
4 Eyes Skin Assessment     NAME:  Fernie Hein YOB: 1970  MEDICAL RECORD NUMBER:  [id number]    The patient is being assessed for  Admission    I agree that at least one RN has performed a thorough Head to Toe Skin Assessment on the patient. ALL assessment sites listed below have been assessed. Areas assessed by both nurses:    Head, Face, Ears, Shoulders, Back, Chest, Arms, Elbows, Hands, Sacrum. Buttock, Coccyx, Ischium, Legs. Feet and Heels, and Under Medical Devices         Does the Patient have a Wound?  No noted wound(s)       Lobo Prevention initiated by RN: No  Wound Care Orders initiated by RN: No    Pressure Injury (Stage 3,4, Unstageable, DTI, NWPT, and Complex wounds) if present, place Wound referral order by RN under : No    New Ostomies, if present place, Ostomy referral order under : No     Nurse 1 eSignature: Electronically signed by Roseann Dubon RN on 8/8/23 at 5:10 PM EDT    **SHARE this note so that the co-signing nurse can place an eSignature**    Nurse 2 eSignature: Electronically signed by Werner Zhu RN on 8/8/23 at 6:20 PM EDT

## 2023-08-08 NOTE — H&P
regurgitation. RVSP is 23 mmHg. No evidence to suggest pulmonary hypertension. Physiologic and/or trace pulmonic regurgitation present. There is a minimal to small pericardial effusion. Technically good quality study. No comparison study available. Suggest clinical correlation. Recent Radiological Studies:  CT HEAD WO CONTRAST   Final Result   No acute intracranial abnormality. Specifically, there is no acute   intracranial hemorrhage. CTA PULMONARY W CONTRAST   Final Result   1. Diffuse bilateral airspace disease in a patient with cardiomegaly and   dialysis catheter. The finding is favored to represent CHF/volume overload. 2. Small right pleural effusion and trace left pleural effusion. 3. There is no focal consolidation seen within the right or left lungs to   suggest pneumonia. 4. There is no pulmonary embolus. XR CHEST PORTABLE   Final Result          Assessment  Active Hospital Problems    Diagnosis     ESRD (end stage renal disease) ([address]) [N18.6]      Priority: High    Acute heart failure with preserved ejection fraction (HCC) [I50.31]      Priority: High    Hypertensive crisis [I16.9]      Priority: Medium    Hypertension [I10]      Priority: Low       Plan  Hypertensive urgency, likely due to fluid overload  CT head noted  Hydralazine 100mg q8 at home  Labetalol in ER  Monitor BP  Defer further BP meds to renal    ESRD on HD:   Defer HD, electrolyte replacement, and fluid management to renal  Follow electrolytes  TTS HD patient    CHF  CXR, CTA pulm noted  Pro-BNP noted  Telemetry  Cardiology consult  Defer need for echo to cardio  Follow I&O's  Fluid removal w/ HD    Continue home medications. Follow labs  DVT prophylaxis with heparin  Please see orders for further management and care.    for discharge planning  Discharge plan: TBD pending clinical improvement     David Rankin MD  8/8/2023  12:20 PM    I can be reached through [email or web address]. NOTE:  This report was transcribed using voice recognition software. Every effort was made to ensure accuracy; however, inadvertent computerized transcription errors may be present. Addendum: I have personally participated in a face-to-face history and physical exam on the date of service with the patient. I have discussed the case with the resident. I also participated in medical decision making with the resident on the date of service and I agree with all of the pertinent clinical information unless indicated in my editing of the note. I have reviewed and edited the note above based on my findings during my history, exam, and decision making on the same day of service. My additional thoughts:   Seen in HD  H/o ESRD due to HTN  SOB yesterday when laying flat  HA for a few days  Blurry vision yesterday  He has not missed any home meds  High BP in ED  Taken right to HD  Cardio and renal consults  Watch K-- should be better after HD though   Likely home tomorrow     Electronically signed by Zahra Birmingham DO on 8/8/2023 at 12:44 PM    I can be reached through [employer].

## 2023-08-08 NOTE — ED PROVIDER NOTES
[address] ENCOUNTER        Pt Name: Snow Dior MRN: [id number]  Birthdate 1970  Date of evaluation: 8/8/2023  Provider: Adriane Delaney DO  PCP: Yari Mcneil MD  Note Started: 7:49 AM EDT 8/8/23    CHIEF COMPLAINT       Chief Complaint   Patient presents with    Shortness of Breath    Nausea    Headache     Blurred vision, headache unrelieved with tylenol. HISTORY OF PRESENT ILLNESS: 1 or more Elements   History From: Patient    Limitations to history : None  Social Determinants : None    Snow Dior is a 48 y.o. male who presents with SOB and headache. He states the headache started a few days ago, but got much worse yesterday. He also complains of blurry vision and SOB. The SOB occurs when he lays down. He denies loss of vision or black spots in vision. He also has some nausea at times. He has a hx of CKD and is on dialysis. Denies any fever, chills, dizziness, neck tenderness or stiffness, weakness, cp, palpitations, leg swelling/tenderness, cough, abd pain, dysuria, hematuria, diarrhea, constipation, bloody stools.     Nursing Notes were all reviewed and agreed with or any disagreements were addressed in the HPI.    ROS:   Pertinent positives and negatives are stated within HPI, all other systems reviewed and are negative.      --------------------------------------------- PAST HISTORY ---------------------------------------------  Past Medical History:  has a past medical history of Acute heart failure with preserved ejection fraction ([address]), CONY (acute kidney injury) ([address]), Anemia in chronic kidney disease, on chronic dialysis ([address]), Anemia in stage 5 chronic kidney disease, not on chronic dialysis ([address]), Asymmetric septal hypertrophy ([address]), BPH (benign prostatic hyperplasia), CKD (chronic kidney disease) stage 5, GFR less than 15 ml/min ([address]), CKD (chronic kidney disease) stage V requiring ---------------------------------------------------PHYSICAL EXAM--------------------------------------        Constitutional/General: Alert and oriented x3, non toxic in NAD  Head: Normocephalic and atraumatic  Eyes: EOMI  Mouth: Oropharynx clear, handling secretions  Neck: Supple, full ROM  Pulmonary: Lungs clear to auscultation bilaterally, no wheezes, rales, or rhonchi. Not in respiratory distress  Cardiovascular:  Regular rate. Regular rhythm. No murmurs. Abdomen: Soft. Non tender. Non distended. Musculoskeletal: Moves all extremities x 4. Neurovascularly intact. Skin: warm and dry. No rashes. Neurologic: GCS 15, no gross focal neurologic deficits. Facial symmetry. Speech clear. Cranial nerves intact. No focal motor or sensory deficits. Psych: Normal Affect    -------------------------------------------------- RESULTS -------------------------------------------------  I have personally reviewed all laboratory and imaging results for this patient. Results are listed below.      LABS:  Results for orders placed or performed during the hospital encounter of 08/08/23   CBC with Auto Differential   Result Value Ref Range    WBC 10.9 4.5 - 11.5 k/uL    RBC 2.90 (L) 3.80 - 5.80 m/uL    Hemoglobin 9.6 (L) 12.5 - 16.5 g/dL    Hematocrit 30.0 (L) 37.0 - 54.0 %    .4 (H) 80.0 - 99.9 fL    MCH 33.1 26.0 - 35.0 pg    MCHC 32.0 32.0 - 34.5 g/dL    RDW 14.3 11.5 - 15.0 %    Platelets 585 775 - 301 k/uL    MPV 9.1 7.0 - 12.0 fL    Neutrophils % 84 (H) 43.0 - 80.0 %    Lymphocytes % 6 (L) 20.0 - 42.0 %    Monocytes % 6 2.0 - 12.0 %    Eosinophils % 3 0 - 6 %    Basophils % 1 0.0 - 2.0 %    Immature Granulocytes 1 0.0 - 5.0 %    Neutrophils Absolute 9.08 (H) 1.80 - 7.30 k/uL    Lymphocytes Absolute 0.68 (L) 1.50 - 4.00 k/uL    Monocytes Absolute 0.66 0.10 - 0.95 k/uL    Eosinophils Absolute 0.34 0.05 - 0.50 k/uL    Basophils Absolute 0.05 0.00 - 0.20 k/uL    Absolute Immature Granulocyte 0.05 0.00 - 0.58

## 2023-08-08 NOTE — ED PROVIDER NOTES
He is nontoxic-appearing. CT angiogram negative for pulmonary embolism. Chest x-ray per my interpretation shows no pneumothorax. Consult placed to nephrology as well as internal medicine. Amount and/or Complexity of Data Reviewed  Labs: ordered. Radiology: ordered. ECG/medicine tests: ordered. Risk  OTC drugs. Prescription drug management. Decision regarding hospitalization. CONSULTS:   IP CONSULT TO NEPHROLOGY  IP CONSULT TO INTERNAL MEDICINE  IP CONSULT TO CARDIOLOGY            PROCEDURES   Unless otherwise noted below, none      CRITICAL CARE TIME (.cct)   CRITICAL CARE:  Please note that the withdrawal or failure to initiate urgent interventions for this patient would likely result in a life threatening deterioration or permanent disability. Accordingly this patient received 30 minutes of critical care time, including coordination of care, and direct bedside care and excluding separately billable procedures. I am the Primary Clinician of Record. FINAL IMPRESSION      1. Hypertensive emergency    2. Hyperkalemia    3. ESRD (end stage renal disease) ([address])          DISPOSITION/PLAN     DISPOSITION Admitted 08/08/2023 10:49:33 AM      PATIENT REFERRED TO:  No follow-up provider specified.     DISCHARGE MEDICATIONS:  Current Discharge Medication List          DISCONTINUED MEDICATIONS:  Current Discharge Medication List                 (Please note that portions of this note were completed with a voice recognition program.  Efforts were made to edit the dictations but occasionally words are mis-transcribed.)    Ashley Arzate MD (electronically signed)           Sandre Phillips MD  08/08/23 0044

## 2023-08-08 NOTE — CONSULTS
Nephrology Progress Note  8/8/2023 4:18 PM  Subjective:   Admit Date: 8/8/2023  PCP: Damian Vega MD  Interval History: Full consult deferred as we are seeing pt inpt during the April, May and June  admission as well as in the outpt dialysis unit. Pt has a Known HX ESRD was on PD , GI screening colonoscopy suggested CA colon , s/p LAPAROSCOPIC ROBOTIC XI ASSISTED RIGHT COLECTOMY  4/7/2023 . 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 Pt just had the abd drain removed in June . After  the abd drain was removed there was  purulent drainage in the PB catheter and the pain in his abd has moved to the LLQ. He had the PD catheter removed 6/16/23. 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 he was treated with oral Linezolid, Metronidazole and Cefuroxime for 2 weeks. Pt had been doing well until apprx 3 weeks ago when he began to develop HA. Over the last several days the HA's have gotten progressively worse and he notes worsening BP control. Pt states he has not missed any IHD sessions as an outpt. He has associated nausea with the HA but no emesis, fever, chills, diarrhea. He presented to [facility] ED 8/8/23 with SOB and HA. BP in the ED initially 212/126 with  most recent BP down to 165/97. K+ 6.4 and repeat 6.1    Diet: ADULT DIET; Regular; No Added Salt (3-4 gm);  Low Potassium (Less than 3000 mg/day)    Data:   Scheduled Meds:   sodium chloride flush  10 mL IntraVENous 2 times per day    heparin (porcine)  5,000 Units SubCUTAneous 3 times per day    hydrALAZINE  100 mg Oral 3 times per day    [START ON 8/9/2023] sodium zirconium cyclosilicate  5 g Oral Once per day on Mon Wed Fri    sevelamer  800 mg Oral TID WC    dilTIAZem  180 mg Oral Daily       Continuous Infusions:   dextrose      sodium chloride         PRN Meds:glucose, dextrose bolus **OR** dextrose bolus, glucagon (rDNA), dextrose, hydrALAZINE, labetalol, sodium chloride flush, sodium chloride, disease) stage 5, GFR less than 15 ml/min (HCC)     Anemia in chronic kidney disease, on chronic dialysis (HCC)     SBP (spontaneous bacterial peritonitis) ([address])     CKD (chronic kidney disease) stage V requiring chronic dialysis (HCC)     NSTEMI (non-ST elevated myocardial infarction) ([address])     Malignant neoplasm of ascending colon (HCC)     Sinus tachycardia     Epigastric pain     ESRD (end stage renal disease) ([address])     Intra-abdominal abscess (HCC)     Thoracic aortic aneurysm without rupture (HCC)     Grade I diastolic dysfunction    Plan:     Assessment/Plan     1) ESKD  hemodialysis planned TTS via tunneled catheter   PLAN:  1. IHD 8/8/23 - with 3.5L vol removal targeted  2. Follow Labs  3. Next dialysis in the AM     2) CA colon   rt hemicolectomy ( 4/7/2023) s/p LAPAROSCOPIC ROBOTIC XI ASSISTED RIGHT COLECTOMY  4/7/2023 . He developed a VR Enterococcus Faecium intraabd abcess and was treated with Daptomycin  6/16/23 S/P [place]ch[place]off Catheter removal  PD drainage Gram (-) cocci-Cultures grew Neisseria sicca and VRE faecium. he was treated with oral Linezolid, Metronidazole and Cefuroxime for 2 weeks. 3) HTN of ESKD  Goal < 140/90-above goal  PLAN:  Continue hydralazine 100mg po tid  Diltiazem 180mg qd initiated 8/8/23  Follow BP  Vol removal on IHD     4) Anemia of CKD  HgB goal 10-12-below goal  6/15/23 Ferritin 1197 with an Iron Sat 16%-no IV Fe++  PLAN:  1. Follow CBC  2. transfuse if HB drops < 7   3. Hold  pablo due to Colon Ca    5). Sec hyperparathyroidism of Renal Origin  PLAN:  Continue On renvela  2. Follow Ca++ and PO4    6) Hyperkalemia  K+ 6.4, 6.1  PLAN:  On a 2.0K+ bath for the hyperkalemia  Follow K+        Thank you for allowing us to participate in the care of Tea Gomez.        Kirk Aceves MD

## 2023-08-09 LAB
ALBUMIN SERPL-MCNC: 4.2 G/DL (ref 3.5–5.2)
ALP SERPL-CCNC: 62 U/L (ref 40–129)
ALT SERPL-CCNC: 26 U/L (ref 0–40)
ANION GAP SERPL CALCULATED.3IONS-SCNC: 15 MMOL/L (ref 7–16)
AST SERPL-CCNC: 15 U/L (ref 0–39)
BASOPHILS # BLD: 0.07 K/UL (ref 0–0.2)
BASOPHILS NFR BLD: 1 % (ref 0–2)
BILIRUB SERPL-MCNC: 0.3 MG/DL (ref 0–1.2)
BUN SERPL-MCNC: 32 MG/DL (ref 6–20)
CALCIUM SERPL-MCNC: 9.7 MG/DL (ref 8.6–10.2)
CHLORIDE SERPL-SCNC: 94 MMOL/L (ref 98–107)
CO2 SERPL-SCNC: 25 MMOL/L (ref 22–29)
CREAT SERPL-MCNC: 9.1 MG/DL (ref 0.7–1.2)
EOSINOPHIL # BLD: 0.35 K/UL (ref 0.05–0.5)
EOSINOPHILS RELATIVE PERCENT: 5 % (ref 0–6)
ERYTHROCYTE [DISTWIDTH] IN BLOOD BY AUTOMATED COUNT: 14.3 % (ref 11.5–15)
GFR SERPL CREATININE-BSD FRML MDRD: 6 ML/MIN/1.73M2
GLUCOSE SERPL-MCNC: 93 MG/DL (ref 74–99)
HAV IGM SERPL QL IA: NONREACTIVE
HBV CORE IGM SERPL QL IA: NONREACTIVE
HBV SURFACE AB SERPL IA-ACNC: 120.08 MIU/ML (ref 0–9.99)
HBV SURFACE AG SERPL QL IA: NONREACTIVE
HCT VFR BLD AUTO: 31.1 % (ref 37–54)
HCV AB SERPL QL IA: NONREACTIVE
HGB BLD-MCNC: 10 G/DL (ref 12.5–16.5)
IMM GRANULOCYTES # BLD AUTO: 0.04 K/UL (ref 0–0.58)
IMM GRANULOCYTES NFR BLD: 1 % (ref 0–5)
LYMPHOCYTES NFR BLD: 0.95 K/UL (ref 1.5–4)
LYMPHOCYTES RELATIVE PERCENT: 13 % (ref 20–42)
MAGNESIUM SERPL-MCNC: 2.4 MG/DL (ref 1.6–2.6)
MCH RBC QN AUTO: 32.6 PG (ref 26–35)
MCHC RBC AUTO-ENTMCNC: 32.2 G/DL (ref 32–34.5)
MCV RBC AUTO: 101.3 FL (ref 80–99.9)
METER GLUCOSE: 115 MG/DL (ref 74–99)
METER GLUCOSE: 131 MG/DL (ref 74–99)
MONOCYTES NFR BLD: 0.69 K/UL (ref 0.1–0.95)
MONOCYTES NFR BLD: 10 % (ref 2–12)
NEUTROPHILS NFR BLD: 71 % (ref 43–80)
NEUTS SEG NFR BLD: 5.05 K/UL (ref 1.8–7.3)
PHOSPHATE SERPL-MCNC: 7.3 MG/DL (ref 2.5–4.5)
PLATELET # BLD AUTO: 261 K/UL (ref 130–450)
PMV BLD AUTO: 9.3 FL (ref 7–12)
POTASSIUM SERPL-SCNC: 4.8 MMOL/L (ref 3.5–5)
POTASSIUM SERPL-SCNC: 6.4 MMOL/L (ref 3.5–5)
PROT SERPL-MCNC: 7.2 G/DL (ref 6.4–8.3)
RBC # BLD AUTO: 3.07 M/UL (ref 3.8–5.8)
SODIUM SERPL-SCNC: 134 MMOL/L (ref 132–146)
WBC OTHER # BLD: 7.2 K/UL (ref 4.5–11.5)

## 2023-08-09 PROCEDURE — 6370000000 HC RX 637 (ALT 250 FOR IP): Performed by: INTERNAL MEDICINE

## 2023-08-09 PROCEDURE — 84132 ASSAY OF SERUM POTASSIUM: CPT

## 2023-08-09 PROCEDURE — 6360000002 HC RX W HCPCS

## 2023-08-09 PROCEDURE — 90935 HEMODIALYSIS ONE EVALUATION: CPT

## 2023-08-09 PROCEDURE — 6370000000 HC RX 637 (ALT 250 FOR IP)

## 2023-08-09 PROCEDURE — 5A1D70Z PERFORMANCE OF URINARY FILTRATION, INTERMITTENT, LESS THAN 6 HOURS PER DAY: ICD-10-PCS | Performed by: INTERNAL MEDICINE

## 2023-08-09 PROCEDURE — 80053 COMPREHEN METABOLIC PANEL: CPT

## 2023-08-09 PROCEDURE — 1200000000 HC SEMI PRIVATE

## 2023-08-09 PROCEDURE — 82962 GLUCOSE BLOOD TEST: CPT

## 2023-08-09 PROCEDURE — 2580000003 HC RX 258

## 2023-08-09 PROCEDURE — 83735 ASSAY OF MAGNESIUM: CPT

## 2023-08-09 PROCEDURE — 6360000002 HC RX W HCPCS: Performed by: INTERNAL MEDICINE

## 2023-08-09 PROCEDURE — 6370000000 HC RX 637 (ALT 250 FOR IP): Performed by: NURSE PRACTITIONER

## 2023-08-09 PROCEDURE — 85025 COMPLETE CBC W/AUTO DIFF WBC: CPT

## 2023-08-09 PROCEDURE — 84100 ASSAY OF PHOSPHORUS: CPT

## 2023-08-09 RX ORDER — DILTIAZEM HYDROCHLORIDE 240 MG/1
240 CAPSULE, COATED, EXTENDED RELEASE ORAL DAILY
Status: DISCONTINUED | OUTPATIENT
Start: 2023-08-09 | End: 2023-08-10 | Stop reason: HOSPADM

## 2023-08-09 RX ORDER — OXYCODONE HYDROCHLORIDE AND ACETAMINOPHEN 5; 325 MG/1; MG/1
1 TABLET ORAL EVERY 6 HOURS PRN
Status: DISCONTINUED | OUTPATIENT
Start: 2023-08-09 | End: 2023-08-10 | Stop reason: HOSPADM

## 2023-08-09 RX ADMIN — HYDROMORPHONE HYDROCHLORIDE 0.25 MG: 1 INJECTION, SOLUTION INTRAMUSCULAR; INTRAVENOUS; SUBCUTANEOUS at 16:19

## 2023-08-09 RX ADMIN — DILTIAZEM HYDROCHLORIDE 240 MG: 240 CAPSULE, COATED, EXTENDED RELEASE ORAL at 09:33

## 2023-08-09 RX ADMIN — ACETAMINOPHEN 650 MG: 325 TABLET ORAL at 17:42

## 2023-08-09 RX ADMIN — HEPARIN SODIUM 5000 UNITS: 10000 INJECTION INTRAVENOUS; SUBCUTANEOUS at 06:36

## 2023-08-09 RX ADMIN — SODIUM CHLORIDE, PRESERVATIVE FREE 10 ML: 5 INJECTION INTRAVENOUS at 11:25

## 2023-08-09 RX ADMIN — HYDROMORPHONE HYDROCHLORIDE 0.25 MG: 1 INJECTION, SOLUTION INTRAMUSCULAR; INTRAVENOUS; SUBCUTANEOUS at 20:27

## 2023-08-09 RX ADMIN — SODIUM CHLORIDE, PRESERVATIVE FREE 10 ML: 5 INJECTION INTRAVENOUS at 09:35

## 2023-08-09 RX ADMIN — SODIUM ZIRCONIUM CYCLOSILICATE 5 G: 5 POWDER, FOR SUSPENSION ORAL at 09:34

## 2023-08-09 RX ADMIN — HYDRALAZINE HYDROCHLORIDE 100 MG: 50 TABLET, FILM COATED ORAL at 22:38

## 2023-08-09 RX ADMIN — ACETAMINOPHEN 650 MG: 325 TABLET ORAL at 09:33

## 2023-08-09 RX ADMIN — SEVELAMER CARBONATE 800 MG: 800 TABLET, FILM COATED ORAL at 06:31

## 2023-08-09 RX ADMIN — OXYCODONE AND ACETAMINOPHEN 1 TABLET: 5; 325 TABLET ORAL at 06:44

## 2023-08-09 RX ADMIN — HYDROMORPHONE HYDROCHLORIDE 0.25 MG: 1 INJECTION, SOLUTION INTRAMUSCULAR; INTRAVENOUS; SUBCUTANEOUS at 11:25

## 2023-08-09 RX ADMIN — SEVELAMER CARBONATE 800 MG: 800 TABLET, FILM COATED ORAL at 16:19

## 2023-08-09 RX ADMIN — SEVELAMER CARBONATE 800 MG: 800 TABLET, FILM COATED ORAL at 11:25

## 2023-08-09 RX ADMIN — SODIUM CHLORIDE, PRESERVATIVE FREE 10 ML: 5 INJECTION INTRAVENOUS at 20:27

## 2023-08-09 RX ADMIN — HYDROMORPHONE HYDROCHLORIDE 0.25 MG: 1 INJECTION, SOLUTION INTRAMUSCULAR; INTRAVENOUS; SUBCUTANEOUS at 02:47

## 2023-08-09 RX ADMIN — HYDRALAZINE HYDROCHLORIDE 100 MG: 50 TABLET, FILM COATED ORAL at 06:35

## 2023-08-09 RX ADMIN — OXYCODONE AND ACETAMINOPHEN 1 TABLET: 5; 325 TABLET ORAL at 00:46

## 2023-08-09 RX ADMIN — HEPARIN SODIUM 5000 UNITS: 10000 INJECTION INTRAVENOUS; SUBCUTANEOUS at 22:30

## 2023-08-09 ASSESSMENT — PAIN DESCRIPTION - DESCRIPTORS
DESCRIPTORS: ACHING;BURNING;CRAMPING
DESCRIPTORS: ACHING;DISCOMFORT;POUNDING
DESCRIPTORS: ACHING;DISCOMFORT
DESCRIPTORS: PRESSURE;THROBBING
DESCRIPTORS: BURNING;ACHING;CRAMPING

## 2023-08-09 ASSESSMENT — PAIN SCALES - GENERAL
PAINLEVEL_OUTOF10: 1
PAINLEVEL_OUTOF10: 2
PAINLEVEL_OUTOF10: 2
PAINLEVEL_OUTOF10: 6
PAINLEVEL_OUTOF10: 4
PAINLEVEL_OUTOF10: 6
PAINLEVEL_OUTOF10: 4
PAINLEVEL_OUTOF10: 8
PAINLEVEL_OUTOF10: 3
PAINLEVEL_OUTOF10: 4

## 2023-08-09 ASSESSMENT — PAIN DESCRIPTION - LOCATION
LOCATION: HEAD

## 2023-08-09 ASSESSMENT — PAIN DESCRIPTION - FREQUENCY: FREQUENCY: CONTINUOUS

## 2023-08-09 ASSESSMENT — PAIN DESCRIPTION - ORIENTATION: ORIENTATION: ANTERIOR

## 2023-08-09 ASSESSMENT — PAIN DESCRIPTION - ONSET: ONSET: ON-GOING

## 2023-08-09 ASSESSMENT — PAIN DESCRIPTION - PAIN TYPE: TYPE: ACUTE PAIN

## 2023-08-09 NOTE — PROGRESS NOTES
[facility] at [address]  progress    Name: Keshia Crowe. Age: 48 y.o. Date of Admission: 8/8/2023  6:09 AM    Date of Service: 8/9/2023    Reason for Consultation: Hypertensive urgency    Referring Physician: Dr Andie Vigil  Primary Care Physician: Kevin Hines MD    History of Present Illness: The patient is a 48y.o. year old male with end-stage renal disease and hypertension presenting to the hospital for headache and chest tightness in the face of marked hypertension 212/126. States he started feeling poorly yesterday. States he did have his dialysis on Saturday, states he took his hydralazine today-takes 100 mg 3 times a day. He was seen once by [facility] cardiology 3/9/2023 for palpitations at which time he was taking hydralazine 100 mg 3 times a day and diltiazem 240 mg twice a day. Was suppose to have a 7 day event recorder but never got it. Back in Jan 2023 was on coreg 25 bid. Unclear when med changes were made. He states the diltiazem was discontinued since that visit- looks to be between May and June this year. States been on dialysis for 2 years, due to be evaluated for AV fistula in September 8/9/23:  No new complaints, still with HA but better. Ate breakfast ok, no chest pain /SOB. BPs a bit better. K+ 6.4, BUN/Cr 32/9.1  Tele sinus rhythm 90's.     Past Medical History:   Past Medical History:   Diagnosis Date    Acute heart failure with preserved ejection fraction ([address]) 09/11/2020    CONY (acute kidney injury) ([address]) 09/10/2020    Anemia in chronic kidney disease, on chronic dialysis ([address]) 12/05/2020    Anemia in stage 5 chronic kidney disease, not on chronic dialysis ([address]) 12/05/2020    Asymmetric septal hypertrophy ([address]) 09/10/2020    2D echo    BPH (benign prostatic hyperplasia) 09/11/2020    CKD (chronic kidney disease) stage 5, GFR less than 15 ml/min ([address]) 12/05/2020    CKD (chronic kidney disease) stage V requiring chronic dialysis ([address]) chest  Musculoskeletal: normal tone and strength in the upper and lower extremities bilaterally  Neuro: No focal deficits. Moves all extremities appropriately to command. Normal sensation in the upper and lower extremities bilaterally  Psychiatric: Cooperative, and normal affect    Intake/Output:    Intake/Output Summary (Last 24 hours) at 8/9/2023 0805  Last data filed at 8/8/2023 1600  Gross per 24 hour   Intake 752.02 ml   Output 3800 ml   Net -3047.98 ml     No intake/output data recorded. Laboratory Tests:  Last 3 CBC:  Recent Labs     08/08/23  0703 08/08/23  1145 08/09/23  0257   WBC 10.9 9.8 7.2   RBC 2.90* 2.68* 3.07*   HGB 9.6* 8.9* 10.0*   HCT 30.0* 28.4* 31.1*   .4* 106.0* 101.3*   MCH 33.1 33.2 32.6   MCHC 32.0 31.3* 32.2   RDW 14.3 14.2 14.3    275 261   MPV 9.1 9.3 9.3       Last 3 CMP:    Recent Labs     08/08/23  0703 08/08/23  1145 08/09/23  0257    137 134   K 6.4* 6.1* 6.4*    99 94*   CO2 19* 22 25   BUN 63* 66* 32*   CREATININE 13.0* 13.4* 9.1*   GLUCOSE 103* 83 93   CALCIUM 9.7 9.6 9.7   PROT 7.0 6.3* 7.2   LABALBU 4.1 3.8 4.2   BILITOT 0.3 0.2 0.3   ALKPHOS 59 56 62   AST 14 13 15   ALT 27 22 26       Last 3 Mag/Phos:  Recent Labs     08/09/23  0257   MG 2.4   PHOS 7.3*       Last 3 CK, CKMB, Troponin  No results for input(s): CKTOTAL, CKMB, TROPONINI in the last 72 hours. Last 3 Pro-BNP:  Recent Labs     08/08/23 0703   PROBNP 17,227*     Lab Results   Component Value Date    PROBNP 17,227 (H) 08/08/2023       Last 3 Glucose:     Recent Labs     08/08/23  0703 08/08/23  1145 08/09/23  0257   GLUCOSE 103* 83 93       Last 3 Coags:  No results for input(s): PROTIME, INR, PTT in the last 72 hours. Lab Results   Component Value Date/Time    PROTIME 17.9 04/28/2023 01:56 PM    INR 1.6 04/28/2023 01:56 PM       Last 3 Lipid Panel:  No results for input(s): LDLCALC, HDL, TRIG in the last 72 hours.     Invalid input(s): CHLPL  Lab Results   Component Value Date

## 2023-08-09 NOTE — CARE COORDINATION
Social Work / Discharge Planning : SW met with patient and explained role as discharge planner/ transition of care. Patient admitted with Hypertensive Crisis. Patient verified plan at discharge is HOME where he resides independently. Patient does do HD at [address] [place] [address]. SW called [employer]  [phone] and updated them on admission. They will need called at discharge. Patient has an established PCP as well as insurance. Await treatment plan. SW to follow.  Electronically signed by RAE Wu on 8/9/23 at 9:41 AM EDT

## 2023-08-09 NOTE — ACP (ADVANCE CARE PLANNING)
Advance Care Planning   Healthcare Decision Maker:    Primary Decision Maker (Active): Vega Manuel - Parent - [phone]    Click here to complete Healthcare Decision Makers including selection of the Healthcare Decision Maker Relationship (ie \"Primary\"). Today we documented Decision Maker(s) consistent with ACP documents on file.        Vega Manuel Parent [phone]

## 2023-08-09 NOTE — PROGRESS NOTES
Called placed to Warden Sung NP re: pt requesting something more for pain as he feels his pain is still uncontrolled. See new orders.

## 2023-08-09 NOTE — PROGRESS NOTES
Internal Medicine Progress Note    Patient's name: Jeane De Anda. : 1970  Chief complaints (on day of admission): Shortness of Breath, Nausea, and Headache (Blurred vision, headache unrelieved with tylenol.)  Admission date: 2023  Date of service: 2023   Room: [address]  Primary care physician: Praneeth Diaz MD  Reason for visit: Follow-up for hyperkalemia    Subjective  Toñito was seen and examined. Patient still with pain in the head with headache. Was having improvement with tylenol. No fever, chills, cp, sob, n/v. Patient seen during dialysis. Starting to have some appetite. Review of Systems  There are no new complaints of chest pain, shortness of breath, abdominal pain, nausea, vomiting, diarrhea, constipation.     Hospital Medications  Current Facility-Administered Medications   Medication Dose Route Frequency Provider Last Rate Last Admin    oxyCODONE-acetaminophen (PERCOCET) 5-325 MG per tablet 1 tablet  1 tablet Oral Q6H PRN VIC Benedict - CNP   1 tablet at 23 0644    dilTIAZem (CARDIZEM CD) extended release capsule 240 mg  240 mg Oral Daily Ema Abdi MD   240 mg at 23 0933    glucose chewable tablet 16 g  4 tablet Oral PRN Mady Kennedy MD        dextrose bolus 10% 125 mL  125 mL IntraVENous PRN Mady Kennedy MD   Stopped at 23 1112    Or    dextrose bolus 10% 250 mL  250 mL IntraVENous PRN Mady Kennedy MD        glucagon injection 1 mg  1 mg SubCUTAneous PRN Mady Kennedy MD        dextrose 10 % infusion   IntraVENous Continuous PRN Mady Kennedy MD        hydrALAZINE (APRESOLINE) injection 10 mg  10 mg IntraVENous Q4H PRN Lm Louis MD   10 mg at 23 2151    labetalol (NORMODYNE;TRANDATE) injection 20 mg  20 mg IntraVENous Q4H PRN Lm Louis MD        sodium chloride flush 0.9 % injection 10 mL  10 mL IntraVENous 2 times per day Tessy Lamb MD   10 mL at 23 0935    sodium chloride flush 0.9 % further BP meds to renal  Starting to improve     ESRD on HD:   Defer HD, electrolyte replacement, and fluid management to renal  Follow electrolytes  TTS HD patient     CHF  CXR, CTA pulm noted  Pro-BNP noted  Telemetry  Cardiology consult  Defer need for echo to cardio  Follow I&O's  Fluid removal w/ HD     PT AM-PAC-- pending  Follow labs   DVT prophylaxis  Please see orders for further management and care. Discharge plan: pending clinical improvement    Electronically signed by Ivan Mendiola MD on 8/9/2023 at 2:44 PM    I can be reached through [facility].

## 2023-08-10 VITALS
HEIGHT: 72 IN | WEIGHT: 192.02 LBS | RESPIRATION RATE: 18 BRPM | OXYGEN SATURATION: 97 % | BODY MASS INDEX: 26.01 KG/M2 | TEMPERATURE: 98.6 F | HEART RATE: 100 BPM | DIASTOLIC BLOOD PRESSURE: 85 MMHG | SYSTOLIC BLOOD PRESSURE: 133 MMHG

## 2023-08-10 LAB
ALBUMIN SERPL-MCNC: 4.2 G/DL (ref 3.5–5.2)
ALP SERPL-CCNC: 57 U/L (ref 40–129)
ALT SERPL-CCNC: 27 U/L (ref 0–40)
ANION GAP SERPL CALCULATED.3IONS-SCNC: 20 MMOL/L (ref 7–16)
AST SERPL-CCNC: 15 U/L (ref 0–39)
BASOPHILS # BLD: 0.08 K/UL (ref 0–0.2)
BASOPHILS NFR BLD: 2 % (ref 0–2)
BILIRUB SERPL-MCNC: 0.2 MG/DL (ref 0–1.2)
BUN SERPL-MCNC: 25 MG/DL (ref 6–20)
CALCIUM SERPL-MCNC: 9.8 MG/DL (ref 8.6–10.2)
CHLORIDE SERPL-SCNC: 93 MMOL/L (ref 98–107)
CO2 SERPL-SCNC: 27 MMOL/L (ref 22–29)
CREAT SERPL-MCNC: 7.4 MG/DL (ref 0.7–1.2)
EOSINOPHIL # BLD: 0.43 K/UL (ref 0.05–0.5)
EOSINOPHILS RELATIVE PERCENT: 8 % (ref 0–6)
ERYTHROCYTE [DISTWIDTH] IN BLOOD BY AUTOMATED COUNT: 14.2 % (ref 11.5–15)
GFR SERPL CREATININE-BSD FRML MDRD: 8 ML/MIN/1.73M2
GLUCOSE SERPL-MCNC: 99 MG/DL (ref 74–99)
HCT VFR BLD AUTO: 31 % (ref 37–54)
HGB BLD-MCNC: 9.9 G/DL (ref 12.5–16.5)
IMM GRANULOCYTES # BLD AUTO: 0.04 K/UL (ref 0–0.58)
IMM GRANULOCYTES NFR BLD: 1 % (ref 0–5)
LYMPHOCYTES NFR BLD: 1.18 K/UL (ref 1.5–4)
LYMPHOCYTES RELATIVE PERCENT: 22 % (ref 20–42)
MAGNESIUM SERPL-MCNC: 2.4 MG/DL (ref 1.6–2.6)
MCH RBC QN AUTO: 32.8 PG (ref 26–35)
MCHC RBC AUTO-ENTMCNC: 31.9 G/DL (ref 32–34.5)
MCV RBC AUTO: 102.6 FL (ref 80–99.9)
MONOCYTES NFR BLD: 0.8 K/UL (ref 0.1–0.95)
MONOCYTES NFR BLD: 15 % (ref 2–12)
NEUTROPHILS NFR BLD: 54 % (ref 43–80)
NEUTS SEG NFR BLD: 2.96 K/UL (ref 1.8–7.3)
PHOSPHATE SERPL-MCNC: 6.3 MG/DL (ref 2.5–4.5)
PLATELET # BLD AUTO: 265 K/UL (ref 130–450)
PMV BLD AUTO: 9.2 FL (ref 7–12)
POTASSIUM SERPL-SCNC: 5 MMOL/L (ref 3.5–5)
PROT SERPL-MCNC: 7.1 G/DL (ref 6.4–8.3)
RBC # BLD AUTO: 3.02 M/UL (ref 3.8–5.8)
SODIUM SERPL-SCNC: 140 MMOL/L (ref 132–146)
WBC OTHER # BLD: 5.5 K/UL (ref 4.5–11.5)

## 2023-08-10 PROCEDURE — 6370000000 HC RX 637 (ALT 250 FOR IP)

## 2023-08-10 PROCEDURE — 83735 ASSAY OF MAGNESIUM: CPT

## 2023-08-10 PROCEDURE — 87077 CULTURE AEROBIC IDENTIFY: CPT

## 2023-08-10 PROCEDURE — 6360000002 HC RX W HCPCS

## 2023-08-10 PROCEDURE — 87081 CULTURE SCREEN ONLY: CPT

## 2023-08-10 PROCEDURE — 6360000002 HC RX W HCPCS: Performed by: INTERNAL MEDICINE

## 2023-08-10 PROCEDURE — 90935 HEMODIALYSIS ONE EVALUATION: CPT

## 2023-08-10 PROCEDURE — 2580000003 HC RX 258

## 2023-08-10 PROCEDURE — 6370000000 HC RX 637 (ALT 250 FOR IP): Performed by: INTERNAL MEDICINE

## 2023-08-10 PROCEDURE — 80053 COMPREHEN METABOLIC PANEL: CPT

## 2023-08-10 PROCEDURE — 84100 ASSAY OF PHOSPHORUS: CPT

## 2023-08-10 PROCEDURE — 85025 COMPLETE CBC W/AUTO DIFF WBC: CPT

## 2023-08-10 RX ORDER — DILTIAZEM HYDROCHLORIDE 240 MG/1
240 CAPSULE, COATED, EXTENDED RELEASE ORAL DAILY
Qty: 30 CAPSULE | Refills: 3 | Status: SHIPPED | OUTPATIENT
Start: 2023-08-10

## 2023-08-10 RX ORDER — SEVELAMER CARBONATE 800 MG/1
800 TABLET, FILM COATED ORAL
Qty: 90 TABLET | Refills: 3 | Status: SHIPPED | OUTPATIENT
Start: 2023-08-10

## 2023-08-10 RX ORDER — OXYCODONE HYDROCHLORIDE AND ACETAMINOPHEN 5; 325 MG/1; MG/1
1 TABLET ORAL EVERY 8 HOURS PRN
Qty: 10 TABLET | Refills: 0 | Status: SHIPPED | OUTPATIENT
Start: 2023-08-10 | End: 2023-08-15

## 2023-08-10 RX ORDER — SENNOSIDES A AND B 8.6 MG/1
1 TABLET, FILM COATED ORAL DAILY PRN
Qty: 30 TABLET | Refills: 0 | Status: SHIPPED | OUTPATIENT
Start: 2023-08-10 | End: 2023-09-09

## 2023-08-10 RX ADMIN — HYDRALAZINE HYDROCHLORIDE 100 MG: 50 TABLET, FILM COATED ORAL at 06:32

## 2023-08-10 RX ADMIN — ACETAMINOPHEN 650 MG: 325 TABLET ORAL at 01:03

## 2023-08-10 RX ADMIN — ACETAMINOPHEN 650 MG: 325 TABLET ORAL at 10:00

## 2023-08-10 RX ADMIN — HYDROMORPHONE HYDROCHLORIDE 0.25 MG: 1 INJECTION, SOLUTION INTRAMUSCULAR; INTRAVENOUS; SUBCUTANEOUS at 06:33

## 2023-08-10 RX ADMIN — SODIUM CHLORIDE, PRESERVATIVE FREE 10 ML: 5 INJECTION INTRAVENOUS at 06:33

## 2023-08-10 RX ADMIN — SEVELAMER CARBONATE 800 MG: 800 TABLET, FILM COATED ORAL at 12:09

## 2023-08-10 RX ADMIN — DILTIAZEM HYDROCHLORIDE 240 MG: 240 CAPSULE, COATED, EXTENDED RELEASE ORAL at 14:57

## 2023-08-10 RX ADMIN — SEVELAMER CARBONATE 800 MG: 800 TABLET, FILM COATED ORAL at 06:33

## 2023-08-10 RX ADMIN — HEPARIN SODIUM 5000 UNITS: 10000 INJECTION INTRAVENOUS; SUBCUTANEOUS at 06:33

## 2023-08-10 RX ADMIN — HYDROMORPHONE HYDROCHLORIDE 0.25 MG: 1 INJECTION, SOLUTION INTRAMUSCULAR; INTRAVENOUS; SUBCUTANEOUS at 01:57

## 2023-08-10 RX ADMIN — SODIUM CHLORIDE, PRESERVATIVE FREE 10 ML: 5 INJECTION INTRAVENOUS at 09:59

## 2023-08-10 RX ADMIN — HYDRALAZINE HYDROCHLORIDE 100 MG: 50 TABLET, FILM COATED ORAL at 14:57

## 2023-08-10 ASSESSMENT — PAIN DESCRIPTION - PAIN TYPE
TYPE: ACUTE PAIN
TYPE: ACUTE PAIN

## 2023-08-10 ASSESSMENT — PAIN DESCRIPTION - FREQUENCY: FREQUENCY: CONTINUOUS

## 2023-08-10 ASSESSMENT — PAIN DESCRIPTION - DESCRIPTORS
DESCRIPTORS: POUNDING;ACHING;DISCOMFORT
DESCRIPTORS: POUNDING;ACHING;DISCOMFORT
DESCRIPTORS: ACHING;POUNDING

## 2023-08-10 ASSESSMENT — PAIN DESCRIPTION - ONSET: ONSET: ON-GOING

## 2023-08-10 ASSESSMENT — PAIN DESCRIPTION - LOCATION
LOCATION: HEAD

## 2023-08-10 ASSESSMENT — PAIN SCALES - GENERAL
PAINLEVEL_OUTOF10: 6
PAINLEVEL_OUTOF10: 4

## 2023-08-10 ASSESSMENT — PAIN - FUNCTIONAL ASSESSMENT: PAIN_FUNCTIONAL_ASSESSMENT: ACTIVITIES ARE NOT PREVENTED

## 2023-08-10 ASSESSMENT — PAIN DESCRIPTION - ORIENTATION: ORIENTATION: ANTERIOR

## 2023-08-10 NOTE — PROGRESS NOTES
Discharge instructions provided to pt and explained. All questions answered. Pt verbalizes understanding at this time.

## 2023-08-10 NOTE — CARE COORDINATION
Social Work / Discharge PLanning : Discharge order noted. Patient independent and HOME. SW called [facility] [address] [phone] and updated Laurent on d/c. No additional needs. SW to follow.  Electronically signed by RAE Downs on 8/10/23 at 1:23 PM EDT

## 2023-08-10 NOTE — PROGRESS NOTES
Nephrology Progress Note  8/10/2023 3:28 PM  Subjective:   Admit Date: 8/8/2023  PCP: Yandel Monterroso MD  Interval History: Full consult deferred as we are seeing pt inpt during the April, May and June  admission as well as in the outpt dialysis unit. Pt has a Known HX ESRD was on PD , GI screening colonoscopy suggested CA colon , s/p LAPAROSCOPIC ROBOTIC XI ASSISTED RIGHT COLECTOMY  4/7/2023 . 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 Pt just had the abd drain removed in June . After  the abd drain was removed there was  purulent drainage in the PB catheter and the pain in his abd has moved to the LLQ. He had the PD catheter removed 6/16/23. 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 he was treated with oral Linezolid, Metronidazole and Cefuroxime for 2 weeks. Pt had been doing well until apprx 3 weeks ago when he began to develop HA. Over the last several days the HA's have gotten progressively worse and he notes worsening BP control. Pt states he has not missed any IHD sessions as an outpt. He has associated nausea with the HA but no emesis, fever, chills, diarrhea. He presented to [facility] ED 8/8/23 with SOB and HA. BP in the ED initially 212/126 with  most recent BP down to 165/97. K+ 6.4 and repeat 6.1    8/10/23: Pt awake, alert, states YE is stable on acetaminophen. Pt seen on treatment earlier today, tolerating the treatment well    Diet: ADULT DIET; Regular; No Added Salt (3-4 gm);  Low Potassium (Less than 3000 mg/day)    Data:   Scheduled Meds:   dilTIAZem  240 mg Oral Daily    sodium chloride flush  10 mL IntraVENous 2 times per day    heparin (porcine)  5,000 Units SubCUTAneous 3 times per day    hydrALAZINE  100 mg Oral 3 times per day    sodium zirconium cyclosilicate  5 g Oral Once per day on Mon Wed Fri    sevelamer  800 mg Oral TID WC       Continuous Infusions:   dextrose      sodium chloride         PRN Meds:oxyCODONE-acetaminophen,

## 2023-08-10 NOTE — DISCHARGE SUMMARY
Internal Medicine Discharge Summary    NAME: Atul Clemons :  1970  MRN:  [id number] Anette Collins MD    ADMITTED: 2023   DISCHARGED: No discharge date for patient encounter.     ADMITTING PHYSICIAN: Heena Shoemaker MD    PCP: Sandeep Leon MD    CONSULTANT(S):   IP CONSULT TO NEPHROLOGY  IP CONSULT TO INTERNAL MEDICINE  IP CONSULT TO CARDIOLOGY     ADMITTING DIAGNOSIS:   Hypertensive crisis [I16.9]  Hyperkalemia [E87.5]  ESRD (end stage renal disease) ([address]) [N18.6]  Hypertensive emergency [I16.1]     Please see H&P for further details    DISCHARGE DIAGNOSES:   Active Hospital Problems    Diagnosis     Hypertensive crisis [I16.9]     ESRD (end stage renal disease) ([address]) [N18.6]     Acute heart failure with preserved ejection fraction (HCC) [I50.31]     Hyperkalemia [E87.5]     BPH (benign prostatic hyperplasia) [N40.0]     Hypertension [I10]     Hyperlipidemia [E78.5]        BRIEF HISTORY OF PRESENT ILLNESS: Atul Clemons is a 48 y.o. male patient of Sandeep Leon MD who  has a past medical history of Acute heart failure with preserved ejection fraction ([address]), CONY (acute kidney injury) ([address]), Anemia in chronic kidney disease, on chronic dialysis ([address]), Anemia in stage 5 chronic kidney disease, not on chronic dialysis ([address]), Asymmetric septal hypertrophy ([address]), BPH (benign prostatic hyperplasia), CKD (chronic kidney disease) stage 5, GFR less than 15 ml/min ([address]), CKD (chronic kidney disease) stage V requiring chronic dialysis ([address]), Erectile dysfunction, Gout, Grade I diastolic dysfunction, Hemodialysis patient ([address]), History of blood transfusion, Hyperkalemia, Hyperlipidemia, Hypertension, Malignant neoplasm of ascending colon ([address]), Mild aortic sclerosis, Mild aortic valve sclerosis, Opioid dependence in remission ([address]), Proteinuria, SBP (spontaneous bacterial peritonitis) ([address]), Seasonal allergic rhinitis, Secondary hyperparathyroidism of renal origin ([address]), Sepsis ([address]), and Thoracic them.  If recurrence or worsening of symptoms go to the ED or call primary care physician. Diet: ADULT DIET; Regular; No Added Salt (3-4 gm); Low Potassium (Less than 3000 mg/day)    FOLLOW-IP  No follow-up provider specified. Preparing for this patient's discharge, including paperwork, orders, instructions, and meeting with patient did required 27 minutes.     Electronically signed by Vega Arevalo MD on 8/10/2023 at 11:19 AM

## 2023-08-12 LAB
MICROORGANISM SPEC CULT: ABNORMAL
SPECIMEN DESCRIPTION: ABNORMAL

## 2023-08-14 LAB
MICROORGANISM SPEC CULT: ABNORMAL
SPECIMEN DESCRIPTION: ABNORMAL

## 2023-09-18 ENCOUNTER — OFFICE VISIT (OUTPATIENT)
Dept: VASCULAR SURGERY | Age: 53
End: 2023-09-18
Payer: MEDICARE

## 2023-09-18 ENCOUNTER — PREP FOR PROCEDURE (OUTPATIENT)
Dept: VASCULAR SURGERY | Age: 53
End: 2023-09-18

## 2023-09-18 VITALS — WEIGHT: 205 LBS | BODY MASS INDEX: 27.77 KG/M2 | HEIGHT: 72 IN

## 2023-09-18 DIAGNOSIS — N18.6 END STAGE RENAL DISEASE (HCC): ICD-10-CM

## 2023-09-18 DIAGNOSIS — N18.6 ENCOUNTER REGARDING VASCULAR ACCESS FOR DIALYSIS FOR END-STAGE RENAL DISEASE (HCC): ICD-10-CM

## 2023-09-18 DIAGNOSIS — Z99.2 ENCOUNTER REGARDING VASCULAR ACCESS FOR DIALYSIS FOR END-STAGE RENAL DISEASE (HCC): ICD-10-CM

## 2023-09-18 PROCEDURE — G8419 CALC BMI OUT NRM PARAM NOF/U: HCPCS

## 2023-09-18 PROCEDURE — G8427 DOCREV CUR MEDS BY ELIG CLIN: HCPCS

## 2023-09-18 PROCEDURE — 3017F COLORECTAL CA SCREEN DOC REV: CPT

## 2023-09-18 PROCEDURE — 99203 OFFICE O/P NEW LOW 30 MIN: CPT

## 2023-09-18 PROCEDURE — 1036F TOBACCO NON-USER: CPT

## 2023-09-18 RX ORDER — DIPHENHYDRAMINE HCL 50 MG
50 CAPSULE ORAL EVERY 6 HOURS PRN
COMMUNITY

## 2023-09-18 NOTE — PROGRESS NOTES
oral suspension Take 1 packet by mouth three times a week      Multiple Vitamins-Minerals (RENAPLEX-D) TABS Take 1 tablet by mouth every morning      hydrALAZINE (APRESOLINE) 100 MG tablet Take 1 tablet by mouth every 8 hours 90 tablet 3     No current facility-administered medications for this visit.      Allergies:  Anesthetics, amide  Social History     Socioeconomic History    Marital status: Single     Spouse name: Not on file    Number of children: Not on file    Years of education: Not on file    Highest education level: Not on file   Occupational History    Not on file   Tobacco Use    Smoking status: Former     Packs/day: 1.00     Years: 31.00     Additional pack years: 0.00     Total pack years: 31.00     Types: Cigarettes     Start date: 9/10/1987     Quit date: 9/10/2019     Years since quittin.0    Smokeless tobacco: Never   Vaping Use    Vaping Use: Never used   Substance and Sexual Activity    Alcohol use: Not Currently    Drug use: Not Currently    Sexual activity: Not on file   Other Topics Concern    Not on file   Social History Narrative    Not on file     Social Determinants of Health     Financial Resource Strain: Not on file   Food Insecurity: Not on file   Transportation Needs: Not on file   Physical Activity: Not on file   Stress: Not on file   Social Connections: Not on file   Intimate Partner Violence: Not on file   Housing Stability: Not on file     Family History   Problem Relation Age of Onset    Heart Disease Mother          age 55    Heart Attack Father 61        0 Alive, age 68,; previous CABG    No Known Problems Sister      Labs  Lab Results   Component Value Date    WBC 5.5 08/10/2023    HGB 9.9 (L) 08/10/2023    HCT 31.0 (L) 08/10/2023     08/10/2023    PROTIME 17.9 (H) 2023    INR 1.6 2023    APTT 32.3 2023    K 6.3 (H) 2023    BUN 25 (H) 08/10/2023    CREATININE 7.4 (H) 08/10/2023       PHYSICAL EXAM:    Ht 6' (1.829 m)   Wt 205 lb (93

## 2023-10-23 NOTE — PROGRESS NOTES
[address]   PRE-ADMISSION TESTING GENERAL INSTRUCTIONS  [facility] Phone Number: [phone]      GENERAL INSTRUCTIONS:    [x] Antibacterial Soap Shower Night before and/or AM of surgery. [] CHG Wipes instruction sheet and wipes given. [] Hibiclens shower the night before and the morning of surgery.   -Do not use Hibiclens on your face or head. [x] Do not wear makeup, lotions, powders, deodorant. [x] Nothing by mouth after midnight; including gum, candy, mints, or water. [x] You may brush your teeth, gargle, but do NOT swallow water. [x] No tobacco products, illegal drugs, or alcohol within 24 hours of your surgery. [x] Jewelry or valuables should not be brought to the hospital. All body and/or tongue piercing's must be removed prior to arriving to hospital. No contact lens or hair pins. [x] Arrange transportation with a responsible adult  to and from the hospital. Arrange for someone to be with you for the remainder of the day and for 24 hours after your procedure due to having had anesthesia. -Who will be your  for transportation?___DAD_______________         -Who will be staying with you for 24 hrs after your procedure?__DAD________________  [x] Bring insurance card and photo ID.  [] Bring copy of living will or healthcare power of  papers to be placed in your electronic record. [] Urine Pregnancy test will be preformed the day of surgery. A specimen sample may be brought from home. [] Transfusion [place]) Bracelet: Please bring with you to hospital, day of surgery. PARKING INSTRUCTIONS:     [x] ARRIVAL DATE & TIME: 10/27 AT 1045  [x] Times are subject to change. We will contact you the business day before surgery if that were to occur. [x] Enter into the [facility] [employer]. Two people may accompany you. Masks are not required. [x] Parking Lot \"I\" is where you will park. It is located on the corner of [address] [place] [address] and [address] [place] [address]. The entrance is on [address] [place]. Only one vehicle - per patient, is permitted in parking lot. Upon entering the parking lot, a voucher ticket will print. EDUCATION INSTRUCTIONS:        [] Knee or Hip replacement booklet & exercise pamphlets given. [] [address] placed in chart. [] Pre-admission Testing educational folder given  [] Incentive Spirometry,coughing & deep breathing exercises reviewed. [] Fluoroscopy-Xray used in surgery reviewed with patient. Educational pamphlet placed in chart. [] Pain: Post-op pain is normal and to be expected. You will be asked to rate your pain from 0-10. [] Joint camp offered & Joint replacement booklets given. [] Follow instruction sheet for Ensure/ Protein drinks - provided to you during PAT apt. MEDICATION INSTRUCTIONS:    [x] Bring a complete list of your medications, please write the last time you took the medicine, give this list to the nurse in Pre-Op. [x] Take only the following medications the morning of surgery with 1-2 ounces of water: HYDRALAZINE, CARDIZEM  [x] Stop all herbal supplements and vitamins 5 days before surgery. Stop NSAIDS 7 days before surgery. [] DO NOT take any diabetic medicine the morning of surgery. Follow instructions for insulin the day before surgery. [] If you are diabetic and your blood sugar is low or you feel symptomatic, you may drink 1-2 ounces of apple juice or take a glucose tablet.            -The morning of your procedure, you may call the pre-op area if you have concerns about your blood sugar [phone]. [] Use your inhalers the morning of surgery. Bring your emergency inhaler with you day of surgery. [] Follow physician instructions regarding any blood thinners you may be taking.     WHAT TO EXPECT:    [x] The day of surgery you will be greeted and checked in by the [employer].  In addition, you will be registered in the [facility] by a Patient Access

## 2023-10-26 ENCOUNTER — TELEPHONE (OUTPATIENT)
Dept: VASCULAR SURGERY | Age: 53
End: 2023-10-26

## 2023-10-26 NOTE — PROGRESS NOTES
Patient notified of new arrival time for procedure tomorrow. New arrival time is now _0815___. Patient verbalized understanding.

## 2023-10-27 ENCOUNTER — ANESTHESIA EVENT (OUTPATIENT)
Dept: OPERATING ROOM | Age: 53
End: 2023-10-27
Payer: MEDICARE

## 2023-10-27 ENCOUNTER — ANESTHESIA (OUTPATIENT)
Dept: OPERATING ROOM | Age: 53
End: 2023-10-27
Payer: MEDICARE

## 2023-10-27 ENCOUNTER — HOSPITAL ENCOUNTER (OUTPATIENT)
Age: 53
Setting detail: OUTPATIENT SURGERY
Discharge: HOME | End: 2023-10-27
Attending: SURGERY | Admitting: SURGERY
Payer: MEDICARE

## 2023-10-27 VITALS
OXYGEN SATURATION: 96 % | WEIGHT: 215 LBS | TEMPERATURE: 97.6 F | BODY MASS INDEX: 29.12 KG/M2 | SYSTOLIC BLOOD PRESSURE: 149 MMHG | RESPIRATION RATE: 19 BRPM | HEIGHT: 72 IN | DIASTOLIC BLOOD PRESSURE: 87 MMHG | HEART RATE: 76 BPM

## 2023-10-27 DIAGNOSIS — Z99.2 ENCOUNTER REGARDING VASCULAR ACCESS FOR DIALYSIS FOR END-STAGE RENAL DISEASE (HCC): Primary | ICD-10-CM

## 2023-10-27 DIAGNOSIS — N18.6 ENCOUNTER REGARDING VASCULAR ACCESS FOR DIALYSIS FOR END-STAGE RENAL DISEASE (HCC): Primary | ICD-10-CM

## 2023-10-27 DIAGNOSIS — I77.0 AVF (ARTERIOVENOUS FISTULA) (HCC): ICD-10-CM

## 2023-10-27 LAB
ABO + RH BLD: NORMAL
ANION GAP SERPL CALCULATED.3IONS-SCNC: 16 MMOL/L (ref 7–16)
ARM BAND NUMBER: NORMAL
BLOOD BANK SAMPLE EXPIRATION: NORMAL
BLOOD GROUP ANTIBODIES SERPL: NEGATIVE
BUN SERPL-MCNC: 33 MG/DL (ref 6–20)
CALCIUM SERPL-MCNC: 9.5 MG/DL (ref 8.6–10.2)
CHLORIDE SERPL-SCNC: 98 MMOL/L (ref 98–107)
CO2 SERPL-SCNC: 24 MMOL/L (ref 22–29)
CREAT SERPL-MCNC: 9.8 MG/DL (ref 0.7–1.2)
ERYTHROCYTE [DISTWIDTH] IN BLOOD BY AUTOMATED COUNT: 14.6 % (ref 11.5–15)
GFR SERPL CREATININE-BSD FRML MDRD: 6 ML/MIN/1.73M2
GLUCOSE SERPL-MCNC: 94 MG/DL (ref 74–99)
HCT VFR BLD AUTO: 34.7 % (ref 37–54)
HGB BLD-MCNC: 11.2 G/DL (ref 12.5–16.5)
INR PPP: 1
MCH RBC QN AUTO: 31.7 PG (ref 26–35)
MCHC RBC AUTO-ENTMCNC: 32.3 G/DL (ref 32–34.5)
MCV RBC AUTO: 98.3 FL (ref 80–99.9)
PLATELET # BLD AUTO: 283 K/UL (ref 130–450)
PMV BLD AUTO: 9.2 FL (ref 7–12)
POTASSIUM SERPL-SCNC: 5.9 MMOL/L (ref 3.5–5)
PROTHROMBIN TIME: 11.3 SEC (ref 9.3–12.4)
RBC # BLD AUTO: 3.53 M/UL (ref 3.8–5.8)
SODIUM SERPL-SCNC: 138 MMOL/L (ref 132–146)
WBC OTHER # BLD: 7 K/UL (ref 4.5–11.5)

## 2023-10-27 PROCEDURE — 7100000011 HC PHASE II RECOVERY - ADDTL 15 MIN: Performed by: SURGERY

## 2023-10-27 PROCEDURE — 36821 AV FUSION DIRECT ANY SITE: CPT | Performed by: SURGERY

## 2023-10-27 PROCEDURE — 2500000003 HC RX 250 WO HCPCS

## 2023-10-27 PROCEDURE — 7100000001 HC PACU RECOVERY - ADDTL 15 MIN: Performed by: SURGERY

## 2023-10-27 PROCEDURE — 86901 BLOOD TYPING SEROLOGIC RH(D): CPT

## 2023-10-27 PROCEDURE — 6360000002 HC RX W HCPCS

## 2023-10-27 PROCEDURE — 2500000003 HC RX 250 WO HCPCS: Performed by: ANESTHESIOLOGY

## 2023-10-27 PROCEDURE — 6360000002 HC RX W HCPCS: Performed by: SURGERY

## 2023-10-27 PROCEDURE — 85610 PROTHROMBIN TIME: CPT

## 2023-10-27 PROCEDURE — 86900 BLOOD TYPING SEROLOGIC ABO: CPT

## 2023-10-27 PROCEDURE — 3700000000 HC ANESTHESIA ATTENDED CARE: Performed by: SURGERY

## 2023-10-27 PROCEDURE — 3700000001 HC ADD 15 MINUTES (ANESTHESIA): Performed by: SURGERY

## 2023-10-27 PROCEDURE — 2709999900 HC NON-CHARGEABLE SUPPLY: Performed by: SURGERY

## 2023-10-27 PROCEDURE — 2580000003 HC RX 258: Performed by: PHYSICIAN ASSISTANT

## 2023-10-27 PROCEDURE — 7100000000 HC PACU RECOVERY - FIRST 15 MIN: Performed by: SURGERY

## 2023-10-27 PROCEDURE — 86850 RBC ANTIBODY SCREEN: CPT

## 2023-10-27 PROCEDURE — 7100000010 HC PHASE II RECOVERY - FIRST 15 MIN: Performed by: SURGERY

## 2023-10-27 PROCEDURE — 3600000002 HC SURGERY LEVEL 2 BASE: Performed by: SURGERY

## 2023-10-27 PROCEDURE — A4217 STERILE WATER/SALINE, 500 ML: HCPCS | Performed by: SURGERY

## 2023-10-27 PROCEDURE — 6360000002 HC RX W HCPCS: Performed by: PHYSICIAN ASSISTANT

## 2023-10-27 PROCEDURE — 2580000003 HC RX 258

## 2023-10-27 PROCEDURE — 80048 BASIC METABOLIC PNL TOTAL CA: CPT

## 2023-10-27 PROCEDURE — 2580000003 HC RX 258: Performed by: ANESTHESIOLOGY

## 2023-10-27 PROCEDURE — 2580000003 HC RX 258: Performed by: SURGERY

## 2023-10-27 PROCEDURE — 3600000012 HC SURGERY LEVEL 2 ADDTL 15MIN: Performed by: SURGERY

## 2023-10-27 PROCEDURE — 85027 COMPLETE CBC AUTOMATED: CPT

## 2023-10-27 RX ORDER — LABETALOL HYDROCHLORIDE 5 MG/ML
INJECTION, SOLUTION INTRAVENOUS PRN
Status: DISCONTINUED | OUTPATIENT
Start: 2023-10-27 | End: 2023-10-27 | Stop reason: SDUPTHER

## 2023-10-27 RX ORDER — PROPOFOL 10 MG/ML
INJECTION, EMULSION INTRAVENOUS PRN
Status: DISCONTINUED | OUTPATIENT
Start: 2023-10-27 | End: 2023-10-27 | Stop reason: SDUPTHER

## 2023-10-27 RX ORDER — ONDANSETRON 2 MG/ML
INJECTION INTRAMUSCULAR; INTRAVENOUS PRN
Status: DISCONTINUED | OUTPATIENT
Start: 2023-10-27 | End: 2023-10-27 | Stop reason: SDUPTHER

## 2023-10-27 RX ORDER — SODIUM CHLORIDE 0.9 % (FLUSH) 0.9 %
5-40 SYRINGE (ML) INJECTION EVERY 12 HOURS SCHEDULED
Status: CANCELLED | OUTPATIENT
Start: 2023-10-27

## 2023-10-27 RX ORDER — ROPIVACAINE HYDROCHLORIDE 5 MG/ML
25 INJECTION, SOLUTION EPIDURAL; INFILTRATION; PERINEURAL ONCE
Status: DISCONTINUED | OUTPATIENT
Start: 2023-10-27 | End: 2023-10-27 | Stop reason: HOSPADM

## 2023-10-27 RX ORDER — HYDROMORPHONE HYDROCHLORIDE 1 MG/ML
0.5 INJECTION, SOLUTION INTRAMUSCULAR; INTRAVENOUS; SUBCUTANEOUS EVERY 5 MIN PRN
Status: CANCELLED | OUTPATIENT
Start: 2023-10-27

## 2023-10-27 RX ORDER — SODIUM CHLORIDE 0.9 % (FLUSH) 0.9 %
5-40 SYRINGE (ML) INJECTION PRN
Status: CANCELLED | OUTPATIENT
Start: 2023-10-27

## 2023-10-27 RX ORDER — SODIUM CHLORIDE 9 MG/ML
INJECTION, SOLUTION INTRAVENOUS PRN
Status: CANCELLED | OUTPATIENT
Start: 2023-10-27

## 2023-10-27 RX ORDER — SODIUM CHLORIDE 0.9 % (FLUSH) 0.9 %
5-40 SYRINGE (ML) INJECTION PRN
Status: DISCONTINUED | OUTPATIENT
Start: 2023-10-27 | End: 2023-10-27 | Stop reason: HOSPADM

## 2023-10-27 RX ORDER — SODIUM CHLORIDE 9 MG/ML
INJECTION, SOLUTION INTRAVENOUS CONTINUOUS
Status: DISCONTINUED | OUTPATIENT
Start: 2023-10-27 | End: 2023-10-27 | Stop reason: HOSPADM

## 2023-10-27 RX ORDER — ONDANSETRON 2 MG/ML
INJECTION INTRAMUSCULAR; INTRAVENOUS
Status: COMPLETED
Start: 2023-10-27 | End: 2023-10-27

## 2023-10-27 RX ORDER — ROCURONIUM BROMIDE 10 MG/ML
INJECTION, SOLUTION INTRAVENOUS PRN
Status: DISCONTINUED | OUTPATIENT
Start: 2023-10-27 | End: 2023-10-27 | Stop reason: SDUPTHER

## 2023-10-27 RX ORDER — OXYCODONE HYDROCHLORIDE AND ACETAMINOPHEN 5; 325 MG/1; MG/1
1 TABLET ORAL EVERY 6 HOURS PRN
Qty: 28 TABLET | Refills: 0 | Status: SHIPPED | OUTPATIENT
Start: 2023-10-27 | End: 2023-11-03

## 2023-10-27 RX ORDER — SODIUM CHLORIDE 9 MG/ML
INJECTION, SOLUTION INTRAVENOUS PRN
Status: DISCONTINUED | OUTPATIENT
Start: 2023-10-27 | End: 2023-10-27 | Stop reason: HOSPADM

## 2023-10-27 RX ORDER — ROPIVACAINE HYDROCHLORIDE 5 MG/ML
INJECTION, SOLUTION EPIDURAL; INFILTRATION; PERINEURAL
Status: COMPLETED | OUTPATIENT
Start: 2023-10-27 | End: 2023-10-27

## 2023-10-27 RX ORDER — SODIUM CHLORIDE 0.9 % (FLUSH) 0.9 %
5-40 SYRINGE (ML) INJECTION EVERY 12 HOURS SCHEDULED
Status: DISCONTINUED | OUTPATIENT
Start: 2023-10-27 | End: 2023-10-27 | Stop reason: HOSPADM

## 2023-10-27 RX ORDER — DEXAMETHASONE SODIUM PHOSPHATE 10 MG/ML
INJECTION INTRAMUSCULAR; INTRAVENOUS PRN
Status: DISCONTINUED | OUTPATIENT
Start: 2023-10-27 | End: 2023-10-27 | Stop reason: SDUPTHER

## 2023-10-27 RX ORDER — GLYCOPYRROLATE 0.2 MG/ML
INJECTION INTRAMUSCULAR; INTRAVENOUS PRN
Status: DISCONTINUED | OUTPATIENT
Start: 2023-10-27 | End: 2023-10-27 | Stop reason: SDUPTHER

## 2023-10-27 RX ORDER — MIDAZOLAM HYDROCHLORIDE 1 MG/ML
INJECTION INTRAMUSCULAR; INTRAVENOUS
Status: COMPLETED
Start: 2023-10-27 | End: 2023-10-27

## 2023-10-27 RX ORDER — MIDAZOLAM HYDROCHLORIDE 1 MG/ML
INJECTION INTRAMUSCULAR; INTRAVENOUS PRN
Status: DISCONTINUED | OUTPATIENT
Start: 2023-10-27 | End: 2023-10-27 | Stop reason: SDUPTHER

## 2023-10-27 RX ORDER — HEPARIN SODIUM 1000 [USP'U]/ML
INJECTION, SOLUTION INTRAVENOUS; SUBCUTANEOUS PRN
Status: DISCONTINUED | OUTPATIENT
Start: 2023-10-27 | End: 2023-10-27 | Stop reason: SDUPTHER

## 2023-10-27 RX ORDER — MIDAZOLAM HYDROCHLORIDE 2 MG/2ML
2 INJECTION, SOLUTION INTRAMUSCULAR; INTRAVENOUS ONCE
Status: DISCONTINUED | OUTPATIENT
Start: 2023-10-27 | End: 2023-10-27 | Stop reason: HOSPADM

## 2023-10-27 RX ORDER — HYDROMORPHONE HYDROCHLORIDE 1 MG/ML
0.25 INJECTION, SOLUTION INTRAMUSCULAR; INTRAVENOUS; SUBCUTANEOUS EVERY 5 MIN PRN
Status: CANCELLED | OUTPATIENT
Start: 2023-10-27

## 2023-10-27 RX ORDER — ROPIVACAINE HYDROCHLORIDE 5 MG/ML
INJECTION, SOLUTION EPIDURAL; INFILTRATION; PERINEURAL
Status: COMPLETED
Start: 2023-10-27 | End: 2023-10-27

## 2023-10-27 RX ORDER — NEOSTIGMINE METHYLSULFATE 1 MG/ML
INJECTION, SOLUTION INTRAVENOUS PRN
Status: DISCONTINUED | OUTPATIENT
Start: 2023-10-27 | End: 2023-10-27 | Stop reason: SDUPTHER

## 2023-10-27 RX ORDER — ONDANSETRON 2 MG/ML
4 INJECTION INTRAMUSCULAR; INTRAVENOUS
Status: CANCELLED | OUTPATIENT
Start: 2023-10-27 | End: 2023-10-28

## 2023-10-27 RX ORDER — SUCCINYLCHOLINE/SOD CL,ISO/PF 200MG/10ML
SYRINGE (ML) INTRAVENOUS PRN
Status: DISCONTINUED | OUTPATIENT
Start: 2023-10-27 | End: 2023-10-27 | Stop reason: SDUPTHER

## 2023-10-27 RX ADMIN — MIDAZOLAM 2 MG: 1 INJECTION INTRAMUSCULAR; INTRAVENOUS at 08:34

## 2023-10-27 RX ADMIN — PROPOFOL 100 MG: 10 INJECTION, EMULSION INTRAVENOUS at 10:03

## 2023-10-27 RX ADMIN — GLYCOPYRROLATE 0.6 MG: 0.2 INJECTION INTRAMUSCULAR; INTRAVENOUS at 11:01

## 2023-10-27 RX ADMIN — MIDAZOLAM 2 MG: 1 INJECTION INTRAMUSCULAR; INTRAVENOUS at 10:01

## 2023-10-27 RX ADMIN — PROPOFOL 75 MCG/KG/MIN: 10 INJECTION, EMULSION INTRAVENOUS at 10:00

## 2023-10-27 RX ADMIN — DEXAMETHASONE SODIUM PHOSPHATE 10 MG: 10 INJECTION INTRAMUSCULAR; INTRAVENOUS at 11:00

## 2023-10-27 RX ADMIN — PROPOFOL 100 MG: 10 INJECTION, EMULSION INTRAVENOUS at 10:12

## 2023-10-27 RX ADMIN — SODIUM CHLORIDE 0.5 MCG/KG/HR: 9 INJECTION, SOLUTION INTRAVENOUS at 10:31

## 2023-10-27 RX ADMIN — Medication 160 MG: at 10:12

## 2023-10-27 RX ADMIN — PROPOFOL 100 MG: 10 INJECTION, EMULSION INTRAVENOUS at 10:08

## 2023-10-27 RX ADMIN — PROPOFOL 50 MG: 10 INJECTION, EMULSION INTRAVENOUS at 09:59

## 2023-10-27 RX ADMIN — Medication 3 MG: at 11:01

## 2023-10-27 RX ADMIN — LABETALOL HYDROCHLORIDE 5 MG: 5 INJECTION INTRAVENOUS at 10:37

## 2023-10-27 RX ADMIN — HEPARIN SODIUM 9000 UNITS: 1000 INJECTION INTRAVENOUS; SUBCUTANEOUS at 10:29

## 2023-10-27 RX ADMIN — LABETALOL HYDROCHLORIDE 5 MG: 5 INJECTION INTRAVENOUS at 10:17

## 2023-10-27 RX ADMIN — CEFAZOLIN 2000 MG: 2 INJECTION, POWDER, FOR SOLUTION INTRAMUSCULAR; INTRAVENOUS at 10:05

## 2023-10-27 RX ADMIN — ONDANSETRON HYDROCHLORIDE 4 MG: 2 SOLUTION INTRAMUSCULAR; INTRAVENOUS at 08:30

## 2023-10-27 RX ADMIN — ROCURONIUM BROMIDE 20 MG: 10 INJECTION, SOLUTION INTRAVENOUS at 10:26

## 2023-10-27 RX ADMIN — ROPIVACAINE HYDROCHLORIDE 25 ML: 5 INJECTION EPIDURAL; INFILTRATION; PERINEURAL at 08:38

## 2023-10-27 RX ADMIN — SODIUM CHLORIDE: 9 INJECTION, SOLUTION INTRAVENOUS at 08:23

## 2023-10-27 RX ADMIN — DEXMEDETOMIDINE 0.6 MCG/KG/HR: 100 INJECTION, SOLUTION INTRAVENOUS at 12:00

## 2023-10-27 RX ADMIN — MIDAZOLAM 2 MG: 1 INJECTION INTRAMUSCULAR; INTRAVENOUS at 11:34

## 2023-10-27 ASSESSMENT — PAIN SCALES - GENERAL: PAINLEVEL_OUTOF10: 0

## 2023-10-27 ASSESSMENT — LIFESTYLE VARIABLES: SMOKING_STATUS: 0

## 2023-10-27 ASSESSMENT — PAIN - FUNCTIONAL ASSESSMENT: PAIN_FUNCTIONAL_ASSESSMENT: 0-10

## 2023-10-27 NOTE — PROGRESS NOTES
Nerve block completed in lineroom by Dr. Chavez Scrape   All medications given and pulled by anesthesia   OR desk aware.

## 2023-10-27 NOTE — H&P
Vascular Surgery History & Physical      HPI :    Flavio Christensen is a 48 y.o. male who presents for AV fistula creation. He has a history of ESRD on HD, previously on PD however he developed VRE and intra-abdominal abscess after a right hemicolectomy for cancer 4/2023, thus PD was discontinued. No prior arm dialysis access . Currently getting HD via tunneled catheter.        ROS : Negative if blank [], Positive if [x]  General Vascular   [] Fevers [] Claudication (Blocks)   [] Chills [] Rest Pain   [] Weight Loss [] Tissue Loss   [] Chest Pain [] Clotting Disorder   [] SOB at rest [] Leg Swelling   [] SOB with exertion [] DVT/PE      [] Nausea    [] Vomitting [] Stroke/TIA   [] Abdominal Pain [] Focal weakness   [] Melena [] Slurred Speech   [] Hematochezia [] Vision Changes   [] Hematuria    [] Dysuria [x] Hx of Central Catheters - tunneled HD catheter    [x] Wears Glasses/Contacts  [x] Dialysis and If so date initiated - PD 2021   [] Blindness     [x] Right Hand Dominant   [] Difficulty swallowing        Past Medical History:   Diagnosis Date    Acute heart failure with preserved ejection fraction ([address]) 09/11/2020    CONY (acute kidney injury) ([address]) 09/10/2020    Anemia in chronic kidney disease, on chronic dialysis ([address]) 12/05/2020    Anemia in stage 5 chronic kidney disease, not on chronic dialysis ([address]) 12/05/2020    Asymmetric septal hypertrophy ([address]) 09/10/2020    2D echo    BPH (benign prostatic hyperplasia) 09/11/2020    CKD (chronic kidney disease) stage 5, GFR less than 15 ml/min ([address]) 12/05/2020    CKD (chronic kidney disease) stage V requiring chronic dialysis ([address])     Erectile dysfunction 09/11/2020    Gout 09/11/2020    Grade I diastolic dysfunction 39/58/8980    2D echo    Hemodialysis patient ([address])     tue, th , sat    History of blood transfusion     Hyperkalemia 09/11/2020    Hyperlipidemia     Hypertension     Malignant neoplasm of ascending colon ([address]) 04/07/2023    5 cm invasive colonic adenocarcinoma negative margins    Mild aortic sclerosis 09/11/2020    Mild aortic valve sclerosis 09/11/2020    Opioid dependence in remission ([address]) 09/11/2020    Younger years    Proteinuria 09/11/2020    SBP (spontaneous bacterial peritonitis) ([address]) 11/24/2022 4/27/2023    Seasonal allergic rhinitis 09/11/2020    Secondary hyperparathyroidism of renal origin ([address]) 05/01/2023    Sepsis ([address]) 05/01/2023    Thoracic aortic aneurysm without rupture ([address]) 09/11/2020    Ascending; 4.3 cm- Dr. Yolis Mccullough 3/2020        Past Surgical History:   Procedure Laterality Date    COLECTOMY Right 04/07/2023    LAPAROSCOPIC ROBOTIC XI ASSISTED RIGHT COLECTOMY performed by Melanie Zuluaga MD at [address]  2023    DIALYSIS CATHETER INSERTION N/A 12/14/2020    LAPAROSCOPIC PERITONEAL DIALYSIS CATHETER INSERTION performed by Bina Dela Cruz MD at [address]  04/03/2023    DIALYSIS CATHETER REMOVAL N/A 6/16/2023    LAPAROSCOPIC REMOVAL PERITONEAL DIALYSIS CATHETER performed by Melanie Zuluaga MD at [address] [facility]      Ventilation tubes, child       Current Medications:    sodium chloride      sodium chloride 25 mL/hr at 10/27/23 0823      sodium chloride flush, sodium chloride, ondansetron    sodium chloride flush  5-40 mL IntraVENous 2 times per day    ceFAZolin (ANCEF) IVPB  2,000 mg IntraVENous On Call to OR    ropivacaine  25 mL Other Once    midazolam  2 mg IntraVENous Once    ondansetron            Allergies:  Anesthetics, amide    Social History     Socioeconomic History    Marital status: Single     Spouse name: Not on file    Number of children: Not on file    Years of education: Not on file    Highest education level: Not on file   Occupational History    Not on file   Tobacco Use    Smoking status: Former     Packs/day: 1.00     Years: 31.00     Additional pack years: 0.00     Total pack years: 31.00     Types: Cigarettes     Start date: 9/10/1987     Quit

## 2023-10-27 NOTE — OP NOTE
Operative Note      Patient: Laura Medellin. YOB: 1970  MRN: [id number]    Date of Procedure: 10/27/2023    Pre-Op Diagnosis Codes:     * End stage renal disease ([address]) [N18.6]    Post-Op Diagnosis: Same       Procedure  L RC AVF (snuff box)    Surgeon(s):  Checo Marsh MD    Assistant:   Surgical Assistant: Travis Chan  Resident: Sudhakar Bowen MD    Anesthesia: Regional, General    Estimated Blood Loss (mL): less than 50     Complications: None    DESCRIPTION OF PROCEDURE: The patient was identified and the procedure was confirmed. The left arm was prepped and draped in the usual sterile fashion. Intraoperatively patient required intubation per anesthesia. One percent lidocaine mixed with 0.25% Marcaine was used for local anesthesia. A skin incision was made over the radial artery branch in cephalic vein in the region of the snuff box and carried down through the subcutaneous tissue. The cephalic vein was identified and freed from the surrounding tissues. It was marked for orientation. Branches were ligated between silk ties. The radial artery branch was freed from the surrounding tissues. The patient was heparinized and the vein was ligated distally and transected. The artery was clamped proximally and distally, and a longitudinal arteriotomy was made. The vein was cut to the appropriate length and spatulated, and anastomosed to the side of the artery using a running 7-0 Prolene suture. After completing the anastomosis, the clamps were released and a good thrill was appreciated through the fistula. A large branch was visualized over the medial forearm, and a small incision was made over the vein and the vein was identified and ligated with silk tie. Hemostasis was obtained and the incisions were approximated with Vicryl suture, and the skin was closed with subcuticular Monocryl stitch. Dermabond was applied to the incisions in the operating room.  Needle, sponge and instrument counts were reported as correct x2. The patient tolerated the procedure and was transferred to the recovery area in satisfactory condition.      Lucy Martinez MD    PCP : Sahil Akhtar MD  Nephrologist : Dr. Cain Hollow : T Th Sat    Previous Vascular Procedure  4/2023 Tonia Lira hd cath   10/27/23 L RC AVF     Electronically signed by Lucy Martinez MD on 10/27/2023 at 11:07 AM

## 2023-10-27 NOTE — ANESTHESIA PRE PROCEDURE
intravenous. MIPS: Postoperative opioids intended and Prophylactic antiemetics administered. Anesthetic plan and risks discussed with patient. Use of blood products discussed with patient whom consented to blood products. Plan discussed with CRNA.               Nakul Gamboa MD   10/27/2023

## 2023-10-27 NOTE — PROGRESS NOTES
CLINICAL PHARMACY NOTE: MEDS TO BEDS    Total # of Prescriptions Filled: 1   The following medications were delivered to the patient:  Oxycodone-apap 5-325    Additional Documentation:    Pharmacy pickup father Jazmine Yo

## 2023-10-27 NOTE — ANESTHESIA POSTPROCEDURE EVALUATION
Department of Anesthesiology  Postprocedure Note    Patient: Pavan Morris   MRN: [id number]  YOB: 1970  Date of evaluation: 10/27/2023      Procedure Summary     Date: 10/27/23 Room / Location: [facility] 03 / [facility]    Anesthesia Start: 7295 Anesthesia Stop: 1133    Procedure: AV FISTULA CREATION LEFT ARM (Left: Arm Lower) Diagnosis:       End stage renal disease ([address])      (End stage renal disease ([address]) [N18.6])    Surgeons: Sowmya Leos MD Responsible Provider: Ramila Medina MD    Anesthesia Type: MAC, General ASA Status: 4          Anesthesia Type: MAC, General    Savanna Phase I: Savanna Score: 10    Savanna Phase II:        Anesthesia Post Evaluation    Patient location during evaluation: PACU  Patient participation: complete - patient participated  Level of consciousness: awake  Pain score: 0  Airway patency: patent  Nausea & Vomiting: no nausea  Complications: no  Cardiovascular status: hemodynamically stable  Respiratory status: acceptable  Hydration status: stable  Multimodal analgesia pain management approach

## 2023-10-27 NOTE — ANESTHESIA PROCEDURE NOTES
Peripheral Block    Patient location during procedure: pre-op  Reason for block: post-op pain management, primary anesthetic and at surgeon's request  Start time: 10/27/2023 8:34 AM  End time: 10/27/2023 8:40 AM  Staffing  Performed: other anesthesia staff   Anesthesiologist: Cuate Delvalle MD  Other anesthesia staff: Samantha Rubin RN  Performed by: Samantha Rubin RN  Authorized by: Oliver Victor MD    Preanesthetic Checklist  Completed: patient identified, IV checked, site marked, risks and benefits discussed, surgical/procedural consents, equipment checked, pre-op evaluation, timeout performed, anesthesia consent given, oxygen available, monitors applied/VS acknowledged, fire risk safety assessment completed and verbalized and blood product R/B/A discussed and consented  Peripheral Block   Patient position: supine  Prep: ChloraPrep  Provider prep: mask and sterile gloves  Patient monitoring: continuous pulse ox, frequent blood pressure checks, IV access, oxygen and responsive to questions  Block type: Brachial plexus  Interscalene  Laterality: left  Injection technique: single-shot  Guidance: ultrasound guided  Local infiltration: lidocaine  Infiltration strength: 1 %  Local infiltration: lidocaine  Dose: 3 mL    Needle   Needle gauge: 21 G  Needle localization: ultrasound guidance  Needle length: 10 cm  Assessment   Injection assessment: negative aspiration for heme, no paresthesia on injection and local visualized surrounding nerve on ultrasound  Paresthesia pain: none  Slow fractionated injection: yes  Hemodynamics: stable  Real-time US image taken/store: yes  Outcomes: patient tolerated procedure well    Additional Notes  U/S 20203.   Timeout performed          Medications Administered  ropivacaine (NAROPIN) injection 0.5% - Perineural   25 mL - 10/27/2023 8:38:00 AM

## 2023-10-27 NOTE — ADDENDUM NOTE
Addendum  created 10/27/23 1148 by Rex Abel MD    Order list changed, Pharmacy for encounter modified

## 2023-11-27 ENCOUNTER — TELEPHONE (OUTPATIENT)
Dept: VASCULAR SURGERY | Age: 53
End: 2023-11-27

## 2023-11-27 ENCOUNTER — OFFICE VISIT (OUTPATIENT)
Dept: VASCULAR SURGERY | Age: 53
End: 2023-11-27

## 2023-11-27 VITALS — WEIGHT: 205 LBS | BODY MASS INDEX: 27.8 KG/M2

## 2023-11-27 DIAGNOSIS — N18.6 ENCOUNTER REGARDING VASCULAR ACCESS FOR DIALYSIS FOR END-STAGE RENAL DISEASE (HCC): Primary | ICD-10-CM

## 2023-11-27 DIAGNOSIS — Z99.2 ENCOUNTER REGARDING VASCULAR ACCESS FOR DIALYSIS FOR END-STAGE RENAL DISEASE (HCC): Primary | ICD-10-CM

## 2023-11-27 PROCEDURE — 99024 POSTOP FOLLOW-UP VISIT: CPT

## 2023-11-27 NOTE — TELEPHONE ENCOUNTER
Alphonso at [facility] notified OK to use pt's AVF beginning next week, call with issues.

## 2023-11-27 NOTE — PROGRESS NOTES
11/27/2023    Elvin Courser.  1970    PCP : Cindy Pantoja MD  Nephrologist : Dr. Resendiz Janay : [facility]    Previous Vascular Procedures  4/2023 Clara Cisneros hd cath   10/27/23 L RC AVF     Patient returns for post operative evaluation. The patient denies any unexpected problems since the procedure. The wound is healing well. Denies left hand pain. He does have some numbness and tingling in the first and second digit of the left hand. Occasionally he will feel a pain in the wrist when he moves his arm a certain way, but this typically lasts only a few seconds.      PE  Gen NAD Awake and Alert  left Upper Extremity  Fistula has palpable  thrill, and a bruit is ausculatated  Brachial 2+ Radial 2+ Ulnar 2+ Palmar strongly biphasic  Wound is clean, dry and intact  with no evidence of cellulitis or drainage  No deficits in sensation or motor     A/P Dialysis Access  left RC AVF  wound is  healing well  no evidence of steal syndrome  av access is matured and ready for use, measuring about 7-8 mm  He has several branches noted but these will hopefully not be an issue  He understands that if they have any issues with the fistula we may need to bring him back to surgery to ligate the branches  We will give it one more week to mature then they can start accessing his fistula starting the week of 12/4/23  I reviewed with the patient that normal activities can be resumed as tolerated  They will call with any issues with the fistula or when pts tunneled catheter is ready to be removed    Pt seen and plan reviewed with Dr. Maximiliano Duque, APRN - CNP

## 2023-12-04 ENCOUNTER — APPOINTMENT (OUTPATIENT)
Dept: GENERAL RADIOLOGY | Age: 53
End: 2023-12-04
Payer: MEDICARE

## 2023-12-04 ENCOUNTER — HOSPITAL ENCOUNTER (INPATIENT)
Age: 53
LOS: 1 days | Discharge: HOME | End: 2023-12-06
Attending: EMERGENCY MEDICINE | Admitting: INTERNAL MEDICINE
Payer: MEDICARE

## 2023-12-04 DIAGNOSIS — N18.6 ESRD (END STAGE RENAL DISEASE) ON DIALYSIS (HCC): Primary | ICD-10-CM

## 2023-12-04 DIAGNOSIS — E87.70 HYPERVOLEMIA, UNSPECIFIED HYPERVOLEMIA TYPE: ICD-10-CM

## 2023-12-04 DIAGNOSIS — Z99.2 ESRD (END STAGE RENAL DISEASE) ON DIALYSIS (HCC): Primary | ICD-10-CM

## 2023-12-04 DIAGNOSIS — R79.89 ELEVATED TROPONIN: ICD-10-CM

## 2023-12-04 LAB
ALBUMIN SERPL-MCNC: 4.4 G/DL (ref 3.5–5.2)
ALP SERPL-CCNC: 70 U/L (ref 40–129)
ALT SERPL-CCNC: 12 U/L (ref 0–40)
ANION GAP SERPL CALCULATED.3IONS-SCNC: 20 MMOL/L (ref 7–16)
AST SERPL-CCNC: 9 U/L (ref 0–39)
BASOPHILS # BLD: 0.07 K/UL (ref 0–0.2)
BASOPHILS NFR BLD: 1 % (ref 0–2)
BILIRUB SERPL-MCNC: 0.5 MG/DL (ref 0–1.2)
BNP SERPL-MCNC: ABNORMAL PG/ML (ref 0–125)
BUN SERPL-MCNC: 67 MG/DL (ref 6–20)
CALCIUM SERPL-MCNC: 9.9 MG/DL (ref 8.6–10.2)
CHLORIDE SERPL-SCNC: 98 MMOL/L (ref 98–107)
CO2 SERPL-SCNC: 23 MMOL/L (ref 22–29)
CREAT SERPL-MCNC: 13.7 MG/DL (ref 0.7–1.2)
EOSINOPHIL # BLD: 0.25 K/UL (ref 0.05–0.5)
EOSINOPHILS RELATIVE PERCENT: 2 % (ref 0–6)
ERYTHROCYTE [DISTWIDTH] IN BLOOD BY AUTOMATED COUNT: 14.9 % (ref 11.5–15)
GFR SERPL CREATININE-BSD FRML MDRD: 4 ML/MIN/1.73M2
GLUCOSE SERPL-MCNC: 98 MG/DL (ref 74–99)
HCT VFR BLD AUTO: 29.7 % (ref 37–54)
HGB BLD-MCNC: 9.6 G/DL (ref 12.5–16.5)
IMM GRANULOCYTES # BLD AUTO: 0.05 K/UL (ref 0–0.58)
IMM GRANULOCYTES NFR BLD: 1 % (ref 0–5)
INFLUENZA A BY PCR: NOT DETECTED
INFLUENZA B BY PCR: NOT DETECTED
LIPASE SERPL-CCNC: 37 U/L (ref 13–60)
LYMPHOCYTES NFR BLD: 1.06 K/UL (ref 1.5–4)
LYMPHOCYTES RELATIVE PERCENT: 10 % (ref 20–42)
MCH RBC QN AUTO: 32.7 PG (ref 26–35)
MCHC RBC AUTO-ENTMCNC: 32.3 G/DL (ref 32–34.5)
MCV RBC AUTO: 101 FL (ref 80–99.9)
MONOCYTES NFR BLD: 0.67 K/UL (ref 0.1–0.95)
MONOCYTES NFR BLD: 6 % (ref 2–12)
NEUTROPHILS NFR BLD: 81 % (ref 43–80)
NEUTS SEG NFR BLD: 8.95 K/UL (ref 1.8–7.3)
PLATELET # BLD AUTO: 273 K/UL (ref 130–450)
PMV BLD AUTO: 9.5 FL (ref 7–12)
POTASSIUM SERPL-SCNC: 6.1 MMOL/L (ref 3.5–5)
PROT SERPL-MCNC: 7.1 G/DL (ref 6.4–8.3)
RBC # BLD AUTO: 2.94 M/UL (ref 3.8–5.8)
SARS-COV-2 RDRP RESP QL NAA+PROBE: NOT DETECTED
SODIUM SERPL-SCNC: 141 MMOL/L (ref 132–146)
SPECIMEN DESCRIPTION: NORMAL
TROPONIN I SERPL HS-MCNC: 158 NG/L (ref 0–11)
WBC OTHER # BLD: 11.1 K/UL (ref 4.5–11.5)

## 2023-12-04 PROCEDURE — 93005 ELECTROCARDIOGRAM TRACING: CPT

## 2023-12-04 PROCEDURE — 85025 COMPLETE CBC W/AUTO DIFF WBC: CPT

## 2023-12-04 PROCEDURE — 84484 ASSAY OF TROPONIN QUANT: CPT

## 2023-12-04 PROCEDURE — 99285 EMERGENCY DEPT VISIT HI MDM: CPT

## 2023-12-04 PROCEDURE — C9113 INJ PANTOPRAZOLE SODIUM, VIA: HCPCS

## 2023-12-04 PROCEDURE — 2580000003 HC RX 258

## 2023-12-04 PROCEDURE — 87635 SARS-COV-2 COVID-19 AMP PRB: CPT

## 2023-12-04 PROCEDURE — 71045 X-RAY EXAM CHEST 1 VIEW: CPT

## 2023-12-04 PROCEDURE — A4216 STERILE WATER/SALINE, 10 ML: HCPCS

## 2023-12-04 PROCEDURE — 80053 COMPREHEN METABOLIC PANEL: CPT

## 2023-12-04 PROCEDURE — 83690 ASSAY OF LIPASE: CPT

## 2023-12-04 PROCEDURE — 96374 THER/PROPH/DIAG INJ IV PUSH: CPT

## 2023-12-04 PROCEDURE — 6360000002 HC RX W HCPCS

## 2023-12-04 PROCEDURE — 87502 INFLUENZA DNA AMP PROBE: CPT

## 2023-12-04 PROCEDURE — 83880 ASSAY OF NATRIURETIC PEPTIDE: CPT

## 2023-12-04 RX ORDER — DEXTROSE MONOHYDRATE 100 MG/ML
INJECTION, SOLUTION INTRAVENOUS CONTINUOUS PRN
Status: DISCONTINUED | OUTPATIENT
Start: 2023-12-04 | End: 2023-12-06 | Stop reason: HOSPADM

## 2023-12-04 RX ORDER — CALCIUM GLUCONATE 94 MG/ML
1000 INJECTION, SOLUTION INTRAVENOUS ONCE
Status: COMPLETED | OUTPATIENT
Start: 2023-12-04 | End: 2023-12-05

## 2023-12-04 RX ADMIN — SODIUM CHLORIDE 80 MG: 9 INJECTION, SOLUTION INTRAMUSCULAR; INTRAVENOUS; SUBCUTANEOUS at 21:45

## 2023-12-04 ASSESSMENT — PAIN - FUNCTIONAL ASSESSMENT: PAIN_FUNCTIONAL_ASSESSMENT: NONE - DENIES PAIN

## 2023-12-05 PROBLEM — Z99.2 ESRD (END STAGE RENAL DISEASE) ON DIALYSIS (HCC): Status: ACTIVE | Noted: 2023-12-05

## 2023-12-05 PROBLEM — R10.13 EPIGASTRIC PAIN: Status: RESOLVED | Noted: 2023-04-09 | Resolved: 2023-12-05

## 2023-12-05 PROBLEM — N18.6 END STAGE RENAL DISEASE (HCC): Status: RESOLVED | Noted: 2023-09-18 | Resolved: 2023-12-05

## 2023-12-05 PROBLEM — N18.6 ESRD (END STAGE RENAL DISEASE) (HCC): Status: RESOLVED | Noted: 2023-04-09 | Resolved: 2023-12-05

## 2023-12-05 PROBLEM — N18.6 ESRD (END STAGE RENAL DISEASE) ON DIALYSIS (HCC): Status: ACTIVE | Noted: 2023-12-05

## 2023-12-05 PROBLEM — K65.2 SBP (SPONTANEOUS BACTERIAL PERITONITIS) (HCC): Status: RESOLVED | Noted: 2022-11-24 | Resolved: 2023-12-05

## 2023-12-05 PROBLEM — N18.5 CKD (CHRONIC KIDNEY DISEASE) STAGE 5, GFR LESS THAN 15 ML/MIN (HCC): Status: RESOLVED | Noted: 2020-12-05 | Resolved: 2023-12-05

## 2023-12-05 LAB
EKG ATRIAL RATE: 113 BPM
EKG P AXIS: 62 DEGREES
EKG P-R INTERVAL: 158 MS
EKG Q-T INTERVAL: 358 MS
EKG QRS DURATION: 84 MS
EKG QTC CALCULATION (BAZETT): 488 MS
EKG R AXIS: 2 DEGREES
EKG T AXIS: 73 DEGREES
EKG VENTRICULAR RATE: 112 BPM
GLUCOSE BLD-MCNC: 105 MG/DL (ref 74–99)
GLUCOSE BLD-MCNC: 113 MG/DL (ref 74–99)
GLUCOSE BLD-MCNC: 114 MG/DL (ref 74–99)
GLUCOSE BLD-MCNC: 114 MG/DL (ref 74–99)
GLUCOSE BLD-MCNC: 153 MG/DL (ref 74–99)
GLUCOSE BLD-MCNC: 56 MG/DL (ref 74–99)
GLUCOSE BLD-MCNC: 72 MG/DL (ref 74–99)
TROPONIN I SERPL HS-MCNC: 165 NG/L (ref 0–11)

## 2023-12-05 PROCEDURE — 6360000002 HC RX W HCPCS: Performed by: EMERGENCY MEDICINE

## 2023-12-05 PROCEDURE — 5A1D70Z PERFORMANCE OF URINARY FILTRATION, INTERMITTENT, LESS THAN 6 HOURS PER DAY: ICD-10-PCS | Performed by: INTERNAL MEDICINE

## 2023-12-05 PROCEDURE — 2580000003 HC RX 258: Performed by: NURSE PRACTITIONER

## 2023-12-05 PROCEDURE — 6370000000 HC RX 637 (ALT 250 FOR IP): Performed by: NURSE PRACTITIONER

## 2023-12-05 PROCEDURE — 2060000000 HC ICU INTERMEDIATE R&B

## 2023-12-05 PROCEDURE — 6360000002 HC RX W HCPCS

## 2023-12-05 PROCEDURE — 82962 GLUCOSE BLOOD TEST: CPT

## 2023-12-05 PROCEDURE — 90935 HEMODIALYSIS ONE EVALUATION: CPT

## 2023-12-05 PROCEDURE — 2500000003 HC RX 250 WO HCPCS: Performed by: EMERGENCY MEDICINE

## 2023-12-05 PROCEDURE — 6360000002 HC RX W HCPCS: Performed by: NURSE PRACTITIONER

## 2023-12-05 PROCEDURE — 93010 ELECTROCARDIOGRAM REPORT: CPT | Performed by: INTERNAL MEDICINE

## 2023-12-05 PROCEDURE — 84484 ASSAY OF TROPONIN QUANT: CPT

## 2023-12-05 PROCEDURE — 2580000003 HC RX 258: Performed by: EMERGENCY MEDICINE

## 2023-12-05 PROCEDURE — 6370000000 HC RX 637 (ALT 250 FOR IP): Performed by: EMERGENCY MEDICINE

## 2023-12-05 PROCEDURE — 6360000002 HC RX W HCPCS: Performed by: INTERNAL MEDICINE

## 2023-12-05 RX ORDER — SODIUM CHLORIDE 9 MG/ML
INJECTION, SOLUTION INTRAVENOUS PRN
Status: DISCONTINUED | OUTPATIENT
Start: 2023-12-05 | End: 2023-12-06 | Stop reason: HOSPADM

## 2023-12-05 RX ORDER — ONDANSETRON 2 MG/ML
4 INJECTION INTRAMUSCULAR; INTRAVENOUS EVERY 6 HOURS PRN
Status: DISCONTINUED | OUTPATIENT
Start: 2023-12-05 | End: 2023-12-06 | Stop reason: HOSPADM

## 2023-12-05 RX ORDER — SODIUM CHLORIDE 0.9 % (FLUSH) 0.9 %
10 SYRINGE (ML) INJECTION EVERY 12 HOURS SCHEDULED
Status: DISCONTINUED | OUTPATIENT
Start: 2023-12-05 | End: 2023-12-06 | Stop reason: HOSPADM

## 2023-12-05 RX ORDER — HEPARIN SODIUM 10000 [USP'U]/ML
5000 INJECTION, SOLUTION INTRAVENOUS; SUBCUTANEOUS EVERY 8 HOURS SCHEDULED
Status: DISCONTINUED | OUTPATIENT
Start: 2023-12-05 | End: 2023-12-06 | Stop reason: HOSPADM

## 2023-12-05 RX ORDER — ACETAMINOPHEN 650 MG/1
650 SUPPOSITORY RECTAL EVERY 6 HOURS PRN
Status: DISCONTINUED | OUTPATIENT
Start: 2023-12-05 | End: 2023-12-06 | Stop reason: HOSPADM

## 2023-12-05 RX ORDER — DILTIAZEM HYDROCHLORIDE 240 MG/1
240 CAPSULE, COATED, EXTENDED RELEASE ORAL DAILY
Status: DISCONTINUED | OUTPATIENT
Start: 2023-12-05 | End: 2023-12-06 | Stop reason: HOSPADM

## 2023-12-05 RX ORDER — LABETALOL HYDROCHLORIDE 5 MG/ML
10 INJECTION, SOLUTION INTRAVENOUS EVERY 4 HOURS PRN
Status: DISCONTINUED | OUTPATIENT
Start: 2023-12-05 | End: 2023-12-06 | Stop reason: HOSPADM

## 2023-12-05 RX ORDER — HYDRALAZINE HYDROCHLORIDE 20 MG/ML
10 INJECTION INTRAMUSCULAR; INTRAVENOUS EVERY 4 HOURS PRN
Status: DISCONTINUED | OUTPATIENT
Start: 2023-12-05 | End: 2023-12-06 | Stop reason: HOSPADM

## 2023-12-05 RX ORDER — HYDRALAZINE HYDROCHLORIDE 50 MG/1
100 TABLET, FILM COATED ORAL EVERY 8 HOURS SCHEDULED
Status: DISCONTINUED | OUTPATIENT
Start: 2023-12-05 | End: 2023-12-06 | Stop reason: HOSPADM

## 2023-12-05 RX ORDER — SODIUM CHLORIDE 0.9 % (FLUSH) 0.9 %
10 SYRINGE (ML) INJECTION PRN
Status: DISCONTINUED | OUTPATIENT
Start: 2023-12-05 | End: 2023-12-06 | Stop reason: HOSPADM

## 2023-12-05 RX ORDER — ACETAMINOPHEN 325 MG/1
650 TABLET ORAL EVERY 6 HOURS PRN
Status: DISCONTINUED | OUTPATIENT
Start: 2023-12-05 | End: 2023-12-06 | Stop reason: HOSPADM

## 2023-12-05 RX ORDER — DIPHENHYDRAMINE HCL 25 MG
50 TABLET ORAL EVERY 6 HOURS PRN
Status: DISCONTINUED | OUTPATIENT
Start: 2023-12-05 | End: 2023-12-06 | Stop reason: HOSPADM

## 2023-12-05 RX ORDER — SENNOSIDES A AND B 8.6 MG/1
1 TABLET, FILM COATED ORAL DAILY PRN
Status: DISCONTINUED | OUTPATIENT
Start: 2023-12-05 | End: 2023-12-06 | Stop reason: HOSPADM

## 2023-12-05 RX ORDER — TRAMADOL HYDROCHLORIDE 50 MG/1
50 TABLET ORAL ONCE
Status: COMPLETED | OUTPATIENT
Start: 2023-12-05 | End: 2023-12-05

## 2023-12-05 RX ORDER — SEVELAMER CARBONATE 800 MG/1
800 TABLET, FILM COATED ORAL
Status: DISCONTINUED | OUTPATIENT
Start: 2023-12-05 | End: 2023-12-06 | Stop reason: HOSPADM

## 2023-12-05 RX ORDER — HYDRALAZINE HYDROCHLORIDE 20 MG/ML
INJECTION INTRAMUSCULAR; INTRAVENOUS
Status: COMPLETED
Start: 2023-12-05 | End: 2023-12-05

## 2023-12-05 RX ORDER — ONDANSETRON 4 MG/1
4 TABLET, ORALLY DISINTEGRATING ORAL EVERY 8 HOURS PRN
Status: DISCONTINUED | OUTPATIENT
Start: 2023-12-05 | End: 2023-12-06 | Stop reason: HOSPADM

## 2023-12-05 RX ADMIN — DEXTROSE MONOHYDRATE 250 ML: 100 INJECTION, SOLUTION INTRAVENOUS at 06:40

## 2023-12-05 RX ADMIN — HYDRALAZINE HYDROCHLORIDE 100 MG: 50 TABLET, FILM COATED ORAL at 08:57

## 2023-12-05 RX ADMIN — DILTIAZEM HYDROCHLORIDE 240 MG: 240 CAPSULE, EXTENDED RELEASE ORAL at 08:57

## 2023-12-05 RX ADMIN — SEVELAMER CARBONATE 800 MG: 800 TABLET, FILM COATED ORAL at 12:46

## 2023-12-05 RX ADMIN — ONDANSETRON 4 MG: 2 INJECTION INTRAMUSCULAR; INTRAVENOUS at 22:30

## 2023-12-05 RX ADMIN — Medication 10 ML: at 21:18

## 2023-12-05 RX ADMIN — Medication 10 UNITS: at 01:50

## 2023-12-05 RX ADMIN — TRAMADOL HYDROCHLORIDE 50 MG: 50 TABLET, COATED ORAL at 21:16

## 2023-12-05 RX ADMIN — LABETALOL HYDROCHLORIDE 10 MG: 5 INJECTION INTRAVENOUS at 12:15

## 2023-12-05 RX ADMIN — HEPARIN SODIUM 5000 UNITS: 10000 INJECTION INTRAVENOUS; SUBCUTANEOUS at 21:16

## 2023-12-05 RX ADMIN — Medication 50 MEQ: at 01:50

## 2023-12-05 RX ADMIN — HYDRALAZINE HYDROCHLORIDE 10 MG: 20 INJECTION INTRAMUSCULAR; INTRAVENOUS at 05:20

## 2023-12-05 RX ADMIN — DEXTROSE MONOHYDRATE: 100 INJECTION, SOLUTION INTRAVENOUS at 06:48

## 2023-12-05 RX ADMIN — ACETAMINOPHEN 650 MG: 325 TABLET ORAL at 13:48

## 2023-12-05 RX ADMIN — HEPARIN SODIUM 5000 UNITS: 10000 INJECTION INTRAVENOUS; SUBCUTANEOUS at 13:36

## 2023-12-05 RX ADMIN — CALCIUM GLUCONATE 1000 MG: 94 INJECTION, SOLUTION INTRAVENOUS at 01:50

## 2023-12-05 RX ADMIN — HYDRALAZINE HYDROCHLORIDE 100 MG: 50 TABLET, FILM COATED ORAL at 13:48

## 2023-12-05 RX ADMIN — ACETAMINOPHEN 650 MG: 325 TABLET ORAL at 19:47

## 2023-12-05 RX ADMIN — HYDRALAZINE HYDROCHLORIDE 100 MG: 50 TABLET, FILM COATED ORAL at 21:15

## 2023-12-05 ASSESSMENT — PAIN DESCRIPTION - ORIENTATION: ORIENTATION: MID

## 2023-12-05 ASSESSMENT — PAIN SCALES - GENERAL
PAINLEVEL_OUTOF10: 6
PAINLEVEL_OUTOF10: 5
PAINLEVEL_OUTOF10: 0
PAINLEVEL_OUTOF10: 6

## 2023-12-05 ASSESSMENT — PAIN DESCRIPTION - DESCRIPTORS: DESCRIPTORS: ACHING

## 2023-12-05 ASSESSMENT — PAIN DESCRIPTION - LOCATION
LOCATION: HEAD
LOCATION: HEAD

## 2023-12-05 ASSESSMENT — PAIN - FUNCTIONAL ASSESSMENT: PAIN_FUNCTIONAL_ASSESSMENT: 0-10

## 2023-12-05 NOTE — ED PROVIDER NOTES
[address] ENCOUNTER        Pt Name: Marium Howe MRN: [id number]  Birthdate 1970  Date of evaluation: 12/4/2023  Provider: Shakira Reyes DO  PCP: Cheng Arevalo MD  Note Started: 9:29 PM EST 12/4/23    CHIEF COMPLAINT       Chief Complaint   Patient presents with    Shortness of Breath     1 day. cough    Emesis     Today. Dialysis T, TH, Sat       HISTORY OF PRESENT ILLNESS: 1 or more Elements   History From: Patient    Limitations to history : None  Social Determinants : None    Marium Howe is a 48 y.o. male who presents for shortness of breath. Patient states that this started last night. He states he was getting short of breath when lying flat. He states it is not worse with exertion. He denies any chest pain. He states he feels fluid overloaded. He denies any history of blood clots. He is on dialysis Tuesday, Thursday, Saturday. He states he has never missed a dialysis. He also complains of some nausea, vomiting and diarrhea. He states all of that started today. He denies any blood in the emesis. He states he has had some black diarrhea today. Denies any fever, chills, headache, dizziness, vision changes, neck tenderness or stiffness, weakness, cp, abd pain, dysuria, hematuria, diarrhea, constipation, bloody stools.     Nursing Notes were all reviewed and agreed with or any disagreements were addressed in the HPI.    ROS:   Pertinent positives and negatives are stated within HPI, all other systems reviewed and are negative.      --------------------------------------------- PAST HISTORY ---------------------------------------------  Past Medical History:  has a past medical history of Acute heart failure with preserved ejection fraction ([address]), CONY (acute kidney injury) ([address]), Anemia in chronic kidney disease, on chronic dialysis ([address]), Anemia in stage 5 chronic kidney disease, not on chronic Hemoglobin 9.6 (L) 12.5 - 16.5 g/dL    Hematocrit 29.7 (L) 37.0 - 54.0 %    .0 (H) 80.0 - 99.9 fL    MCH 32.7 26.0 - 35.0 pg    MCHC 32.3 32.0 - 34.5 g/dL    RDW 14.9 11.5 - 15.0 %    Platelets 481 211 - 906 k/uL    MPV 9.5 7.0 - 12.0 fL    Neutrophils % 81 (H) 43.0 - 80.0 %    Lymphocytes % 10 (L) 20.0 - 42.0 %    Monocytes % 6 2.0 - 12.0 %    Eosinophils % 2 0 - 6 %    Basophils % 1 0.0 - 2.0 %    Immature Granulocytes 1 0.0 - 5.0 %    Neutrophils Absolute 8.95 (H) 1.80 - 7.30 k/uL    Lymphocytes Absolute 1.06 (L) 1.50 - 4.00 k/uL    Monocytes Absolute 0.67 0.10 - 0.95 k/uL    Eosinophils Absolute 0.25 0.05 - 0.50 k/uL    Basophils Absolute 0.07 0.00 - 0.20 k/uL    Absolute Immature Granulocyte 0.05 0.00 - 0.58 k/uL   CMP   Result Value Ref Range    Sodium 141 132 - 146 mmol/L    Potassium 6.1 (H) 3.5 - 5.0 mmol/L    Chloride 98 98 - 107 mmol/L    CO2 23 22 - 29 mmol/L    Anion Gap 20 (H) 7 - 16 mmol/L    Glucose 98 74 - 99 mg/dL    BUN 67 (H) 6 - 20 mg/dL    Creatinine 13.7 (HH) 0.70 - 1.20 mg/dL    Est, Glom Filt Rate 4 (L) >60 mL/min/1.73m2    Calcium 9.9 8.6 - 10.2 mg/dL    Total Protein 7.1 6.4 - 8.3 g/dL    Albumin 4.4 3.5 - 5.2 g/dL    Total Bilirubin 0.5 0.0 - 1.2 mg/dL    Alkaline Phosphatase 70 40 - 129 U/L    ALT 12 0 - 40 U/L    AST 9 0 - 39 U/L   Troponin   Result Value Ref Range    Troponin, High Sensitivity 158 (H) 0 - 11 ng/L   Brain Natriuretic Peptide   Result Value Ref Range    Pro-BNP 43,232 (H) 0 - 125 pg/mL   Lipase   Result Value Ref Range    Lipase 37 13 - 60 U/L   Troponin   Result Value Ref Range    Troponin, High Sensitivity 165 (H) 0 - 11 ng/L   POCT Glucose   Result Value Ref Range    POC Glucose 105 (H) 74 - 99 mg/dL   POCT Glucose   Result Value Ref Range    POC Glucose 113 (H) 74 - 99 mg/dL   POCT Glucose   Result Value Ref Range    POC Glucose 56 (L) 74 - 99 mg/dL   POCT Glucose   Result Value Ref Range    POC Glucose 72 (L) 74 - 99 mg/dL   POCT Glucose   Result Value Ref

## 2023-12-05 NOTE — CONSULTS
[facility]  Nephrology Consult Note    Patient's Name: Hali Hughes. Reason for Consult: End-Stage Renal Disease    Chief Complaint: Shortness of breath, emesis  History Obtained from: patient, electronic medical record, past medical records     History of Present Illness: Charline Tierney is a 48year old male, with past medical history of HFpEF, anemia in CKD, BPH, ESRD on HD, gout, hyperkalemia, HLD, HTN, and secondary hyperparathyroidism of renal origin, who presented to the ED on 12/4 with complaints of shortness of breath and emesis. Vital signs on 12/4 include temperature 97.9, pulse 115, RR 18, 202/125, 98% SPO2 on room air. Lab data on 12/4 includes sodium 141, potassium 6.1, CO2 23, Bun 67, creatinine 13.7, calcium 9.9, pro-BNP 43,232, troponin 158, albumin 4.4, WBC 11.1, Hg 9.6. Imaging on 12/4 includes a chest xray which showed right-sided tunnel hemodialysis catheter is stable in position, heart is  enlarged with increased prominence of the central vasculature, diffusely increased interstitial markings are seen with hazy perihilar opacities diffusely, no significant pleural effusion or pneumothorax is identified, and cardiomegaly with pulmonary edema most suggestive of fluid overload versus congestive heart failure. Nephrology was consulted to see the patient for ESRD on dialysis. The patient is known to our service and dialyzes at [facility] TTS 2nd shift via a TDC. He has a new left forearm AV fistula that is healing/maturing, + thrill, + bruit. He reports that his last dialysis treatment was on Saturday 12/2. At present, patient was seen and examined on HD. He reports that he came into the ED last night because he was feel very short of breath. He reports shortness of breath since Sunday. He reports a headache as well that he believes is related to his elevated blood pressure. He denies chest pain. He denies n/v or abdominal pain. He denies fever or chills.  He denies any

## 2023-12-05 NOTE — CARE COORDINATION
Internal Medicine On-call Care Coordination Note    I was called by the ED physician because they recommended admission for this patient and we cover their PCP. The history as I understand it after discussion with the ED physician is as follows:    Presents with shortness of breath worsened by laying down  Hx ESRD on HD, states he has not missed a session  CXR showing fluid overload    I placed admission orders. Including:    Nephrology consult    Dr. Kaylene Mccoy, or our coverage will see the patient  for H&P.     Electronically signed by VIC Gautam CNP on 12/5/2023 at 6:58 AM

## 2023-12-05 NOTE — H&P
sores  Head: normocephalic, atraumatic  Neck: no JVD, no adenopathy, no thyromegaly, neck is supple, trachea is midline  Back: ROM normal, no CVA tenderness. Chest: no pain on palpation, right chest wall Tesio catheter  Lungs: clear to auscultation bilaterally, without rhonchi, crackle, wheezing, or rale, no retractions or use of accessory muscles  Heart: Slightly tachycardic rate and regular rhythm, no murmur, normal S1, S2  Abdomen: soft, non-tender; bowel sounds normal; no masses, no organomegaly  : Deferred   Extremities: no lower extremity edema, extremities atraumatic, no cyanosis, no clubbing, 2+ pedal pulses palpated  Skin: normal color, normal texture, normal turgor, no rashes, no lesions  Neurologic:5/5 muscle strength throughout, normal muscle tone throughout, face symmetric, hearing intact, tongue midline, speech appropriate without slurring, sensation to fine touch intact in upper and lower extremities    Labs-   Lab Results   Component Value Date    WBC 11.1 12/04/2023    HGB 9.6 (L) 12/04/2023    HCT 29.7 (L) 12/04/2023     12/04/2023     12/04/2023    K 6.1 (H) 12/04/2023    CL 98 12/04/2023    CREATININE 13.7 (HH) 12/04/2023    BUN 67 (H) 12/04/2023    CO2 23 12/04/2023    GLUCOSE 98 12/04/2023    ALT 12 12/04/2023    AST 9 12/04/2023    INR 1.0 10/27/2023    APTT 32.3 04/03/2023       Lab Results   Component Value Date    IRON 32 (L) 06/15/2023    FERRITIN 1,197 06/15/2023    TIBC 200 (L) 06/15/2023       Lab Results   Component Value Date    TROPHS 165 (H) 12/05/2023    TROPHS 158 (H) 12/04/2023    TROPHS 122 (H) 08/08/2023        Lab Results   Component Value Date    TSH 1.050 04/28/2023    VITD25 15 (L) 12/06/2020     (H) 12/06/2020       Lab Results   Component Value Date    MG 2.4 08/10/2023    PHOS 6.3 (H) 08/10/2023     Recent Radiological Studies:  XR CHEST PORTABLE   Final Result   Right-sided tunnel hemodialysis catheter is stable in position.   Heart is   enlarged

## 2023-12-06 VITALS
DIASTOLIC BLOOD PRESSURE: 95 MMHG | OXYGEN SATURATION: 96 % | TEMPERATURE: 97.9 F | HEIGHT: 72 IN | WEIGHT: 203.93 LBS | HEART RATE: 99 BPM | RESPIRATION RATE: 18 BRPM | BODY MASS INDEX: 27.62 KG/M2 | SYSTOLIC BLOOD PRESSURE: 159 MMHG

## 2023-12-06 LAB
ALBUMIN SERPL-MCNC: 4.1 G/DL (ref 3.5–5.2)
ALP SERPL-CCNC: 62 U/L (ref 40–129)
ALT SERPL-CCNC: 13 U/L (ref 0–40)
ANION GAP SERPL CALCULATED.3IONS-SCNC: 14 MMOL/L (ref 7–16)
AST SERPL-CCNC: 11 U/L (ref 0–39)
BASOPHILS # BLD: 0.07 K/UL (ref 0–0.2)
BASOPHILS NFR BLD: 1 % (ref 0–2)
BILIRUB SERPL-MCNC: 0.4 MG/DL (ref 0–1.2)
BUN SERPL-MCNC: 34 MG/DL (ref 6–20)
CALCIUM SERPL-MCNC: 9.6 MG/DL (ref 8.6–10.2)
CHLORIDE SERPL-SCNC: 98 MMOL/L (ref 98–107)
CO2 SERPL-SCNC: 28 MMOL/L (ref 22–29)
CREAT SERPL-MCNC: 9.6 MG/DL (ref 0.7–1.2)
EOSINOPHIL # BLD: 0.32 K/UL (ref 0.05–0.5)
EOSINOPHILS RELATIVE PERCENT: 5 % (ref 0–6)
ERYTHROCYTE [DISTWIDTH] IN BLOOD BY AUTOMATED COUNT: 15 % (ref 11.5–15)
GFR SERPL CREATININE-BSD FRML MDRD: 6 ML/MIN/1.73M2
GLUCOSE BLD-MCNC: 104 MG/DL (ref 74–99)
GLUCOSE SERPL-MCNC: 98 MG/DL (ref 74–99)
HCT VFR BLD AUTO: 29.3 % (ref 37–54)
HGB BLD-MCNC: 9.2 G/DL (ref 12.5–16.5)
IMM GRANULOCYTES # BLD AUTO: 0.03 K/UL (ref 0–0.58)
IMM GRANULOCYTES NFR BLD: 1 % (ref 0–5)
LYMPHOCYTES NFR BLD: 0.94 K/UL (ref 1.5–4)
LYMPHOCYTES RELATIVE PERCENT: 15 % (ref 20–42)
MAGNESIUM SERPL-MCNC: 2.6 MG/DL (ref 1.6–2.6)
MCH RBC QN AUTO: 32.2 PG (ref 26–35)
MCHC RBC AUTO-ENTMCNC: 31.4 G/DL (ref 32–34.5)
MCV RBC AUTO: 102.4 FL (ref 80–99.9)
MONOCYTES NFR BLD: 0.68 K/UL (ref 0.1–0.95)
MONOCYTES NFR BLD: 11 % (ref 2–12)
NEUTROPHILS NFR BLD: 68 % (ref 43–80)
NEUTS SEG NFR BLD: 4.42 K/UL (ref 1.8–7.3)
PHOSPHATE SERPL-MCNC: 7.1 MG/DL (ref 2.5–4.5)
PLATELET # BLD AUTO: 261 K/UL (ref 130–450)
PMV BLD AUTO: 9.6 FL (ref 7–12)
POTASSIUM SERPL-SCNC: 4.6 MMOL/L (ref 3.5–5)
POTASSIUM SERPL-SCNC: 6.2 MMOL/L (ref 3.5–5)
PROT SERPL-MCNC: 7 G/DL (ref 6.4–8.3)
RBC # BLD AUTO: 2.86 M/UL (ref 3.8–5.8)
SODIUM SERPL-SCNC: 140 MMOL/L (ref 132–146)
WBC OTHER # BLD: 6.5 K/UL (ref 4.5–11.5)

## 2023-12-06 PROCEDURE — 80053 COMPREHEN METABOLIC PANEL: CPT

## 2023-12-06 PROCEDURE — 85025 COMPLETE CBC W/AUTO DIFF WBC: CPT

## 2023-12-06 PROCEDURE — 90935 HEMODIALYSIS ONE EVALUATION: CPT

## 2023-12-06 PROCEDURE — 2580000003 HC RX 258: Performed by: NURSE PRACTITIONER

## 2023-12-06 PROCEDURE — 82962 GLUCOSE BLOOD TEST: CPT

## 2023-12-06 PROCEDURE — 2500000003 HC RX 250 WO HCPCS

## 2023-12-06 PROCEDURE — 6370000000 HC RX 637 (ALT 250 FOR IP): Performed by: NURSE PRACTITIONER

## 2023-12-06 PROCEDURE — 83735 ASSAY OF MAGNESIUM: CPT

## 2023-12-06 PROCEDURE — 84132 ASSAY OF SERUM POTASSIUM: CPT

## 2023-12-06 PROCEDURE — 6360000002 HC RX W HCPCS: Performed by: NURSE PRACTITIONER

## 2023-12-06 PROCEDURE — 84100 ASSAY OF PHOSPHORUS: CPT

## 2023-12-06 RX ORDER — ANTICOAGULANT SODIUM CITRATE SOLUTION 4 G/100ML
SOLUTION INTRAVENOUS
Status: COMPLETED
Start: 2023-12-06 | End: 2023-12-06

## 2023-12-06 RX ADMIN — ANTICOAGULANT SODIUM CITRATE SOLUTION 4 ML: 4 SOLUTION INTRAVENOUS at 10:40

## 2023-12-06 RX ADMIN — SEVELAMER CARBONATE 800 MG: 800 TABLET, FILM COATED ORAL at 10:57

## 2023-12-06 RX ADMIN — Medication 10 ML: at 07:02

## 2023-12-06 RX ADMIN — SEVELAMER CARBONATE 800 MG: 800 TABLET, FILM COATED ORAL at 07:02

## 2023-12-06 RX ADMIN — HYDRALAZINE HYDROCHLORIDE 100 MG: 50 TABLET, FILM COATED ORAL at 06:16

## 2023-12-06 RX ADMIN — DILTIAZEM HYDROCHLORIDE 240 MG: 240 CAPSULE, EXTENDED RELEASE ORAL at 07:02

## 2023-12-06 RX ADMIN — HYDRALAZINE HYDROCHLORIDE 100 MG: 50 TABLET, FILM COATED ORAL at 12:59

## 2023-12-06 RX ADMIN — HEPARIN SODIUM 5000 UNITS: 10000 INJECTION INTRAVENOUS; SUBCUTANEOUS at 06:16

## 2023-12-06 RX ADMIN — SODIUM ZIRCONIUM CYCLOSILICATE 5 G: 5 POWDER, FOR SUSPENSION ORAL at 07:02

## 2023-12-06 ASSESSMENT — PAIN SCALES - GENERAL
PAINLEVEL_OUTOF10: 0

## 2023-12-06 NOTE — DISCHARGE SUMMARY
Internal Medicine Discharge Summary    NAME: Yvonne Bella  :  1970  MRN:  [id number] Daisy Downs MD    ADMITTED: 2023   DISCHARGED: 2023  2:35 PM    ADMITTING PHYSICIAN: Amber Vásquez DO    PCP: Janette Gould MD    CONSULTANT(S):   IP CONSULT TO NEPHROLOGY  IP CONSULT TO NEPHROLOGY     ADMITTING DIAGNOSIS:   Elevated troponin [R79.89]  ESRD (end stage renal disease) on dialysis ([address]) [N18.6, Z99.2]  Hypervolemia, unspecified hypervolemia type [E87.70]     Please see H&P for further details    DISCHARGE DIAGNOSES:   Active Hospital Problems    Diagnosis     ESRD (end stage renal disease) on dialysis ([address]) [N18.6, Z99.2]      Priority: High    CKD (chronic kidney disease) stage V requiring chronic dialysis ([address]) [N18.6, Z99.2]      Priority: Medium    Malignant neoplasm of ascending colon ([address]) [C18.2]     Anemia in chronic kidney disease, on chronic dialysis ([address]) [N18.6, D63.1, Z99.2]     BPH (benign prostatic hyperplasia) [N40.0]     Gout [M10.9]     Mild aortic valve sclerosis [I35.8]     Grade I diastolic dysfunction [F24.93]     Opioid dependence in remission ([address]) [F11.21]     Hypertension [I10]     Hyperlipidemia [E78.5]        BRIEF HISTORY OF PRESENT ILLNESS: Yvonne Bella is a 48 y.o. male patient of Janette Gould MD who  has a past medical history of Acute heart failure with preserved ejection fraction ([address]), CONY (acute kidney injury) ([address]), Anemia in chronic kidney disease, on chronic dialysis ([address]), Anemia in stage 5 chronic kidney disease, not on chronic dialysis ([address]), Asymmetric septal hypertrophy (HCC), BPH (benign prostatic hyperplasia), CKD (chronic kidney disease) stage 5, GFR less than 15 ml/min ([address]), CKD (chronic kidney disease) stage V requiring chronic dialysis ([address]), Erectile dysfunction, Gout, Grade I diastolic dysfunction, Hemodialysis patient ([address]), History of blood transfusion, Hyperkalemia, Hyperlipidemia, Hypertension, Malignant neoplasm of

## 2023-12-06 NOTE — DISCHARGE SUMMARY
4 Eyes Skin Assessment     NAME:  Burt Flowers. YOB: 1970  MEDICAL RECORD NUMBER:  [id number]    The patient is being assessed for  Admission    I agree that at least one RN has performed a thorough Head to Toe Skin Assessment on the patient. ALL assessment sites listed below have been assessed. Areas assessed by both nurses:    Head, Face, Ears, Shoulders, Back, Chest, Arms, Elbows, Hands, Legs. Feet and Heels, and Under Medical Devices         Does the Patient have a Wound?  No noted wound(s)       Lobo Prevention initiated by RN: No  Wound Care Orders initiated by RN: No    Pressure Injury (Stage 3,4, Unstageable, DTI, NWPT, and Complex wounds) if present, place Wound referral order by RN under : No    New Ostomies, if present place, Ostomy referral order under : No     Nurse 1 eSignature: Electronically signed by Efren San RN on 12/5/23 at 9:59 PM EST    **SHARE this note so that the co-signing nurse can place an eSignature**    Nurse 2 eSignature: {Esignature:[id number]}

## 2023-12-06 NOTE — CARE COORDINATION
Social Work/Discharge Planning:  Discharge order noted if okay with renal.  Chart reviewed. Patient has dialysis every Tuesday, Thursday and Saturday at [facility] in [place]. Called Elli with [facility] (ph: [phone]) and informed her of anticipated discharge. Anticipate no further needs.   Electronically signed by RAE Davis on 12/6/2023 at 12:02 PM

## 2023-12-06 NOTE — CARE COORDINATION
I was called by nursing because patient is complaining of a headache 5/10. Tylenol was ineffective, patient was requesting ibuprofen but his last crt is 13.7. Order placed for ultram x 1. Pt presented to ED with HR in the 190s. Vagal maneuvers attempted and unsuccessful. 6mg adenosine administered, pt's HR improved to 100s, remains sinus tachycardic. Pt denies symptoms, daughter at bedside. Pt remains attached to EKG and defibrillator pads. Daughter at bedside.

## 2024-05-20 ENCOUNTER — HOSPITAL ENCOUNTER (OUTPATIENT)
Age: 54
Discharge: HOME | End: 2024-05-22

## 2024-05-20 PROCEDURE — 88305 TISSUE EXAM BY PATHOLOGIST: CPT

## 2024-05-24 LAB — SURGICAL PATHOLOGY REPORT: NORMAL

## 2024-11-26 PROBLEM — J30.2 SEASONAL ALLERGIC RHINITIS: Status: RESOLVED | Noted: 2020-09-11 | Resolved: 2024-11-26

## 2024-11-26 PROBLEM — I35.8 MILD AORTIC VALVE SCLEROSIS: Status: RESOLVED | Noted: 2020-09-11 | Resolved: 2024-11-26

## 2024-11-26 PROBLEM — Z99.2 ENCOUNTER REGARDING VASCULAR ACCESS FOR DIALYSIS FOR END-STAGE RENAL DISEASE (HCC): Status: RESOLVED | Noted: 2023-09-18 | Resolved: 2024-11-26

## 2024-11-26 PROBLEM — N18.6 ENCOUNTER REGARDING VASCULAR ACCESS FOR DIALYSIS FOR END-STAGE RENAL DISEASE (HCC): Status: RESOLVED | Noted: 2023-09-18 | Resolved: 2024-11-26

## 2024-11-26 PROBLEM — N40.0 BPH (BENIGN PROSTATIC HYPERPLASIA): Status: RESOLVED | Noted: 2020-09-11 | Resolved: 2024-11-26

## 2024-11-26 PROBLEM — A41.9 SEPSIS (HCC): Status: RESOLVED | Noted: 2023-05-01 | Resolved: 2024-11-26

## 2024-11-26 PROBLEM — N18.6 CKD (CHRONIC KIDNEY DISEASE) STAGE V REQUIRING CHRONIC DIALYSIS (HCC): Status: RESOLVED | Noted: 2022-11-24 | Resolved: 2024-11-26

## 2024-11-26 PROBLEM — I16.9 HYPERTENSIVE CRISIS: Status: RESOLVED | Noted: 2023-08-08 | Resolved: 2024-11-26

## 2024-11-26 PROBLEM — I51.89 GRADE I DIASTOLIC DYSFUNCTION: Status: RESOLVED | Noted: 2020-09-11 | Resolved: 2024-11-26

## 2024-11-26 PROBLEM — I21.4 NSTEMI (NON-ST ELEVATED MYOCARDIAL INFARCTION) (HCC): Status: RESOLVED | Noted: 2022-12-28 | Resolved: 2024-11-26

## 2024-11-26 PROBLEM — E44.1 MILD PROTEIN-CALORIE MALNUTRITION (HCC): Status: RESOLVED | Noted: 2023-06-15 | Resolved: 2024-11-26

## 2024-11-26 PROBLEM — N17.9 AKI (ACUTE KIDNEY INJURY) (HCC): Status: RESOLVED | Noted: 2020-09-10 | Resolved: 2024-11-26

## 2024-11-26 PROBLEM — Z99.2 CKD (CHRONIC KIDNEY DISEASE) STAGE V REQUIRING CHRONIC DIALYSIS (HCC): Status: RESOLVED | Noted: 2022-11-24 | Resolved: 2024-11-26

## 2024-11-26 PROBLEM — R00.0 SINUS TACHYCARDIA: Status: RESOLVED | Noted: 2023-04-09 | Resolved: 2024-11-26

## 2024-11-26 PROBLEM — I50.31 ACUTE HEART FAILURE WITH PRESERVED EJECTION FRACTION (HCC): Status: RESOLVED | Noted: 2020-09-11 | Resolved: 2024-11-26

## 2024-11-26 PROBLEM — E87.5 HYPERKALEMIA: Status: RESOLVED | Noted: 2020-09-11 | Resolved: 2024-11-26

## 2024-11-26 PROBLEM — K65.1 INTRA-ABDOMINAL ABSCESS (HCC): Status: RESOLVED | Noted: 2023-04-27 | Resolved: 2024-11-26

## 2024-12-02 ENCOUNTER — OFFICE VISIT (OUTPATIENT)
Dept: CARDIOLOGY CLINIC | Age: 54
End: 2024-12-02
Payer: MEDICARE

## 2024-12-02 VITALS
RESPIRATION RATE: 18 BRPM | HEART RATE: 89 BPM | WEIGHT: 182.6 LBS | BODY MASS INDEX: 24.73 KG/M2 | SYSTOLIC BLOOD PRESSURE: 162 MMHG | HEIGHT: 72 IN | DIASTOLIC BLOOD PRESSURE: 84 MMHG

## 2024-12-02 DIAGNOSIS — R80.9 PROTEINURIA, UNSPECIFIED TYPE: ICD-10-CM

## 2024-12-02 DIAGNOSIS — I71.21 ANEURYSM OF ASCENDING AORTA WITHOUT RUPTURE (HCC): ICD-10-CM

## 2024-12-02 DIAGNOSIS — Z01.810 PREOP CARDIOVASCULAR EXAM: Primary | ICD-10-CM

## 2024-12-02 PROCEDURE — 93000 ELECTROCARDIOGRAM COMPLETE: CPT | Performed by: INTERNAL MEDICINE

## 2024-12-02 PROCEDURE — 3079F DIAST BP 80-89 MM HG: CPT | Performed by: INTERNAL MEDICINE

## 2024-12-02 PROCEDURE — 99203 OFFICE O/P NEW LOW 30 MIN: CPT | Performed by: INTERNAL MEDICINE

## 2024-12-02 PROCEDURE — 3077F SYST BP >= 140 MM HG: CPT | Performed by: INTERNAL MEDICINE

## 2024-12-02 PROCEDURE — 3017F COLORECTAL CA SCREEN DOC REV: CPT | Performed by: INTERNAL MEDICINE

## 2024-12-02 PROCEDURE — G8427 DOCREV CUR MEDS BY ELIG CLIN: HCPCS | Performed by: INTERNAL MEDICINE

## 2024-12-02 PROCEDURE — G8484 FLU IMMUNIZE NO ADMIN: HCPCS | Performed by: INTERNAL MEDICINE

## 2024-12-02 PROCEDURE — G8420 CALC BMI NORM PARAMETERS: HCPCS | Performed by: INTERNAL MEDICINE

## 2024-12-02 PROCEDURE — 1036F TOBACCO NON-USER: CPT | Performed by: INTERNAL MEDICINE

## 2024-12-02 NOTE — PROGRESS NOTES
Chief Complaint   Patient presents with    Cardiac Clearance       Patient Active Problem List    Diagnosis Date Noted    ESRD (end stage renal disease) on dialysis ([facility]) 12/05/2023    Secondary hyperparathyroidism of renal origin ([facility]) 05/01/2023    Malignant neoplasm of ascending colon ([facility]) 04/07/2023    Anemia in chronic kidney disease, on chronic dialysis ([facility]) 12/05/2020    Erectile dysfunction 09/11/2020    Gout 09/11/2020    Opioid dependence in remission ([facility]) 09/11/2020     Overview Note:     Younger years      Proteinuria 09/11/2020    Thoracic aortic aneurysm without rupture ([facility]) 09/11/2020     Overview Note:     A. Ascending; 4.3 cm- Dr. Armenta 3/2020  B. CT 4.3 2022      Hyperlipidemia     Hypertension        Current Outpatient Medications   Medication Sig Dispense Refill    sevelamer (RENVELA) 800 MG tablet Take 1 tablet by mouth 3 times daily (with meals) 90 tablet 3    dilTIAZem (CARDIZEM CD) 240 MG extended release capsule Take 1 capsule by mouth daily 30 capsule 3    sodium zirconium cyclosilicate (LOKELMA) 5 g PACK oral suspension Take 1 packet by mouth three times a week      Multiple Vitamins-Minerals (RENAPLEX-D) TABS Take 1 tablet by mouth every morning      hydrALAZINE (APRESOLINE) 100 MG tablet Take 1 tablet by mouth every 8 hours 90 tablet 3     No current facility-administered medications for this visit.        Allergies   Allergen Reactions    Anesthetics, Amide Anaphylaxis and Other (See Comments)     PT STATES \"IT MADE ME FLIP OUT.\"  CHILDHOOD OCCURRENCE       Vitals:    12/02/24 0727   BP: (!) 162/84   Pulse: 89   Resp: 18   Weight: 82.8 kg (182 lb 9.6 oz)   Height: 1.829 m (6')             SUBJECTIVE: Toñito Banegas Jr. presents to the office today for consult for pre-op evaluation prior to bladder scope  I reviewed old cardiology notes and test.  Hx of ESRD on dialysis, HTN, LVH on echo 2022 with Mild AI as well.  Also hx of mild ascending thoracic aorta ectasia, unchanged on

## 2025-01-17 ENCOUNTER — HOSPITAL ENCOUNTER (OUTPATIENT)
Age: 55
Discharge: HOME | End: 2025-01-19

## 2025-01-17 PROCEDURE — 87086 URINE CULTURE/COLONY COUNT: CPT

## 2025-01-18 LAB
MICROORGANISM SPEC CULT: NO GROWTH
SPECIMEN DESCRIPTION: NORMAL

## 2025-01-27 ENCOUNTER — HOSPITAL ENCOUNTER (INPATIENT)
Age: 55
LOS: 1 days | Discharge: HOME | DRG: 193 | End: 2025-01-29
Attending: EMERGENCY MEDICINE | Admitting: INTERNAL MEDICINE
Payer: MEDICARE

## 2025-01-27 ENCOUNTER — APPOINTMENT (OUTPATIENT)
Dept: GENERAL RADIOLOGY | Age: 55
DRG: 193 | End: 2025-01-27
Payer: MEDICARE

## 2025-01-27 DIAGNOSIS — E87.70 HYPERVOLEMIA, UNSPECIFIED HYPERVOLEMIA TYPE: ICD-10-CM

## 2025-01-27 DIAGNOSIS — E87.5 HYPERKALEMIA: ICD-10-CM

## 2025-01-27 DIAGNOSIS — Z99.2 ESRD ON DIALYSIS (HCC): Primary | ICD-10-CM

## 2025-01-27 DIAGNOSIS — N18.6 ESRD ON DIALYSIS (HCC): Primary | ICD-10-CM

## 2025-01-27 LAB
ANION GAP SERPL CALCULATED.3IONS-SCNC: 18 MMOL/L (ref 7–16)
BNP SERPL-MCNC: ABNORMAL PG/ML (ref 0–125)
BUN SERPL-MCNC: 86 MG/DL (ref 6–20)
CALCIUM SERPL-MCNC: 8.9 MG/DL (ref 8.6–10.2)
CHLORIDE SERPL-SCNC: 92 MMOL/L (ref 98–107)
CO2 SERPL-SCNC: 22 MMOL/L (ref 22–29)
CREAT SERPL-MCNC: 11.7 MG/DL (ref 0.7–1.2)
GFR, ESTIMATED: 5 ML/MIN/1.73M2
GLUCOSE SERPL-MCNC: 110 MG/DL (ref 74–99)
INFLUENZA A BY PCR: NOT DETECTED
INFLUENZA B BY PCR: NOT DETECTED
MAGNESIUM SERPL-MCNC: 2.6 MG/DL (ref 1.6–2.6)
POTASSIUM SERPL-SCNC: 5.8 MMOL/L (ref 3.5–5)
SODIUM SERPL-SCNC: 132 MMOL/L (ref 132–146)

## 2025-01-27 PROCEDURE — 6370000000 HC RX 637 (ALT 250 FOR IP)

## 2025-01-27 PROCEDURE — 87502 INFLUENZA DNA AMP PROBE: CPT

## 2025-01-27 PROCEDURE — 83735 ASSAY OF MAGNESIUM: CPT

## 2025-01-27 PROCEDURE — 0202U NFCT DS 22 TRGT SARS-COV-2: CPT

## 2025-01-27 PROCEDURE — 83880 ASSAY OF NATRIURETIC PEPTIDE: CPT

## 2025-01-27 PROCEDURE — 80053 COMPREHEN METABOLIC PANEL: CPT

## 2025-01-27 PROCEDURE — 2580000003 HC RX 258

## 2025-01-27 PROCEDURE — 99285 EMERGENCY DEPT VISIT HI MDM: CPT

## 2025-01-27 PROCEDURE — 93005 ELECTROCARDIOGRAM TRACING: CPT | Performed by: PHYSICIAN ASSISTANT

## 2025-01-27 PROCEDURE — 82248 BILIRUBIN DIRECT: CPT

## 2025-01-27 PROCEDURE — 85025 COMPLETE CBC W/AUTO DIFF WBC: CPT

## 2025-01-27 PROCEDURE — 71045 X-RAY EXAM CHEST 1 VIEW: CPT

## 2025-01-27 RX ORDER — GLUCAGON 1 MG/ML
1 KIT INJECTION PRN
Status: DISCONTINUED | OUTPATIENT
Start: 2025-01-27 | End: 2025-01-28 | Stop reason: CLARIF

## 2025-01-27 RX ORDER — ALBUTEROL SULFATE 0.83 MG/ML
10 SOLUTION RESPIRATORY (INHALATION) ONCE
Status: COMPLETED | OUTPATIENT
Start: 2025-01-28 | End: 2025-01-28

## 2025-01-27 RX ORDER — DEXTROSE MONOHYDRATE 100 MG/ML
INJECTION, SOLUTION INTRAVENOUS CONTINUOUS PRN
Status: DISCONTINUED | OUTPATIENT
Start: 2025-01-27 | End: 2025-01-29 | Stop reason: HOSPADM

## 2025-01-27 RX ORDER — ACETAMINOPHEN 500 MG
1000 TABLET ORAL ONCE
Status: COMPLETED | OUTPATIENT
Start: 2025-01-27 | End: 2025-01-27

## 2025-01-27 RX ORDER — CALCIUM GLUCONATE 20 MG/ML
1000 INJECTION, SOLUTION INTRAVENOUS ONCE
Status: COMPLETED | OUTPATIENT
Start: 2025-01-28 | End: 2025-01-28

## 2025-01-27 RX ORDER — 0.9 % SODIUM CHLORIDE 0.9 %
1000 INTRAVENOUS SOLUTION INTRAVENOUS ONCE
Status: COMPLETED | OUTPATIENT
Start: 2025-01-27 | End: 2025-01-28

## 2025-01-27 RX ADMIN — ACETAMINOPHEN 1000 MG: 500 TABLET ORAL at 22:53

## 2025-01-27 RX ADMIN — SODIUM CHLORIDE 1000 ML: 9 INJECTION, SOLUTION INTRAVENOUS at 22:55

## 2025-01-27 ASSESSMENT — PAIN DESCRIPTION - FREQUENCY: FREQUENCY: INTERMITTENT

## 2025-01-27 ASSESSMENT — PAIN DESCRIPTION - DESCRIPTORS
DESCRIPTORS: ACHING
DESCRIPTORS: ACHING

## 2025-01-27 ASSESSMENT — PAIN - FUNCTIONAL ASSESSMENT
PAIN_FUNCTIONAL_ASSESSMENT: NONE - DENIES PAIN
PAIN_FUNCTIONAL_ASSESSMENT: ACTIVITIES ARE NOT PREVENTED
PAIN_FUNCTIONAL_ASSESSMENT: ACTIVITIES ARE NOT PREVENTED

## 2025-01-27 ASSESSMENT — PAIN DESCRIPTION - LOCATION
LOCATION: GENERALIZED
LOCATION: GENERALIZED

## 2025-01-27 ASSESSMENT — PAIN SCALES - GENERAL
PAINLEVEL_OUTOF10: 6
PAINLEVEL_OUTOF10: 5

## 2025-01-27 NOTE — ED NOTES
Department of Emergency Medicine  FIRST PROVIDER TRIAGE NOTE             Independent MLP           1/27/25  6:33 PM EST    Date of Encounter: 1/27/25   MRN: [id number]      HPI: Toñito Banegas Jr. is a 54 y.o. male who presents to the ED for Dizziness (Onset Sunday. Tested positive for Flu A today. ), Vomiting (Onset Sunday. ), Shortness of Breath (Onset Sunday. Flu A +. SPO2 95% on RA at intake. ), and Cough (Non-productive)     PT presents to ED with shortness of breath, nasuea, vomiting and dizziness in setting of positive flu A test today. Pt reports unable to take home meds due to vomiting. Many comorbidities with CHF and ESRD, HD patient, had dialysis Saturday, schedule is T,R,S.      Vitals:    01/27/25 1753   Pulse: (!) 111   Resp: 18   SpO2: 95%         ROS: Negative for diarrhea.    PE: Gen Appearance/Constitutional: alert, ill appearing  CV: tachycardia  Pulm: diminished bases.     Initial Plan of Care:  Plan to order/Initiate the following while awaiting opening in ED: labs, EKG, and imaging studies.   Instructed patient and/or family member with patient if any worsening condition to notify staff.    Provider-Patient relationship only established for Provider In Triage (PIT) secondary to high volume, low staffing, and/or boarding- patient to await bed availability..  Full assessment, HPI and examination not performed.  Discussed with patient that the provider in triage evaluation is not a comprehensive medical screening exam and this is not yet possible at the end of the provider in triage encounter, to state whether or not an emergency medical condition exist  This ends my PIT-Patient relationship.    Electronically signed by Peg White PA-C   DD: 1/27/25

## 2025-01-28 PROBLEM — J81.0 PULMONARY EDEMA, ACUTE: Status: ACTIVE | Noted: 2025-01-28

## 2025-01-28 LAB
ALBUMIN SERPL-MCNC: 3.8 G/DL (ref 3.5–5.2)
ALP SERPL-CCNC: 59 U/L (ref 40–129)
ALT SERPL-CCNC: 12 U/L (ref 0–40)
AST SERPL-CCNC: 14 U/L (ref 0–39)
B PARAP IS1001 DNA NPH QL NAA+NON-PROBE: NOT DETECTED
B PERT DNA SPEC QL NAA+PROBE: NOT DETECTED
BASOPHILS # BLD: 0.06 K/UL (ref 0–0.2)
BASOPHILS NFR BLD: 1 % (ref 0–2)
BILIRUB DIRECT SERPL-MCNC: <0.2 MG/DL (ref 0–0.3)
BILIRUB INDIRECT SERPL-MCNC: NORMAL MG/DL (ref 0–1)
BILIRUB SERPL-MCNC: 0.5 MG/DL (ref 0–1.2)
C PNEUM DNA NPH QL NAA+NON-PROBE: NOT DETECTED
CHP ED QC CHECK: NORMAL
EKG ATRIAL RATE: 109 BPM
EKG P AXIS: 77 DEGREES
EKG P-R INTERVAL: 184 MS
EKG Q-T INTERVAL: 342 MS
EKG QRS DURATION: 98 MS
EKG QTC CALCULATION (BAZETT): 460 MS
EKG R AXIS: -24 DEGREES
EKG T AXIS: 100 DEGREES
EKG VENTRICULAR RATE: 109 BPM
EOSINOPHIL # BLD: 0 K/UL (ref 0.05–0.5)
EOSINOPHILS RELATIVE PERCENT: 0 % (ref 0–6)
ERYTHROCYTE [DISTWIDTH] IN BLOOD BY AUTOMATED COUNT: 14.6 % (ref 11.5–15)
FLUAV H3 RNA NPH QL NAA+NON-PROBE: DETECTED
FLUAV RNA NPH QL NAA+NON-PROBE: DETECTED
FLUBV RNA NPH QL NAA+NON-PROBE: NOT DETECTED
GLUCOSE BLD-MCNC: 101 MG/DL (ref 74–99)
GLUCOSE BLD-MCNC: 110 MG/DL
GLUCOSE BLD-MCNC: 110 MG/DL (ref 74–99)
GLUCOSE BLD-MCNC: 166 MG/DL (ref 74–99)
GLUCOSE BLD-MCNC: 168 MG/DL
GLUCOSE BLD-MCNC: 59 MG/DL
GLUCOSE BLD-MCNC: 59 MG/DL (ref 74–99)
GLUCOSE BLD-MCNC: 68 MG/DL
GLUCOSE BLD-MCNC: 68 MG/DL (ref 74–99)
GLUCOSE BLD-MCNC: 94 MG/DL (ref 74–99)
HADV DNA NPH QL NAA+NON-PROBE: NOT DETECTED
HCOV 229E RNA NPH QL NAA+NON-PROBE: NOT DETECTED
HCOV HKU1 RNA NPH QL NAA+NON-PROBE: NOT DETECTED
HCOV NL63 RNA NPH QL NAA+NON-PROBE: NOT DETECTED
HCOV OC43 RNA NPH QL NAA+NON-PROBE: NOT DETECTED
HCT VFR BLD AUTO: 30.8 % (ref 37–54)
HGB BLD-MCNC: 9.9 G/DL (ref 12.5–16.5)
HMPV RNA NPH QL NAA+NON-PROBE: NOT DETECTED
HPIV1 RNA NPH QL NAA+NON-PROBE: NOT DETECTED
HPIV2 RNA NPH QL NAA+NON-PROBE: NOT DETECTED
HPIV3 RNA NPH QL NAA+NON-PROBE: NOT DETECTED
HPIV4 RNA NPH QL NAA+NON-PROBE: NOT DETECTED
IMM GRANULOCYTES # BLD AUTO: 0.04 K/UL (ref 0–0.58)
IMM GRANULOCYTES NFR BLD: 1 % (ref 0–5)
LYMPHOCYTES NFR BLD: 0.4 K/UL (ref 1.5–4)
LYMPHOCYTES RELATIVE PERCENT: 5 % (ref 20–42)
M PNEUMO DNA NPH QL NAA+NON-PROBE: NOT DETECTED
MCH RBC QN AUTO: 31.3 PG (ref 26–35)
MCHC RBC AUTO-ENTMCNC: 32.1 G/DL (ref 32–34.5)
MCV RBC AUTO: 97.5 FL (ref 80–99.9)
MONOCYTES NFR BLD: 0.52 K/UL (ref 0.1–0.95)
MONOCYTES NFR BLD: 6 % (ref 2–12)
NEUTROPHILS NFR BLD: 88 % (ref 43–80)
NEUTS SEG NFR BLD: 7.37 K/UL (ref 1.8–7.3)
PLATELET # BLD AUTO: 210 K/UL (ref 130–450)
PMV BLD AUTO: 9.6 FL (ref 7–12)
POTASSIUM SERPL-SCNC: 4.4 MMOL/L (ref 3.5–5)
POTASSIUM SERPL-SCNC: 5.1 MMOL/L (ref 3.5–5)
PROT SERPL-MCNC: 6.4 G/DL (ref 6.4–8.3)
RBC # BLD AUTO: 3.16 M/UL (ref 3.8–5.8)
RBC # BLD: ABNORMAL 10*6/UL
RSV RNA NPH QL NAA+NON-PROBE: NOT DETECTED
RV+EV RNA NPH QL NAA+NON-PROBE: NOT DETECTED
SARS-COV-2 RNA NPH QL NAA+NON-PROBE: NOT DETECTED
SPECIMEN DESCRIPTION: ABNORMAL
WBC OTHER # BLD: 8.4 K/UL (ref 4.5–11.5)

## 2025-01-28 PROCEDURE — 93010 ELECTROCARDIOGRAM REPORT: CPT | Performed by: INTERNAL MEDICINE

## 2025-01-28 PROCEDURE — 6360000002 HC RX W HCPCS: Performed by: NURSE PRACTITIONER

## 2025-01-28 PROCEDURE — 94640 AIRWAY INHALATION TREATMENT: CPT

## 2025-01-28 PROCEDURE — 2500000003 HC RX 250 WO HCPCS: Performed by: NURSE PRACTITIONER

## 2025-01-28 PROCEDURE — 5A1D70Z PERFORMANCE OF URINARY FILTRATION, INTERMITTENT, LESS THAN 6 HOURS PER DAY: ICD-10-PCS | Performed by: HOSPITALIST

## 2025-01-28 PROCEDURE — 6370000000 HC RX 637 (ALT 250 FOR IP)

## 2025-01-28 PROCEDURE — 2580000003 HC RX 258

## 2025-01-28 PROCEDURE — 6360000002 HC RX W HCPCS

## 2025-01-28 PROCEDURE — 96374 THER/PROPH/DIAG INJ IV PUSH: CPT

## 2025-01-28 PROCEDURE — 84132 ASSAY OF SERUM POTASSIUM: CPT

## 2025-01-28 PROCEDURE — 2060000000 HC ICU INTERMEDIATE R&B

## 2025-01-28 PROCEDURE — 90935 HEMODIALYSIS ONE EVALUATION: CPT

## 2025-01-28 PROCEDURE — 6370000000 HC RX 637 (ALT 250 FOR IP): Performed by: NURSE PRACTITIONER

## 2025-01-28 PROCEDURE — 82962 GLUCOSE BLOOD TEST: CPT

## 2025-01-28 PROCEDURE — 94664 DEMO&/EVAL PT USE INHALER: CPT

## 2025-01-28 PROCEDURE — 6360000002 HC RX W HCPCS: Performed by: HOSPITALIST

## 2025-01-28 RX ORDER — ALBUTEROL SULFATE 0.83 MG/ML
2.5 SOLUTION RESPIRATORY (INHALATION)
Status: DISCONTINUED | OUTPATIENT
Start: 2025-01-28 | End: 2025-01-29

## 2025-01-28 RX ORDER — ACETAMINOPHEN 650 MG/1
650 SUPPOSITORY RECTAL EVERY 6 HOURS PRN
Status: DISCONTINUED | OUTPATIENT
Start: 2025-01-28 | End: 2025-01-29 | Stop reason: HOSPADM

## 2025-01-28 RX ORDER — GLUCAGON 1 MG/ML
1 KIT INJECTION PRN
Status: DISCONTINUED | OUTPATIENT
Start: 2025-01-28 | End: 2025-01-29 | Stop reason: HOSPADM

## 2025-01-28 RX ORDER — HYDRALAZINE HYDROCHLORIDE 50 MG/1
100 TABLET, FILM COATED ORAL EVERY 8 HOURS SCHEDULED
Status: DISCONTINUED | OUTPATIENT
Start: 2025-01-28 | End: 2025-01-29 | Stop reason: HOSPADM

## 2025-01-28 RX ORDER — DILTIAZEM HYDROCHLORIDE 180 MG/1
180 CAPSULE, EXTENDED RELEASE ORAL EVERY MORNING
COMMUNITY

## 2025-01-28 RX ORDER — SENNOSIDES A AND B 8.6 MG/1
1 TABLET, FILM COATED ORAL DAILY PRN
Status: DISCONTINUED | OUTPATIENT
Start: 2025-01-28 | End: 2025-01-29 | Stop reason: HOSPADM

## 2025-01-28 RX ORDER — SEVELAMER CARBONATE 800 MG/1
800 TABLET, FILM COATED ORAL
Status: DISCONTINUED | OUTPATIENT
Start: 2025-01-28 | End: 2025-01-29 | Stop reason: HOSPADM

## 2025-01-28 RX ORDER — SEVELAMER CARBONATE 800 MG/1
2 TABLET, FILM COATED ORAL
COMMUNITY

## 2025-01-28 RX ORDER — DILTIAZEM HYDROCHLORIDE 240 MG/1
240 CAPSULE, COATED, EXTENDED RELEASE ORAL DAILY
Status: DISCONTINUED | OUTPATIENT
Start: 2025-01-28 | End: 2025-01-29 | Stop reason: HOSPADM

## 2025-01-28 RX ORDER — ONDANSETRON 2 MG/ML
4 INJECTION INTRAMUSCULAR; INTRAVENOUS EVERY 6 HOURS PRN
Status: DISCONTINUED | OUTPATIENT
Start: 2025-01-28 | End: 2025-01-29 | Stop reason: HOSPADM

## 2025-01-28 RX ORDER — IPRATROPIUM BROMIDE AND ALBUTEROL SULFATE 2.5; .5 MG/3ML; MG/3ML
SOLUTION RESPIRATORY (INHALATION)
Status: COMPLETED
Start: 2025-01-28 | End: 2025-01-28

## 2025-01-28 RX ORDER — SODIUM CHLORIDE 0.9 % (FLUSH) 0.9 %
10 SYRINGE (ML) INJECTION EVERY 12 HOURS SCHEDULED
Status: DISCONTINUED | OUTPATIENT
Start: 2025-01-28 | End: 2025-01-29 | Stop reason: HOSPADM

## 2025-01-28 RX ORDER — SEVELAMER CARBONATE 800 MG/1
1600 TABLET, FILM COATED ORAL
Status: DISCONTINUED | OUTPATIENT
Start: 2025-01-28 | End: 2025-01-29 | Stop reason: HOSPADM

## 2025-01-28 RX ORDER — ACETAMINOPHEN 325 MG/1
650 TABLET ORAL EVERY 6 HOURS PRN
Status: DISCONTINUED | OUTPATIENT
Start: 2025-01-28 | End: 2025-01-29 | Stop reason: HOSPADM

## 2025-01-28 RX ORDER — HYDRALAZINE HYDROCHLORIDE 100 MG/1
100 TABLET, FILM COATED ORAL 2 TIMES DAILY
COMMUNITY

## 2025-01-28 RX ORDER — HEPARIN SODIUM 5000 [USP'U]/ML
5000 INJECTION, SOLUTION INTRAVENOUS; SUBCUTANEOUS EVERY 8 HOURS SCHEDULED
Status: DISCONTINUED | OUTPATIENT
Start: 2025-01-28 | End: 2025-01-29 | Stop reason: HOSPADM

## 2025-01-28 RX ORDER — SODIUM CHLORIDE 9 MG/ML
INJECTION, SOLUTION INTRAVENOUS PRN
Status: DISCONTINUED | OUTPATIENT
Start: 2025-01-28 | End: 2025-01-29 | Stop reason: HOSPADM

## 2025-01-28 RX ORDER — ONDANSETRON 4 MG/1
4 TABLET, ORALLY DISINTEGRATING ORAL EVERY 8 HOURS PRN
Status: DISCONTINUED | OUTPATIENT
Start: 2025-01-28 | End: 2025-01-29 | Stop reason: HOSPADM

## 2025-01-28 RX ORDER — SEVELAMER CARBONATE 800 MG/1
1600 TABLET, FILM COATED ORAL
COMMUNITY

## 2025-01-28 RX ORDER — SODIUM CHLORIDE 0.9 % (FLUSH) 0.9 %
10 SYRINGE (ML) INJECTION PRN
Status: DISCONTINUED | OUTPATIENT
Start: 2025-01-28 | End: 2025-01-29 | Stop reason: HOSPADM

## 2025-01-28 RX ADMIN — INSULIN HUMAN 10 UNITS: 100 INJECTION, SOLUTION PARENTERAL at 00:41

## 2025-01-28 RX ADMIN — SODIUM ZIRCONIUM CYCLOSILICATE 10 G: 10 POWDER, FOR SUSPENSION ORAL at 00:40

## 2025-01-28 RX ADMIN — HYDRALAZINE HYDROCHLORIDE 100 MG: 50 TABLET ORAL at 16:25

## 2025-01-28 RX ADMIN — SEVELAMER CARBONATE 800 MG: 800 TABLET, FILM COATED ORAL at 08:28

## 2025-01-28 RX ADMIN — ACETAMINOPHEN 650 MG: 325 TABLET ORAL at 08:36

## 2025-01-28 RX ADMIN — ALBUTEROL SULFATE 2.5 MG: 2.5 SOLUTION RESPIRATORY (INHALATION) at 20:33

## 2025-01-28 RX ADMIN — SODIUM CHLORIDE, PRESERVATIVE FREE 10 ML: 5 INJECTION INTRAVENOUS at 19:59

## 2025-01-28 RX ADMIN — DILTIAZEM HYDROCHLORIDE 240 MG: 240 CAPSULE, COATED, EXTENDED RELEASE ORAL at 08:28

## 2025-01-28 RX ADMIN — ACETAMINOPHEN 650 MG: 325 TABLET ORAL at 22:22

## 2025-01-28 RX ADMIN — Medication 16 G: at 04:40

## 2025-01-28 RX ADMIN — HYDRALAZINE HYDROCHLORIDE 100 MG: 50 TABLET ORAL at 08:28

## 2025-01-28 RX ADMIN — HEPARIN SODIUM 5000 UNITS: 5000 INJECTION INTRAVENOUS; SUBCUTANEOUS at 22:22

## 2025-01-28 RX ADMIN — DEXTROSE MONOHYDRATE: 100 INJECTION, SOLUTION INTRAVENOUS at 04:16

## 2025-01-28 RX ADMIN — SODIUM CHLORIDE, PRESERVATIVE FREE 10 ML: 5 INJECTION INTRAVENOUS at 08:28

## 2025-01-28 RX ADMIN — IPRATROPIUM BROMIDE AND ALBUTEROL SULFATE 3 ML: .5; 2.5 SOLUTION RESPIRATORY (INHALATION) at 00:52

## 2025-01-28 RX ADMIN — HYDRALAZINE HYDROCHLORIDE 100 MG: 50 TABLET ORAL at 22:22

## 2025-01-28 RX ADMIN — DEXTROSE 250 ML: 10 SOLUTION INTRAVENOUS at 00:44

## 2025-01-28 RX ADMIN — ACETAMINOPHEN 650 MG: 325 TABLET ORAL at 15:08

## 2025-01-28 RX ADMIN — HEPARIN SODIUM 5000 UNITS: 5000 INJECTION INTRAVENOUS; SUBCUTANEOUS at 16:25

## 2025-01-28 RX ADMIN — SEVELAMER CARBONATE 800 MG: 800 TABLET, FILM COATED ORAL at 14:05

## 2025-01-28 RX ADMIN — ALBUTEROL SULFATE 10 MG: 2.5 SOLUTION RESPIRATORY (INHALATION) at 00:53

## 2025-01-28 RX ADMIN — SEVELAMER CARBONATE 800 MG: 800 TABLET, FILM COATED ORAL at 16:25

## 2025-01-28 RX ADMIN — ALBUTEROL SULFATE 2.5 MG: 2.5 SOLUTION RESPIRATORY (INHALATION) at 16:04

## 2025-01-28 RX ADMIN — CALCIUM GLUCONATE 1000 MG: 20 INJECTION, SOLUTION INTRAVENOUS at 00:40

## 2025-01-28 RX ADMIN — HEPARIN SODIUM 5000 UNITS: 5000 INJECTION INTRAVENOUS; SUBCUTANEOUS at 08:25

## 2025-01-28 RX ADMIN — SODIUM CHLORIDE, PRESERVATIVE FREE 10 ML: 5 INJECTION INTRAVENOUS at 16:25

## 2025-01-28 ASSESSMENT — PAIN SCALES - GENERAL
PAINLEVEL_OUTOF10: 0
PAINLEVEL_OUTOF10: 3
PAINLEVEL_OUTOF10: 7
PAINLEVEL_OUTOF10: 3

## 2025-01-28 ASSESSMENT — PAIN DESCRIPTION - DESCRIPTORS: DESCRIPTORS: ACHING;DISCOMFORT

## 2025-01-28 ASSESSMENT — PAIN DESCRIPTION - LOCATION
LOCATION: BACK

## 2025-01-28 ASSESSMENT — PAIN - FUNCTIONAL ASSESSMENT: PAIN_FUNCTIONAL_ASSESSMENT: ACTIVITIES ARE NOT PREVENTED

## 2025-01-28 ASSESSMENT — PAIN DESCRIPTION - PAIN TYPE: TYPE: ACUTE PAIN

## 2025-01-28 NOTE — FLOWSHEET NOTE
01/28/25 1256   Treatment   Time On 0853   Time Off 1253   Treatment Goal 3000   Observations & Evaluations   Level of Consciousness 0   Oriented X 3   Heart Rhythm Regular   Respiratory Quality/Effort Unlabored   O2 Device None (Room air)   Skin Color Pale   Vital Signs   BP (!) 187/89   Temp 98.1 °F (36.7 °C)   Pulse 89   Respirations 17   Weight - Scale   (unable to obtain on ED cart)   Pain Assessment   Pain Assessment 0-10   Pain Level 3   Pain Location Back   Pain Type Acute pain   Post-Hemodialysis Assessment   Post-Treatment Procedures Blood returned;Access bleeding time < 10 minutes   Machine Disinfection Process Exterior Machine Disinfection   Rinseback Volume (ml) 300 ml   Blood Volume Processed (Liters) 89 L   Dialyzer Clearance Lightly streaked   Duration of Treatment (minutes) 240 minutes   Hemodialysis Intake (ml) 300 ml   Hemodialysis Output (ml) 3500 ml   NET Removed (ml) 3200   Tolerated Treatment Good   Patient Disposition Return to room     HD treatment completed. Patient ran 4 hours on 2K 2.5 Ca bath. UF 3200 mL NET. Patient tolerated procedure well.

## 2025-01-28 NOTE — CARE COORDINATION
Internal Medicine On-call Care Coordination Note    I was called by the ED physician because they recommended admission for this patient and we cover their PCP.  The history as I understand it after discussion with the ED physician is as follows:    Presents with n/v, dyspnea  Hx ESRD on HD  K 5.8  BNP > 70,000    I placed admission orders.  Including:    General admission orders  Reconciled home meds  Nephrology consult     Dr. Joiner, or our coverage will see the patient for H&P.    Electronically signed by VIC Azevedo CNP on 1/28/2025 at 2:59 AM

## 2025-01-28 NOTE — PROGRESS NOTES
Patient came through ED with shoes/slippers. Arrived on unit with missing shoes. Called down to dialysis and ED with no shoes found.

## 2025-01-28 NOTE — CONSULTS
[facility]  Nephrology Consult  Note    Patient's Name: Toñito Banegas Jr.     History OF Present Illness:  \"Toñito Banegas is a 53 year old male, with past medical history of HFpEF, anemia in CKD, BPH, ESRD on HD, gout, hyperkalemia, HLD, HTN, and secondary hyperparathyroidism of renal origin, who presented to the ED on 1/27/25 with complaints of shortness of breath and emesis. Vital signs on include temperature 99.7, pulse 111, RR 18, 173/110, 99% SPO2 on room air. Lab data on 12/4 includes potassium 5.8, CO2 22, Bun 86, creatinine 11.7,  pro-BNP >70k, troponin 158, albumin 3.8, WBC 8.4 Hg 9.9. Imaging on 12/4 includes a chest xray which showed with pulmonary edema and (+) Influenza A  Nephrology was consulted to see the patient for ESRD on dialysis. The patient is known to our service and dialyzes at [facility] TTS 2nd shift via a TDC. He has a left forearm AV fistula that is + thrill, + bruit.   At present, patient was seen and examined on HD. He reports that he came into the ED last night because he was feel very short of breath. He reports shortness of breath over the last 4 days. He called his PCP who suggested he have and Influenza Test which was (+) at a local pharmacy. He saw his PCP who sent him to ED. He notes for the last 2 days N/V of even liquids He denies chest pain.     Subjective:         Problem List:    Patient Active Problem List   Diagnosis    Hyperlipidemia    Hypertension    Erectile dysfunction    Gout    Opioid dependence in remission (HCC)    Proteinuria    Anemia in chronic kidney disease, on chronic dialysis (HCC)    Malignant neoplasm of ascending colon (HCC)    Thoracic aortic aneurysm without rupture (HCC)    Secondary hyperparathyroidism of renal origin (HCC)    ESRD (end stage renal disease) on dialysis (HCC)    Pulmonary edema, acute        Past Medical History:    Past Medical History:   Diagnosis Date    Acute heart failure with preserved ejection fraction (HCC)

## 2025-01-28 NOTE — ED NOTES
ED to Inpatient Handoff Report    Notified floor that electronic handoff available and patient ready for transport to room 642.    Safety Risks: None identified    Patient in Restraints: no    Constant Observer or Patient : no    Telemetry Monitoring Ordered: Yes          Order to transfer to unit without monitor: YES    Last MEWS: 1 Time completed: 1409    Deterioration Index: 25.45    Vitals:    01/28/25 0445 01/28/25 0828 01/28/25 0828 01/28/25 1256   BP:   (!) 138/104 (!) 187/89   Pulse: (!) 108 94 92 89   Resp:   18 17   Temp:    98.1 °F (36.7 °C)   TempSrc:       SpO2: 96%  99%    Weight:       Height:           Opportunity for questions and clarification was provided.

## 2025-01-28 NOTE — PROGRESS NOTES
4 Eyes Skin Assessment     NAME:  Toñito Banegas Jr.  YOB: 1970  MEDICAL RECORD NUMBER:  [id number]    The patient is being assessed for  Admission    I agree that at least one RN has performed a thorough Head to Toe Skin Assessment on the patient. ALL assessment sites listed below have been assessed.      Areas assessed by both nurses:    Head, Face, Ears, Shoulders, Back, Chest, Arms, Elbows, Hands, Sacrum. Buttock, Coccyx, Ischium, Legs. Feet and Heels, and Under Medical Devices         Does the Patient have a Wound? No noted wound(s)       Lobo Prevention initiated by RN: No  Wound Care Orders initiated by RN: No    Pressure Injury (Stage 3,4, Unstageable, DTI, NWPT, and Complex wounds) if present, place Wound referral order by RN under : No    Old PD site    New Ostomies, if present place, Ostomy referral order under : No     Nurse 1 eSignature: Electronically signed by Jin Olmstead RN on 1/28/25 at 4:39 PM EST    **SHARE this note so that the co-signing nurse can place an eSignature**    Nurse 2 eSignature: Electronically signed by Lora Paz RN on 1/28/25 at 5:04 PM EST

## 2025-01-28 NOTE — PROGRESS NOTES
Database initiated. Patient is A&O independent from home with GF. States he uses no assistive devices and is RA at baseline. He is a NO LEFT ARM d/t fistula.

## 2025-01-28 NOTE — H&P
Internal Medicine History & Physical     Name: Toñito Banegas Jr.  : 1970  Chief Complaint: Dizziness (Onset . Tested positive for Flu A today. ), Vomiting (Onset . ), Shortness of Breath (Onset . Flu A +. SPO2 95% on RA at intake. ), and Cough (Non-productive)  Primary Care Physician: Toñito Watters MD  Admission date: 2025  Date of service: 2025     History of Present Illness  Toñito is a 54 y.o. year old male.  Patient presented with dizziness and vomiting along with diarrhea.  He was short of breath.  On , he went to the pharmacy and diagnosed with influenza.  Came into the ER for further evaluation and he was negative at this time his test.  He was still having cough.  He is not eating or drinking well and felt short of breath with any activity.  He is on dialysis.  Patient now seen after dialysis.  He is breathing easier.  States he is starting to get hungry and wants to eat.  Denies any current vomiting or diarrhea.  Nothing was making him feel well at home.  Said he was stented by his family doctor to dosed Tamiflu because of his end-stage renal disease.    Patient did well with dialysis today.  Starting to feel better.  States he would like to go home tomorrow if doing well.      ED course:   Initial blood work and imaging studies performed. Admission recommended by ED physician. Case was discussed with ED provider. Meds in ED consisted of the following:  Medications   glucose chewable tablet 16 g (16 g Oral Given 25 0440)   dextrose bolus 10% 125 mL (has no administration in time range)     Or   dextrose bolus 10% 250 mL (has no administration in time range)   dextrose 10 % infusion (0 mLs IntraVENous Stopped 25 0441)   sodium zirconium cyclosilicate (LOKELMA) oral suspension 5 g (has no administration in time range)   dilTIAZem (CARDIZEM CD) extended release capsule 240 mg (240 mg Oral Given 25 0828)   hydrALAZINE (APRESOLINE) tablet 100 mg (100

## 2025-01-28 NOTE — ED PROVIDER NOTES
Department of Emergency Medicine     Written by: Gage Michael MD  Patient Name: Toñito Banegas Jr.  Admit Date: 2025 10:21 PM  MRN: [id number]                   : 1970    HPI     Chief Complaint   Patient presents with    Dizziness     Onset . Tested positive for Flu A today.     Vomiting     Onset .     Shortness of Breath     Onset . Flu A +. SPO2 95% on RA at intake.     Cough     Non-productive       Toñito Banegas Jr. is a 54 y.o. male that presents to the ED with lightheadedness nausea vomiting dyspnea all started yesterday.  On dialysis, nephrologist is Jeffrey.  Received dialysis .  Has not missed any dialysis sessions.  He had influenza a positive swab at home after his PCP recommended he gets 1 from the pharmacy.  Patient was sent to the ER for recommendations on how to start Tamiflu given the patient is on dialysis.    Review of systems   Pertinent positives and negatives mentioned in the HPI/MDM.      Physical   Physical Exam  Constitutional:       General: He is not in acute distress.     Appearance: Normal appearance.   Eyes:      Extraocular Movements: Extraocular movements intact.      Pupils: Pupils are equal, round, and reactive to light.   Cardiovascular:      Rate and Rhythm: Normal rate and regular rhythm.   Pulmonary:      Effort: Pulmonary effort is normal. No respiratory distress.   Chest:      Chest wall: No mass or tenderness.   Abdominal:      Palpations: Abdomen is soft.      Tenderness: There is no abdominal tenderness.   Musculoskeletal:      Right lower leg: Edema (1+) present.      Left lower leg: Edema (1+) present.      Comments: Left arm fistula   Skin:     Coloration: Skin is not cyanotic.   Neurological:      Mental Status: He is alert and oriented to person, place, and time. Mental status is at baseline.          Chart review: Evaluated by cardiology for preop cardiovascular exam on 2024    Social determinants:

## 2025-01-29 VITALS
OXYGEN SATURATION: 97 % | HEART RATE: 93 BPM | BODY MASS INDEX: 23.57 KG/M2 | SYSTOLIC BLOOD PRESSURE: 144 MMHG | RESPIRATION RATE: 17 BRPM | WEIGHT: 174 LBS | DIASTOLIC BLOOD PRESSURE: 96 MMHG | HEIGHT: 72 IN | TEMPERATURE: 98.2 F

## 2025-01-29 LAB
ALBUMIN SERPL-MCNC: 3.7 G/DL (ref 3.5–5.2)
ALP SERPL-CCNC: 69 U/L (ref 40–129)
ALT SERPL-CCNC: 16 U/L (ref 0–40)
ANION GAP SERPL CALCULATED.3IONS-SCNC: 13 MMOL/L (ref 7–16)
AST SERPL-CCNC: 27 U/L (ref 0–39)
BASOPHILS # BLD: 0.05 K/UL (ref 0–0.2)
BASOPHILS NFR BLD: 1 % (ref 0–2)
BILIRUB SERPL-MCNC: 0.5 MG/DL (ref 0–1.2)
BUN SERPL-MCNC: 51 MG/DL (ref 6–20)
CALCIUM SERPL-MCNC: 8.4 MG/DL (ref 8.6–10.2)
CHLORIDE SERPL-SCNC: 97 MMOL/L (ref 98–107)
CO2 SERPL-SCNC: 29 MMOL/L (ref 22–29)
CREAT SERPL-MCNC: 8.6 MG/DL (ref 0.7–1.2)
EOSINOPHIL # BLD: 0.12 K/UL (ref 0.05–0.5)
EOSINOPHILS RELATIVE PERCENT: 2 % (ref 0–6)
ERYTHROCYTE [DISTWIDTH] IN BLOOD BY AUTOMATED COUNT: 14.6 % (ref 11.5–15)
GFR, ESTIMATED: 7 ML/MIN/1.73M2
GLUCOSE SERPL-MCNC: 108 MG/DL (ref 74–99)
HCT VFR BLD AUTO: 30.2 % (ref 37–54)
HGB BLD-MCNC: 9.3 G/DL (ref 12.5–16.5)
IMM GRANULOCYTES # BLD AUTO: <0.03 K/UL (ref 0–0.58)
IMM GRANULOCYTES NFR BLD: 0 % (ref 0–5)
LYMPHOCYTES NFR BLD: 0.47 K/UL (ref 1.5–4)
LYMPHOCYTES RELATIVE PERCENT: 8 % (ref 20–42)
MAGNESIUM SERPL-MCNC: 2.7 MG/DL (ref 1.6–2.6)
MCH RBC QN AUTO: 30.5 PG (ref 26–35)
MCHC RBC AUTO-ENTMCNC: 30.8 G/DL (ref 32–34.5)
MCV RBC AUTO: 99 FL (ref 80–99.9)
MONOCYTES NFR BLD: 0.42 K/UL (ref 0.1–0.95)
MONOCYTES NFR BLD: 7 % (ref 2–12)
NEUTROPHILS NFR BLD: 82 % (ref 43–80)
NEUTS SEG NFR BLD: 5.06 K/UL (ref 1.8–7.3)
PHOSPHATE SERPL-MCNC: 6 MG/DL (ref 2.5–4.5)
PLATELET # BLD AUTO: 184 K/UL (ref 130–450)
PMV BLD AUTO: 9.8 FL (ref 7–12)
POTASSIUM SERPL-SCNC: 4.8 MMOL/L (ref 3.5–5)
PROT SERPL-MCNC: 6.2 G/DL (ref 6.4–8.3)
RBC # BLD AUTO: 3.05 M/UL (ref 3.8–5.8)
RBC # BLD: ABNORMAL 10*6/UL
SODIUM SERPL-SCNC: 139 MMOL/L (ref 132–146)
WBC OTHER # BLD: 6.1 K/UL (ref 4.5–11.5)

## 2025-01-29 PROCEDURE — 6370000000 HC RX 637 (ALT 250 FOR IP): Performed by: NURSE PRACTITIONER

## 2025-01-29 PROCEDURE — 84100 ASSAY OF PHOSPHORUS: CPT

## 2025-01-29 PROCEDURE — 83735 ASSAY OF MAGNESIUM: CPT

## 2025-01-29 PROCEDURE — 6360000002 HC RX W HCPCS: Performed by: NURSE PRACTITIONER

## 2025-01-29 PROCEDURE — 2500000003 HC RX 250 WO HCPCS: Performed by: NURSE PRACTITIONER

## 2025-01-29 PROCEDURE — 80053 COMPREHEN METABOLIC PANEL: CPT

## 2025-01-29 PROCEDURE — 36415 COLL VENOUS BLD VENIPUNCTURE: CPT

## 2025-01-29 PROCEDURE — 6360000002 HC RX W HCPCS: Performed by: HOSPITALIST

## 2025-01-29 PROCEDURE — 85025 COMPLETE CBC W/AUTO DIFF WBC: CPT

## 2025-01-29 PROCEDURE — 94640 AIRWAY INHALATION TREATMENT: CPT

## 2025-01-29 RX ORDER — ALBUTEROL SULFATE 0.83 MG/ML
2.5 SOLUTION RESPIRATORY (INHALATION) EVERY 4 HOURS PRN
Status: DISCONTINUED | OUTPATIENT
Start: 2025-01-29 | End: 2025-01-29 | Stop reason: HOSPADM

## 2025-01-29 RX ORDER — OSELTAMIVIR PHOSPHATE 30 MG/1
30 CAPSULE ORAL DAILY
Qty: 4 CAPSULE | Refills: 0 | Status: SHIPPED | OUTPATIENT
Start: 2025-01-29 | End: 2025-01-29 | Stop reason: HOSPADM

## 2025-01-29 RX ORDER — OSELTAMIVIR PHOSPHATE 30 MG/1
30 CAPSULE ORAL
Status: DISCONTINUED | OUTPATIENT
Start: 2025-01-29 | End: 2025-01-29 | Stop reason: HOSPADM

## 2025-01-29 RX ORDER — OSELTAMIVIR PHOSPHATE 30 MG/1
30 CAPSULE ORAL
Qty: 4 CAPSULE | Refills: 0 | Status: SHIPPED | OUTPATIENT
Start: 2025-01-29 | End: 2025-02-06

## 2025-01-29 RX ADMIN — SEVELAMER CARBONATE 800 MG: 800 TABLET, FILM COATED ORAL at 12:22

## 2025-01-29 RX ADMIN — DILTIAZEM HYDROCHLORIDE 240 MG: 240 CAPSULE, COATED, EXTENDED RELEASE ORAL at 09:36

## 2025-01-29 RX ADMIN — HYDRALAZINE HYDROCHLORIDE 100 MG: 50 TABLET ORAL at 06:08

## 2025-01-29 RX ADMIN — ALBUTEROL SULFATE 2.5 MG: 2.5 SOLUTION RESPIRATORY (INHALATION) at 08:16

## 2025-01-29 RX ADMIN — SODIUM ZIRCONIUM CYCLOSILICATE 5 G: 5 POWDER, FOR SUSPENSION ORAL at 09:36

## 2025-01-29 RX ADMIN — SEVELAMER CARBONATE 800 MG: 800 TABLET, FILM COATED ORAL at 08:09

## 2025-01-29 RX ADMIN — ACETAMINOPHEN 650 MG: 325 TABLET ORAL at 07:31

## 2025-01-29 RX ADMIN — HEPARIN SODIUM 5000 UNITS: 5000 INJECTION INTRAVENOUS; SUBCUTANEOUS at 06:08

## 2025-01-29 RX ADMIN — SODIUM CHLORIDE, PRESERVATIVE FREE 10 ML: 5 INJECTION INTRAVENOUS at 09:37

## 2025-01-29 ASSESSMENT — PAIN SCALES - GENERAL: PAINLEVEL_OUTOF10: 4

## 2025-01-29 ASSESSMENT — PAIN DESCRIPTION - DESCRIPTORS: DESCRIPTORS: ACHING;SORE

## 2025-01-29 ASSESSMENT — PAIN DESCRIPTION - LOCATION: LOCATION: GENERALIZED

## 2025-01-29 NOTE — PLAN OF CARE
Problem: ABCDS Injury Assessment  Goal: Absence of physical injury  1/28/2025 2350 by Uzma Milner, RN  Outcome: Progressing     Problem: Pain  Goal: Verbalizes/displays adequate comfort level or baseline comfort level  1/28/2025 2350 by Uzma Milner RN  Outcome: Progressing     Problem: Safety - Adult  Goal: Free from fall injury  1/28/2025 2350 by Uzma Milner, RN  Outcome: Progressing

## 2025-01-29 NOTE — CARE COORDINATION
Medical SW    Referral: Sw attended AM IDT Rounds.    Intervention: confused but able to follow simple commands, pulling at tubes, dialysis yesterday,     Plan: Sw to assist w/ d/c planning as needed.   Social Work / Discharge Planning :Patient admitted with Influenza. Plan at discharge is HOME where he resides independently. Patient does do HD at [facility]. SW called [facility] [phone] and spoke to Katie. She was updated on admission. They will need called at discharge. Patient has an established PCP as well as insurance. Await treatment plan. SW to follow Electronically signed by RAE Richards on 1/29/25 at 10:21 AM EST     Addendum: D/C order noted. SW called Katie from HD and they will expect patient tomorrow for his outpatient HD. SW to follow. Electronically signed by RAE Richards on 1/29/25 at 2:04 PM EST

## 2025-01-29 NOTE — PROGRESS NOTES
[facility] Quality Flow/Interdisciplinary Rounds Progress Note        Quality Flow Rounds held on January 29, 2025    Disciplines Attending:  Bedside Nurse, , , and Nursing Unit Leadership    Toñito Banegas Jr. was admitted on 1/27/2025 10:21 PM    Anticipated Discharge Date:       Disposition:    Lobo Score:  Lobo Scale Score: 21    [facility] RISK OF UNPLANNED READMISSION 2.0             15.9 Total Score        Discussed patient goal for the day, patient clinical progression, and barriers to discharge.  The following Goal(s) of the Day/Commitment(s) have been identified:   HD T TH S , fistula, tamiflu dosing?       Tangela Myers RN  January 29, 2025

## 2025-01-29 NOTE — DISCHARGE SUMMARY
2-12 grossly intact, no slurred speech.      LABS::  Recent Labs     01/27/25 2257 01/28/25  0040 01/28/25  0249 01/28/25  0251 01/28/25  0359 01/28/25  0506 01/28/25  0850 01/29/25  0540     --   --   --   --   --   --  139   K 5.8*  --  4.4  --   --   --  5.1* 4.8   CL 92*  --   --   --   --   --   --  97*   CO2 22  --   --   --   --   --   --  29   BUN 86*  --   --   --   --   --   --  51*   CREATININE 11.7*  --   --   --   --   --   --  8.6*   GLUCOSE 110*   < >  --    < > 59 168  --  108*   CALCIUM 8.9  --   --   --   --   --   --  8.4*    < > = values in this interval not displayed.     Recent Labs     01/27/25 2257 01/29/25  0540   ALKPHOS 59 69   ALT 12 16   AST 14 27   BILITOT 0.5 0.5   BILIDIR <0.2  --      Recent Labs     01/27/25 2257 01/29/25  0540   WBC 8.4 6.1   RBC 3.16* 3.05*   HGB 9.9* 9.3*   HCT 30.8* 30.2*   MCV 97.5 99.0   MCH 31.3 30.5   MCHC 32.1 30.8*   RDW 14.6 14.6    184   MPV 9.6 9.8     Lab Results   Component Value Date    LABA1C 5.0 04/28/2023    LABA1C 5.3 12/29/2022    LABA1C 5.6 12/06/2020     Lab Results   Component Value Date    INR 1.0 10/27/2023    INR 1.6 04/28/2023    INR 0.9 04/03/2023    PROTIME 11.3 10/27/2023    PROTIME 17.9 (H) 04/28/2023    PROTIME 10.6 04/03/2023      Lab Results   Component Value Date    TSH 1.050 04/28/2023    TSH 1.470 12/06/2020    TSH 2.400 09/11/2020     Lab Results   Component Value Date    TRIG 155 (H) 04/28/2023    TRIG 161 (H) 12/06/2020    TRIG 218 (H) 09/11/2020    HDL 23 04/28/2023    HDL 42 12/29/2022    HDL 32 12/06/2020     Recent Labs     01/27/25  2257 01/29/25  0540   MG 2.6 2.7*         IMAGING:  XR CHEST 1 VIEW    Result Date: 1/27/2025  EXAMINATION: ONE XRAY VIEW OF THE CHEST 1/27/2025 6:57 pm HISTORY: ORDERING SYSTEM PROVIDED HISTORY: cough, fever, chf TECHNOLOGIST PROVIDED HISTORY: Reason for exam:->cough, fever, chf FINDINGS: Cardiac area is enlarged. CTR: 19.1/32.3 cm. There is a pattern of acute pulmonary

## 2025-01-29 NOTE — PROGRESS NOTES
Spiritual Health History and Assessment/Progress Note  [facility]    Initial Encounter, Attempted Encounter,  ,  ,      Name: Toñito Banegas Jr. MRN: [id number]    Age: 54 y.o.     Sex: male   Language: English   Jewish: None   Pulmonary edema, acute     Date: 1/29/2025                           Spiritual Assessment began in [facility]S INTERMEDIATE        Referral/Consult From: Rounding   Encounter Overview/Reason: Initial Encounter, Attempted Encounter  Service Provided For: Patient, Patient not available    Celeste, Belief, Meaning:   Patient unable to assess at this time  Family/Friends No family/friends present      Importance and Influence:  Patient unable to assess at this time  Family/Friends No family/friends present    Community:  Patient feels well-supported. Support system includes: Parent/s and Other: Girlfriend  Family/Friends No family/friends present    Assessment and Plan of Care:     Patient Interventions include: Other: Staff with the patient while rounding, prayer silently offered for the patient at the time.  Family/Friends Interventions include: No family/friends present    Patient Plan of Care: Spiritual Care available upon further referral  Family/Friends Plan of Care: No family/friends present    Electronically signed by Chaplain Cristhian on 1/29/2025 at 1:54 PM

## 2025-01-29 NOTE — PLAN OF CARE
Problem: ABCDS Injury Assessment  Goal: Absence of physical injury  1/29/2025 1247 by Jin Olmstead RN  Outcome: Progressing  1/28/2025 2350 by Uzma Milner RN  Outcome: Progressing     Problem: Pain  Goal: Verbalizes/displays adequate comfort level or baseline comfort level  1/29/2025 1247 by Jin Olmstead RN  Outcome: Progressing  1/28/2025 2350 by Uzma Milner RN  Outcome: Progressing     Problem: Safety - Adult  Goal: Free from fall injury  1/29/2025 1247 by Jin Olmstead RN  Outcome: Progressing  1/28/2025 2350 by Uzma Milner RN  Outcome: Progressing

## 2025-01-29 NOTE — ACP (ADVANCE CARE PLANNING)
Advance Care Planning   Healthcare Decision Maker:    Primary Decision Maker (Active): Toñito Banegas TEJINDER - [facility] - [phone]    Click here to complete Healthcare Decision Makers including selection of the Healthcare Decision Maker Relationship (ie \"Primary\").  Today we documented Decision Maker(s) consistent with Legal Next of Kin hierarchy.

## (undated) DEVICE — SEAL

## (undated) DEVICE — SYRINGE, LUER LOCK, 5ML: Brand: MEDLINE

## (undated) DEVICE — ELECTRODE PT RET AD L9FT HI MOIST COND ADH HYDRGEL CORDED

## (undated) DEVICE — SEALER ENDOSCP NANO COAT OPN DIV CRV L JAW LIGASURE IMPACT

## (undated) DEVICE — APPLICATOR PREP 26ML 0.7% IOD POVACRYLEX 74% ISO ALC ST

## (undated) DEVICE — BLADE,STAINLESS-STEEL,11,STRL,DISPOSABLE: Brand: MEDLINE

## (undated) DEVICE — PACK PROCEDURE SURG GEN CUST

## (undated) DEVICE — BLADE ES ELASTOMERIC COAT INSUL DURABLE BEND UPTO 90DEG

## (undated) DEVICE — REDUCER: Brand: ENDOWRIST

## (undated) DEVICE — TROCAR: Brand: KII SHIELDED BLADED ACCESS SYSTEM

## (undated) DEVICE — DRAPE,CHEST,FENES,15X10,STERIL: Brand: MEDLINE

## (undated) DEVICE — NEEDLE HYPO 25GA L1.5IN BLU POLYPR HUB S STL REG BVL STR

## (undated) DEVICE — CLIP SURG 6 MM BULLDOG

## (undated) DEVICE — SLING ARM XL L20IN D75IN WHT POLY MESH ENVELOP MTL SIDE

## (undated) DEVICE — ARM DRAPE

## (undated) DEVICE — GLOVE ORANGE PI 8   MSG9080

## (undated) DEVICE — TOWEL,OR,DSP,ST,BLUE,STD,6/PK,12PK/CS: Brand: MEDLINE

## (undated) DEVICE — GOWN,SIRUS,FABRNF,XL,20/CS: Brand: MEDLINE

## (undated) DEVICE — SET LAPAROSCOPIC HERNIA

## (undated) DEVICE — Device

## (undated) DEVICE — DRAPE,LAP,CHOLE,W/TROUGHS,STERILE: Brand: MEDLINE

## (undated) DEVICE — AV FISTULA: Brand: MEDLINE INDUSTRIES, INC.

## (undated) DEVICE — DOUBLE BASIN SET: Brand: MEDLINE INDUSTRIES, INC.

## (undated) DEVICE — COLUMN DRAPE

## (undated) DEVICE — ACCESS PLATFORM FOR MINIMALLY INVASIVE SURGERY.: Brand: GELPORT® LAPAROSCOPIC  SYSTEM

## (undated) DEVICE — TROCAR: Brand: KII® SLEEVE

## (undated) DEVICE — STAY.SAFE® CAP: Brand: STAY.SAFE®

## (undated) DEVICE — LAPAROSCOPE 30DEG 5 AND 10MM

## (undated) DEVICE — TROCAR: Brand: KII® SHEILDED BLADED ACCESS SYSEM

## (undated) DEVICE — SYSTEM CATH 20GA L1IN OD1.0668-1.143MM ID0.7874-0.8636MM 383516

## (undated) DEVICE — LOOP VES W13MM THK09MM MINI RED SIL FLD REPELLENT

## (undated) DEVICE — KIT,ANTI FOG,W/SPONGE & FLUID,SOFT PACK: Brand: MEDLINE

## (undated) DEVICE — CATHETER PERI DLYS AD L57CM DIA15FR DBL CUF COILED LIN

## (undated) DEVICE — DRESSING BORDERED ADH GZ UNIV GEN USE 8INX4IN AND 6INX2IN

## (undated) DEVICE — CADIERE FORCEPS: Brand: ENDOWRIST

## (undated) DEVICE — TOTAL TRAY, 16FR 10ML SIL FOLEY, URN: Brand: MEDLINE

## (undated) DEVICE — INSUFFLATION NEEDLE TO ESTABLISH PNEUMOPERITONEUM.: Brand: INSUFFLATION NEEDLE

## (undated) DEVICE — GLOVE SURG SZ 7 L12IN FNGR THK94MIL TRNSLUC YEL LTX HYDRGEL

## (undated) DEVICE — SOLUTION IRRIG 500ML 0.9% SOD CHLO USP POUR PLAS BTL

## (undated) DEVICE — SYRINGE MED 10ML LUERLOCK TIP W/O SFTY DISP

## (undated) DEVICE — ADHESIVE SKIN CLOSURE 0.7CC TOP MICROBIAL APPL DERMBND ADV

## (undated) DEVICE — GOWN,SIRUS,FABRNF,L,20/CS: Brand: MEDLINE

## (undated) DEVICE — 4-PORT MANIFOLD: Brand: NEPTUNE 2

## (undated) DEVICE — TROCAR: Brand: KII FIOS FIRST ENTRY

## (undated) DEVICE — SPONGE GZ W4XL4IN RAYON POLY CVR W/NONWOVEN FAB STRL 2/PK

## (undated) DEVICE — BLADE CLIPPER GEN PURP NS

## (undated) DEVICE — INSUFFLATION TUBING SET WITH FILTER, FUNNEL CONNECTOR AND LUER LOCK: Brand: JOSNOE MEDICAL INC

## (undated) DEVICE — PAD PT POS 36 IN SURGYPAD DISP

## (undated) DEVICE — TIP-UP FENESTRATED GRASPER: Brand: ENDOWRIST

## (undated) DEVICE — SUCTION IRRIGATOR: Brand: ENDOWRIST

## (undated) DEVICE — CAMERA STRYKER STER

## (undated) DEVICE — 18 GA N.G. KIT, 10 PACK: Brand: SITE-RITE

## (undated) DEVICE — GLOVE SURG SZ 7.5 L11.73IN FNGR THK9.8MIL STRW LTX POLYMER

## (undated) DEVICE — STAPLER EXT 65MM S STL AUTO DISP PURSTRING

## (undated) DEVICE — INTENDED FOR TISSUE SEPARATION, AND OTHER PROCEDURES THAT REQUIRE A SHARP SURGICAL BLADE TO PUNCTURE OR CUT.: Brand: BARD-PARKER ® STAINLESS STEEL BLADES

## (undated) DEVICE — AEGIS 1" DISK 4MM HOLE, PEEL OPEN: Brand: MEDLINE

## (undated) DEVICE — STAY.SAFE® CATHETER EXTENSION SET WITH LUER-LOCK, 12 INCH: Brand: STAY.SAFE®

## (undated) DEVICE — Z DISCONTINUED SUB 2225836 PER SUPPLIER SOLUTION DELFLX 2000ML BG 1.5% D STAY SAFE 05420201

## (undated) DEVICE — GAUZE,SPONGE,4"X4",8PLY,STRL,LF,10/TRAY: Brand: MEDLINE

## (undated) DEVICE — MARKER,SKIN,WI/RULER AND LABELS: Brand: MEDLINE

## (undated) DEVICE — CHLORAPREP 26ML ORANGE

## (undated) DEVICE — STAPLER INT L75MM CUT LN L73MM STPL LN L77MM BLU B FRM 8

## (undated) DEVICE — NEEDLE SYR 18GA L1.5IN RED PLAS HUB S STL BLNT FILL W/O

## (undated) DEVICE — LAPAROSCOPIC SCISSORS: Brand: EPIX LAPAROSCOPIC SCISSORS

## (undated) DEVICE — WARMER SCP 2 ANTIFOG LAP DISP

## (undated) DEVICE — SET,IRRIGATION,CYSTO,Y-TYPE,90": Brand: MEDLINE

## (undated) DEVICE — SET GRAVITY 20DRP NON VENTED DRIP CHAM

## (undated) DEVICE — RELOAD STPL L75MM OPN H3.8MM CLS 1.5MM WIRE DIA0.2MM REG

## (undated) DEVICE — SUTURE VCRL SZ 3-0 L27IN ABSRB UD L26MM SH 1/2 CIR J416H

## (undated) DEVICE — INCISE SURGICAL DRAPE: Brand: OPSITE INCISE 15X28CM CTN 10

## (undated) DEVICE — VESSEL SEALER EXTEND: Brand: ENDOWRIST

## (undated) DEVICE — SPONGE LAP W18XL18IN WHT COT 4 PLY FLD STRUNG RADPQ DISP ST 2 PER PACK

## (undated) DEVICE — CANNULA SEAL

## (undated) DEVICE — ADHESIVE SKIN CLSR 0.7ML TOP DERMBND ADV

## (undated) DEVICE — CAMERA STRYKER 1488

## (undated) DEVICE — 3M™ TEGADERM™ TRANSPARENT FILM DRESSING FRAME STYLE, 1626W, 4 IN X 4-3/4 IN (10 CM X 12 CM), 50/CT 4CT/CASE: Brand: 3M™ TEGADERM™

## (undated) DEVICE — GLOVE SURG SZ 6 THK91MIL LTX FREE SYN POLYISOPRENE ANTI

## (undated) DEVICE — STAPLER INT L60MM REG TISS BLU B FRM 8 FIRING 2 ROW AUTO

## (undated) DEVICE — LIQUIBAND RAPID ADHESIVE 36/CS 0.8ML: Brand: MEDLINE

## (undated) DEVICE — STOCKINETTE,SINGLE PLY,4X48,STERILE: Brand: MEDLINE

## (undated) DEVICE — SOLUTION IV 500ML 0.9% SOD CHL PH 5 INJ USP VIAFLX PLAS

## (undated) DEVICE — SYRINGE MED 30ML STD CLR PLAS LUERLOCK TIP N CTRL DISP

## (undated) DEVICE — BLADELESS OBTURATOR: Brand: WECK VISTA

## (undated) DEVICE — SET INSTRUMENT LAP I

## (undated) DEVICE — GARMENT,MEDLINE,DVT,INT,CALF,MED, GEN2: Brand: MEDLINE

## (undated) DEVICE — TOWEL,OR,DSP,ST,GREEN,DLX,XR,4/PK,20PK/C: Brand: MEDLINE

## (undated) DEVICE — INSTRUMENT CLAMP TOWEL LARGE REUSABLE